# Patient Record
Sex: FEMALE | Race: WHITE | NOT HISPANIC OR LATINO | Employment: OTHER | URBAN - METROPOLITAN AREA
[De-identification: names, ages, dates, MRNs, and addresses within clinical notes are randomized per-mention and may not be internally consistent; named-entity substitution may affect disease eponyms.]

---

## 2017-08-02 ENCOUNTER — OFFICE VISIT (OUTPATIENT)
Dept: PULMONOLOGY | Facility: CLINIC | Age: 77
End: 2017-08-02
Payer: MEDICARE

## 2017-08-02 VITALS
WEIGHT: 181 LBS | BODY MASS INDEX: 32.07 KG/M2 | OXYGEN SATURATION: 95 % | HEIGHT: 63 IN | DIASTOLIC BLOOD PRESSURE: 80 MMHG | SYSTOLIC BLOOD PRESSURE: 120 MMHG | HEART RATE: 69 BPM

## 2017-08-02 DIAGNOSIS — R09.82 POSTNASAL DRIP: ICD-10-CM

## 2017-08-02 DIAGNOSIS — Z87.891 PERSONAL HISTORY OF TOBACCO USE, PRESENTING HAZARDS TO HEALTH: ICD-10-CM

## 2017-08-02 DIAGNOSIS — R05.9 COUGH: Primary | ICD-10-CM

## 2017-08-02 DIAGNOSIS — R91.8 ABNORMAL CT SCAN OF LUNG: ICD-10-CM

## 2017-08-02 DIAGNOSIS — G47.33 OBSTRUCTIVE SLEEP APNEA SYNDROME: ICD-10-CM

## 2017-08-02 DIAGNOSIS — J40 BRONCHITIS: ICD-10-CM

## 2017-08-02 DIAGNOSIS — E66.9 NON MORBID OBESITY, UNSPECIFIED OBESITY TYPE: ICD-10-CM

## 2017-08-02 PROCEDURE — 99204 OFFICE O/P NEW MOD 45 MIN: CPT | Mod: ,,, | Performed by: INTERNAL MEDICINE

## 2017-08-02 PROCEDURE — 1159F MED LIST DOCD IN RCRD: CPT | Mod: ,,, | Performed by: INTERNAL MEDICINE

## 2017-08-02 RX ORDER — METOPROLOL SUCCINATE 50 MG/1
100 TABLET, EXTENDED RELEASE ORAL DAILY
COMMUNITY
End: 2020-02-06 | Stop reason: CLARIF

## 2017-08-02 RX ORDER — ASPIRIN 325 MG
100 TABLET, DELAYED RELEASE (ENTERIC COATED) ORAL DAILY
COMMUNITY

## 2017-08-02 RX ORDER — ANASTROZOLE 1 MG/1
1 TABLET ORAL DAILY
COMMUNITY
End: 2018-04-26 | Stop reason: SDUPTHER

## 2017-08-02 RX ORDER — OMEPRAZOLE 20 MG/1
20 CAPSULE, DELAYED RELEASE ORAL DAILY
COMMUNITY
End: 2018-03-22 | Stop reason: ALTCHOICE

## 2017-08-02 RX ORDER — ALBUTEROL SULFATE 90 UG/1
2 AEROSOL, METERED RESPIRATORY (INHALATION) EVERY 6 HOURS PRN
Qty: 18 G | Refills: 5 | Status: SHIPPED | OUTPATIENT
Start: 2017-08-02 | End: 2018-03-22 | Stop reason: SDUPTHER

## 2017-08-02 RX ORDER — AMLODIPINE BESYLATE 5 MG/1
5 TABLET ORAL 2 TIMES DAILY
COMMUNITY
End: 2020-09-02

## 2017-08-02 RX ORDER — LOSARTAN POTASSIUM 25 MG/1
25 TABLET ORAL DAILY
COMMUNITY
End: 2018-03-22

## 2017-08-02 RX ORDER — LORATADINE 10 MG/1
10 TABLET ORAL DAILY
COMMUNITY
End: 2020-02-06 | Stop reason: CLARIF

## 2017-08-02 RX ORDER — TRAMADOL HYDROCHLORIDE 50 MG/1
50 TABLET ORAL EVERY 6 HOURS PRN
COMMUNITY
End: 2018-10-04 | Stop reason: SDUPTHER

## 2017-08-02 RX ORDER — MELOXICAM 7.5 MG/1
7.5 TABLET ORAL DAILY
COMMUNITY
End: 2017-08-02

## 2017-08-02 RX ORDER — BENAZEPRIL HYDROCHLORIDE 20 MG/1
20 TABLET ORAL DAILY
COMMUNITY
End: 2017-08-02

## 2017-08-02 RX ORDER — CHOLECALCIFEROL (VITAMIN D3) 25 MCG
1000 TABLET ORAL DAILY
COMMUNITY

## 2017-08-02 RX ORDER — METFORMIN HYDROCHLORIDE 500 MG/1
500 TABLET ORAL 2 TIMES DAILY WITH MEALS
COMMUNITY

## 2017-08-02 RX ORDER — ASPIRIN 81 MG/1
81 TABLET ORAL DAILY
COMMUNITY

## 2017-08-02 RX ORDER — ATORVASTATIN CALCIUM 40 MG/1
40 TABLET, FILM COATED ORAL DAILY
COMMUNITY
End: 2020-02-06 | Stop reason: CLARIF

## 2017-08-02 NOTE — PROGRESS NOTES
New Office Visit/Consultation Note    Patient Name: Mirta Guerin  MRN: [id number]  : 1940      Reason for visit: Cough, bronchitis, chest pain (since surgery)    HPI:     78 yo with h/o cigarette use had CABG ( Dr Cardenas) has had issues with chronic chest pain since that time (though it is improved), had CT chest - no significant findings but had some mediastinal adenopathy (? Reactive), had PFT which showed no obstruction, no restriction, mild decrease in DLCO.  Had a recent bronchitis (2017) and was treated with doxycyline and is improved (still with some cough now with white to yellow sputum).  She denies problems with SOB, STEPHENS.  Still with chest pain though it is better (states that she had point tenderness when seen by Dr Juarez).    Past Medical History  Past Medical History:   Diagnosis Date    Anxiety     AV block     Breast cancer     Cardiac pacemaker in situ     Coronary artery disease     Diabetes mellitus     GERD (gastroesophageal reflux disease)     HLD (hyperlipidemia)     Hypertension     Lung disease     Mitral valve disorder     NEGRITA (obstructive sleep apnea)     Psoriasis     Spinal stenosis     Vitamin D deficiency        Past Surgical History  Past Surgical History:   Procedure Laterality Date    BREAST LUMPECTOMY      CARDIAC PACEMAKER PLACEMENT  2012    CORONARY ARTERY BYPASS GRAFT  2017    MASTECTOMY, PARTIAL      UPPER GASTROINTESTINAL ENDOSCOPY         Medications  No current outpatient prescriptions on file prior to visit.     No current facility-administered medications on file prior to visit.        Allergies  Review of patient's allergies indicates:   Allergen Reactions    Benazepril Other (See Comments)     sleepy    Codeine Nausea And Vomiting    Polysporin [bacitracin-polymyxin b] Rash       Review of Systems  Review of Systems   Constitutional: Positive for malaise/fatigue. Negative for chills, diaphoresis, fever and weight loss.  "  HENT: Negative for congestion.    Eyes: Negative for pain.   Respiratory: Positive for cough and sputum production. Negative for hemoptysis, shortness of breath, wheezing and stridor.         H/o sleep apnea - intolerant   Cardiovascular: Positive for chest pain. Negative for palpitations, orthopnea, claudication, leg swelling and PND.        Musculoskeletal   Gastrointestinal: Positive for heartburn. Negative for abdominal pain, constipation, diarrhea, nausea and vomiting.   Genitourinary: Negative for dysuria, frequency and urgency.   Musculoskeletal: Negative for falls and myalgias.   Neurological: Negative for sensory change, focal weakness and weakness.   Psychiatric/Behavioral: Negative for depression. The patient is not nervous/anxious.        Physical Exam    Vitals:    08/02/17 1051   BP: 120/80   Pulse: 69   SpO2: 95%   Weight: 82.1 kg (181 lb)   Height: 5' 3" (1.6 m)       Physical Exam   Constitutional: She is oriented to person, place, and time. She appears well-developed and well-nourished. No distress.   HENT:   Head: Normocephalic and atraumatic.   Right Ear: External ear normal.   Left Ear: External ear normal.   Nose: Nose normal.   Mouth/Throat: Oropharynx is clear and moist.   Eyes: Conjunctivae and EOM are normal. Pupils are equal, round, and reactive to light.   Neck: Normal range of motion. Neck supple. No JVD present. No tracheal deviation present. No thyromegaly present.   Cardiovascular: Normal rate, regular rhythm, normal heart sounds and intact distal pulses.  Exam reveals no gallop and no friction rub.    No murmur heard.  Pulmonary/Chest: Effort normal and breath sounds normal. No stridor. No respiratory distress. She has no wheezes. She has no rales. She exhibits no tenderness.   Abdominal: Soft. Bowel sounds are normal. She exhibits no distension. There is no tenderness.   Musculoskeletal: Normal range of motion. She exhibits no edema or tenderness.   Lymphadenopathy:     She has no " cervical adenopathy.   Neurological: She is alert and oriented to person, place, and time. No cranial nerve deficit.   Skin: Skin is warm and dry. She is not diaphoretic.   Psychiatric: She has a normal mood and affect. Her behavior is normal.   Nursing note and vitals reviewed.      Labs    Xrays    Imaging Results    None         Impression/Plan    Problem List Items Addressed This Visit        Neuro    Obstructive sleep apnea syndrome  - d/w pt, will consider further evaluation       Pulmonary    Bronchitis  - better    Cough - Primary  - had essentially normal PFT  - trial of prn proair and will follow    Relevant Medications    albuterol 90 mcg/actuation inhaler       Fluids/Electrolytes/Nutrition/GI    Non morbid obesity  - needs to work on weight loss       Other    Postnasal drip  - continue with prn treatment    Personal history of tobacco use, presenting hazards to health  - has stopped    Abnormal CT scan of lung  - will repeat CT to make sure no changes and that nodes resolve  - RTC 1 month    Relevant Orders    CT Chest Without Contrast      Other Visit Diagnoses    None.         Time spent with patient: 45 minutes

## 2017-08-14 ENCOUNTER — OFFICE VISIT (OUTPATIENT)
Dept: HEMATOLOGY/ONCOLOGY | Facility: CLINIC | Age: 77
End: 2017-08-14
Payer: MEDICARE

## 2017-08-14 ENCOUNTER — TELEPHONE (OUTPATIENT)
Dept: HEMATOLOGY/ONCOLOGY | Facility: CLINIC | Age: 77
End: 2017-08-14

## 2017-08-14 VITALS
HEIGHT: 62 IN | HEART RATE: 87 BPM | DIASTOLIC BLOOD PRESSURE: 81 MMHG | RESPIRATION RATE: 18 BRPM | WEIGHT: 181 LBS | SYSTOLIC BLOOD PRESSURE: 147 MMHG | BODY MASS INDEX: 33.31 KG/M2 | TEMPERATURE: 98 F

## 2017-08-14 DIAGNOSIS — Z17.0 MALIGNANT NEOPLASM OF UPPER-OUTER QUADRANT OF RIGHT BREAST IN FEMALE, ESTROGEN RECEPTOR POSITIVE: Primary | ICD-10-CM

## 2017-08-14 DIAGNOSIS — C50.411 MALIGNANT NEOPLASM OF UPPER-OUTER QUADRANT OF RIGHT BREAST IN FEMALE, ESTROGEN RECEPTOR POSITIVE: Primary | ICD-10-CM

## 2017-08-14 DIAGNOSIS — I25.810 CORONARY ARTERY DISEASE INVOLVING AUTOLOGOUS VEIN CORONARY BYPASS GRAFT WITHOUT ANGINA PECTORIS: ICD-10-CM

## 2017-08-14 PROCEDURE — 3008F BODY MASS INDEX DOCD: CPT | Mod: ,,, | Performed by: INTERNAL MEDICINE

## 2017-08-14 PROCEDURE — 99214 OFFICE O/P EST MOD 30 MIN: CPT | Mod: ,,, | Performed by: INTERNAL MEDICINE

## 2017-08-14 PROCEDURE — 1159F MED LIST DOCD IN RCRD: CPT | Mod: ,,, | Performed by: INTERNAL MEDICINE

## 2017-08-14 PROCEDURE — 1125F AMNT PAIN NOTED PAIN PRSNT: CPT | Mod: ,,, | Performed by: INTERNAL MEDICINE

## 2017-08-14 RX ORDER — PANTOPRAZOLE SODIUM 40 MG/1
TABLET, DELAYED RELEASE ORAL
COMMUNITY
Start: 2017-08-05 | End: 2021-05-24

## 2017-08-14 RX ORDER — DULOXETIN HYDROCHLORIDE 20 MG/1
20 CAPSULE, DELAYED RELEASE ORAL DAILY
COMMUNITY
End: 2018-04-26 | Stop reason: SDUPTHER

## 2017-08-14 NOTE — LETTER
August 14, 2017      James Jung MD  [address] [place] [address]           [facility]  [address]  [address]  [facility] [address]  Phone: [phone]  Fax: [phone]          Patient: Mirta Guerin   MR Number: [id number]   YOB: 1940   Date of Visit: 8/14/2017       Dear Dr. James Jung:    Thank you for referring Mirta Guerin to me for evaluation. Attached you will find relevant portions of my assessment and plan of care.    If you have questions, please do not hesitate to call me. I look forward to following Mirta Guerin along with you.    Sincerely,    ZOE Fontenot MD    Enclosure  CC:  No Recipients    If you would like to receive this communication electronically, please contact [email or web address] or [phone] to request more information on [employer] Link access.    For providers and/or their staff who would like to refer a patient to [facility], please contact us through our one-stop-shop provider referral line, [facility], at [phone].    If you feel you have received this communication in error or would no longer like to receive these types of communications, please e-mail [email or web address]

## 2017-08-14 NOTE — PROGRESS NOTES
[facility] History & Physical    Subjective:      Patient ID:   Mirta Guerin  77 y.o. female  1940  James Jung M.D.. Barbie Castillo M.D., Tony Goldberg.      Chief Complaint:breast cancer follow-up    HPI:  77 y.o. female with diagnosis of stage I right breast cancer.Diagnosed in 2015. Treated with partial mastectomy and sentinel node biopsy, followed by adjuvant radiation therapy, she started adjuvant Arimidex in 2016.    ERP was positive, PRP was positive, HER-2/asaf was negative.hot flash symptoms are controlled on Cymbalta. Joint pain symptoms have not been a problem for her. She has history of hypertension, diabetes, GERD,hypercholesterolemia, DJD, and sleep apnea syndrome.    She has history of left partial mastectomy and sentinel node biopsy, pacemaker placement, facial surgery, right and left knee replacement, complete hysterectomy. She had CABG surgery in 2016.    Since her CABG surgery, she complains of continued substernal chest pain. CAT scan in 2016 at [facility] did show some inflammation in the soft tissue in the sternal area. She recently had another CAT scan completed at [facility] imaging center and report is pending.    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    Her dad had CVA, her mother  of ovarian cancer at age 86, her brother has had 2 or 3 CVA, her sister has had bladder cancer, she has a daughter with hypoglycemia.    She is allergic to codeine benazepril, Polysporin, and triple antibiotics ointment.    Dr. James Jung is her primary care physician. Dr. Wero Moreno is her pulmonologist.  Dr. Juarez is her cardiologist.    She is 5 foot 2 inches tall and weighs 181 pounds    ROS:   GEN: normal without any fever, night sweats or weight loss  HEENT: normal with no HA's, sore throat, stiff neck, changes in vision  CV: see history of present illness.   no CP, SOB, PND, STEPHENS  or orthopnea  PULM: see history of present illness.   no SOB, cough, hemoptysis, sputum or pleuritic pain  GI: normal with no abdominal pain, nausea, vomiting, constipation, diarrhea, melanotic stools, BRBPR, or hematemesis  : normal with no hematuria, dysuria  BREAST: see history of present illness  SKIN: normal with no rash, erythema, bruising, or swelling     Past Medical History:   Diagnosis Date    Anxiety     AV block     Breast cancer     Cardiac pacemaker in situ     Coronary artery disease     Diabetes mellitus     GERD (gastroesophageal reflux disease)     HLD (hyperlipidemia)     Hypertension     Lung disease     Mitral valve disorder     NEGRITA (obstructive sleep apnea)     Psoriasis     Spinal stenosis     Vitamin D deficiency      Past Surgical History:   Procedure Laterality Date    BREAST LUMPECTOMY      CARDIAC PACEMAKER PLACEMENT  04/05/2012    CORONARY ARTERY BYPASS GRAFT  07/2017    MASTECTOMY, PARTIAL      UPPER GASTROINTESTINAL ENDOSCOPY         Review of patient's allergies indicates:   Allergen Reactions    Benazepril Other (See Comments)     sleepy    Codeine Nausea And Vomiting    Polysporin [bacitracin-polymyxin b] Rash     Social History     Social History    Marital status:      Spouse name: N/A    Number of children: N/A    Years of education: N/A     Occupational History    Not on file.     Social History Main Topics    Smoking status: Current Every Day Smoker     Packs/day: 0.50     Years: 50.00     Types: Cigarettes     Start date: 1954    Smokeless tobacco: Never Used    Alcohol use Yes      Comment: occasional    Drug use: Unknown    Sexual activity: Not on file     Other Topics Concern    Not on file     Social History Narrative    No narrative on file         Current Outpatient Prescriptions:     albuterol 90 mcg/actuation inhaler, Inhale 2 puffs into the lungs every 6 (six) hours as needed for Wheezing. Rescue, Disp: 18 g, Rfl: 5     "amlodipine (NORVASC) 5 MG tablet, Take 5 mg by mouth once daily., Disp: , Rfl:     anastrozole (ARIMIDEX) 1 mg Tab, Take 1 mg by mouth once daily., Disp: , Rfl:     aspirin (ECOTRIN) 81 MG EC tablet, Take 81 mg by mouth once daily., Disp: , Rfl:     atorvastatin (LIPITOR) 40 MG tablet, Take 40 mg by mouth once daily., Disp: , Rfl:     co-enzyme Q-10 50 mg capsule, Take 100 mg by mouth once daily., Disp: , Rfl:     duloxetine (CYMBALTA) 20 MG capsule, Take 20 mg by mouth once daily., Disp: , Rfl:     loratadine (CLARITIN) 10 mg tablet, Take 10 mg by mouth once daily., Disp: , Rfl:     losartan (COZAAR) 25 MG tablet, Take 25 mg by mouth once daily., Disp: , Rfl:     metformin (GLUCOPHAGE) 500 MG tablet, Take 500 mg by mouth 2 (two) times daily with meals., Disp: , Rfl:     metoprolol succinate (TOPROL-XL) 100 MG 24 hr tablet, Take 100 mg by mouth once daily., Disp: , Rfl:     MULTIVIT-MIN/FA/LUTEIN/ZEAXANT (MACULAR VITAMIN ORAL), Take by mouth., Disp: , Rfl:     multivitamin capsule, Take 1 capsule by mouth once daily., Disp: , Rfl:     omeprazole (PRILOSEC) 20 MG capsule, Take 20 mg by mouth once daily., Disp: , Rfl:     pantoprazole (PROTONIX) 40 MG tablet, , Disp: , Rfl:     tramadol (ULTRAM) 50 mg tablet, Take 50 mg by mouth every 6 (six) hours as needed for Pain., Disp: , Rfl:     vitamin D 1000 units Tab, Take 1,000 Units by mouth once daily., Disp: , Rfl:           Objective:   Vitals:  Blood pressure (!) 147/81, pulse 87, temperature 97.6 °F (36.4 °C), resp. rate 18, height 5' 2" (1.575 m), weight 82.1 kg (181 lb).    Physical Examination:   GEN: no apparent distress, comfortable; AAOx3  HEAD: atraumatic and normocephalic  EYES: no pallor, no icterus, PERRLA  ENT: OMM, no pharyngeal erythema, external ears WNL; no nasal discharge; no thrush  NECK: no masses, thyroid normal, trachea midline, no LAD/LN's, supple  CV: RRR with no murmur the skin  at the sternal area is healed and closed, nontender " without warmth or redness or fluctuance  CHEST: Normal respiratory effort; CTAB; distantbreath sounds; no wheeze or crackles  ABDOM: nontender and nondistended; soft; normal bowel sounds; no rebound/guarding  MUSC/Skeletal: ROM normal; no crepitus; joints normal  EXTREM: no clubbing, cyanosis, inflammation or swelling  SKIN: no rashes, lesions, ulcers, petechia  : no wolf  NEURO: grossly intact; motor/sensory WNL; AAOx3; no tremors  PSYCH: normal mood, affect and behavior  LYMPH: normal cervical, supraclavicular, axillary and groin LN's  BREAST:the left breast shows postoperative change, nontender, without palpable mass. The right breast is nontender without palpable mass.      Labs:     Radiology/Diagnostic Studies:    Mammogram 01/13/2017 was negative for malignancy at [facility]. Follow-up mammogram is scheduled for January 2018 at [facility]      All lab results and imaging results have been reviewed and discussed with the patient    Assessment:   (1) 77 y.o. female with diagnosis of stage I right breast cancer treated with right partial mastectomy, adjuvant radiation therapy, and continues on adjuvant Arimidex now. Originally diagnosed in May 2015. No evidence of disease on physical exam.Due for mammogram in January 2018.    (2) hot flash symptoms secondary to Arimidex are managed with Cymbalta 20 mg by mouth daily.    (3)sleep apnea syndrome under management of pulmonary    (4)substernal chest pain evaluation under investigation per . CAT scan of chest is pending. Return to clinic here in 6 months with mammogram at that time.          1. Malignant neoplasm of upper-outer quadrant of right breast in female, estrogen receptor positive    2. Coronary artery disease involving autologous vein coronary bypass graft without angina pectoris        Plan:       Malignant neoplasm of upper-outer quadrant of right breast in female, estrogen receptor positive  -     Mammo Diagnostic Bilateral; Future; Expected date:  02/14/2018    Coronary artery disease involving autologous vein coronary bypass graft without angina pectoris      Return in about 6 months (around 2/14/2018) for follow up on cancer status.    I have explained and the patient understands all of  the current recommendation(s). I have answered all of their questions to the best of my ability and to their complete satisfaction.

## 2017-09-21 ENCOUNTER — OFFICE VISIT (OUTPATIENT)
Dept: PULMONOLOGY | Facility: CLINIC | Age: 77
End: 2017-09-21
Payer: MEDICARE

## 2017-09-21 VITALS
OXYGEN SATURATION: 98 % | WEIGHT: 180 LBS | HEART RATE: 76 BPM | SYSTOLIC BLOOD PRESSURE: 110 MMHG | DIASTOLIC BLOOD PRESSURE: 68 MMHG | HEIGHT: 62 IN | BODY MASS INDEX: 33.13 KG/M2

## 2017-09-21 DIAGNOSIS — R91.8 ABNORMAL CT SCAN OF LUNG: Primary | ICD-10-CM

## 2017-09-21 DIAGNOSIS — Z87.891 PERSONAL HISTORY OF TOBACCO USE, PRESENTING HAZARDS TO HEALTH: ICD-10-CM

## 2017-09-21 DIAGNOSIS — G47.33 OBSTRUCTIVE SLEEP APNEA SYNDROME: ICD-10-CM

## 2017-09-21 PROCEDURE — 99214 OFFICE O/P EST MOD 30 MIN: CPT | Mod: ,,, | Performed by: INTERNAL MEDICINE

## 2017-09-21 PROCEDURE — 1159F MED LIST DOCD IN RCRD: CPT | Mod: ,,, | Performed by: INTERNAL MEDICINE

## 2017-09-21 NOTE — PROGRESS NOTES
Office Visit    HPI:    Here for follow up, feels better overall.  CT shows no adenopathy or lung disease, + ASCVD.  She is smoking a few cigarettes (d/w pt).  No other new issues.    Medications      Current Outpatient Prescriptions:     amlodipine (NORVASC) 5 MG tablet, Take 5 mg by mouth once daily., Disp: , Rfl:     anastrozole (ARIMIDEX) 1 mg Tab, Take 1 mg by mouth once daily., Disp: , Rfl:     aspirin (ECOTRIN) 81 MG EC tablet, Take 81 mg by mouth once daily., Disp: , Rfl:     atorvastatin (LIPITOR) 40 MG tablet, Take 40 mg by mouth once daily., Disp: , Rfl:     co-enzyme Q-10 50 mg capsule, Take 100 mg by mouth once daily., Disp: , Rfl:     duloxetine (CYMBALTA) 20 MG capsule, Take 20 mg by mouth once daily., Disp: , Rfl:     loratadine (CLARITIN) 10 mg tablet, Take 10 mg by mouth once daily., Disp: , Rfl:     losartan (COZAAR) 25 MG tablet, Take 25 mg by mouth once daily., Disp: , Rfl:     metformin (GLUCOPHAGE) 500 MG tablet, Take 500 mg by mouth 2 (two) times daily with meals., Disp: , Rfl:     metoprolol succinate (TOPROL-XL) 100 MG 24 hr tablet, Take 100 mg by mouth once daily., Disp: , Rfl:     MULTIVIT-MIN/FA/LUTEIN/ZEAXANT (MACULAR VITAMIN ORAL), Take by mouth., Disp: , Rfl:     multivitamin capsule, Take 1 capsule by mouth once daily., Disp: , Rfl:     omeprazole (PRILOSEC) 20 MG capsule, Take 20 mg by mouth once daily., Disp: , Rfl:     pantoprazole (PROTONIX) 40 MG tablet, , Disp: , Rfl:     tramadol (ULTRAM) 50 mg tablet, Take 50 mg by mouth every 6 (six) hours as needed for Pain., Disp: , Rfl:     vitamin D 1000 units Tab, Take 1,000 Units by mouth once daily., Disp: , Rfl:     albuterol 90 mcg/actuation inhaler, Inhale 2 puffs into the lungs every 6 (six) hours as needed for Wheezing. Rescue, Disp: 18 g, Rfl: 5    Allergies    Review of patient's allergies indicates:   Allergen Reactions    Benazepril Other (See Comments)     sleepy    Codeine Nausea And Vomiting     "Polysporin [bacitracin-polymyxin b] Rash       Social History    History   Smoking Status    Current Every Day Smoker    Packs/day: 0.50    Years: 50.00    Types: Cigarettes    Start date: 1954   Smokeless Tobacco    Never Used       ETOH - 0 drinks per week.    Drug Use - 0    Occupation - retired    Family History    Family History   Problem Relation Age of Onset    Cancer Mother      ovarian    Coronary artery disease Father     Stroke Father     Stroke Brother     Stroke Maternal Grandfather     Cancer Paternal Grandmother      breast    Stroke Paternal Grandfather        ROS  ROS    Physical Exam    Vitals:    09/21/17 0950   BP: 110/68   Pulse: 76   SpO2: 98%   Weight: 81.6 kg (180 lb)   Height: 5' 2" (1.575 m)       Physical Exam    Lab    @RESUFAST (WBC,HGB,HCT,PLT)@    No results found for: NA, K, CL, CO2, GLU, BUN, CREATININE, CALCIUM, PROT, ALBUMIN, BILITOT, ALKPHOS, AST, ALT, ANIONGAP      Xray        Impression/Plan    Problem List Items Addressed This Visit        Pulmonary    Obstructive sleep apnea syndrome  - stable    Abnormal CT scan of lung - Primary  - repeat CT better, no further workup needed       Other    Personal history of tobacco use, presenting hazards to health  - PFT were OK  - d/w pt about need to stop  - RTC 6 ,months      Other Visit Diagnoses    None.         "

## 2018-02-03 ENCOUNTER — TELEPHONE (OUTPATIENT)
Dept: HEMATOLOGY/ONCOLOGY | Facility: CLINIC | Age: 78
End: 2018-02-03

## 2018-02-26 ENCOUNTER — OFFICE VISIT (OUTPATIENT)
Dept: HEMATOLOGY/ONCOLOGY | Facility: CLINIC | Age: 78
End: 2018-02-26
Payer: MEDICARE

## 2018-02-26 VITALS
DIASTOLIC BLOOD PRESSURE: 73 MMHG | TEMPERATURE: 98 F | SYSTOLIC BLOOD PRESSURE: 114 MMHG | HEART RATE: 79 BPM | RESPIRATION RATE: 18 BRPM | BODY MASS INDEX: 31.57 KG/M2 | WEIGHT: 178.19 LBS | HEIGHT: 63 IN

## 2018-02-26 DIAGNOSIS — G47.33 OBSTRUCTIVE SLEEP APNEA SYNDROME: ICD-10-CM

## 2018-02-26 DIAGNOSIS — Z17.0 MALIGNANT NEOPLASM OF UPPER-OUTER QUADRANT OF RIGHT BREAST IN FEMALE, ESTROGEN RECEPTOR POSITIVE: Primary | ICD-10-CM

## 2018-02-26 DIAGNOSIS — C50.411 MALIGNANT NEOPLASM OF UPPER-OUTER QUADRANT OF RIGHT BREAST IN FEMALE, ESTROGEN RECEPTOR POSITIVE: Primary | ICD-10-CM

## 2018-02-26 DIAGNOSIS — Z87.891 PERSONAL HISTORY OF TOBACCO USE, PRESENTING HAZARDS TO HEALTH: ICD-10-CM

## 2018-02-26 DIAGNOSIS — R91.8 ABNORMAL CT SCAN OF LUNG: ICD-10-CM

## 2018-02-26 DIAGNOSIS — I25.810 CORONARY ARTERY DISEASE INVOLVING AUTOLOGOUS VEIN CORONARY BYPASS GRAFT WITHOUT ANGINA PECTORIS: ICD-10-CM

## 2018-02-26 PROCEDURE — 1159F MED LIST DOCD IN RCRD: CPT | Mod: ,,, | Performed by: INTERNAL MEDICINE

## 2018-02-26 PROCEDURE — 99214 OFFICE O/P EST MOD 30 MIN: CPT | Mod: ,,, | Performed by: INTERNAL MEDICINE

## 2018-02-26 RX ORDER — METOPROLOL TARTRATE 50 MG/1
50 TABLET ORAL 2 TIMES DAILY
COMMUNITY
Start: 2018-02-11 | End: 2018-12-27

## 2018-02-26 RX ORDER — METOPROLOL TARTRATE 25 MG/1
25 TABLET, FILM COATED ORAL 2 TIMES DAILY
COMMUNITY
Start: 2018-01-02 | End: 2018-12-27

## 2018-02-26 RX ORDER — LOSARTAN POTASSIUM 100 MG/1
TABLET ORAL
COMMUNITY
Start: 2018-01-02 | End: 2021-05-24

## 2018-02-26 NOTE — LETTER
February 26, 2018      James Jung MD  [address] [place] [address]           [facility]  [address]  [address]  [facility] [address]  Phone: [phone]  Fax: [phone]          Patient: Mirta Guerin   MR Number: [id number]   YOB: 1940   Date of Visit: 2/26/2018       Dear Dr. James Jung:    Thank you for referring Mirta Guerin to me for evaluation. Attached you will find relevant portions of my assessment and plan of care.    If you have questions, please do not hesitate to call me. I look forward to following Mirta Guerin along with you.    Sincerely,    ZOE Fontenot MD    Enclosure  CC:  No Recipients    If you would like to receive this communication electronically, please contact [email or web address] or [phone] to request more information on [email or web address] Link access.    For providers and/or their staff who would like to refer a patient to [facility], please contact us through our one-stop-shop provider referral line, [facility], at [phone].    If you feel you have received this communication in error or would no longer like to receive these types of communications, please e-mail [email or web address]

## 2018-02-27 NOTE — PROGRESS NOTES
[facility] History & Physical    Subjective:      Patient ID:   Mirta Guerin  77 y.o. female  1940  James Jung M.D.. Barbie Castillo M.D., Tony Goldberg.      Chief Complaint:breast cancer follow-up    HPI:  77 y.o. female with diagnosis of stage I right breast cancer.Diagnosed in 2015. Treated with partial mastectomy and sentinel node biopsy, followed by adjuvant radiation therapy, she started adjuvant Arimidex in 2016.    ERP was positive, PRP was positive, HER-2/asaf was negative.hot flash symptoms are controlled on Cymbalta. Joint pain symptoms have not been a problem for her. She has history of hypertension, diabetes, GERD,hypercholesterolemia, DJD, and sleep apnea syndrome.    She has history of left partial mastectomy and sentinel node biopsy, pacemaker placement, facial surgery, right and left knee replacement, complete hysterectomy. She had CABG surgery in 2016.    Since her CABG surgery, she complains of continued substernal chest pain. CAT scan in 2016 at [facility] did show some inflammation in the soft tissue in the sternal area. She recently had another CAT scan completed at [facility] imaging center and report is pending.    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    Her dad had CVA, her mother  of ovarian cancer at age 86, her brother has had 2 or 3 CVA, her sister has had bladder cancer, she has a daughter with hypoglycemia.    She is allergic to codeine benazepril, Polysporin, and triple antibiotics ointment.    Dr. James Jung is her primary care physician. Dr. Wero Moreno is her pulmonologist.  Dr. Juarez is her cardiologist.  Dr. Talbot is her GYN.    She is 5 foot 2 inches tall and weighs 181 pounds    ROS:   GEN: normal without any fever, night sweats or weight loss  HEENT: normal with no HA's, sore throat, stiff neck, changes in vision  CV: see history of present  illness.   no CP, SOB, PND, STEPHENS or orthopnea  PULM: see history of present illness.   no SOB, cough, hemoptysis, sputum or pleuritic pain  GI: normal with no abdominal pain, nausea, vomiting, constipation, diarrhea, melanotic stools, BRBPR, or hematemesis  : normal with no hematuria, dysuria  BREAST: see history of present illness  SKIN: normal with no rash, erythema, bruising, or swelling     Past Medical History:   Diagnosis Date    Anxiety     AV block     Breast cancer     Cardiac pacemaker in situ     Coronary artery disease     Diabetes mellitus     GERD (gastroesophageal reflux disease)     HLD (hyperlipidemia)     Hypertension     Lung disease     Mitral valve disorder     NEGRITA (obstructive sleep apnea)     Psoriasis     Spinal stenosis     Vitamin D deficiency      Past Surgical History:   Procedure Laterality Date    BREAST LUMPECTOMY      CARDIAC PACEMAKER PLACEMENT  04/05/2012    CORONARY ARTERY BYPASS GRAFT  07/2017    MASTECTOMY, PARTIAL      UPPER GASTROINTESTINAL ENDOSCOPY         Review of patient's allergies indicates:   Allergen Reactions    Benazepril Other (See Comments)     sleepy    Codeine Nausea And Vomiting    Polysporin [bacitracin-polymyxin b] Rash     Social History     Social History    Marital status:      Spouse name: N/A    Number of children: N/A    Years of education: N/A     Occupational History    Not on file.     Social History Main Topics    Smoking status: Current Every Day Smoker     Packs/day: 0.50     Years: 50.00     Types: Cigarettes     Start date: 1954    Smokeless tobacco: Never Used    Alcohol use Yes      Comment: occasional    Drug use: Unknown    Sexual activity: Not on file     Other Topics Concern    Not on file     Social History Narrative    No narrative on file         Current Outpatient Prescriptions:     albuterol 90 mcg/actuation inhaler, Inhale 2 puffs into the lungs every 6 (six) hours as needed for Wheezing.  "Rescue, Disp: 18 g, Rfl: 5    amlodipine (NORVASC) 5 MG tablet, Take 5 mg by mouth once daily., Disp: , Rfl:     anastrozole (ARIMIDEX) 1 mg Tab, Take 1 mg by mouth once daily., Disp: , Rfl:     aspirin (ECOTRIN) 81 MG EC tablet, Take 81 mg by mouth once daily., Disp: , Rfl:     atorvastatin (LIPITOR) 40 MG tablet, Take 40 mg by mouth once daily., Disp: , Rfl:     co-enzyme Q-10 50 mg capsule, Take 100 mg by mouth once daily., Disp: , Rfl:     duloxetine (CYMBALTA) 20 MG capsule, Take 20 mg by mouth once daily., Disp: , Rfl:     loratadine (CLARITIN) 10 mg tablet, Take 10 mg by mouth once daily., Disp: , Rfl:     losartan (COZAAR) 100 MG tablet, , Disp: , Rfl:     losartan (COZAAR) 25 MG tablet, Take 25 mg by mouth once daily., Disp: , Rfl:     metformin (GLUCOPHAGE) 500 MG tablet, Take 500 mg by mouth 2 (two) times daily with meals., Disp: , Rfl:     metoprolol succinate (TOPROL-XL) 100 MG 24 hr tablet, Take 100 mg by mouth once daily., Disp: , Rfl:     metoprolol tartrate (LOPRESSOR) 25 MG tablet, , Disp: , Rfl:     metoprolol tartrate (LOPRESSOR) 50 MG tablet, , Disp: , Rfl:     MULTIVIT-MIN/FA/LUTEIN/ZEAXANT (MACULAR VITAMIN ORAL), Take by mouth., Disp: , Rfl:     multivitamin capsule, Take 1 capsule by mouth once daily., Disp: , Rfl:     omeprazole (PRILOSEC) 20 MG capsule, Take 20 mg by mouth once daily., Disp: , Rfl:     pantoprazole (PROTONIX) 40 MG tablet, , Disp: , Rfl:     tramadol (ULTRAM) 50 mg tablet, Take 50 mg by mouth every 6 (six) hours as needed for Pain., Disp: , Rfl:     vitamin D 1000 units Tab, Take 1,000 Units by mouth once daily., Disp: , Rfl:           Objective:   Vitals:  Blood pressure 114/73, pulse 79, temperature 97.9 °F (36.6 °C), resp. rate 18, height 5' 3" (1.6 m), weight 80.8 kg (178 lb 3.2 oz).    Physical Examination:   GEN: no apparent distress, comfortable  HEAD: atraumatic and normocephalic  EYES: no pallor, no icterus  ENT:  no pharyngeal erythema, external " ears WNL; no nasal discharge  NECK: no masses, thyroid normal, trachea midline, no LAD/LN's, supple  CV: RRR with no murmur the skin  at the sternal area is healed and closed, nontender without warmth or redness or fluctuance  CHEST: Normal respiratory effort; CTAB; distantbreath sounds; no wheeze or crackles  ABDOM: nontender and nondistended; soft; normal bowel sounds; no rebound/guarding, L/S NP  MUSC/Skeletal: ROM normal; no crepitus; joints normal  EXTREM: no clubbing, cyanosis, inflammation or swelling  SKIN: no rashes, lesions, ulcers, petechia  : no wolf  NEURO: grossly intact; motor/sensory WNL;  no tremors  PSYCH: normal mood, affect and behavior  LYMPH: normal cervical, supraclavicular, axillary and groin LN's  BREAST:the left breast shows postoperative change, nontender, without palpable mass. The right breast is nontender without palpable mass.      Labs:     Radiology/Diagnostic Studies:    Mammogram 1/2018 was negative for malignancy at [facility]. Follow-up mammogram is scheduled for January 2019 at [facility]      Assessment:   (1) 77 y.o. female with diagnosis of stage I right breast cancer treated with right partial mastectomy, adjuvant radiation therapy, and continues on adjuvant Arimidex now. Originally diagnosed in May 2015. No evidence of disease on physical exam.Due for mammogram in January 2019.    (2) hot flash symptoms secondary to Arimidex are managed with Cymbalta 20 mg by mouth daily.    (3)sleep apnea syndrome under management of pulmonary    (4)substernal chest pain hx

## 2018-03-22 ENCOUNTER — OFFICE VISIT (OUTPATIENT)
Dept: PULMONOLOGY | Facility: CLINIC | Age: 78
End: 2018-03-22
Payer: MEDICARE

## 2018-03-22 VITALS
OXYGEN SATURATION: 99 % | HEIGHT: 63 IN | WEIGHT: 181 LBS | HEART RATE: 90 BPM | BODY MASS INDEX: 32.07 KG/M2 | DIASTOLIC BLOOD PRESSURE: 70 MMHG | SYSTOLIC BLOOD PRESSURE: 110 MMHG

## 2018-03-22 DIAGNOSIS — G47.33 OBSTRUCTIVE SLEEP APNEA SYNDROME: Primary | ICD-10-CM

## 2018-03-22 DIAGNOSIS — R05.9 COUGH: ICD-10-CM

## 2018-03-22 PROCEDURE — 99214 OFFICE O/P EST MOD 30 MIN: CPT | Mod: ,,, | Performed by: INTERNAL MEDICINE

## 2018-03-22 RX ORDER — ALBUTEROL SULFATE 90 UG/1
2 AEROSOL, METERED RESPIRATORY (INHALATION) EVERY 6 HOURS PRN
Qty: 3 INHALER | Refills: 3 | Status: SHIPPED | OUTPATIENT
Start: 2018-03-22 | End: 2019-04-25

## 2018-03-22 NOTE — PROGRESS NOTES
"Office Visit    HPI:    9/21/2017 - Here for follow up, feels better overall.  CT shows no adenopathy or lung disease, + ASCVD.  She is smoking a few cigarettes (d/w pt).  No other new issues.    3/22/2018 - Here for follow up, feels ok and has had no new issues reported.  Still smokes a few cigarettes a day. No increase in SOB, STEPHENS.  Pt is currently stable on present medications with no recent increases in their symptoms or use of rescue medications.  I have reviewed the medical regimen and re-educated the pt on the role of rescue and controlling medications.  All questions answered.  Inhaler technique seems adequate.    SLEEP STUDY ORDER    Pt has evidence for sleep apnea including snoring, observed apneas, poor quality sleep, excessive somnolence ([place] Sleepiness Score - 13).  Medically she needs a sleep study for evaluation and diagnosis.  This is a face to face visit regarding the need for a sleep study and possible further evaluation and/or treatment.  She is interested in a home study.    Cigarette Counseling    Currently smoking < 1/4 packs per day  30 pack years    I have counseled pt for 3-5 minutes regarding cigarette cessation.  This has included the need to stop smoking as well as strategies, including but not limited to "cold turkey", CHANTIX (including risks and benefits of sailaja drug), nicotine replacement and WELLBUTRIN.    Medications      Current Outpatient Prescriptions:     albuterol 90 mcg/actuation inhaler, Inhale 2 puffs into the lungs every 6 (six) hours as needed for Wheezing. Rescue, Disp: 18 g, Rfl: 5    amlodipine (NORVASC) 5 MG tablet, Take 5 mg by mouth once daily., Disp: , Rfl:     anastrozole (ARIMIDEX) 1 mg Tab, Take 1 mg by mouth once daily., Disp: , Rfl:     aspirin (ECOTRIN) 81 MG EC tablet, Take 81 mg by mouth once daily., Disp: , Rfl:     atorvastatin (LIPITOR) 40 MG tablet, Take 40 mg by mouth once daily., Disp: , Rfl:     co-enzyme Q-10 50 mg capsule, Take 100 mg by " mouth once daily., Disp: , Rfl:     duloxetine (CYMBALTA) 20 MG capsule, Take 20 mg by mouth once daily., Disp: , Rfl:     loratadine (CLARITIN) 10 mg tablet, Take 10 mg by mouth once daily., Disp: , Rfl:     losartan (COZAAR) 100 MG tablet, , Disp: , Rfl:     metformin (GLUCOPHAGE) 500 MG tablet, Take 500 mg by mouth 2 (two) times daily with meals., Disp: , Rfl:     metoprolol succinate (TOPROL-XL) 50 MG 24 hr tablet, Take 100 mg by mouth once daily., Disp: , Rfl:     metoprolol tartrate (LOPRESSOR) 25 MG tablet, 25 mg 2 (two) times daily. , Disp: , Rfl:     metoprolol tartrate (LOPRESSOR) 50 MG tablet, 50 mg 2 (two) times daily. , Disp: , Rfl:     MULTIVIT-MIN/FA/LUTEIN/ZEAXANT (MACULAR VITAMIN ORAL), Take by mouth., Disp: , Rfl:     multivitamin capsule, Take 1 capsule by mouth once daily., Disp: , Rfl:     pantoprazole (PROTONIX) 40 MG tablet, , Disp: , Rfl:     tramadol (ULTRAM) 50 mg tablet, Take 50 mg by mouth every 6 (six) hours as needed for Pain., Disp: , Rfl:     vitamin D 1000 units Tab, Take 1,000 Units by mouth once daily., Disp: , Rfl:     Allergies    Review of patient's allergies indicates:   Allergen Reactions    Benazepril Other (See Comments)     sleepy    Codeine Nausea And Vomiting    Polysporin [bacitracin-polymyxin b] Rash       Social History    History   Smoking Status    Current Every Day Smoker    Packs/day: 0.50    Years: 50.00    Types: Cigarettes    Start date: 1954   Smokeless Tobacco    Never Used       ETOH - 0 drinks per week.    Drug Use - 0    Occupation - retired    Family History    Family History   Problem Relation Age of Onset    Cancer Mother      ovarian    Coronary artery disease Father     Stroke Father     Stroke Brother     Stroke Maternal Grandfather     Cancer Paternal Grandmother      breast    Stroke Paternal Grandfather        ROS  Review of Systems   Constitutional: Positive for malaise/fatigue. Negative for chills, diaphoresis, fever  "and weight loss.   HENT: Negative for congestion.    Eyes: Negative for pain.   Respiratory: Positive for cough. Negative for hemoptysis, sputum production, shortness of breath, wheezing and stridor.    Cardiovascular: Negative for chest pain, palpitations, orthopnea, claudication, leg swelling and PND.   Gastrointestinal: Negative for abdominal pain, constipation, diarrhea, heartburn, nausea and vomiting.   Genitourinary: Negative for dysuria, frequency and urgency.   Musculoskeletal: Negative for falls and myalgias.   Neurological: Negative for sensory change, focal weakness and weakness.   Psychiatric/Behavioral: Negative for depression. The patient is not nervous/anxious.        Physical Exam    Vitals:    03/22/18 0951   BP: 110/70   Pulse: 90   SpO2: 99%   Weight: 82.1 kg (181 lb)   Height: 5' 3" (1.6 m)       Physical Exam   Constitutional: She is oriented to person, place, and time. She appears well-developed and well-nourished. No distress.   HENT:   Head: Normocephalic and atraumatic.   Nose: Nose normal.   Mouth/Throat: Oropharynx is clear and moist.   Eyes: Conjunctivae and EOM are normal. Pupils are equal, round, and reactive to light.   Neck: Normal range of motion. Neck supple. No JVD present. No tracheal deviation present. No thyromegaly present.   Cardiovascular: Normal rate, regular rhythm, normal heart sounds and intact distal pulses.  Exam reveals no gallop and no friction rub.    No murmur heard.  Pulmonary/Chest: Effort normal and breath sounds normal. No stridor. No respiratory distress. She has no wheezes. She has no rales. She exhibits no tenderness.   Abdominal: Soft. Bowel sounds are normal. She exhibits no distension. There is no tenderness.   Musculoskeletal: Normal range of motion. She exhibits no edema or tenderness.   Lymphadenopathy:     She has no cervical adenopathy.   Neurological: She is alert and oriented to person, place, and time. No cranial nerve deficit.   Skin: Skin is warm " "and dry. She is not diaphoretic.   Psychiatric: She has a normal mood and affect. Her behavior is normal.   Nursing note and vitals reviewed.      Lab    @RESUFAST (WBC,HGB,HCT,PLT)@    No results found for: NA, K, CL, CO2, GLU, BUN, CREATININE, CALCIUM, PROT, ALBUMIN, BILITOT, ALKPHOS, AST, ALT, ANIONGAP      Xray    Comparison: Prior outside report dated 11/10/2016. The images are not available  for comparison  CT THORAX:  There are median sternotomy wires from prior CABG. There is no subcutaneous or  retrosternal soft tissue density within the region of the sternoclavicular  joint which was described on the prior study. There is no osseous abnormality.  There is a left subclavian vein implantable cardiac device with lead tips in  the right atrium and right ventricle.    There are subcentimeter mediastinal nodes which are not pathologically enlarged.  There is no mediastinal, hilar or axillary adenopathy. There is diffuse  vascular calcification of the aorta and coronary arteries.    There are mild emphysematous changes. There are no pulmonary nodules,  infiltrates or pleural effusions.    The trachea and bronchi are normal.    There is a 5.8 cm cyst within the left lobe of the liver the adrenal glands are  normal. There are no acute osseous abnormalities.    IMPRESSION:  Prior CABG with no acute pulmonary process    Mild emphysematous changes    Diffuse vascular calcification of aorta    Read and electronically signed by: Angely Orellana MD on 8/15/2017 1:13 PM CDT      ANGELY ORELLANA MD    Impression/Plan    Problem List Items Addressed This Visit        Pulmonary    Obstructive sleep apnea syndrome  - has not used CPAP in "years"  - will set up for home study to evaluate    Abnormal CT scan of lung - Primary  - repeat CT better, no further workup needed       Other    Personal history of tobacco use, presenting hazards to health  - PFT were OK (FEV! - 99%, DLCO - 58% 6/5/2017)  - d/w pt about need to " stop  - RTC 6 months      Other Visit Diagnoses    None.

## 2018-04-18 ENCOUNTER — TELEPHONE (OUTPATIENT)
Dept: ORTHOPEDICS | Facility: CLINIC | Age: 78
End: 2018-04-18

## 2018-04-18 NOTE — TELEPHONE ENCOUNTER
Spoke with the patient. She wants rx called in to [facility] [phone].  ----- Message from Aisha Juarez sent at 4/18/2018  9:59 AM CDT -----  Contact: Patient  Call patient, [phone], she wants to know if the reason we could not approve her tramadol was because a different pharmacy is requesting?

## 2018-04-26 DIAGNOSIS — Z17.0 MALIGNANT NEOPLASM OF UPPER-OUTER QUADRANT OF RIGHT BREAST IN FEMALE, ESTROGEN RECEPTOR POSITIVE: Primary | ICD-10-CM

## 2018-04-26 DIAGNOSIS — C50.411 MALIGNANT NEOPLASM OF UPPER-OUTER QUADRANT OF RIGHT BREAST IN FEMALE, ESTROGEN RECEPTOR POSITIVE: Primary | ICD-10-CM

## 2018-04-26 NOTE — TELEPHONE ENCOUNTER
----- Message from Regina Mosher sent at 4/26/2018  9:48 AM CDT -----  Pt called in asked that we call in her Duloxetine 20mg to [facility] phone number [phone] pt asked that we call rather than wait for them to send us an rx request since pt only has 7 pills left.

## 2018-04-29 RX ORDER — DULOXETIN HYDROCHLORIDE 20 MG/1
20 CAPSULE, DELAYED RELEASE ORAL DAILY
Qty: 30 CAPSULE | Refills: 4 | Status: SHIPPED | OUTPATIENT
Start: 2018-04-29 | End: 2018-09-27 | Stop reason: SDUPTHER

## 2018-04-29 RX ORDER — ANASTROZOLE 1 MG/1
1 TABLET ORAL DAILY
Qty: 90 TABLET | Refills: 3 | Status: SHIPPED | OUTPATIENT
Start: 2018-04-29 | End: 2018-12-01 | Stop reason: SDUPTHER

## 2018-05-01 ENCOUNTER — TELEPHONE (OUTPATIENT)
Dept: PULMONOLOGY | Facility: CLINIC | Age: 78
End: 2018-05-01

## 2018-05-01 DIAGNOSIS — G47.33 OBSTRUCTIVE SLEEP APNEA SYNDROME: Primary | ICD-10-CM

## 2018-06-14 ENCOUNTER — OFFICE VISIT (OUTPATIENT)
Dept: PODIATRY | Facility: CLINIC | Age: 78
End: 2018-06-14
Payer: MEDICARE

## 2018-06-14 VITALS
BODY MASS INDEX: 31.89 KG/M2 | HEIGHT: 63 IN | HEART RATE: 81 BPM | TEMPERATURE: 98 F | DIASTOLIC BLOOD PRESSURE: 62 MMHG | SYSTOLIC BLOOD PRESSURE: 127 MMHG | WEIGHT: 180 LBS

## 2018-06-14 DIAGNOSIS — L97.509 ULCER OF FOOT, UNSPECIFIED LATERALITY, UNSPECIFIED ULCER STAGE: ICD-10-CM

## 2018-06-14 DIAGNOSIS — L60.0 INGROWN NAIL: ICD-10-CM

## 2018-06-14 DIAGNOSIS — E11.49 TYPE II DIABETES MELLITUS WITH NEUROLOGICAL MANIFESTATIONS: Primary | ICD-10-CM

## 2018-06-14 DIAGNOSIS — L40.9 PSORIASIS: ICD-10-CM

## 2018-06-14 PROCEDURE — 99213 OFFICE O/P EST LOW 20 MIN: CPT | Mod: S$PBB,,, | Performed by: PODIATRIST

## 2018-06-14 PROCEDURE — 99213 OFFICE O/P EST LOW 20 MIN: CPT | Mod: PBBFAC,PN | Performed by: PODIATRIST

## 2018-06-14 PROCEDURE — 99999 PR PBB SHADOW E&M-EST. PATIENT-LVL III: CPT | Mod: PBBFAC,,, | Performed by: PODIATRIST

## 2018-06-17 NOTE — PROGRESS NOTES
Subjective:       Patient ID: Mirta Guerin is a 77 y.o. female.    Chief Complaint: Diabetes Mellitus; Nail Problem; and Ingrown Toenail  Patient presents today for followup she is a history of chronically ingrown toenails with signs of infection especially on her left hallux.     HPI Patient reports No polyuria, No polydipsia, No galactorrhea, No hair loss, No heat intolerance, No cold intolerance, No change in libido, and No hirsutism; DM TYPE 2. She reports No fever, No chills, No night sweats, No weight loss, No weight gain, No fatigue, No malaise, No loss of appetite, and No myalgias. She reports No chest pain, No murmurs, No palpitations, No dyspnea on exertion, No orthopnea, No syncope, No paroxysmal nocturnal dyspnea, No edema, No cyanosis, No claudication, No rest pain, and No orthostatic symmtoms. She reports No shortness of breath, No wheezing, No cough, No hoarseness, No hemoptysis, and No sleep apnea. She reports No heartburn, No nausea, No vomiting, No diarrhea, No constipation, No abdominal pain, No jaundice, No melena, No hematochezia, No biliuria, No acholia, No anorexia, No early satiety, No dysphagia, No odynophagia, No hematemesis, No excessive belching, No bloating, No distention, No obstipation, No blood in stools, No hemorrhoids, No steatorrhea, and No excessive flatus. She reports No rash, No itching, No new skin lesions, No changes to the existing skin lesions or moles, No pigmentation changes, No skin dryness, No hair dryness, No hair changes, No nail changes, and No hirsutism. She reports No blurred vision, No changes in vision, No impaired vision, No double vision, No peripheral vision changes, and No eye discomfort. She reports No sore throat, No nasal congestion, No nasal discharge, No postnasal drip, No headaches, No vertigo, No lightheadedness, No nosebleeds, No gingival bleeding, No dental problems, No neck stiffness, No neck pain, No tinnitus, and No oral lesions. She reports  no bone pain, No muscle pain, No muscular weakness, No muscle cramps, No joint pain, No joint swelling, and No motion limitations. She reports No breast lump, No tenderness, No swelling, and No nipple discharge. She reports No tingling, No numbness, No weakness, No incoordination, No difficulty concentrating, No speech problems, No seizures, No tremors, and No loss of balance. She reports No anxiety, No depression, and No hallucinations. She reports No easy bruising, No petechiae, no purpura, No lymphadenopathy, and No pica. She reports No allergic dermatitis and No frequent illness. She reports No urgency, No frequency, No dysuria, No nocturia, No hematuria, No hesitancy, No pyuria, No oligouria, No change in urine color, No incontinence, No decreased stream, No dribbling, No pneumaturia, and No decreased libido. She reports No dyspareunia, No genital sores, No irregular menses, No menorrhagia, No vaginal discharge, and No possible pregnancy.     Review of Systems    Objective:      Physical Exam   Constitutional: She appears well-developed and well-nourished.   Cardiovascular:   Pulses:       Dorsalis pedis pulses are 1+ on the right side, and 1+ on the left side.        Posterior tibial pulses are 1+ on the right side, and 1+ on the left side.   Musculoskeletal: Normal range of motion. She exhibits edema and deformity.   Feet:   Right Foot:   Protective Sensation: 4 sites tested. 1 site sensed.  Skin Integrity: Positive for dry skin.   Left Foot:   Protective Sensation: 4 sites tested. 1 site sensed.  Skin Integrity: Positive for dry skin.   Neurological: She displays abnormal reflex.   Skin: Skin is warm. Capillary refill takes more than 3 seconds.   Psychiatric: She has a normal mood and affect. Her behavior is normal. Judgment and thought content normal.     Patient is noted to have positive erythema positive edema at the medial lateral aspect of the patient's left hallux upon debridement of the lateral border  left hallux there is purulent drainage noted as well as obvious signs of infection and pain to palpation. Patient is noted to have a well-defined area of hyperkeratotic tissue on the dorsal lateral aspect of the fifth digit left there is positive pain noted upon palpation of this area no sign of infection is noted. patient's previously noted psoriasis is more well-controlled on today's visit that ever seen it since it started seeing the patient there are no erythematous plaques or areas of skin breakdown especially on the heel.    Assessment:       1. Type II diabetes mellitus with neurological manifestations    2. Ulcer of foot, unspecified laterality, unspecified ulcer stage    3. Ingrown nail    4. Psoriasis        Plan:        Following examination I aggressively debrided both the medial and lateral border of the patient's left hallux I did advise the patient is obvious signs of infection with purulent drainage noted at the lateral border left hallux. Sterile saline was used to flush and irrigated the lateral border left hallux bacitracin ointment and a well-padded dry dressing applied patient advised to keep antibiotic ointment on the area for the next 3 days if this does not completely resolve if it continues to be red and painful we need to see her back for followup sooner than the regularly scheduled 6-8 week appointment. I debrided the hyperkeratotic tissue from the fifth digit left patient stated that this felt much better I advised her this is likely due to a shoe rubbing and irritation of this area recommended followup 6-8 weeks.Patient states her psoriasis is doing as good as it's been in many years and is very well controlled especially on her feet.   Patient made a statement today that the gabapentin I had prescribed her has absolutely changed her life she is having a lot less discomfort and pain in her lower extremities.

## 2018-08-06 ENCOUNTER — TELEPHONE (OUTPATIENT)
Dept: PODIATRY | Facility: CLINIC | Age: 78
End: 2018-08-06

## 2018-08-06 NOTE — TELEPHONE ENCOUNTER
Pt requesting rx of gabapentin 300 mg - 1 cap at bedtime every night.  Pt states she has taken the gabapentin prn and is out.  Last seen in June.  Last rx is in [place].  Please advise.  Pt uses [facility] mail order.

## 2018-08-06 NOTE — TELEPHONE ENCOUNTER
----- Message from Yolande Reyes sent at 8/6/2018  9:43 AM CDT -----  Contact: self  Patient need to speak with nurse regarding a refill on medication Gabapengin 300mg    Patent states medication has not been refilled since 2015        [facility] - [address]  [address]  [facility] [address]  Phone: [phone] Fax: [phone]      Please call to advice [phone]

## 2018-08-07 DIAGNOSIS — E11.42 DIABETIC POLYNEUROPATHY ASSOCIATED WITH TYPE 2 DIABETES MELLITUS: Primary | ICD-10-CM

## 2018-08-07 RX ORDER — GABAPENTIN 300 MG/1
300 CAPSULE ORAL NIGHTLY
Qty: 90 CAPSULE | Refills: 3 | Status: SHIPPED | OUTPATIENT
Start: 2018-08-07 | End: 2019-09-16 | Stop reason: SDUPTHER

## 2018-08-07 NOTE — TELEPHONE ENCOUNTER
----- Message from Larry Crawford sent at 8/7/2018  2:55 PM CDT -----  Type:  Patient Returning Call    Who Called:  Self   Who Left Message for Patient:  ANTHONY  Does the patient know what this is regarding?:  ANTHONY Best Call Back Number:  [phone]  Additional Information:

## 2018-08-13 ENCOUNTER — OFFICE VISIT (OUTPATIENT)
Dept: ORTHOPEDICS | Facility: CLINIC | Age: 78
End: 2018-08-13
Payer: MEDICARE

## 2018-08-13 VITALS
DIASTOLIC BLOOD PRESSURE: 80 MMHG | BODY MASS INDEX: 33.66 KG/M2 | HEIGHT: 63 IN | WEIGHT: 190 LBS | SYSTOLIC BLOOD PRESSURE: 130 MMHG

## 2018-08-13 DIAGNOSIS — M51.36 DISC DEGENERATION, LUMBAR: Primary | ICD-10-CM

## 2018-08-13 DIAGNOSIS — M43.16 SPONDYLOLISTHESIS OF LUMBAR REGION: ICD-10-CM

## 2018-08-13 PROCEDURE — 99213 OFFICE O/P EST LOW 20 MIN: CPT | Mod: ,,, | Performed by: ORTHOPAEDIC SURGERY

## 2018-08-13 RX ORDER — DICLOFENAC SODIUM 10 MG/G
2 GEL TOPICAL 4 TIMES DAILY
Qty: 500 G | Refills: 4 | Status: SHIPPED | OUTPATIENT
Start: 2018-08-13 | End: 2018-11-12

## 2018-08-13 NOTE — PROGRESS NOTES
Subjective:       Patient ID: Mirta Guerin is a 78 y.o. female.    Chief Complaint: Pain of the Lumbar Spine (Lumbar pain is worse. Lumbar pain radiates down both legs to knees with burning and tingling. No other treatment)  5    History of Present Illness  She returns for follow-up. The last injection ofDr. Jung helped but this was about onths ago. She is interested in having a new injection of the lumbar spine    Current Medications  Current Outpatient Medications   Medication Sig Dispense Refill    amlodipine (NORVASC) 5 MG tablet Take 5 mg by mouth once daily.      anastrozole (ARIMIDEX) 1 mg Tab Take 1 tablet (1 mg total) by mouth once daily. 90 tablet 3    aspirin (ECOTRIN) 81 MG EC tablet Take 81 mg by mouth once daily.      atorvastatin (LIPITOR) 40 MG tablet Take 40 mg by mouth once daily.      co-enzyme Q-10 50 mg capsule Take 100 mg by mouth once daily.      DULoxetine (CYMBALTA) 20 MG capsule Take 1 capsule (20 mg total) by mouth once daily. 30 capsule 4    gabapentin (NEURONTIN) 300 MG capsule Take 1 capsule (300 mg total) by mouth every evening. 90 capsule 3    loratadine (CLARITIN) 10 mg tablet Take 10 mg by mouth once daily.      losartan (COZAAR) 100 MG tablet       metformin (GLUCOPHAGE) 500 MG tablet Take 500 mg by mouth 2 (two) times daily with meals.      metoprolol succinate (TOPROL-XL) 50 MG 24 hr tablet Take 100 mg by mouth once daily.      metoprolol tartrate (LOPRESSOR) 25 MG tablet 25 mg 2 (two) times daily.       metoprolol tartrate (LOPRESSOR) 50 MG tablet 50 mg 2 (two) times daily.       MULTIVIT-MIN/FA/LUTEIN/ZEAXANT (MACULAR VITAMIN ORAL) Take by mouth.      multivitamin capsule Take 1 capsule by mouth once daily.      pantoprazole (PROTONIX) 40 MG tablet       tramadol (ULTRAM) 50 mg tablet Take 50 mg by mouth every 6 (six) hours as needed for Pain.      vitamin D 1000 units Tab Take 1,000 Units by mouth once daily.      albuterol 90 mcg/actuation inhaler  Inhale 2 puffs into the lungs every 6 (six) hours as needed for Wheezing. Rescue 3 Inhaler 3     No current facility-administered medications for this visit.        Allergies  Review of patient's allergies indicates:   Allergen Reactions    Benazepril Other (See Comments)     sleepy    Codeine Nausea And Vomiting    Polysporin [bacitracin-polymyxin b] Rash       Past Medical History  Past Medical History:   Diagnosis Date    Anxiety     AV block     Breast cancer     Cardiac pacemaker in situ     Coronary artery disease     Diabetes mellitus     GERD (gastroesophageal reflux disease)     HLD (hyperlipidemia)     Hypertension     Lung disease     Mitral valve disorder     NEGRITA (obstructive sleep apnea)     Prediabetes     Psoriasis     Spinal stenosis     Vitamin D deficiency        Surgical History  Past Surgical History:   Procedure Laterality Date    BREAST LUMPECTOMY      CARDIAC PACEMAKER PLACEMENT  04/05/2012    CORONARY ARTERY BYPASS GRAFT  07/2017    MASTECTOMY, PARTIAL      UPPER GASTROINTESTINAL ENDOSCOPY         Family History:   Family History   Problem Relation Age of Onset    Cancer Mother         ovarian    Coronary artery disease Father     Stroke Father     Stroke Brother     Stroke Maternal Grandfather     Cancer Paternal Grandmother         breast    Stroke Paternal Grandfather        Social History:   Social History     Socioeconomic History    Marital status:      Spouse name: Not on file    Number of children: Not on file    Years of education: Not on file    Highest education level: Not on file   Social Needs    Financial resource strain: Not on file    Food insecurity - worry: Not on file    Food insecurity - inability: Not on file    Transportation needs - medical: Not on file    Transportation needs - non-medical: Not on file   Occupational History    Not on file   Tobacco Use    Smoking status: Current Every Day Smoker     Packs/day: 0.50      Years: 50.00     Pack years: 25.00     Types: Cigarettes     Start date: 1954    Smokeless tobacco: Never Used   Substance and Sexual Activity    Alcohol use: Yes     Comment: occasional    Drug use: Not on file    Sexual activity: Not on file   Other Topics Concern    Not on file   Social History Narrative    Not on file       Hospitalization/Major Diagnostic Procedure:     Review of Systems     General/Constitutional:  Chills denies. Fatigue denies. Fever denies. Weight gain denies. Weight loss denies.    Respiratory:  Shortness of breath denies.    Cardiovascular:  Chest pain denies.    Gastrointestinal:  Constipation denies. Diarrhea denies. Nausea denies. Vomiting denies.     Hematology:  Easy bruising denies. Prolonged bleeding denies.     Genitourinary:  Frequent urination denies. Pain in lower back denies. Painful urination denies.     Musculoskeletal:  See HPI for details    Skin:  Rash denies.    Neurologic:  Dizziness denies. Gait abnormalities denies. Seizures denies. Tingling/Numbess denies.    Psychiatric:  Anxiety denies. Depressed mood denies.     Objective:   Vital Signs:   Vitals:    08/13/18 1039   BP: 130/80        Physical Exam      General Examination:     Constitutional: The patient is alert and oriented to lace person and time. Mood is pleasant.     Head/Face: Normal facial features normal eyebrows    Eyes: Normal extraocular motion bilaterally    Lungs: Respirations are equal and unlabored    Gait is coordinated.    Cardiovascular: There are no swelling or varicosities present.    Lymphatic: Negative for adenopathy    Skin: Normal    Neurological: Level of consciousness normal. Oriented to place person and time and situation    Psychiatric: Oriented to time place person and situation    Lumbar spine exam shows patient has difficulty erectand moderate tendpinous muscles bilaterally without spasm. Range of motion is moderately restricted. Straight leg raising is negative.  XRAY Report/  Interpretation:      Assessment:       1. Disc degeneration, lumbar    2. Spondylolisthesis of lumbar region        Plan:       Mirta was seen today for pain.    Diagnoses and all orders for this visit:    Disc degeneration, lumbar    Spondylolisthesis of lumbar region         No Follow-up on file.    atient referred back for lumbar medial branch blocks ateral L4-5 and L5-S1 lev    This note was created using Dragon voice recognition software that occasionally misinterpreted phrases or words.

## 2018-08-27 ENCOUNTER — OFFICE VISIT (OUTPATIENT)
Dept: HEMATOLOGY/ONCOLOGY | Facility: CLINIC | Age: 78
End: 2018-08-27
Payer: MEDICARE

## 2018-08-27 ENCOUNTER — TELEPHONE (OUTPATIENT)
Dept: ORTHOPEDICS | Facility: CLINIC | Age: 78
End: 2018-08-27

## 2018-08-27 ENCOUNTER — TELEPHONE (OUTPATIENT)
Dept: HEMATOLOGY/ONCOLOGY | Facility: CLINIC | Age: 78
End: 2018-08-27

## 2018-08-27 VITALS
WEIGHT: 187.19 LBS | HEART RATE: 91 BPM | SYSTOLIC BLOOD PRESSURE: 117 MMHG | DIASTOLIC BLOOD PRESSURE: 71 MMHG | HEIGHT: 63 IN | BODY MASS INDEX: 33.17 KG/M2 | TEMPERATURE: 98 F

## 2018-08-27 DIAGNOSIS — G47.30 SLEEP APNEA, UNSPECIFIED TYPE: ICD-10-CM

## 2018-08-27 DIAGNOSIS — C50.411 MALIGNANT NEOPLASM OF UPPER-OUTER QUADRANT OF RIGHT BREAST IN FEMALE, ESTROGEN RECEPTOR POSITIVE: Primary | ICD-10-CM

## 2018-08-27 DIAGNOSIS — Z17.0 MALIGNANT NEOPLASM OF UPPER-OUTER QUADRANT OF RIGHT BREAST IN FEMALE, ESTROGEN RECEPTOR POSITIVE: Primary | ICD-10-CM

## 2018-08-27 PROCEDURE — 99214 OFFICE O/P EST MOD 30 MIN: CPT | Mod: ,,, | Performed by: INTERNAL MEDICINE

## 2018-08-27 NOTE — TELEPHONE ENCOUNTER
----- Message from Destini Mensah sent at 8/27/2018  3:01 PM CDT -----  Contact: HALINA @  [facility] [phone]  IF ANY IMAGING WAS DONE CAN IT BE SENT  [phone]  Notes, orders, imaging Faxed as requested

## 2018-08-27 NOTE — PROGRESS NOTES
[facility] History & Physical    Subjective:      Patient ID:   Mirta Guerin  78 y.o. female  1940  James Jung M.D.. Barbie Castillo M.D., Tony Goldberg.      Chief Complaint:breast cancer follow-up    HPI:  78 y.o. female with diagnosis of stage I right breast cancer.Diagnosed in 2015. Treated with partial mastectomy and sentinel node biopsy, followed by adjuvant radiation therapy, she started adjuvant Arimidex in 2016.    ERP was positive, PRP was positive, HER-2/asaf was negative.hot flash symptoms are controlled on Cymbalta. Joint pain symptoms have not been a problem for her. She has history of hypertension, diabetes, GERD,hypercholesterolemia, DJD, and sleep apnea syndrome.    She has history of left partial mastectomy and sentinel node biopsy, pacemaker placement, facial surgery, right and left knee replacement, complete hysterectomy. She had CABG surgery in 2016.    She had a skin cancer removed from her right scalp ,she believes it was a melanoma, per Dr. Lee in 2018, she also saw Dr. Cabrera rfor wide excision of the area.  Will call for pathology reports.    Since her CABG surgery, she complains of continued substernal chest pain. CAT scan in 2016 at [facility] did show some inflammation in the soft tissue in the sternal area. She recently had another CAT scan completed at [facility] imaging center and report is pending.    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    Her dad had CVA, her mother  of ovarian cancer at age 86, her brother has had 2 or 3 CVA, her sister has had bladder cancer, she has a daughter with hypoglycemia.    She is allergic to codeine benazepril, Polysporin, and triple antibiotics ointment.    Dr. James Jung is her primary care physician. Dr. Wero Moreno is her pulmonologist.  Dr. Juarez is her cardiologist.  Dr. Talbot is her GYN.    She has been  diagnosed with sleep apnea syndrome. She has asked for a referral to Dr. Mancera of pulmonary.    She is tolerating the Arimadex fine,  With the addition of Cymbalta daily.  She has history of restless leg syndrome and peripheral neuropathy symptoms in the fingers and feet.    She is 5 foot 2 inches tall and weighs 181 pounds    ROS:   GEN: normal without any fever, night sweats or weight loss  HEENT: normal with no HA's, sore throat, stiff neck, changes in vision  CV: see history of present illness.   no CP, SOB, PND, STEPHENS or orthopnea  PULM: see history of present illness.   no SOB, cough, hemoptysis, sputum or pleuritic pain  GI: normal with no abdominal pain, nausea, vomiting, constipation, diarrhea, melanotic stools, BRBPR, or hematemesis  : normal with no hematuria, dysuria  BREAST: see history of present illness  SKIN: normal with no rash, erythema, bruising, or swelling     Past Medical History:   Diagnosis Date    Anxiety     AV block     Breast cancer     Cardiac pacemaker in situ     Coronary artery disease     Diabetes mellitus     GERD (gastroesophageal reflux disease)     HLD (hyperlipidemia)     Hypertension     Lung disease     Mitral valve disorder     NEGRITA (obstructive sleep apnea)     Prediabetes     Psoriasis     Spinal stenosis     Vitamin D deficiency      Past Surgical History:   Procedure Laterality Date    BREAST LUMPECTOMY      CARDIAC PACEMAKER PLACEMENT  04/05/2012    CORONARY ARTERY BYPASS GRAFT  07/2017    MASTECTOMY, PARTIAL      UPPER GASTROINTESTINAL ENDOSCOPY         Review of patient's allergies indicates:   Allergen Reactions    Benazepril Other (See Comments)     sleepy    Codeine Nausea And Vomiting    Polysporin [bacitracin-polymyxin b] Rash     Social History     Socioeconomic History    Marital status:      Spouse name: Not on file    Number of children: Not on file    Years of education: Not on file    Highest education level: Not on file    Social Needs    Financial resource strain: Not on file    Food insecurity - worry: Not on file    Food insecurity - inability: Not on file    Transportation needs - medical: Not on file    Transportation needs - non-medical: Not on file   Occupational History    Not on file   Tobacco Use    Smoking status: Current Every Day Smoker     Packs/day: 0.50     Years: 50.00     Pack years: 25.00     Types: Cigarettes     Start date: 1954    Smokeless tobacco: Never Used   Substance and Sexual Activity    Alcohol use: Yes     Comment: occasional    Drug use: Not on file    Sexual activity: Not on file   Other Topics Concern    Not on file   Social History Narrative    Not on file         Current Outpatient Medications:     amlodipine (NORVASC) 5 MG tablet, Take 5 mg by mouth once daily., Disp: , Rfl:     anastrozole (ARIMIDEX) 1 mg Tab, Take 1 tablet (1 mg total) by mouth once daily., Disp: 90 tablet, Rfl: 3    aspirin (ECOTRIN) 81 MG EC tablet, Take 81 mg by mouth once daily., Disp: , Rfl:     atorvastatin (LIPITOR) 40 MG tablet, Take 40 mg by mouth once daily., Disp: , Rfl:     co-enzyme Q-10 50 mg capsule, Take 100 mg by mouth once daily., Disp: , Rfl:     diclofenac sodium (VOLTAREN) 1 % Gel, Apply 2 g topically 4 (four) times daily. AAA, Disp: 500 g, Rfl: 4    DULoxetine (CYMBALTA) 20 MG capsule, Take 1 capsule (20 mg total) by mouth once daily., Disp: 30 capsule, Rfl: 4    gabapentin (NEURONTIN) 300 MG capsule, Take 1 capsule (300 mg total) by mouth every evening., Disp: 90 capsule, Rfl: 3    loratadine (CLARITIN) 10 mg tablet, Take 10 mg by mouth once daily., Disp: , Rfl:     losartan (COZAAR) 100 MG tablet, , Disp: , Rfl:     metformin (GLUCOPHAGE) 500 MG tablet, Take 500 mg by mouth 2 (two) times daily with meals., Disp: , Rfl:     metoprolol succinate (TOPROL-XL) 50 MG 24 hr tablet, Take 100 mg by mouth once daily., Disp: , Rfl:     metoprolol tartrate (LOPRESSOR) 25 MG tablet, 25 mg 2  "(two) times daily. , Disp: , Rfl:     metoprolol tartrate (LOPRESSOR) 50 MG tablet, 50 mg 2 (two) times daily. , Disp: , Rfl:     MULTIVIT-MIN/FA/LUTEIN/ZEAXANT (MACULAR VITAMIN ORAL), Take by mouth., Disp: , Rfl:     multivitamin capsule, Take 1 capsule by mouth once daily., Disp: , Rfl:     pantoprazole (PROTONIX) 40 MG tablet, , Disp: , Rfl:     tramadol (ULTRAM) 50 mg tablet, Take 50 mg by mouth every 6 (six) hours as needed for Pain., Disp: , Rfl:     vitamin D 1000 units Tab, Take 1,000 Units by mouth once daily., Disp: , Rfl:     albuterol 90 mcg/actuation inhaler, Inhale 2 puffs into the lungs every 6 (six) hours as needed for Wheezing. Rescue, Disp: 3 Inhaler, Rfl: 3          Objective:   Vitals:  Blood pressure 117/71, pulse 91, temperature 97.7 °F (36.5 °C), height 5' 3" (1.6 m), weight 84.9 kg (187 lb 3.2 oz).    Physical Examination:   GEN: no apparent distress, comfortable  HEAD: atraumatic and normocephalic  EYES: no pallor, no icterus.  She has a healing scabbed over surgical site at the right scalp, without satellite lesions or palpable lymphadenopathy at the neck  ENT:  no pharyngeal erythema, external ears WNL; no nasal discharge  NECK: no masses, thyroid normal, trachea midline, no LAD/LN's, supple  CV: RRR with no murmur,  the skin  at the sternal area is healed and closed, nontender without warmth or redness or fluctuance  CHEST: Normal respiratory effort; CTAB; distantbreath sounds; no wheeze or crackles  ABDOM: nontender and nondistended; soft; normal bowel sounds; no rebound/guarding, L/S NP  MUSC/Skeletal: ROM normal; no crepitus; joints normal  EXTREM: no clubbing, cyanosis, inflammation or swelling  SKIN: no rashes, lesions, ulcers, petechia  : no wolf  NEURO: grossly intact; motor/sensory WNL;  no tremors  PSYCH: normal mood, affect and behavior  LYMPH: normal cervical, supraclavicular, axillary and groin LN's  BREAST:the left breast shows postoperative change, nontender, " without palpable mass. The right breast is nontender without palpable mass.      Labs:     Radiology/Diagnostic Studies:    Mammogram 1/2018 was negative for malignancy at [facility]. Follow-up mammogram is scheduled for January 2019 at [facility]      Assessment:   (1) 78 y.o. female with diagnosis of stage I right breast cancer treated with right partial mastectomy, adjuvant radiation therapy, and continues on adjuvant Arimidex now. Originally diagnosed in May 2015. No evidence of disease on physical exam.Due for mammogram in January 2019.    (2) hot flash symptoms secondary to Arimidex are managed with Cymbalta 20 mg by mouth daily.    (3)sleep apnea syndrome under management of pulmonary, make referral to Dr. Mancera at patient's request.    (4)substernal chest pain     (5)recent removal of melanotic lesion from right scalp, call for pathology reports.    Return to clinic in February with repeat mammogram at that time

## 2018-08-27 NOTE — TELEPHONE ENCOUNTER
She had a melanoma lesion removed from her forehead and June 2018. Please call Dr. Lee for the pathology report.    She also saw a Dr. Hi in [place] for wide excision of the area, please call him for the pathology report from that procedure.

## 2018-08-27 NOTE — LETTER
August 27, 2018      James Jung MD  [address] [place] [address]           [facility]  [address]  [address]  [facility] [address]  Phone: [phone]  Fax: [phone]          Patient: Mirta Guerin   MR Number: [id number]   YOB: 1940   Date of Visit: 8/27/2018       Dear Dr. James Jung:    Thank you for referring Mirta Guerin to me for evaluation. Attached you will find relevant portions of my assessment and plan of care.    If you have questions, please do not hesitate to call me. I look forward to following Mirta Guerin along with you.    Sincerely,    ZOE Fontenot MD    Enclosure  CC:  No Recipients    If you would like to receive this communication electronically, please contact [email or web address] or [phone] to request more information on [employer] Link access.    For providers and/or their staff who would like to refer a patient to [facility], please contact us through our one-stop-shop provider referral line, [facility], at [phone].    If you feel you have received this communication in error or would no longer like to receive these types of communications, please e-mail [email or web address]

## 2018-09-13 ENCOUNTER — OFFICE VISIT (OUTPATIENT)
Dept: PODIATRY | Facility: CLINIC | Age: 78
End: 2018-09-13
Payer: MEDICARE

## 2018-09-13 VITALS
HEIGHT: 63 IN | BODY MASS INDEX: 31.89 KG/M2 | SYSTOLIC BLOOD PRESSURE: 147 MMHG | HEART RATE: 77 BPM | TEMPERATURE: 97 F | WEIGHT: 180 LBS | DIASTOLIC BLOOD PRESSURE: 67 MMHG

## 2018-09-13 DIAGNOSIS — L60.0 INGROWN NAIL: ICD-10-CM

## 2018-09-13 DIAGNOSIS — E11.49 TYPE II DIABETES MELLITUS WITH NEUROLOGICAL MANIFESTATIONS: Primary | ICD-10-CM

## 2018-09-13 DIAGNOSIS — L40.9 PSORIASIS: ICD-10-CM

## 2018-09-13 PROCEDURE — 99213 OFFICE O/P EST LOW 20 MIN: CPT | Mod: PBBFAC,PN | Performed by: PODIATRIST

## 2018-09-13 PROCEDURE — 99999 PR PBB SHADOW E&M-EST. PATIENT-LVL III: CPT | Mod: PBBFAC,,, | Performed by: PODIATRIST

## 2018-09-13 PROCEDURE — 99213 OFFICE O/P EST LOW 20 MIN: CPT | Mod: S$PBB,,, | Performed by: PODIATRIST

## 2018-09-16 PROBLEM — L60.0 INGROWN NAIL: Status: ACTIVE | Noted: 2018-09-16

## 2018-09-16 PROBLEM — L40.9 PSORIASIS: Status: ACTIVE | Noted: 2018-09-16

## 2018-09-16 PROBLEM — E11.49 TYPE II DIABETES MELLITUS WITH NEUROLOGICAL MANIFESTATIONS: Status: ACTIVE | Noted: 2018-09-16

## 2018-09-16 NOTE — PROGRESS NOTES
Subjective:       Patient ID: Mirta Guerin is a 78 y.o. female.    Chief Complaint: Follow-up; Foot Problem; Ingrown Toenail; Nail Problem; and Diabetes Mellitus  Patient presents today for followup she is a history of chronically ingrown toenails with signs of infection especially on her left hallux.     HPI Patient reports No polyuria, No polydipsia, No galactorrhea, No hair loss, No heat intolerance, No cold intolerance, No change in libido, and No hirsutism; DM TYPE 2. She reports No fever, No chills, No night sweats, No weight loss, No weight gain, No fatigue, No malaise, No loss of appetite, and No myalgias. She reports No chest pain, No murmurs, No palpitations, No dyspnea on exertion, No orthopnea, No syncope, No paroxysmal nocturnal dyspnea, No edema, No cyanosis, No claudication, No rest pain, and No orthostatic symmtoms. She reports No shortness of breath, No wheezing, No cough, No hoarseness, No hemoptysis, and No sleep apnea. She reports No heartburn, No nausea, No vomiting, No diarrhea, No constipation, No abdominal pain, No jaundice, No melena, No hematochezia, No biliuria, No acholia, No anorexia, No early satiety, No dysphagia, No odynophagia, No hematemesis, No excessive belching, No bloating, No distention, No obstipation, No blood in stools, No hemorrhoids, No steatorrhea, and No excessive flatus. She reports No rash, No itching, No new skin lesions, No changes to the existing skin lesions or moles, No pigmentation changes, No skin dryness, No hair dryness, No hair changes, No nail changes, and No hirsutism. She reports No blurred vision, No changes in vision, No impaired vision, No double vision, No peripheral vision changes, and No eye discomfort. She reports No sore throat, No nasal congestion, No nasal discharge, No postnasal drip, No headaches, No vertigo, No lightheadedness, No nosebleeds, No gingival bleeding, No dental problems, No neck stiffness, No neck pain, No tinnitus, and No  oral lesions. She reports no bone pain, No muscle pain, No muscular weakness, No muscle cramps, No joint pain, No joint swelling, and No motion limitations. She reports No breast lump, No tenderness, No swelling, and No nipple discharge. She reports No tingling, No numbness, No weakness, No incoordination, No difficulty concentrating, No speech problems, No seizures, No tremors, and No loss of balance. She reports No anxiety, No depression, and No hallucinations. She reports No easy bruising, No petechiae, no purpura, No lymphadenopathy, and No pica. She reports No allergic dermatitis and No frequent illness. She reports No urgency, No frequency, No dysuria, No nocturia, No hematuria, No hesitancy, No pyuria, No oligouria, No change in urine color, No incontinence, No decreased stream, No dribbling, No pneumaturia, and No decreased libido. She reports No dyspareunia, No genital sores, No irregular menses, No menorrhagia, No vaginal discharge, and No possible pregnancy.     Review of Systems    Objective:      Physical Exam   Constitutional: She appears well-developed and well-nourished.   Cardiovascular:   Pulses:       Dorsalis pedis pulses are 1+ on the right side, and 1+ on the left side.        Posterior tibial pulses are 1+ on the right side, and 1+ on the left side.   Musculoskeletal: Normal range of motion. She exhibits edema and deformity.   Feet:   Right Foot:   Protective Sensation: 4 sites tested. 1 site sensed.  Skin Integrity: Positive for dry skin.   Left Foot:   Protective Sensation: 4 sites tested. 1 site sensed.  Skin Integrity: Positive for dry skin.   Neurological: She displays abnormal reflex.   Skin: Skin is warm. Capillary refill takes more than 3 seconds.   Psychiatric: She has a normal mood and affect. Her behavior is normal. Judgment and thought content normal.     Patient is noted to have positive erythema positive edema at the medial lateral aspect of the patient's left hallux upon  debridement of the lateral border left hallux there is purulent drainage noted as well as obvious signs of infection and pain to palpation. Patient is noted to have a well-defined area of hyperkeratotic tissue on the dorsal lateral aspect of the fifth digit left there is positive pain noted upon palpation of this area no sign of infection is noted. patient's previously noted psoriasis is more well-controlled on today's visit that ever seen it since it started seeing the patient there are no erythematous plaques or areas of skin breakdown especially on the heel.    Assessment:       1. Type II diabetes mellitus with neurological manifestations    2. Ingrown nail    3. Psoriasis        Plan:        Following examination I aggressively debrided both the medial and lateral border of the patient's left hallux I did advise the patient is obvious signs of infection with purulent drainage noted at the lateral border left hallux. Sterile saline was used to flush and irrigated the lateral border left hallux bacitracin ointment and a well-padded dry dressing applied patient advised to keep antibiotic ointment on the area for the next 3 days if this does not completely resolve if it continues to be red and painful we need to see her back for followup sooner than the regularly scheduled 6-8 week appointment. I debrided the hyperkeratotic tissue from the fifth digit left patient stated that this felt much better I advised her this is likely due to a shoe rubbing and irritation of this area recommended followup 6-8 weeks.Patient states her psoriasis is doing as good as it's been in many years and is very well controlled especially on her feet.   Patient made a statement today that the gabapentin I had prescribed her has absolutely changed her life she is having a lot less discomfort and pain in her lower extremities.  Patient recently had surgery to remove a melanoma off of her head.

## 2018-09-27 DIAGNOSIS — Z17.0 MALIGNANT NEOPLASM OF UPPER-OUTER QUADRANT OF RIGHT BREAST IN FEMALE, ESTROGEN RECEPTOR POSITIVE: ICD-10-CM

## 2018-09-27 DIAGNOSIS — C50.411 MALIGNANT NEOPLASM OF UPPER-OUTER QUADRANT OF RIGHT BREAST IN FEMALE, ESTROGEN RECEPTOR POSITIVE: ICD-10-CM

## 2018-09-29 RX ORDER — DULOXETIN HYDROCHLORIDE 20 MG/1
CAPSULE, DELAYED RELEASE ORAL
Qty: 30 CAPSULE | Refills: 4 | Status: SHIPPED | OUTPATIENT
Start: 2018-09-29 | End: 2022-09-16 | Stop reason: SDUPTHER

## 2018-10-04 NOTE — TELEPHONE ENCOUNTER
Received mail order for Tramadol unfortunately we are unable to send Tramadol through mail order. The Patient will have to take hard script and mail on her own, or fill locally. Will send to jeremy for approval. Her insurance allows 240 pills per 30 days

## 2018-10-05 RX ORDER — TRAMADOL HYDROCHLORIDE 50 MG/1
50 TABLET ORAL EVERY 6 HOURS PRN
Qty: 60 TABLET | Refills: 3 | Status: SHIPPED | OUTPATIENT
Start: 2018-10-05 | End: 2018-11-04

## 2018-10-05 NOTE — TELEPHONE ENCOUNTER
----- Message from Gricel Bedoya sent at 10/4/2018  9:00 AM CDT -----  Patient needs approval for her Tramadol.  [employer] has told her they have sent 2 notices.  Please call patient at [phone]

## 2018-11-12 ENCOUNTER — OFFICE VISIT (OUTPATIENT)
Dept: ORTHOPEDICS | Facility: CLINIC | Age: 78
End: 2018-11-12
Payer: MEDICARE

## 2018-11-12 VITALS
DIASTOLIC BLOOD PRESSURE: 80 MMHG | SYSTOLIC BLOOD PRESSURE: 130 MMHG | HEIGHT: 63 IN | WEIGHT: 180 LBS | BODY MASS INDEX: 31.89 KG/M2

## 2018-11-12 DIAGNOSIS — M43.16 SPONDYLOLISTHESIS OF LUMBAR REGION: ICD-10-CM

## 2018-11-12 DIAGNOSIS — M51.36 DISC DEGENERATION, LUMBAR: Primary | ICD-10-CM

## 2018-11-12 PROCEDURE — 99213 OFFICE O/P EST LOW 20 MIN: CPT | Mod: ,,, | Performed by: ORTHOPAEDIC SURGERY

## 2018-11-12 RX ORDER — TOBRAMYCIN 3 MG/ML
SOLUTION/ DROPS OPHTHALMIC
COMMUNITY
Start: 2018-10-15 | End: 2020-02-06 | Stop reason: CLARIF

## 2018-11-12 RX ORDER — TRAMADOL HYDROCHLORIDE 50 MG/1
TABLET ORAL
COMMUNITY
Start: 2018-11-11 | End: 2019-02-18 | Stop reason: SDUPTHER

## 2018-11-12 NOTE — PROGRESS NOTES
Subjective:       Patient ID: Mirta Guerin is a 78 y.o. female.    Chief Complaint: Pain of the Lumbar Spine (She did have MBB last Wednesday with Dr Moeller. That has helped some. She has lumbar soreness. Light pain down the legs to knees. )      History of Present Illness  Patient is here to follow-up for low back pain. Last week she underwent a facet rhizotomy and is already feeling better than she did prior to the procedure. She remains with some postprocedural soreness.    Current Medications  Current Outpatient Medications   Medication Sig Dispense Refill    amlodipine (NORVASC) 5 MG tablet Take 5 mg by mouth once daily.      anastrozole (ARIMIDEX) 1 mg Tab Take 1 tablet (1 mg total) by mouth once daily. 90 tablet 3    aspirin (ECOTRIN) 81 MG EC tablet Take 81 mg by mouth once daily.      atorvastatin (LIPITOR) 40 MG tablet Take 40 mg by mouth once daily.      co-enzyme Q-10 50 mg capsule Take 100 mg by mouth once daily.      DULoxetine (CYMBALTA) 20 MG capsule TAKE 1 CAPSULE BY MOUTH  ONCE DAILY 30 capsule 4    loratadine (CLARITIN) 10 mg tablet Take 10 mg by mouth once daily.      losartan (COZAAR) 100 MG tablet       metformin (GLUCOPHAGE) 500 MG tablet Take 500 mg by mouth 2 (two) times daily with meals.      metoprolol succinate (TOPROL-XL) 50 MG 24 hr tablet Take 100 mg by mouth once daily.      metoprolol tartrate (LOPRESSOR) 25 MG tablet 25 mg 2 (two) times daily.       metoprolol tartrate (LOPRESSOR) 50 MG tablet 50 mg 2 (two) times daily.       MULTIVIT-MIN/FA/LUTEIN/ZEAXANT (MACULAR VITAMIN ORAL) Take by mouth.      multivitamin capsule Take 1 capsule by mouth once daily.      pantoprazole (PROTONIX) 40 MG tablet       tobramycin sulfate 0.3% (TOBREX) 0.3 % ophthalmic solution       traMADol (ULTRAM) 50 mg tablet       vitamin D 1000 units Tab Take 1,000 Units by mouth once daily.      albuterol 90 mcg/actuation inhaler Inhale 2 puffs into the lungs every 6 (six) hours as  needed for Wheezing. Rescue 3 Inhaler 3    gabapentin (NEURONTIN) 300 MG capsule Take 1 capsule (300 mg total) by mouth every evening. 90 capsule 3     No current facility-administered medications for this visit.        Allergies  Review of patient's allergies indicates:   Allergen Reactions    Benazepril Other (See Comments)     sleepy    Codeine Nausea And Vomiting    Polysporin [bacitracin-polymyxin b] Rash       Past Medical History  Past Medical History:   Diagnosis Date    Anxiety     AV block     Breast cancer     Cardiac pacemaker in situ     Coronary artery disease     Diabetes mellitus     GERD (gastroesophageal reflux disease)     HLD (hyperlipidemia)     Hypertension     Lung disease     Mitral valve disorder     NEGRITA (obstructive sleep apnea)     Prediabetes     Psoriasis     Spinal stenosis     Vitamin D deficiency        Surgical History  Past Surgical History:   Procedure Laterality Date    BREAST LUMPECTOMY      CARDIAC PACEMAKER PLACEMENT  04/05/2012    CORONARY ARTERY BYPASS GRAFT  07/2017    MASTECTOMY, PARTIAL      UPPER GASTROINTESTINAL ENDOSCOPY         Family History:   Family History   Problem Relation Age of Onset    Cancer Mother         ovarian    Coronary artery disease Father     Stroke Father     Stroke Brother     Stroke Maternal Grandfather     Cancer Paternal Grandmother         breast    Stroke Paternal Grandfather        Social History:   Social History     Socioeconomic History    Marital status:      Spouse name: Not on file    Number of children: Not on file    Years of education: Not on file    Highest education level: Not on file   Social Needs    Financial resource strain: Not on file    Food insecurity - worry: Not on file    Food insecurity - inability: Not on file    Transportation needs - medical: Not on file    Transportation needs - non-medical: Not on file   Occupational History    Not on file   Tobacco Use    Smoking  status: Current Every Day Smoker     Packs/day: 0.50     Years: 50.00     Pack years: 25.00     Types: Cigarettes     Start date: 1954    Smokeless tobacco: Never Used   Substance and Sexual Activity    Alcohol use: Yes     Comment: occasional    Drug use: Not on file    Sexual activity: Not on file   Other Topics Concern    Not on file   Social History Narrative    Not on file       Hospitalization/Major Diagnostic Procedure:     Review of Systems     General/Constitutional:  Chills denies. Fatigue denies. Fever denies. Weight gain denies. Weight loss denies.    Respiratory:  Shortness of breath denies.    Cardiovascular:  Chest pain denies.    Gastrointestinal:  Constipation denies. Diarrhea denies. Nausea denies. Vomiting denies.     Hematology:  Easy bruising denies. Prolonged bleeding denies.     Genitourinary:  Frequent urination denies. Pain in lower back denies. Painful urination denies.     Musculoskeletal:  See HPI for details    Skin:  Rash denies.    Neurologic:  Dizziness denies. Gait abnormalities denies. Seizures denies. Tingling/Numbess denies.    Psychiatric:  Anxiety denies. Depressed mood denies.     Objective:   Vital Signs:   Vitals:    11/12/18 0953   BP: 130/80        Physical Exam      General Examination:     Constitutional: The patient is alert and oriented to lace person and time. Mood is pleasant.     Head/Face: Normal facial features normal eyebrows    Eyes: Normal extraocular motion bilaterally    Lungs: Respirations are equal and unlabored    Gait is coordinated.    Cardiovascular: There are no swelling or varicosities present.    Lymphatic: Negative for adenopathy    Skin: Normal    Neurological: Level of consciousness normal. Oriented to place person and time and situation    Psychiatric: Oriented to time place person and situation   lumbar exam: Antalgic gait and antalgic sit to stand. Lumbar range of motion approximate 70% of normal both flexion and extension. Bilateral lower  "extremity is or distal neurovascular intact.    XRAY Report/ Interpretation: New studies today      Assessment:       1. Disc degeneration, lumbar    2. Spondylolisthesis of lumbar region        Plan:       Mirta was seen today for pain.    Diagnoses and all orders for this visit:    Disc degeneration, lumbar    Spondylolisthesis of lumbar region         Follow-up in about 3 months (around 2/12/2019), or if symptoms worsen or fail to improve.  Reuben Dunbar, physician's assistant served in the capacity as a "scribe" for this patient encounter  A "face to face" encounter occurred with Dr. Ltitle on this date  The treatment plan and medical decision making is outlined below:  Anterior activity as tolerated. Follow-up in 3 months or sooner if needed    This note was created using Dragon voice recognition software that occasionally misinterpreted phrases or words.  "

## 2018-11-18 ENCOUNTER — TELEPHONE (OUTPATIENT)
Dept: HEMATOLOGY/ONCOLOGY | Facility: CLINIC | Age: 78
End: 2018-11-18

## 2018-11-19 NOTE — TELEPHONE ENCOUNTER
She had a melanoma lesion removed from her forehead and June 2018. Please call Dr. Lee for the pathology report.    She also saw a Dr. Hi in [place] for wide excision of the area, please call him for the pathology report from that procedure.

## 2018-11-22 ENCOUNTER — TELEPHONE (OUTPATIENT)
Dept: HEMATOLOGY/ONCOLOGY | Facility: CLINIC | Age: 78
End: 2018-11-22

## 2018-11-23 NOTE — TELEPHONE ENCOUNTER
She had a melanoma lesion removed from her forehead and June 2018. Please call Dr. Lee for the pathology report.    She also saw a Dr. Hi in [place] for wide excision of the area, please call him for the pathology report from that procedure.

## 2018-12-01 DIAGNOSIS — C50.411 MALIGNANT NEOPLASM OF UPPER-OUTER QUADRANT OF RIGHT BREAST IN FEMALE, ESTROGEN RECEPTOR POSITIVE: ICD-10-CM

## 2018-12-01 DIAGNOSIS — Z17.0 MALIGNANT NEOPLASM OF UPPER-OUTER QUADRANT OF RIGHT BREAST IN FEMALE, ESTROGEN RECEPTOR POSITIVE: ICD-10-CM

## 2018-12-01 RX ORDER — ANASTROZOLE 1 MG/1
TABLET ORAL
Qty: 90 TABLET | Refills: 3 | Status: SHIPPED | OUTPATIENT
Start: 2018-12-01 | End: 2020-01-10

## 2018-12-27 ENCOUNTER — OFFICE VISIT (OUTPATIENT)
Dept: PODIATRY | Facility: CLINIC | Age: 78
End: 2018-12-27
Payer: MEDICARE

## 2018-12-27 VITALS
RESPIRATION RATE: 20 BRPM | SYSTOLIC BLOOD PRESSURE: 131 MMHG | TEMPERATURE: 98 F | HEIGHT: 63 IN | WEIGHT: 180 LBS | HEART RATE: 75 BPM | BODY MASS INDEX: 31.89 KG/M2 | DIASTOLIC BLOOD PRESSURE: 75 MMHG

## 2018-12-27 DIAGNOSIS — E11.49 TYPE II DIABETES MELLITUS WITH NEUROLOGICAL MANIFESTATIONS: Primary | ICD-10-CM

## 2018-12-27 DIAGNOSIS — L60.0 INGROWN NAIL: ICD-10-CM

## 2018-12-27 DIAGNOSIS — L40.9 PSORIASIS: ICD-10-CM

## 2018-12-27 PROCEDURE — 99213 OFFICE O/P EST LOW 20 MIN: CPT | Mod: PBBFAC,PN | Performed by: PODIATRIST

## 2018-12-27 PROCEDURE — 99213 OFFICE O/P EST LOW 20 MIN: CPT | Mod: S$PBB,,, | Performed by: PODIATRIST

## 2018-12-27 PROCEDURE — 99999 PR PBB SHADOW E&M-EST. PATIENT-LVL III: CPT | Mod: PBBFAC,,, | Performed by: PODIATRIST

## 2018-12-27 RX ORDER — DOCUSATE SODIUM 100 MG/1
100 CAPSULE, LIQUID FILLED ORAL 2 TIMES DAILY
COMMUNITY

## 2018-12-27 NOTE — PROGRESS NOTES
Subjective:       Patient ID: Mirta Guerin is a 78 y.o. female.    Chief Complaint: Diabetic Foot Exam  Patient presents today for followup she is a history of chronically ingrown toenails with signs of infection especially on her left hallux.     Ingrown Toenail     Nail Problem      Patient reports No polyuria, No polydipsia, No galactorrhea, No hair loss, No heat intolerance, No cold intolerance, No change in libido, and No hirsutism; DM TYPE 2. She reports No fever, No chills, No night sweats, No weight loss, No weight gain, No fatigue, No malaise, No loss of appetite, and No myalgias. She reports No chest pain, No murmurs, No palpitations, No dyspnea on exertion, No orthopnea, No syncope, No paroxysmal nocturnal dyspnea, No edema, No cyanosis, No claudication, No rest pain, and No orthostatic symmtoms. She reports No shortness of breath, No wheezing, No cough, No hoarseness, No hemoptysis, and No sleep apnea. She reports No heartburn, No nausea, No vomiting, No diarrhea, No constipation, No abdominal pain, No jaundice, No melena, No hematochezia, No biliuria, No acholia, No anorexia, No early satiety, No dysphagia, No odynophagia, No hematemesis, No excessive belching, No bloating, No distention, No obstipation, No blood in stools, No hemorrhoids, No steatorrhea, and No excessive flatus. She reports No rash, No itching, No new skin lesions, No changes to the existing skin lesions or moles, No pigmentation changes, No skin dryness, No hair dryness, No hair changes, No nail changes, and No hirsutism. She reports No blurred vision, No changes in vision, No impaired vision, No double vision, No peripheral vision changes, and No eye discomfort. She reports No sore throat, No nasal congestion, No nasal discharge, No postnasal drip, No headaches, No vertigo, No lightheadedness, No nosebleeds, No gingival bleeding, No dental problems, No neck stiffness, No neck pain, No tinnitus, and No oral lesions. She reports  no bone pain, No muscle pain, No muscular weakness, No muscle cramps, No joint pain, No joint swelling, and No motion limitations. She reports No breast lump, No tenderness, No swelling, and No nipple discharge. She reports No tingling, No numbness, No weakness, No incoordination, No difficulty concentrating, No speech problems, No seizures, No tremors, and No loss of balance. She reports No anxiety, No depression, and No hallucinations. She reports No easy bruising, No petechiae, no purpura, No lymphadenopathy, and No pica. She reports No allergic dermatitis and No frequent illness. She reports No urgency, No frequency, No dysuria, No nocturia, No hematuria, No hesitancy, No pyuria, No oligouria, No change in urine color, No incontinence, No decreased stream, No dribbling, No pneumaturia, and No decreased libido. She reports No dyspareunia, No genital sores, No irregular menses, No menorrhagia, No vaginal discharge, and No possible pregnancy.     Review of Systems    Objective:      Physical Exam   Constitutional: She appears well-developed and well-nourished.   Cardiovascular:   Pulses:       Dorsalis pedis pulses are 1+ on the right side, and 1+ on the left side.        Posterior tibial pulses are 1+ on the right side, and 1+ on the left side.   Musculoskeletal: Normal range of motion. She exhibits edema and deformity.   Feet:   Right Foot:   Protective Sensation: 4 sites tested. 1 site sensed.  Skin Integrity: Positive for dry skin.   Left Foot:   Protective Sensation: 4 sites tested. 1 site sensed.  Skin Integrity: Positive for dry skin.   Neurological: She displays abnormal reflex.   Skin: Skin is warm. Capillary refill takes more than 3 seconds.   Psychiatric: She has a normal mood and affect. Her behavior is normal. Judgment and thought content normal.     Patient is noted to have positive erythema positive edema at the medial lateral aspect of the patient's left hallux upon debridement of the lateral border  left hallux there is purulent drainage noted as well as obvious signs of infection and pain to palpation. Patient is noted to have a well-defined area of hyperkeratotic tissue on the dorsal lateral aspect of the fifth digit left there is positive pain noted upon palpation of this area no sign of infection is noted. patient's previously noted psoriasis is more well-controlled on today's visit that ever seen it since it started seeing the patient there are mild erythematous plaques or areas of skin breakdown especially on the heel.    Assessment:       1. Type II diabetes mellitus with neurological manifestations    2. Ingrown nail    3. Psoriasis        Plan:        Following examination I aggressively debrided both the medial and lateral border of the patient's left hallux I did advise the patient is obvious signs of infection with purulent drainage noted at the lateral border left hallux. Sterile saline was used to flush and irrigated the lateral border left hallux bacitracin ointment and a well-padded dry dressing applied patient advised to keep antibiotic ointment on the area for the next 3 days if this does not completely resolve if it continues to be red and painful we need to see her back for followup sooner than the regularly scheduled 6-8 week appointment. I debrided the hyperkeratotic tissue from the fifth digit left patient stated that this felt much better I advised her this is likely due to a shoe rubbing and irritation of this area recommended followup 6-8 weeks.Patient states her psoriasis is doing as good as it's been in many years and is very well controlled especially on her feet.   Patient made a statement today that the gabapentin I had prescribed her has absolutely changed her life she is having a lot less discomfort and pain in her lower extremities.   Patient relates she has been under lot of stress with family issues of this definitely aggravates her psoriasis.

## 2019-01-08 ENCOUNTER — OFFICE VISIT (OUTPATIENT)
Dept: PULMONOLOGY | Facility: CLINIC | Age: 79
End: 2019-01-08
Payer: MEDICARE

## 2019-01-08 VITALS
HEART RATE: 82 BPM | OXYGEN SATURATION: 96 % | BODY MASS INDEX: 33.31 KG/M2 | WEIGHT: 188 LBS | HEIGHT: 63 IN | DIASTOLIC BLOOD PRESSURE: 85 MMHG | SYSTOLIC BLOOD PRESSURE: 120 MMHG

## 2019-01-08 DIAGNOSIS — G47.61 PLMD (PERIODIC LIMB MOVEMENT DISORDER): ICD-10-CM

## 2019-01-08 DIAGNOSIS — G25.81 RLS (RESTLESS LEGS SYNDROME): ICD-10-CM

## 2019-01-08 DIAGNOSIS — I25.10 CORONARY ARTERY DISEASE, ANGINA PRESENCE UNSPECIFIED, UNSPECIFIED VESSEL OR LESION TYPE, UNSPECIFIED WHETHER NATIVE OR TRANSPLANTED HEART: Primary | ICD-10-CM

## 2019-01-08 DIAGNOSIS — G47.33 OSA (OBSTRUCTIVE SLEEP APNEA): ICD-10-CM

## 2019-01-08 PROCEDURE — 99214 OFFICE O/P EST MOD 30 MIN: CPT | Mod: ,,, | Performed by: INTERNAL MEDICINE

## 2019-01-08 PROCEDURE — 99214 PR OFFICE/OUTPT VISIT, EST, LEVL IV, 30-39 MIN: ICD-10-PCS | Mod: ,,, | Performed by: INTERNAL MEDICINE

## 2019-01-08 NOTE — PROGRESS NOTES
SUBJECTIVE:    Patient ID: Mirta Guerin is a 78 y.o. female.    Chief Complaint: Chest Pain (has sharp pains in chest from surgery in 2016) and Sleep Apnea (pt does not treat NEGRITA)    HPI   Patient has NEGRITA she does not use her machine consistently because of other things going on and nasal allergies. Her machine is over 10 years old.  Dr. Hewitt ordered a sleep study in June, she had a study but has not received new machine yet.  She has had many different medical issues during that time and she has not heard from in.  She sleeps in a recliner since her CABG secondary to her breasts pulling when she sleeps.  Past Medical History:   Diagnosis Date    Anxiety     AV block     Breast cancer     Cardiac pacemaker in situ     Coronary artery disease     Diabetes mellitus     GERD (gastroesophageal reflux disease)     HLD (hyperlipidemia)     Hypertension     Lung disease     Mitral valve disorder     NEGRITA (obstructive sleep apnea)     Prediabetes     Psoriasis     Spinal stenosis     Vitamin D deficiency      Past Surgical History:   Procedure Laterality Date    BREAST LUMPECTOMY      CARDIAC PACEMAKER PLACEMENT  04/05/2012    CORONARY ARTERY BYPASS GRAFT  07/2017    MASTECTOMY, PARTIAL      UPPER GASTROINTESTINAL ENDOSCOPY       Family History   Problem Relation Age of Onset    Cancer Mother         ovarian    Coronary artery disease Father     Stroke Father     Stroke Brother     Stroke Maternal Grandfather     Cancer Paternal Grandmother         breast    Stroke Paternal Grandfather         Social History:   Marital Status:   Occupation: Data Unavailable  Alcohol History:  reports that she drinks alcohol.  Tobacco History:  reports that she has been smoking cigarettes.  She started smoking about 65 years ago. She has a 25.00 pack-year smoking history. she has never used smokeless tobacco.  Drug History:  reports that she does not use drugs.    Review of patient's allergies  indicates:   Allergen Reactions    Benazepril Other (See Comments)     sleepy    Codeine Nausea And Vomiting    Polysporin [bacitracin-polymyxin b] Rash       Current Outpatient Medications   Medication Sig Dispense Refill    amlodipine (NORVASC) 5 MG tablet Take 5 mg by mouth once daily.      aspirin (ECOTRIN) 81 MG EC tablet Take 81 mg by mouth once daily.      atorvastatin (LIPITOR) 40 MG tablet Take 40 mg by mouth once daily.      co-enzyme Q-10 50 mg capsule Take 100 mg by mouth once daily.      docusate sodium (COLACE) 100 MG capsule Take 100 mg by mouth 2 (two) times daily.      DULoxetine (CYMBALTA) 20 MG capsule TAKE 1 CAPSULE BY MOUTH  ONCE DAILY 30 capsule 4    loratadine (CLARITIN) 10 mg tablet Take 10 mg by mouth once daily.      losartan (COZAAR) 100 MG tablet       metformin (GLUCOPHAGE) 500 MG tablet Take 500 mg by mouth 2 (two) times daily with meals.      metoprolol succinate (TOPROL-XL) 50 MG 24 hr tablet Take 100 mg by mouth once daily.      MULTIVIT-MIN/FA/LUTEIN/ZEAXANT (MACULAR VITAMIN ORAL) Take by mouth.      pantoprazole (PROTONIX) 40 MG tablet       traMADol (ULTRAM) 50 mg tablet       vitamin D 1000 units Tab Take 1,000 Units by mouth once daily.      albuterol 90 mcg/actuation inhaler Inhale 2 puffs into the lungs every 6 (six) hours as needed for Wheezing. Rescue 3 Inhaler 3    anastrozole (ARIMIDEX) 1 mg Tab TAKE 1 TABLET BY MOUTH ONCE DAILY 90 tablet 3    gabapentin (NEURONTIN) 300 MG capsule Take 1 capsule (300 mg total) by mouth every evening. 90 capsule 3    tobramycin sulfate 0.3% (TOBREX) 0.3 % ophthalmic solution        No current facility-administered medications for this visit.            Review of Systems  General: Feeling Well.  Eyes: Vision is good.  ENT:  No sinusitis or pharyngitis.   Heart:: No chest pain or palpitations.  Lungs: No cough, sputum, or wheezing.  GI: no N/V or reflux .  : wakes up twice a night to urinate  Musculoskeletal: No joint  "pain or myalgias.  Skin: No lesions or rashes.  Neuro: No headaches or neuropathy.  Lymph: swelling if she eats real salty  Psych: No anxiety or depression.  Endo:weight gain     OBJECTIVE:      /85 (BP Location: Left arm, Patient Position: Sitting)   Pulse 82   Ht 5' 3" (1.6 m)   Wt 85.3 kg (188 lb)   SpO2 96%   BMI 33.30 kg/m²     Physical Exam  GENERAL: Older patient in no distress.  HEENT: Pupils equal and reactive. Extraocular movements intact. Nose intact.      Pharynx moist. malampatti 4  NECK: Supple. 16 inches   HEART: Regular rate and rhythm. No murmur or gallop auscultated.  LUNGS: Clear to auscultation and percussion. Lung excursion symmetrical. No change in fremitus. No adventitial noises.  ABDOMEN: Bowel sounds present. Non-tender, no masses palpated.  EXTREMITIES: Normal muscle tone and joint movement, no cyanosis or clubbing.   LYMPHATICS: No adenopathy palpated, no edema.  SKIN: Dry, intact, no lesions.   NEURO: Cranial nerves II-XII intact. Motor strength 5/5 bilaterally, upper and lower extremities.  PSYCH: Appropriate affect.    Assessment:       1. Coronary artery disease, angina presence unspecified, unspecified vessel or lesion type, unspecified whether native or transplanted heart    2. NEGRITA (obstructive sleep apnea)    3. RLS (restless legs syndrome)    4. PLMD (periodic limb movement disorder)          Plan:       Coronary artery disease, angina presence unspecified, unspecified vessel or lesion type, unspecified whether native or transplanted heart    NEGRITA (obstructive sleep apnea)  -     Cancel: CPAP FOR HOME USE  -     Cancel: CPAP/BIPAP SUPPLIES  -     CPAP FOR HOME USE  -     CPAP/BIPAP SUPPLIES    RLS (restless legs syndrome)    PLMD (periodic limb movement disorder)    CPAP 14cm of pressure, medium nasal pillows (patient wants the nasal pillows)  Follow up in 2 months with your chip    wear a good bra to sleep in   Continue Gabapentin for RLS  Will not treat PLMD unless it " seems to be interferring with her sleep  Follow-up in about 2 months (around 3/8/2019).

## 2019-01-08 NOTE — PATIENT INSTRUCTIONS
Sleep Apnea (Child)  Sleep apnea (also called obstructive apnea) is a condition where there are long pauses between breaths during sleep. It usually first appears in pre-school age children (2 to 5 years). This is the most common type of apnea in children over 2 years old. Pauses in breathing may be brief or may last over 10 seconds. During this time the child continues to make efforts to breath. This can occur in otherwise healthy children from 2 years old through the teen years.  There are a number of potential causes of sleep apnea in children including:  · Obesity  · Enlarged tonsils or adenoids  · Medication side effects  · Anatomic abnormalities  · Metabolic or genetic disorders  There are a number of symptoms of sleep apnea, and you may see it directly. Other common symptoms include:  · Snoring  · Morning headaches  · Excessive sleepiness  · Restless sleep  · Night sweats  · Nasal obstruction  · Irritability  · Behavioral problems  Snoring is the most common symptom. But not all children who snore will have sleep apnea. Other symptoms vary by age:  Children 2 to 9 years old:  At night there may be labored noisy breathing, restless movements, sweating, bed wetting and unusual sleep positions. During the day, there may be hyperactivity, poor attention and behavioral problems.  Children 9 years old to teens:  At night there may be noisy breathing, gasping, sweating, mouth breathing. During the day, there may be difficulty getting out of bed, frequent napping, irritability, poor concentration and attention. School performance may suffer.  The diagnosis can be hard to make from the parents description alone. Special sleep studies are sometimes necessary to know for sure that this is the problem.  If nasal allergies are present, anti-inflammatory or decongestant medicines may help. If enlarged tonsils and adenoids are the cause and symptoms are severe, surgery is the best treatment.  Home care  · Use a pillow  that supports the head in a neutral position. Not bent forward or tilted back.  · Do not expose your child to cigarette smoke or other indoor pollutants. These will irritate the throat lining and worsen this condition.  · Treat nasal allergies as advised by your doctor.  · If your child is overweight, discuss a weight-loss program with your doctor.  Follow-up care  Follow up with your doctor, or as advised for your childs next scheduled exam. If X-rays or CT scan were done, you will be notified if there is a change in the reading, especially if it affects treatment.  Call 911  Call 911 if any of these occur:  · Trouble breathing  · Any episodes where your child appears to be limp, pale, or blue (usually around the mouth)  · Confusion  · Very drowsy or trouble awakening  · Fainting or loss of consciousness  · Rapid heart rate  · Seizure  · Stiff neck  When to seek medical advice  Call your child's healthcare provider right away if any of these occur:  · Observed pause in breathing that lasts more than 20 seconds  · Not responding normally (behavioral changes) in school or at home  · Bluish color during periods of normal breathing  · Fast breathing (birth to 6 wks: over 60 breaths/min; 6 wk - 2 yr: over 45 breaths/min, 3-6 yr: over 35 breaths/min, 7-10 yrs: over 30 breaths/min, more than 10 yrs old: over 25 breaths/min)  Date Last Reviewed: 11/19/2015  © 2308-4960 [employer]. [address] [place] [address]. All rights reserved. This information is not intended as a substitute for professional medical care. Always follow your healthcare professional's instructions.      CPAP 14cm of pressure, medium nasal pillows (patient wants the nasal pillows)  Follow up in 2 months with your chip    wear a good bra to sleep in

## 2019-01-09 ENCOUNTER — DOCUMENTATION ONLY (OUTPATIENT)
Dept: PULMONOLOGY | Facility: CLINIC | Age: 79
End: 2019-01-09

## 2019-01-31 DIAGNOSIS — M85.89 OSTEOPENIA OF MULTIPLE SITES: ICD-10-CM

## 2019-01-31 DIAGNOSIS — Z17.0 MALIGNANT NEOPLASM OF UPPER-OUTER QUADRANT OF RIGHT BREAST IN FEMALE, ESTROGEN RECEPTOR POSITIVE: Primary | ICD-10-CM

## 2019-01-31 DIAGNOSIS — C50.411 MALIGNANT NEOPLASM OF UPPER-OUTER QUADRANT OF RIGHT BREAST IN FEMALE, ESTROGEN RECEPTOR POSITIVE: Primary | ICD-10-CM

## 2019-02-01 ENCOUNTER — TELEPHONE (OUTPATIENT)
Dept: HEMATOLOGY/ONCOLOGY | Facility: CLINIC | Age: 79
End: 2019-02-01

## 2019-02-01 NOTE — TELEPHONE ENCOUNTER
----- Message from Enedelia Cross sent at 1/28/2019  4:12 PM CST -----  The patient called and said she needs an order for a mammogram and a Dexa Bone Scan sent to [facility]. She said it is already scheduled for 2/18/19 at 1:445? Please let the patient know when orders are sent at [phone]

## 2019-02-05 ENCOUNTER — TELEPHONE (OUTPATIENT)
Dept: ORTHOPEDICS | Facility: CLINIC | Age: 79
End: 2019-02-05

## 2019-02-05 NOTE — TELEPHONE ENCOUNTER
puja from [facility] called she needs a verbal approval to dispense the rx # [id number] ref# [id number]

## 2019-02-18 ENCOUNTER — OFFICE VISIT (OUTPATIENT)
Dept: ORTHOPEDICS | Facility: CLINIC | Age: 79
End: 2019-02-18
Payer: MEDICARE

## 2019-02-18 VITALS
WEIGHT: 180 LBS | HEIGHT: 63 IN | DIASTOLIC BLOOD PRESSURE: 82 MMHG | BODY MASS INDEX: 31.89 KG/M2 | SYSTOLIC BLOOD PRESSURE: 120 MMHG | HEART RATE: 86 BPM

## 2019-02-18 DIAGNOSIS — M51.36 DISC DEGENERATION, LUMBAR: Primary | ICD-10-CM

## 2019-02-18 DIAGNOSIS — M47.816 LUMBAR FACET ARTHROPATHY: ICD-10-CM

## 2019-02-18 DIAGNOSIS — M43.16 SPONDYLOLISTHESIS OF LUMBAR REGION: ICD-10-CM

## 2019-02-18 DIAGNOSIS — M19.042 OSTEOARTHRITIS OF FINGERS OF HANDS, BILATERAL: ICD-10-CM

## 2019-02-18 DIAGNOSIS — M19.041 OSTEOARTHRITIS OF FINGERS OF HANDS, BILATERAL: ICD-10-CM

## 2019-02-18 PROBLEM — M51.369 DISC DEGENERATION, LUMBAR: Status: ACTIVE | Noted: 2019-02-18

## 2019-02-18 PROCEDURE — 99213 OFFICE O/P EST LOW 20 MIN: CPT | Mod: ,,, | Performed by: ORTHOPAEDIC SURGERY

## 2019-02-18 PROCEDURE — 99213 PR OFFICE/OUTPT VISIT, EST, LEVL III, 20-29 MIN: ICD-10-PCS | Mod: ,,, | Performed by: ORTHOPAEDIC SURGERY

## 2019-02-18 RX ORDER — DICLOFENAC SODIUM 10 MG/G
2 GEL TOPICAL 4 TIMES DAILY
Qty: 500 G | Refills: 5 | Status: SHIPPED | OUTPATIENT
Start: 2019-02-18 | End: 2020-02-06 | Stop reason: CLARIF

## 2019-02-18 RX ORDER — TRAMADOL HYDROCHLORIDE 50 MG/1
50 TABLET ORAL EVERY 6 HOURS PRN
Qty: 28 TABLET | Refills: 3 | Status: SHIPPED | OUTPATIENT
Start: 2019-02-18 | End: 2019-05-27 | Stop reason: SDUPTHER

## 2019-02-18 NOTE — PROGRESS NOTES
Subjective:       Patient ID: Mirta Guerin is a 78 y.o. female.    Chief Complaint: Pain of the Lower Back (Lumbar follow up, states her back is doing well. She had the RFA done and it has helped her. She still cannot stand for an extended time, but back is doing much better)      History of Present Illness  Patient is here to follow-up for low back pain as well as bilateral hand pain due to arthritis. Since having the lumbar facet rhizotomy done her pain is improved significantly which has allowed her to increase her functional mobility. NSAID topical gel works well for her hand pain but has been very expensive.    Current Medications  Current Outpatient Medications   Medication Sig Dispense Refill    amlodipine (NORVASC) 5 MG tablet Take 5 mg by mouth once daily.      anastrozole (ARIMIDEX) 1 mg Tab TAKE 1 TABLET BY MOUTH ONCE DAILY 90 tablet 3    aspirin (ECOTRIN) 81 MG EC tablet Take 81 mg by mouth once daily.      atorvastatin (LIPITOR) 40 MG tablet Take 40 mg by mouth once daily.      co-enzyme Q-10 50 mg capsule Take 100 mg by mouth once daily.      docusate sodium (COLACE) 100 MG capsule Take 100 mg by mouth 2 (two) times daily.      DULoxetine (CYMBALTA) 20 MG capsule TAKE 1 CAPSULE BY MOUTH  ONCE DAILY 30 capsule 4    gabapentin (NEURONTIN) 300 MG capsule Take 1 capsule (300 mg total) by mouth every evening. 90 capsule 3    loratadine (CLARITIN) 10 mg tablet Take 10 mg by mouth once daily.      losartan (COZAAR) 100 MG tablet       metformin (GLUCOPHAGE) 500 MG tablet Take 500 mg by mouth 2 (two) times daily with meals.      metoprolol succinate (TOPROL-XL) 50 MG 24 hr tablet Take 100 mg by mouth once daily.      MULTIVIT-MIN/FA/LUTEIN/ZEAXANT (MACULAR VITAMIN ORAL) Take by mouth.      pantoprazole (PROTONIX) 40 MG tablet       tobramycin sulfate 0.3% (TOBREX) 0.3 % ophthalmic solution       traMADol (ULTRAM) 50 mg tablet       vitamin D 1000 units Tab Take 1,000 Units by mouth once  daily.      albuterol 90 mcg/actuation inhaler Inhale 2 puffs into the lungs every 6 (six) hours as needed for Wheezing. Rescue 3 Inhaler 3     No current facility-administered medications for this visit.        Allergies  Review of patient's allergies indicates:   Allergen Reactions    Benazepril Other (See Comments)     sleepy    Codeine Nausea And Vomiting    Polysporin [bacitracin-polymyxin b] Rash       Past Medical History  Past Medical History:   Diagnosis Date    Anxiety     AV block     Breast cancer     Cardiac pacemaker in situ     Coronary artery disease     Diabetes mellitus     GERD (gastroesophageal reflux disease)     HLD (hyperlipidemia)     Hypertension     Lung disease     Mitral valve disorder     NEGRITA (obstructive sleep apnea)     Prediabetes     Psoriasis     Spinal stenosis     Vitamin D deficiency        Surgical History  Past Surgical History:   Procedure Laterality Date    BREAST LUMPECTOMY      CARDIAC PACEMAKER PLACEMENT  04/05/2012    CORONARY ARTERY BYPASS GRAFT  07/2017    MASTECTOMY, PARTIAL      SKIN CANCER EXCISION      UPPER GASTROINTESTINAL ENDOSCOPY         Family History:   Family History   Problem Relation Age of Onset    Cancer Mother         ovarian    Coronary artery disease Father     Stroke Father     Stroke Brother     Stroke Maternal Grandfather     Cancer Paternal Grandmother         breast    Stroke Paternal Grandfather        Social History:   Social History     Socioeconomic History    Marital status:      Spouse name: Not on file    Number of children: Not on file    Years of education: Not on file    Highest education level: Not on file   Social Needs    Financial resource strain: Not on file    Food insecurity - worry: Not on file    Food insecurity - inability: Not on file    Transportation needs - medical: Not on file    Transportation needs - non-medical: Not on file   Occupational History    Not on file    Tobacco Use    Smoking status: Current Every Day Smoker     Packs/day: 0.50     Years: 50.00     Pack years: 25.00     Types: Cigarettes     Start date: 1954    Smokeless tobacco: Never Used   Substance and Sexual Activity    Alcohol use: Yes     Comment: occasional    Drug use: No    Sexual activity: No   Other Topics Concern    Not on file   Social History Narrative    Not on file       Hospitalization/Major Diagnostic Procedure:     Review of Systems     General/Constitutional:  Chills denies. Fatigue denies. Fever denies. Weight gain denies. Weight loss denies.    Respiratory:  Shortness of breath denies.    Cardiovascular:  Chest pain denies.    Gastrointestinal:  Constipation denies. Diarrhea denies. Nausea denies. Vomiting denies.     Hematology:  Easy bruising denies. Prolonged bleeding denies.     Genitourinary:  Frequent urination denies. Pain in lower back denies. Painful urination denies.     Musculoskeletal:  See HPI for details    Skin:  Rash denies.    Neurologic:  Dizziness denies. Gait abnormalities denies. Seizures denies. Tingling/Numbess denies.    Psychiatric:  Anxiety denies. Depressed mood denies.     Objective:   Vital Signs:   Vitals:    02/18/19 1022   BP: 120/82   Pulse: 86        Physical Exam      General Examination:     Constitutional: The patient is alert and oriented to lace person and time. Mood is pleasant.     Head/Face: Normal facial features normal eyebrows    Eyes: Normal extraocular motion bilaterally    Lungs: Respirations are equal and unlabored    Gait is coordinated.    Cardiovascular: There are no swelling or varicosities present.    Lymphatic: Negative for adenopathy    Skin: Normal    Neurological: Level of consciousness normal. Oriented to place person and time and situation    Psychiatric: Oriented to time place person and situation    lumbar exam: Antalgic gait and antalgic sit to stand. Lumbar range of motion approximate 70% of normal both flexion and  "extension. Bilateral lower extremity is or distal neurovascular intact.    XRAY Report/ Interpretation: Bilateral hand with multiple abnormalities and hypertrophies due to osteoarthritis      Assessment:       1. Disc degeneration, lumbar    2. Spondylolisthesis of lumbar region    3. Lumbar facet arthropathy    4. Osteoarthritis of fingers of hands, bilateral        Plan:       Mirta was seen today for pain.    Diagnoses and all orders for this visit:    Disc degeneration, lumbar    Spondylolisthesis of lumbar region    Lumbar facet arthropathy    Osteoarthritis of fingers of hands, bilateral         No Follow-up on file.  Reuben Dunbar, physician's assistant served in the capacity as a "scribe" for this patient encounter  A "face to face" encounter occurred with Dr. Little on this date  The treatment plan and medical decision making is outlined below:    Continue to increase activity as tolerated and follow-up with pain management. I agreed to refill her tramadol today. I will give her also a prescription of Voltaren gel which may come in a generic form    This note was created using Dragon voice recognition software that occasionally misinterpreted phrases or words.  "

## 2019-02-25 ENCOUNTER — OFFICE VISIT (OUTPATIENT)
Dept: HEMATOLOGY/ONCOLOGY | Facility: CLINIC | Age: 79
End: 2019-02-25
Payer: MEDICARE

## 2019-02-25 ENCOUNTER — TELEPHONE (OUTPATIENT)
Dept: HEMATOLOGY/ONCOLOGY | Facility: CLINIC | Age: 79
End: 2019-02-25

## 2019-02-25 VITALS
TEMPERATURE: 97 F | WEIGHT: 185.88 LBS | RESPIRATION RATE: 20 BRPM | BODY MASS INDEX: 32.93 KG/M2 | HEART RATE: 84 BPM | SYSTOLIC BLOOD PRESSURE: 125 MMHG | DIASTOLIC BLOOD PRESSURE: 82 MMHG

## 2019-02-25 DIAGNOSIS — Z17.0 MALIGNANT NEOPLASM OF UPPER-OUTER QUADRANT OF RIGHT BREAST IN FEMALE, ESTROGEN RECEPTOR POSITIVE: Primary | ICD-10-CM

## 2019-02-25 DIAGNOSIS — R92.8 ABNORMAL MAMMOGRAM OF LEFT BREAST: ICD-10-CM

## 2019-02-25 DIAGNOSIS — C50.411 MALIGNANT NEOPLASM OF UPPER-OUTER QUADRANT OF RIGHT BREAST IN FEMALE, ESTROGEN RECEPTOR POSITIVE: Primary | ICD-10-CM

## 2019-02-25 PROCEDURE — 99214 PR OFFICE/OUTPT VISIT, EST, LEVL IV, 30-39 MIN: ICD-10-PCS | Mod: ,,, | Performed by: INTERNAL MEDICINE

## 2019-02-25 PROCEDURE — 99214 OFFICE O/P EST MOD 30 MIN: CPT | Mod: ,,, | Performed by: INTERNAL MEDICINE

## 2019-02-25 NOTE — PATIENT INSTRUCTIONS
What Is Breast Cancer?  Having breast cancer means that some cells in your breast have changed and are growing out of control. Learning about the different types and stages of breast cancer can help you take an active role in your treatment.  Changes in your breast  Your entire body is made of living tissue. This tissue is made up of tiny cells. You can't see these cells with the naked eye. Normal cells reproduce (divide) in a controlled way. They grow when your body needs them, and die when your body does not need them any longer. When you have cancer, some cells become abnormal. These cells may divide quickly, don't die when they should, and spread into other parts of the body.      Normal breast tissue is made of healthy cells. They reproduce new cells that look and work the same. Noninvasive breast cancer(carcinoma in situ) happens when cancer cells are only in the ducts.       Invasive breast cancer happens when cancer cells move out of the ducts or lobules into the surrounding breast tissue. Metastasis happens when cancer cells move into the lymph nodes or bloodstream and travel to another part of the body.      Stages of breast cancer  Several tests are used to measure the size of a tumor and learn how far it has spread. This is called staging. The stage of your cancer will help determine your treatment. Based on [employer] guidelines, the stages of breast cancer are:  · Stage 0. The cancer is noninvasive. Cancer cells are found only in the ducts (ductal carcinoma in situ).  · Stage I. The tumor is 2 cm (about 3/4 of an inch) or less in diameter. It has invaded the surrounding breast tissue, and tiny amounts of cancer cells may be found in the underarm lymph nodes.  · Stage II. The tumor is larger than 2 cm and has not spread to lymph nodes, or the cancer is less than 5 cm across and has spread to the lymph nodes under the arm.  · Stage III. The tumor is less than 5 cm (2 inches) across, and  there's a lot of cancer in your underarm lymph nodes, or it has spread to other lymph nodes. Or the tumor is larger than 5 cm and has spread to lymph nodes. Or the tumor is any size and has spread to the skin, chest wall, and maybe to nearby lymph nodes.  · Stage IV. The tumor has spread beyond the breast to the bones, lungs, liver, brain, or lymph nodes far away from the breast.  Recurrent breast cancer. When the cancer returns after treatment.   Date Last Reviewed: 9/21/2015 © 2000-2017 [facility]. [address] [place] [id number]. All rights reserved. This information is not intended as a substitute for professional medical care. Always follow your healthcare professional's instructions.

## 2019-02-25 NOTE — LETTER
February 25, 2019      James Jung MD  [address] [place] [address]           [facility] - Hematology Oncology  [address]  [address]  [facility] [address]  Phone: [phone]  Fax: [phone]          Patient: Mirta Guerin   MR Number: [id number]   YOB: 1940   Date of Visit: 2/25/2019       Dear Dr. James Jung:    Thank you for referring Mirta Guerin to me for evaluation. Attached you will find relevant portions of my assessment and plan of care.    If you have questions, please do not hesitate to call me. I look forward to following Mirta Guerin along with you.    Sincerely,    ZOE Fontenot MD    Enclosure  CC:  No Recipients    If you would like to receive this communication electronically, please contact [email or web address] or [phone] to request more information on [facility] [employer] access.    For providers and/or their staff who would like to refer a patient to [facility], please contact us through our one-stop-shop provider referral line, [facility], at [phone].    If you feel you have received this communication in error or would no longer like to receive these types of communications, please e-mail [email or web address]

## 2019-02-26 NOTE — PROGRESS NOTES
[facility] History & Physical    Subjective:      Patient ID:   Mirta Guerin  78 y.o. female  1940  James Jung M.D.. Barbie Castillo M.D., Tony Goldberg.      Chief Complaint:breast cancer follow-up    HPI:  78 y.o. female with diagnosis of stage I right breast cancer.Diagnosed in 2015. Treated with partial mastectomy and sentinel node biopsy, followed by adjuvant radiation therapy, she started adjuvant Arimidex in 2016.    ERP was positive, PRP was positive, HER-2/asaf was negative.hot flash symptoms are controlled on Cymbalta. Joint pain symptoms have not been a problem for her. She has history of hypertension, diabetes, GERD,hypercholesterolemia, DJD, and sleep apnea syndrome.    She has history of left partial mastectomy and sentinel node biopsy, pacemaker placement, facial surgery, right and left knee replacement, complete hysterectomy. She had CABG surgery in 2016.    She had a skin cancer removed from her right scalp ,she believes it was a melanoma, per Dr. Lee in 2018, she also saw Dr. Hi for wide excision of the area.  Will call for pathology reports.    Since her CABG surgery, she complains of continued substernal chest pain. CAT scan in 2016 at [facility] did show some inflammation in the soft tissue in the sternal area. I suspect she has costochrondral joint sx.    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    Her dad had CVA, her mother  of ovarian cancer at age 86, her brother has had 2 or 3 CVA, her sister has had bladder cancer, she has a daughter with hypoglycemia.    She is allergic to codeine benazepril, Polysporin, and triple antibiotics ointment.    Dr. James Jung is her primary care physician.   Dr. Juarez is her cardiologist.  Dr. Talbot is her GYN.    She has been diagnosed with sleep apnea syndrome.    She is tolerating the Arimadex fine,  With the  addition of Cymbalta daily.  She has history of restless leg syndrome and peripheral neuropathy symptoms in the fingers and feet.  HF sx better while on Cymbalta.  She has hx of osteopenia, took Reclast in the past.  Hx LBP, S/P injection x's 9, sx decreased.    She is 5 foot 2 inches tall and weighs 181 pounds    ROS:   GEN: normal without any fever, night sweats or weight loss  HEENT: normal with no HA's, sore throat, stiff neck, changes in vision  CV: see history of present illness.   no CP, SOB, PND, STEPHENS or orthopnea  PULM: see history of present illness.   no SOB, cough, hemoptysis, sputum or pleuritic pain  GI: normal with no abdominal pain, nausea, vomiting, constipation, diarrhea, melanotic stools, BRBPR, or hematemesis  : normal with no hematuria, dysuria  BREAST: see history of present illness  SKIN: normal with no rash, erythema, bruising, or swelling     Past Medical History:   Diagnosis Date    Anxiety     AV block     Breast cancer     Cardiac pacemaker in situ     Coronary artery disease     Diabetes mellitus     GERD (gastroesophageal reflux disease)     HLD (hyperlipidemia)     Hypertension     Lung disease     Mitral valve disorder     NEGRITA (obstructive sleep apnea)     Prediabetes     Psoriasis     Spinal stenosis     Vitamin D deficiency      Past Surgical History:   Procedure Laterality Date    BREAST LUMPECTOMY      CARDIAC PACEMAKER PLACEMENT  04/05/2012    CORONARY ARTERY BYPASS GRAFT  07/2017    MASTECTOMY, PARTIAL      SKIN CANCER EXCISION      UPPER GASTROINTESTINAL ENDOSCOPY         Review of patient's allergies indicates:   Allergen Reactions    Benazepril Other (See Comments)     sleepy    Codeine Nausea And Vomiting    Polysporin [bacitracin-polymyxin b] Rash     Social History     Socioeconomic History    Marital status:      Spouse name: Not on file    Number of children: Not on file    Years of education: Not on file    Highest education level:  Not on file   Social Needs    Financial resource strain: Not on file    Food insecurity - worry: Not on file    Food insecurity - inability: Not on file    Transportation needs - medical: Not on file    Transportation needs - non-medical: Not on file   Occupational History    Not on file   Tobacco Use    Smoking status: Current Every Day Smoker     Packs/day: 0.50     Years: 50.00     Pack years: 25.00     Types: Cigarettes     Start date: 1954    Smokeless tobacco: Never Used   Substance and Sexual Activity    Alcohol use: Yes     Comment: occasional    Drug use: No    Sexual activity: No   Other Topics Concern    Not on file   Social History Narrative    Not on file         Current Outpatient Medications:     amlodipine (NORVASC) 5 MG tablet, Take 5 mg by mouth once daily., Disp: , Rfl:     anastrozole (ARIMIDEX) 1 mg Tab, TAKE 1 TABLET BY MOUTH ONCE DAILY, Disp: 90 tablet, Rfl: 3    aspirin (ECOTRIN) 81 MG EC tablet, Take 81 mg by mouth once daily., Disp: , Rfl:     atorvastatin (LIPITOR) 40 MG tablet, Take 40 mg by mouth once daily., Disp: , Rfl:     co-enzyme Q-10 50 mg capsule, Take 100 mg by mouth once daily., Disp: , Rfl:     diclofenac sodium (VOLTAREN) 1 % Gel, Apply 2 g topically 4 (four) times daily. Apply to affected area, Disp: 500 g, Rfl: 5    docusate sodium (COLACE) 100 MG capsule, Take 100 mg by mouth 2 (two) times daily., Disp: , Rfl:     DULoxetine (CYMBALTA) 20 MG capsule, TAKE 1 CAPSULE BY MOUTH  ONCE DAILY, Disp: 30 capsule, Rfl: 4    loratadine (CLARITIN) 10 mg tablet, Take 10 mg by mouth once daily., Disp: , Rfl:     losartan (COZAAR) 100 MG tablet, , Disp: , Rfl:     metformin (GLUCOPHAGE) 500 MG tablet, Take 500 mg by mouth 2 (two) times daily with meals., Disp: , Rfl:     metoprolol succinate (TOPROL-XL) 50 MG 24 hr tablet, Take 100 mg by mouth once daily., Disp: , Rfl:     MULTIVIT-MIN/FA/LUTEIN/ZEAXANT (MACULAR VITAMIN ORAL), Take by mouth., Disp: , Rfl:      pantoprazole (PROTONIX) 40 MG tablet, , Disp: , Rfl:     tobramycin sulfate 0.3% (TOBREX) 0.3 % ophthalmic solution, , Disp: , Rfl:     traMADol (ULTRAM) 50 mg tablet, Take 1 tablet (50 mg total) by mouth every 6 (six) hours as needed for Pain., Disp: 28 tablet, Rfl: 3    vitamin D 1000 units Tab, Take 1,000 Units by mouth once daily., Disp: , Rfl:     albuterol 90 mcg/actuation inhaler, Inhale 2 puffs into the lungs every 6 (six) hours as needed for Wheezing. Rescue, Disp: 3 Inhaler, Rfl: 3    gabapentin (NEURONTIN) 300 MG capsule, Take 1 capsule (300 mg total) by mouth every evening., Disp: 90 capsule, Rfl: 3          Objective:   Vitals:  Blood pressure 125/82, pulse 84, temperature 97.3 °F (36.3 °C), temperature source Oral, resp. rate 20, weight 84.3 kg (185 lb 14.4 oz).    Physical Examination:   GEN: no apparent distress, comfortable  HEAD: atraumatic and normocephalic  EYES: no pallor, no icterus.  She has a healing scabbed over surgical site at the right scalp, without satellite lesions or palpable lymphadenopathy at the neck  ENT:  no pharyngeal erythema, external ears WNL; no nasal discharge  NECK: no masses, thyroid normal, trachea midline, no LAD/LN's, supple  CV: RRR with no murmur,  the skin  at the sternal area is healed and closed, nontender without warmth or redness or fluctuance  CHEST: Normal respiratory effort; CTAB; distantbreath sounds; no wheeze or crackles  ABDOM: nontender and nondistended; soft;  no rebound/guarding, L/S NP  MUSC/Skeletal: ROM normal; no crepitus; joints normal  EXTREM: no clubbing, cyanosis, inflammation or swelling  SKIN: no rashes, lesions, ulcers, petechia  : no cvat  NEURO: grossly intact; motor/sensory WNL;  no tremors  PSYCH: normal mood, affect and behavior  LYMPH: normal cervical, supraclavicular, axillary and groin LN's  BREAST:the left breast shows postoperative change, nontender, without palpable mass. The right breast is nontender without palpable  mass.      Labs:     Radiology/Diagnostic Studies:    Mammogram 2/2019 showed 1.5 oval well circumscribed mass at 8 o'clock at L breast.  Recheck site with mamm and U/S 8/2019.      Assessment:   (1) 78 y.o. female with diagnosis of stage I right breast cancer treated with right partial mastectomy, adjuvant radiation therapy, and continues on adjuvant Arimidex now. Originally diagnosed in May 2015. No evidence of disease on physical exam.Due for mammogram in August 2019.    (2) hot flash symptoms secondary to Arimidex are managed with Cymbalta 20 mg by mouth daily.    (3)sleep apnea syndrome under management of pulmonary, make referral to Dr. Mancera at patient's request.    (4)atypical L chest pain     (5)recent removal of melanotic lesion from right scalp, call for pathology reports.    (6)Abn mamm/ U/S, recheck in 6 months, RTC 6 months.

## 2019-02-26 NOTE — TELEPHONE ENCOUNTER
Rosalia, she had a scalp lesion removed in 6/2018.  Call Dr. Lee  And Dr. Martínez of [place] for both path reports.  Initial bx, and wide excision or Moh's Rx.

## 2019-03-11 ENCOUNTER — OFFICE VISIT (OUTPATIENT)
Dept: PULMONOLOGY | Facility: CLINIC | Age: 79
End: 2019-03-11
Payer: MEDICARE

## 2019-03-11 ENCOUNTER — DOCUMENTATION ONLY (OUTPATIENT)
Dept: PULMONOLOGY | Facility: CLINIC | Age: 79
End: 2019-03-11

## 2019-03-11 VITALS
HEART RATE: 65 BPM | BODY MASS INDEX: 32.6 KG/M2 | OXYGEN SATURATION: 95 % | HEIGHT: 63 IN | DIASTOLIC BLOOD PRESSURE: 65 MMHG | SYSTOLIC BLOOD PRESSURE: 120 MMHG | WEIGHT: 184 LBS

## 2019-03-11 DIAGNOSIS — G47.33 OSA (OBSTRUCTIVE SLEEP APNEA): Primary | ICD-10-CM

## 2019-03-11 PROCEDURE — 99212 OFFICE O/P EST SF 10 MIN: CPT | Mod: ,,, | Performed by: NURSE PRACTITIONER

## 2019-03-11 PROCEDURE — 99212 PR OFFICE/OUTPT VISIT, EST, LEVL II, 10-19 MIN: ICD-10-PCS | Mod: ,,, | Performed by: NURSE PRACTITIONER

## 2019-03-11 NOTE — PROGRESS NOTES
Pt was seen in office today and stated she has not heard anything from [facility] about her CPAP. I spoke to Nay and she said they faxed a request for pts PSG and initial OV on 1/11/19 but I do not see where we had received it. So I sent the info that [facility] requested today.     4/12/2019 Spoke to Nay at [facility] and patient was set up with CPAP on 3/20/2019

## 2019-03-11 NOTE — PROGRESS NOTES
Patient came in today for what was supposed to be a compliance check for her new CPAP machine. Order was sent to [facility] in January.  Patient states they called her once and she did not hear anything else.  [facility] was called they are sending paperwork to be signed. Will reschedule for 2 months

## 2019-04-25 ENCOUNTER — OFFICE VISIT (OUTPATIENT)
Dept: PODIATRY | Facility: CLINIC | Age: 79
End: 2019-04-25
Payer: MEDICARE

## 2019-04-25 VITALS
BODY MASS INDEX: 32.6 KG/M2 | DIASTOLIC BLOOD PRESSURE: 59 MMHG | HEART RATE: 86 BPM | HEIGHT: 63 IN | TEMPERATURE: 98 F | WEIGHT: 184 LBS | SYSTOLIC BLOOD PRESSURE: 140 MMHG

## 2019-04-25 DIAGNOSIS — L40.9 PSORIASIS: ICD-10-CM

## 2019-04-25 DIAGNOSIS — E11.49 TYPE II DIABETES MELLITUS WITH NEUROLOGICAL MANIFESTATIONS: ICD-10-CM

## 2019-04-25 DIAGNOSIS — L60.0 INGROWN NAIL: Primary | ICD-10-CM

## 2019-04-25 PROCEDURE — 99999 PR PBB SHADOW E&M-EST. PATIENT-LVL III: ICD-10-PCS | Mod: PBBFAC,,, | Performed by: PODIATRIST

## 2019-04-25 PROCEDURE — 99999 PR PBB SHADOW E&M-EST. PATIENT-LVL III: CPT | Mod: PBBFAC,,, | Performed by: PODIATRIST

## 2019-04-25 PROCEDURE — 99213 PR OFFICE/OUTPT VISIT, EST, LEVL III, 20-29 MIN: ICD-10-PCS | Mod: S$PBB,,, | Performed by: PODIATRIST

## 2019-04-25 PROCEDURE — 99213 OFFICE O/P EST LOW 20 MIN: CPT | Mod: PBBFAC,PN | Performed by: PODIATRIST

## 2019-04-25 PROCEDURE — 99213 OFFICE O/P EST LOW 20 MIN: CPT | Mod: S$PBB,,, | Performed by: PODIATRIST

## 2019-04-25 RX ORDER — TRIAMCINOLONE ACETONIDE 1 MG/G
CREAM TOPICAL
COMMUNITY
Start: 2019-04-16 | End: 2020-02-06 | Stop reason: CLARIF

## 2019-04-25 RX ORDER — FLUTICASONE PROPIONATE AND SALMETEROL 250; 50 UG/1; UG/1
POWDER RESPIRATORY (INHALATION)
COMMUNITY
End: 2019-06-17

## 2019-04-25 RX ORDER — METOPROLOL TARTRATE 50 MG/1
TABLET ORAL
COMMUNITY
Start: 2019-02-25 | End: 2019-05-27

## 2019-04-28 NOTE — PROGRESS NOTES
Subjective:       Patient ID: Mirta Guerin is a 78 y.o. female.    Chief Complaint: Nail Problem; Foot Problem; Follow-up; and Ingrown Toenail  Patient presents today for followup she is a history of chronically ingrown toenails with signs of infection especially on her left hallux.     Ingrown Toenail     Nail Problem      Patient reports No polyuria, No polydipsia, No galactorrhea, No hair loss, No heat intolerance, No cold intolerance, No change in libido, and No hirsutism; DM TYPE 2. She reports No fever, No chills, No night sweats, No weight loss, No weight gain, No fatigue, No malaise, No loss of appetite, and No myalgias. She reports No chest pain, No murmurs, No palpitations, No dyspnea on exertion, No orthopnea, No syncope, No paroxysmal nocturnal dyspnea, No edema, No cyanosis, No claudication, No rest pain, and No orthostatic symmtoms. She reports No shortness of breath, No wheezing, No cough, No hoarseness, No hemoptysis, and No sleep apnea. She reports No heartburn, No nausea, No vomiting, No diarrhea, No constipation, No abdominal pain, No jaundice, No melena, No hematochezia, No biliuria, No acholia, No anorexia, No early satiety, No dysphagia, No odynophagia, No hematemesis, No excessive belching, No bloating, No distention, No obstipation, No blood in stools, No hemorrhoids, No steatorrhea, and No excessive flatus. She reports No rash, No itching, No new skin lesions, No changes to the existing skin lesions or moles, No pigmentation changes, No skin dryness, No hair dryness, No hair changes, No nail changes, and No hirsutism. She reports No blurred vision, No changes in vision, No impaired vision, No double vision, No peripheral vision changes, and No eye discomfort. She reports No sore throat, No nasal congestion, No nasal discharge, No postnasal drip, No headaches, No vertigo, No lightheadedness, No nosebleeds, No gingival bleeding, No dental problems, No neck stiffness, No neck pain, No  tinnitus, and No oral lesions. She reports no bone pain, No muscle pain, No muscular weakness, No muscle cramps, No joint pain, No joint swelling, and No motion limitations. She reports No breast lump, No tenderness, No swelling, and No nipple discharge. She reports No tingling, No numbness, No weakness, No incoordination, No difficulty concentrating, No speech problems, No seizures, No tremors, and No loss of balance. She reports No anxiety, No depression, and No hallucinations. She reports No easy bruising, No petechiae, no purpura, No lymphadenopathy, and No pica. She reports No allergic dermatitis and No frequent illness. She reports No urgency, No frequency, No dysuria, No nocturia, No hematuria, No hesitancy, No pyuria, No oligouria, No change in urine color, No incontinence, No decreased stream, No dribbling, No pneumaturia, and No decreased libido. She reports No dyspareunia, No genital sores, No irregular menses, No menorrhagia, No vaginal discharge, and No possible pregnancy.     Review of Systems    Objective:      Physical Exam   Constitutional: She appears well-developed and well-nourished.   Cardiovascular:   Pulses:       Dorsalis pedis pulses are 1+ on the right side, and 1+ on the left side.        Posterior tibial pulses are 1+ on the right side, and 1+ on the left side.   Musculoskeletal: Normal range of motion. She exhibits edema and deformity.   Feet:   Right Foot:   Protective Sensation: 4 sites tested. 1 site sensed.  Skin Integrity: Positive for dry skin.   Left Foot:   Protective Sensation: 4 sites tested. 1 site sensed.  Skin Integrity: Positive for dry skin.   Neurological: She displays abnormal reflex.   Skin: Skin is warm. Capillary refill takes more than 3 seconds.   Psychiatric: She has a normal mood and affect. Her behavior is normal. Judgment and thought content normal.     Patient is noted to have positive erythema positive edema at the medial lateral aspect of the patient's left  hallux upon debridement of the lateral border left hallux there is purulent drainage noted as well as obvious signs of infection and pain to palpation. Patient is noted to have a well-defined area of hyperkeratotic tissue on the dorsal lateral aspect of the fifth digit left there is positive pain noted upon palpation of this area no sign of infection is noted. patient's previously noted psoriasis is more well-controlled on today's visit that ever seen it since it started seeing the patient there are mild erythematous plaques or areas of skin breakdown especially on the heel.    Assessment:       1. Ingrown nail    2. Psoriasis    3. Type II diabetes mellitus with neurological manifestations        Plan:        Following examination I aggressively debrided both the medial and lateral border of the patient's left hallux I did advise the patient is obvious signs of infection with purulent drainage noted at the lateral border left hallux. Sterile saline was used to flush and irrigated the lateral border left hallux bacitracin ointment and a well-padded dry dressing applied patient advised to keep antibiotic ointment on the area for the next 3 days if this does not completely resolve if it continues to be red and painful we need to see her back for followup sooner than the regularly scheduled 6-8 week appointment. I debrided the hyperkeratotic tissue from the fifth digit left patient stated that this felt much better I advised her this is likely due to a shoe rubbing and irritation of this area recommended followup 6-8.   weeks.Patient states her psoriasis is doing as good as it's been in many years and is very well controlled especially on her feet.   Patient made a statement today that the gabapentin I had prescribed her has absolutely changed her life she is having a lot less discomfort and pain in her lower extremities.   Patient relates she has been under lot of stress with family issues of this definitely  aggravates her psoriasis.Complete diabetic evaluation was performed today.

## 2019-05-06 ENCOUNTER — OFFICE VISIT (OUTPATIENT)
Dept: PULMONOLOGY | Facility: CLINIC | Age: 79
End: 2019-05-06
Payer: MEDICARE

## 2019-05-06 VITALS
HEART RATE: 87 BPM | WEIGHT: 182 LBS | OXYGEN SATURATION: 97 % | HEIGHT: 63 IN | SYSTOLIC BLOOD PRESSURE: 120 MMHG | DIASTOLIC BLOOD PRESSURE: 80 MMHG | BODY MASS INDEX: 32.25 KG/M2

## 2019-05-06 DIAGNOSIS — G47.33 OSA (OBSTRUCTIVE SLEEP APNEA): Primary | ICD-10-CM

## 2019-05-06 DIAGNOSIS — J30.9 ALLERGIC RHINITIS, UNSPECIFIED SEASONALITY, UNSPECIFIED TRIGGER: ICD-10-CM

## 2019-05-06 PROCEDURE — 99214 OFFICE O/P EST MOD 30 MIN: CPT | Mod: ,,, | Performed by: NURSE PRACTITIONER

## 2019-05-06 PROCEDURE — 99214 PR OFFICE/OUTPT VISIT, EST, LEVL IV, 30-39 MIN: ICD-10-PCS | Mod: ,,, | Performed by: NURSE PRACTITIONER

## 2019-05-06 NOTE — PROGRESS NOTES
SUBJECTIVE:    Patient ID: Mirta Guerin is a 78 y.o. female.    Chief Complaint: Apnea (Pt has been sick on and off past couple months due to being sick) and Cough    HPI   Patient here today for compliance check however last night was the first night she wore her CPAP.  She got her machine on 3/30/19 she states she did not wear her machine because she has been suffering from her allergies.  She did not call anyone regarding this.  She states she slept amazing last night and had no issues with the machine.    Past Medical History:   Diagnosis Date    Anxiety     AV block     Breast cancer     Cardiac pacemaker in situ     Coronary artery disease     Diabetes mellitus     GERD (gastroesophageal reflux disease)     HLD (hyperlipidemia)     Hypertension     Lung disease     Mitral valve disorder     NEGRITA (obstructive sleep apnea)     Prediabetes     Psoriasis     Spinal stenosis     Vitamin D deficiency      Past Surgical History:   Procedure Laterality Date    BREAST LUMPECTOMY      CARDIAC PACEMAKER PLACEMENT  04/05/2012    CORONARY ARTERY BYPASS GRAFT  07/2017    MASTECTOMY, PARTIAL      SKIN CANCER EXCISION      UPPER GASTROINTESTINAL ENDOSCOPY       Family History   Problem Relation Age of Onset    Cancer Mother         ovarian    Coronary artery disease Father     Stroke Father     Stroke Brother     Stroke Maternal Grandfather     Cancer Paternal Grandmother         breast    Stroke Paternal Grandfather         Social History:   Marital Status:   Occupation: Data Unavailable  Alcohol History:  reports that she drinks alcohol.  Tobacco History:  reports that she has been smoking cigarettes.  She started smoking about 65 years ago. She has a 25.00 pack-year smoking history. She has never used smokeless tobacco.  Drug History:  reports that she does not use drugs.    Review of patient's allergies indicates:   Allergen Reactions    Benazepril Other (See Comments)      sleepy    Codeine Nausea And Vomiting    Polysporin [bacitracin-polymyxin b] Rash       Current Outpatient Medications   Medication Sig Dispense Refill    amlodipine (NORVASC) 5 MG tablet Take 5 mg by mouth once daily.      anastrozole (ARIMIDEX) 1 mg Tab TAKE 1 TABLET BY MOUTH ONCE DAILY 90 tablet 3    aspirin (ECOTRIN) 81 MG EC tablet Take 81 mg by mouth once daily.      atorvastatin (LIPITOR) 40 MG tablet Take 40 mg by mouth once daily.      co-enzyme Q-10 50 mg capsule Take 100 mg by mouth once daily.      docusate sodium (COLACE) 100 MG capsule Take 100 mg by mouth 2 (two) times daily.      DULoxetine (CYMBALTA) 20 MG capsule TAKE 1 CAPSULE BY MOUTH  ONCE DAILY 30 capsule 4    loratadine (CLARITIN) 10 mg tablet Take 10 mg by mouth once daily.      losartan (COZAAR) 100 MG tablet       metformin (GLUCOPHAGE) 500 MG tablet Take 500 mg by mouth 2 (two) times daily with meals.      metoprolol succinate (TOPROL-XL) 50 MG 24 hr tablet Take 100 mg by mouth once daily.      MULTIVIT-MIN/FA/LUTEIN/ZEAXANT (MACULAR VITAMIN ORAL) Take by mouth.      pantoprazole (PROTONIX) 40 MG tablet       traMADol (ULTRAM) 50 mg tablet Take 1 tablet (50 mg total) by mouth every 6 (six) hours as needed for Pain. 28 tablet 3    vitamin D 1000 units Tab Take 1,000 Units by mouth once daily.      diclofenac sodium (VOLTAREN) 1 % Gel Apply 2 g topically 4 (four) times daily. Apply to affected area 500 g 5    fluticasone-salmeterol diskus inhaler 250-50 mcg Advair Diskus 250 mcg-50 mcg/dose powder for inhalation      gabapentin (NEURONTIN) 300 MG capsule Take 1 capsule (300 mg total) by mouth every evening. 90 capsule 3    metoprolol tartrate (LOPRESSOR) 50 MG tablet       tobramycin sulfate 0.3% (TOBREX) 0.3 % ophthalmic solution       triamcinolone acetonide 0.1% (KENALOG) 0.1 % cream        No current facility-administered medications for this visit.            General: Feeling Well.  Eyes: Vision is good.  ENT:  No  "sinusitis or pharyngitis.   Heart:: No chest pain or palpitations.  Lungs: No cough, sputum, or wheezing.  GI: no N/V or reflux .  : wakes up twice a night to urinate  Musculoskeletal: No joint pain or myalgias.  Skin: No lesions or rashes.  Neuro: No headaches or neuropathy.  Lymph: swelling if she eats real salty  Psych: No anxiety or depression.  Endo:weight gain     OBJECTIVE:      /80 (BP Location: Left arm, Patient Position: Sitting, BP Method: Medium (Manual))   Pulse 87   Ht 5' 3" (1.6 m)   Wt 82.6 kg (182 lb)   SpO2 97%   BMI 32.24 kg/m²     Physical Exam  GENERAL: Older patient in no distress.  HEENT: Pupils equal and reactive. Extraocular movements intact. Nose intact.      Pharynx moist.   NECK: Supple.   HEART: Regular rate and rhythm. No murmur or gallop auscultated.  LUNGS: Clear to auscultation and percussion. Lung excursion symmetrical. No change in fremitus. No adventitial noises.  ABDOMEN: Bowel sounds present. Non-tender, no masses palpated.  EXTREMITIES: Normal muscle tone and joint movement, no cyanosis or clubbing.   LYMPHATICS: No adenopathy palpated, no edema.  SKIN: Dry, intact, no lesions.   NEURO: Cranial nerves II-XII intact. Motor strength 5/5 bilaterally, upper and lower extremities.  PSYCH: Appropriate affect.    Assessment:       1. NEGRITA (obstructive sleep apnea)    2. Allergic rhinitis, unspecified seasonality, unspecified trigger          Plan:       NEGRITA (obstructive sleep apnea)    Allergic rhinitis, unspecified seasonality, unspecified trigger    Stop the Claritin  Start Allegra 180mg daily  Use flonase to each nostril daily  Sleep at least 4 hours a night on your CPAP   Bring your chip     Follow up in about 6 weeks (around 6/17/2019).        "

## 2019-05-06 NOTE — PATIENT INSTRUCTIONS
Obstructive Sleep Apnea  Obstructive sleep apnea is a condition that causes your air passages to become narrowed or blocked during sleep. As a result, breathing stops for short periods. Your body wakes up enough for breathing to begin again, though you don't remember it. The cycle of stopped breathing and brief awakenings can repeat dozens of times a night. This prevents the body from getting to the deeper stages of sleep that are needed for good rest and may cause your body's oxygen level to fall.  Signs of sleep apnea include loud snoring, noisy breathing, and gasping sounds during sleep. Daytime symptoms include waking up tired after a full night's sleep, waking up with headaches, feeling very sleepy or falling asleep during the day, and having problems with memory or concentration.  Risk factors for sleep apnea include:  · Being overweight  · Being a man, or a woman in menopause  · Smoking  · Using alcohol or sedating medicines  · Having enlarged structures in the nose or throat  Home care  Lifestyle changes that can help treat snoring and sleep apnea include the following:  · If you are overweight, lose weight. Talk to your healthcare provider about a weight-loss plan for you.  · Avoid alcohol for 3 to 4 hours before bedtime. Avoid sedating medications. Ask your healthcare provider about the medicines you take.  · If you smoke, talk to your healthcare provider about ways to quit.  · Sleep on your side. This can help prevent gravity from pulling relaxed throat tissues into your breathing passages.  · If you have allergies or sinus problems that block your nose, ask your healthcare provider for help.  Follow-up care  Follow up with your healthcare provider, or as advised. A diagnosis of sleep apnea is made with a sleep study. Your healthcare provider can tell you more about this test.  When to seek medical advice  Sleep apnea can make you more likely to have certain health problems. These include high blood  pressure, heart attack, stroke, and sexual dysfunction. If you have sleep apnea, talk to your healthcare provider about the best treatments for you.  Date Last Reviewed: 4/1/2017  © 3911-1894 [email or web address]. [address] [place] [address], [place] [address]. All rights reserved. This information is not intended as a substitute for professional medical care. Always follow your healthcare professional's instructions.    Stop the Claritin  Start Allegra 180mg daily  Use flonase to each nostril daily  Sleep at least 4 hours a night on your CPAP   Bring your chip

## 2019-05-27 ENCOUNTER — OFFICE VISIT (OUTPATIENT)
Dept: ORTHOPEDICS | Facility: CLINIC | Age: 79
End: 2019-05-27
Payer: MEDICARE

## 2019-05-27 VITALS
BODY MASS INDEX: 32.6 KG/M2 | DIASTOLIC BLOOD PRESSURE: 78 MMHG | HEART RATE: 80 BPM | HEIGHT: 63 IN | WEIGHT: 184 LBS | SYSTOLIC BLOOD PRESSURE: 138 MMHG

## 2019-05-27 DIAGNOSIS — M47.816 LUMBAR FACET ARTHROPATHY: ICD-10-CM

## 2019-05-27 DIAGNOSIS — M19.041 OSTEOARTHRITIS OF FINGERS OF HANDS, BILATERAL: ICD-10-CM

## 2019-05-27 DIAGNOSIS — M43.16 SPONDYLOLISTHESIS OF LUMBAR REGION: ICD-10-CM

## 2019-05-27 DIAGNOSIS — M51.36 DISC DEGENERATION, LUMBAR: Primary | ICD-10-CM

## 2019-05-27 DIAGNOSIS — M19.042 OSTEOARTHRITIS OF FINGERS OF HANDS, BILATERAL: ICD-10-CM

## 2019-05-27 PROCEDURE — 99213 OFFICE O/P EST LOW 20 MIN: CPT | Mod: ,,, | Performed by: PHYSICIAN ASSISTANT

## 2019-05-27 PROCEDURE — 99213 PR OFFICE/OUTPT VISIT, EST, LEVL III, 20-29 MIN: ICD-10-PCS | Mod: ,,, | Performed by: PHYSICIAN ASSISTANT

## 2019-05-27 RX ORDER — TRAMADOL HYDROCHLORIDE 50 MG/1
50 TABLET ORAL EVERY 4 HOURS PRN
Qty: 42 TABLET | Refills: 3 | Status: SHIPPED | OUTPATIENT
Start: 2019-05-27 | End: 2019-06-03

## 2019-05-27 NOTE — PROGRESS NOTES
Subjective:       Patient ID: Mirta Guerin is a 78 y.o. female.    Chief Complaint: Pain of the Lumbar Spine (Lumbar is getting worse. Pain does not radiate. No other treatment)      History of Present Illness  Patient is here to follow-up for chronic low back pain with a recent mild exacerbation. Overall she does well with tramadol twice a day. She's had a lumbar facet rhizotomy in the past, over 6 months ago, and it did help reduce her symptoms.    Current Medications  Current Outpatient Medications   Medication Sig Dispense Refill    amlodipine (NORVASC) 5 MG tablet Take 5 mg by mouth once daily.      anastrozole (ARIMIDEX) 1 mg Tab TAKE 1 TABLET BY MOUTH ONCE DAILY 90 tablet 3    aspirin (ECOTRIN) 81 MG EC tablet Take 81 mg by mouth once daily.      atorvastatin (LIPITOR) 40 MG tablet Take 40 mg by mouth once daily.      co-enzyme Q-10 50 mg capsule Take 100 mg by mouth once daily.      diclofenac sodium (VOLTAREN) 1 % Gel Apply 2 g topically 4 (four) times daily. Apply to affected area 500 g 5    docusate sodium (COLACE) 100 MG capsule Take 100 mg by mouth 2 (two) times daily.      DULoxetine (CYMBALTA) 20 MG capsule TAKE 1 CAPSULE BY MOUTH  ONCE DAILY 30 capsule 4    fluticasone-salmeterol diskus inhaler 250-50 mcg Advair Diskus 250 mcg-50 mcg/dose powder for inhalation      loratadine (CLARITIN) 10 mg tablet Take 10 mg by mouth once daily.      losartan (COZAAR) 100 MG tablet       metformin (GLUCOPHAGE) 500 MG tablet Take 500 mg by mouth 2 (two) times daily with meals.      metoprolol succinate (TOPROL-XL) 50 MG 24 hr tablet Take 100 mg by mouth once daily.      MULTIVIT-MIN/FA/LUTEIN/ZEAXANT (MACULAR VITAMIN ORAL) Take by mouth.      pantoprazole (PROTONIX) 40 MG tablet       tobramycin sulfate 0.3% (TOBREX) 0.3 % ophthalmic solution       traMADol (ULTRAM) 50 mg tablet Take 1 tablet (50 mg total) by mouth every 6 (six) hours as needed for Pain. 28 tablet 3    triamcinolone  acetonide 0.1% (KENALOG) 0.1 % cream       vitamin D 1000 units Tab Take 1,000 Units by mouth once daily.      gabapentin (NEURONTIN) 300 MG capsule Take 1 capsule (300 mg total) by mouth every evening. 90 capsule 3     No current facility-administered medications for this visit.        Allergies  Review of patient's allergies indicates:   Allergen Reactions    Benazepril Other (See Comments)     sleepy    Codeine Nausea And Vomiting    Polysporin [bacitracin-polymyxin b] Rash       Past Medical History  Past Medical History:   Diagnosis Date    Anxiety     AV block     Breast cancer     Cardiac pacemaker in situ     Coronary artery disease     Diabetes mellitus     GERD (gastroesophageal reflux disease)     HLD (hyperlipidemia)     Hypertension     Lung disease     Mitral valve disorder     NEGRITA (obstructive sleep apnea)     Prediabetes     Psoriasis     Spinal stenosis     Vitamin D deficiency        Surgical History  Past Surgical History:   Procedure Laterality Date    BREAST LUMPECTOMY      CARDIAC PACEMAKER PLACEMENT  04/05/2012    CORONARY ARTERY BYPASS GRAFT  07/2017    MASTECTOMY, PARTIAL      SKIN CANCER EXCISION      UPPER GASTROINTESTINAL ENDOSCOPY         Family History:   Family History   Problem Relation Age of Onset    Cancer Mother         ovarian    Coronary artery disease Father     Stroke Father     Stroke Brother     Stroke Maternal Grandfather     Cancer Paternal Grandmother         breast    Stroke Paternal Grandfather        Social History:   Social History     Socioeconomic History    Marital status:      Spouse name: Not on file    Number of children: Not on file    Years of education: Not on file    Highest education level: Not on file   Occupational History    Not on file   Social Needs    Financial resource strain: Not on file    Food insecurity:     Worry: Not on file     Inability: Not on file    Transportation needs:     Medical: Not on  file     Non-medical: Not on file   Tobacco Use    Smoking status: Current Every Day Smoker     Packs/day: 0.50     Years: 50.00     Pack years: 25.00     Types: Cigarettes     Start date: 1956    Smokeless tobacco: Never Used   Substance and Sexual Activity    Alcohol use: Yes     Comment: occasional    Drug use: No    Sexual activity: Never   Lifestyle    Physical activity:     Days per week: Not on file     Minutes per session: Not on file    Stress: Not on file   Relationships    Social connections:     Talks on phone: Not on file     Gets together: Not on file     Attends Che[employer] service: Not on file     Active member of club or organization: Not on file     Attends meetings of clubs or organizations: Not on file     Relationship status: Not on file   Other Topics Concern    Not on file   Social History Narrative    Not on file       Hospitalization/Major Diagnostic Procedure:     Review of Systems     General/Constitutional:  Chills denies. Fatigue denies. Fever denies. Weight gain denies. Weight loss denies.    Respiratory:  Shortness of breath denies.    Cardiovascular:  Chest pain denies.    Gastrointestinal:  Constipation denies. Diarrhea denies. Nausea denies. Vomiting denies.     Hematology:  Easy bruising denies. Prolonged bleeding denies.     Genitourinary:  Frequent urination denies. Pain in lower back denies. Painful urination denies.     Musculoskeletal:  See HPI for details    Skin:  Rash denies.    Neurologic:  Dizziness denies. Gait abnormalities denies. Seizures denies. Tingling/Numbess denies.    Psychiatric:  Anxiety denies. Depressed mood denies.     Objective:   Vital Signs:   Vitals:    05/27/19 0944   BP: 138/78   Pulse: 80        Physical Exam      General Examination:     Constitutional: The patient is alert and oriented to lace person and time. Mood is pleasant.     Head/Face: Normal facial features normal eyebrows    Eyes: Normal extraocular motion bilaterally    Lungs:  Respirations are equal and unlabored    Gait is coordinated.    Cardiovascular: There are no swelling or varicosities present.    Lymphatic: Negative for adenopathy    Skin: Normal    Neurological: Level of consciousness normal. Oriented to place person and time and situation    Psychiatric: Oriented to time place person and situation    lumbar exam: Antalgic gait and antalgic sit to stand. Lumbar range of motion approximate 70% of normal both flexion and extension. Bilateral lower extremity is or distal neurovascular intact.  XRAY Report/ Interpretation: No new studies      Assessment:       1. Disc degeneration, lumbar    2. Spondylolisthesis of lumbar region    3. Lumbar facet arthropathy        Plan:       Mirta was seen today for pain.    Diagnoses and all orders for this visit:    Disc degeneration, lumbar    Spondylolisthesis of lumbar region    Lumbar facet arthropathy         No follow-ups on file.    At this time I refilled her tramadol. She will consider repeating the lumbar facet rhizotomy in the future. Follow-up in 3 months or sooner if needed.    This note was created using Dragon voice recognition software that occasionally misinterpreted phrases or words.

## 2019-06-17 ENCOUNTER — OFFICE VISIT (OUTPATIENT)
Dept: PULMONOLOGY | Facility: CLINIC | Age: 79
End: 2019-06-17
Payer: MEDICARE

## 2019-06-17 VITALS
DIASTOLIC BLOOD PRESSURE: 80 MMHG | HEIGHT: 63 IN | WEIGHT: 187 LBS | BODY MASS INDEX: 33.13 KG/M2 | OXYGEN SATURATION: 95 % | SYSTOLIC BLOOD PRESSURE: 130 MMHG | HEART RATE: 80 BPM

## 2019-06-17 DIAGNOSIS — G47.33 OBSTRUCTIVE SLEEP APNEA SYNDROME: Primary | ICD-10-CM

## 2019-06-17 PROCEDURE — 99214 OFFICE O/P EST MOD 30 MIN: CPT | Mod: ,,, | Performed by: NURSE PRACTITIONER

## 2019-06-17 PROCEDURE — 99214 PR OFFICE/OUTPT VISIT, EST, LEVL IV, 30-39 MIN: ICD-10-PCS | Mod: ,,, | Performed by: NURSE PRACTITIONER

## 2019-06-17 RX ORDER — TRAMADOL HYDROCHLORIDE 50 MG/1
TABLET ORAL
COMMUNITY
Start: 2019-06-10 | End: 2019-08-23 | Stop reason: SDUPTHER

## 2019-06-17 NOTE — PROGRESS NOTES
SUBJECTIVE:    Patient ID: Mirta Guerin is a 78 y.o. female.    Chief Complaint: Apnea     Patient here today feeling well. She is sleeping on her CPAP every night for an average of 6 hours and 14 minutes. She is 100% compliant.  Her AHI is 3.1.  She is still suffering with her sinuses.  She is still taking Claritin,states nothing else works.  She feels great benefit from her CPAP.     Past Medical History:   Diagnosis Date    Anxiety     AV block     Breast cancer     Cardiac pacemaker in situ     Coronary artery disease     Diabetes mellitus     GERD (gastroesophageal reflux disease)     HLD (hyperlipidemia)     Hypertension     Lung disease     Mitral valve disorder     NEGRITA (obstructive sleep apnea)     Prediabetes     Psoriasis     Spinal stenosis     Vitamin D deficiency      Past Surgical History:   Procedure Laterality Date    BREAST LUMPECTOMY      CARDIAC PACEMAKER PLACEMENT  04/05/2012    CORONARY ARTERY BYPASS GRAFT  07/2017    MASTECTOMY, PARTIAL      SKIN CANCER EXCISION      UPPER GASTROINTESTINAL ENDOSCOPY       Family History   Problem Relation Age of Onset    Cancer Mother         ovarian    Coronary artery disease Father     Stroke Father     Stroke Brother     Stroke Maternal Grandfather     Cancer Paternal Grandmother         breast    Stroke Paternal Grandfather         Social History:   Marital Status:   Occupation: Data Unavailable  Alcohol History:  reports that she drinks alcohol.  Tobacco History:  reports that she has been smoking cigarettes.  She started smoking about 65 years ago. She has a 12.50 pack-year smoking history. She has never used smokeless tobacco.  Drug History:  reports that she does not use drugs.    Review of patient's allergies indicates:   Allergen Reactions    Benazepril Other (See Comments)     sleepy    Codeine Nausea And Vomiting    Polysporin [bacitracin-polymyxin b] Rash       Current Outpatient Medications    Medication Sig Dispense Refill    amlodipine (NORVASC) 5 MG tablet Take 5 mg by mouth once daily.      anastrozole (ARIMIDEX) 1 mg Tab TAKE 1 TABLET BY MOUTH ONCE DAILY 90 tablet 3    aspirin (ECOTRIN) 81 MG EC tablet Take 81 mg by mouth once daily.      atorvastatin (LIPITOR) 40 MG tablet Take 40 mg by mouth once daily.      co-enzyme Q-10 50 mg capsule Take 100 mg by mouth once daily.      diclofenac sodium (VOLTAREN) 1 % Gel Apply 2 g topically 4 (four) times daily. Apply to affected area 500 g 5    docusate sodium (COLACE) 100 MG capsule Take 100 mg by mouth 2 (two) times daily.      DULoxetine (CYMBALTA) 20 MG capsule TAKE 1 CAPSULE BY MOUTH  ONCE DAILY 30 capsule 4    loratadine (CLARITIN) 10 mg tablet Take 10 mg by mouth once daily.      losartan (COZAAR) 100 MG tablet       metformin (GLUCOPHAGE) 500 MG tablet Take 500 mg by mouth 2 (two) times daily with meals.      metoprolol succinate (TOPROL-XL) 50 MG 24 hr tablet Take 100 mg by mouth once daily.      MULTIVIT-MIN/FA/LUTEIN/ZEAXANT (MACULAR VITAMIN ORAL) Take by mouth.      pantoprazole (PROTONIX) 40 MG tablet       tobramycin sulfate 0.3% (TOBREX) 0.3 % ophthalmic solution       traMADol (ULTRAM) 50 mg tablet       triamcinolone acetonide 0.1% (KENALOG) 0.1 % cream       vitamin D 1000 units Tab Take 1,000 Units by mouth once daily.      gabapentin (NEURONTIN) 300 MG capsule Take 1 capsule (300 mg total) by mouth every evening. 90 capsule 3     No current facility-administered medications for this visit.            General: Feeling Well.  Eyes: Vision is good.  ENT:  Suffers from chronic sinuses   Heart:: No chest pain or palpitations.  Lungs: No cough, sputum, or wheezing.  GI: no N/V or reflux .  : no issues   Musculoskeletal: No joint pain or myalgias.  Skin: No lesions or rashes.  Neuro: No headaches or neuropathy.  Lymph: swelling if she eats real salty  Psych: No anxiety or depression.  Endo:weight gain     OBJECTIVE:     "  /80 (BP Location: Left arm, Patient Position: Sitting, BP Method: Medium (Manual))   Pulse 80   Ht 5' 3" (1.6 m)   Wt 84.8 kg (187 lb)   SpO2 95%   BMI 33.13 kg/m²     Physical Exam  GENERAL: Older patient in no distress.  HEENT: Pupils equal and reactive. Extraocular movements intact. Nose intact.      Pharynx moist.   NECK: Supple.   HEART: Regular rate and rhythm. No murmur or gallop auscultated.  LUNGS: Clear to auscultation and percussion. Lung excursion symmetrical. No change in fremitus. No adventitial noises.  ABDOMEN: Bowel sounds present. Non-tender, no masses palpated.  EXTREMITIES: Normal muscle tone and joint movement, no cyanosis or clubbing.   LYMPHATICS: No adenopathy palpated, no edema.  SKIN: Dry, intact, no lesions.   NEURO: Cranial nerves II-XII intact. Motor strength 5/5 bilaterally, upper and lower extremities.  PSYCH: Appropriate affect.    Assessment:       1. Obstructive sleep apnea syndrome          Plan:       Obstructive sleep apnea syndrome    Keep sleeping on your CPAP every night  Follow up in about 6 months (around 12/17/2019).        "

## 2019-06-17 NOTE — PATIENT INSTRUCTIONS
Obstructive Sleep Apnea  Obstructive sleep apnea is a condition that causes your air passages to become narrowed or blocked during sleep. As a result, breathing stops for short periods. Your body wakes up enough for breathing to begin again, though you don't remember it. The cycle of stopped breathing and brief awakenings can repeat dozens of times a night. This prevents the body from getting to the deeper stages of sleep that are needed for good rest and may cause your body's oxygen level to fall.  Signs of sleep apnea include loud snoring, noisy breathing, and gasping sounds during sleep. Daytime symptoms include waking up tired after a full night's sleep, waking up with headaches, feeling very sleepy or falling asleep during the day, and having problems with memory or concentration.  Risk factors for sleep apnea include:  · Being overweight  · Being a man, or a woman in menopause  · Smoking  · Using alcohol or sedating medicines  · Having enlarged structures in the nose or throat  Home care  Lifestyle changes that can help treat snoring and sleep apnea include the following:  · If you are overweight, lose weight. Talk to your healthcare provider about a weight-loss plan for you.  · Avoid alcohol for 3 to 4 hours before bedtime. Avoid sedating medications. Ask your healthcare provider about the medicines you take.  · If you smoke, talk to your healthcare provider about ways to quit.  · Sleep on your side. This can help prevent gravity from pulling relaxed throat tissues into your breathing passages.  · If you have allergies or sinus problems that block your nose, ask your healthcare provider for help.  Follow-up care  Follow up with your healthcare provider, or as advised. A diagnosis of sleep apnea is made with a sleep study. Your healthcare provider can tell you more about this test.  When to seek medical advice  Sleep apnea can make you more likely to have certain health problems. These include high blood  pressure, heart attack, stroke, and sexual dysfunction. If you have sleep apnea, talk to your healthcare provider about the best treatments for you.  Date Last Reviewed: 4/1/2017  © 9900-4630 [employer]. [address] [place] [address], [place] [address]. All rights reserved. This information is not intended as a substitute for professional medical care. Always follow your healthcare professional's instructions.      Keep sleeping on your CPAP every night

## 2019-07-25 ENCOUNTER — OFFICE VISIT (OUTPATIENT)
Dept: PODIATRY | Facility: CLINIC | Age: 79
End: 2019-07-25
Payer: MEDICARE

## 2019-07-25 VITALS
TEMPERATURE: 97 F | DIASTOLIC BLOOD PRESSURE: 77 MMHG | SYSTOLIC BLOOD PRESSURE: 164 MMHG | HEIGHT: 63 IN | HEART RATE: 84 BPM | WEIGHT: 180 LBS | BODY MASS INDEX: 31.89 KG/M2

## 2019-07-25 DIAGNOSIS — S91.302A OPEN WOUND OF LEFT HEEL, INITIAL ENCOUNTER: ICD-10-CM

## 2019-07-25 DIAGNOSIS — L60.0 INGROWN NAIL: Primary | ICD-10-CM

## 2019-07-25 DIAGNOSIS — E11.49 TYPE II DIABETES MELLITUS WITH NEUROLOGICAL MANIFESTATIONS: ICD-10-CM

## 2019-07-25 PROCEDURE — 99213 OFFICE O/P EST LOW 20 MIN: CPT | Mod: S$PBB,,, | Performed by: PODIATRIST

## 2019-07-25 PROCEDURE — 99213 PR OFFICE/OUTPT VISIT, EST, LEVL III, 20-29 MIN: ICD-10-PCS | Mod: S$PBB,,, | Performed by: PODIATRIST

## 2019-07-25 PROCEDURE — 99999 PR PBB SHADOW E&M-EST. PATIENT-LVL III: CPT | Mod: PBBFAC,,, | Performed by: PODIATRIST

## 2019-07-25 PROCEDURE — 99999 PR PBB SHADOW E&M-EST. PATIENT-LVL III: ICD-10-PCS | Mod: PBBFAC,,, | Performed by: PODIATRIST

## 2019-07-25 PROCEDURE — 99213 OFFICE O/P EST LOW 20 MIN: CPT | Mod: PBBFAC,PN | Performed by: PODIATRIST

## 2019-07-25 NOTE — LETTER
July 25, 2019      James Jung MD  [address] [place] [address]           [facility] - Podiatry/Wound Care  [address]  [place] [address]  Phone: [phone]  Fax: [phone]          Patient: Mirta Guerin   MR Number: [id number]   YOB: 1940   Date of Visit: 7/25/2019       Dear Dr. James Jung:    Thank you for referring Mirta Guerin to me for evaluation. Attached you will find relevant portions of my assessment and plan of care.    If you have questions, please do not hesitate to call me. I look forward to following Mirta Guerin along with you.    Sincerely,    Angelo Mendes, ROSALBA    Enclosure  CC:  No Recipients    If you would like to receive this communication electronically, please contact [email or web address] or [phone] to request more information on [employer] Link access.    For providers and/or their staff who would like to refer a patient to [facility], please contact us through our one-stop-shop provider referral line, [facility], at [phone].    If you feel you have received this communication in error or would no longer like to receive these types of communications, please e-mail [email or web address]

## 2019-07-25 NOTE — PROGRESS NOTES
Subjective:       Patient ID: Mirta Guerin is a 79 y.o. female.    Chief Complaint: Follow-up; Diabetes Mellitus; Nail Problem; and Ingrown Toenail  Patient presents today for followup she is a history of chronically ingrown toenails with signs of infection especially on her left hallux.     Nail Problem   Associated symptoms include arthralgias, joint swelling and a rash.   Ingrown Toenail   Associated symptoms include arthralgias, joint swelling and a rash.        Review of Systems   Musculoskeletal: Positive for arthralgias and joint swelling.   Skin: Positive for color change, rash and wound.   All other systems reviewed and are negative.      Objective:      Physical Exam   Constitutional: She appears well-developed and well-nourished.   Cardiovascular:   Pulses:       Dorsalis pedis pulses are 1+ on the right side, and 1+ on the left side.        Posterior tibial pulses are 1+ on the right side, and 1+ on the left side.   Pulmonary/Chest: Effort normal.   Musculoskeletal: Normal range of motion. She exhibits edema and deformity.   Feet:   Right Foot:   Protective Sensation: 4 sites tested. 1 site sensed.  Skin Integrity: Positive for dry skin.   Left Foot:   Protective Sensation: 4 sites tested. 1 site sensed.  Skin Integrity: Positive for dry skin.   Neurological: She displays abnormal reflex.   Skin: Skin is warm. Capillary refill takes more than 3 seconds.   Psychiatric: She has a normal mood and affect. Her behavior is normal. Judgment and thought content normal.     Patient is noted to have positive erythema positive edema at the medial lateral aspect of the patient's left hallux upon debridement of the lateral border left hallux there is purulent drainage noted as well as obvious signs of infection and pain to palpation. Patient is noted to have a well-defined area of hyperkeratotic tissue on the dorsal lateral aspect of the fifth digit left there is positive pain noted upon palpation of this area  no sign of infection is noted. patient's previously noted psoriasis is more well-controlled on today's visit that ever seen it since it started seeing the patient there are mild erythematous plaques or areas of skin breakdown especially on the heel.    Assessment:       1. Ingrown nail    2. Type II diabetes mellitus with neurological manifestations        Plan:        Following examination I aggressively debrided both the medial and lateral border of the patient's left hallux I did advise the patient is obvious signs of infection with purulent drainage noted at the lateral border left hallux. Sterile saline was used to flush and irrigated the lateral border left hallux bacitracin ointment and a well-padded dry dressing applied patient advised to keep antibiotic ointment on the area for the next 3 days if this does not completely resolve if it continues to be red and painful we need to see her back for followup sooner than the regularly scheduled 12 week appointment. I debrided the hyperkeratotic tissue from the fifth digit left patient stated that this felt much better I advised her this is likely due to a shoe rubbing and irritation of this area recommended followup 10 weeks.   weeks.Patient states her psoriasis is doing as good as it's been in many years and is very well controlled especially on her feet.  Patient relates she has been under lot of stress with family issues of this definitely aggravates her psoriasis.Complete diabetic evaluation was performed today. Pt developed an area of psoriatic breakdown on her left heel this needs to be monitored closely. Calmoseptine ointment disp. Sample to use on this area.

## 2019-08-15 ENCOUNTER — TELEPHONE (OUTPATIENT)
Dept: HEMATOLOGY/ONCOLOGY | Facility: CLINIC | Age: 79
End: 2019-08-15

## 2019-08-15 DIAGNOSIS — C50.411 MALIGNANT NEOPLASM OF UPPER-OUTER QUADRANT OF RIGHT BREAST IN FEMALE, ESTROGEN RECEPTOR POSITIVE: Primary | ICD-10-CM

## 2019-08-15 DIAGNOSIS — Z17.0 MALIGNANT NEOPLASM OF UPPER-OUTER QUADRANT OF RIGHT BREAST IN FEMALE, ESTROGEN RECEPTOR POSITIVE: Primary | ICD-10-CM

## 2019-08-15 NOTE — TELEPHONE ENCOUNTER
----- Message from Enedelia Cross sent at 8/15/2019 12:04 PM CDT -----  [facility] called and said the patient is scheduled for tomorrow and they need an order for a follow up ultrasound of the left breast. She said that is what was recommended on the last mammogram. Please call Juany back at [phone].

## 2019-08-23 ENCOUNTER — OFFICE VISIT (OUTPATIENT)
Dept: ORTHOPEDICS | Facility: CLINIC | Age: 79
End: 2019-08-23
Payer: MEDICARE

## 2019-08-23 VITALS
HEIGHT: 63 IN | WEIGHT: 180 LBS | SYSTOLIC BLOOD PRESSURE: 132 MMHG | BODY MASS INDEX: 31.89 KG/M2 | DIASTOLIC BLOOD PRESSURE: 72 MMHG | HEART RATE: 84 BPM

## 2019-08-23 DIAGNOSIS — M51.36 DISC DEGENERATION, LUMBAR: Primary | ICD-10-CM

## 2019-08-23 DIAGNOSIS — M47.816 LUMBAR FACET ARTHROPATHY: ICD-10-CM

## 2019-08-23 DIAGNOSIS — M43.16 SPONDYLOLISTHESIS OF LUMBAR REGION: ICD-10-CM

## 2019-08-23 PROCEDURE — 99213 PR OFFICE/OUTPT VISIT, EST, LEVL III, 20-29 MIN: ICD-10-PCS | Mod: S$GLB,,, | Performed by: PHYSICIAN ASSISTANT

## 2019-08-23 PROCEDURE — 99213 OFFICE O/P EST LOW 20 MIN: CPT | Mod: S$GLB,,, | Performed by: PHYSICIAN ASSISTANT

## 2019-08-23 RX ORDER — TRAMADOL HYDROCHLORIDE 50 MG/1
50 TABLET ORAL EVERY 4 HOURS PRN
Qty: 42 TABLET | Refills: 3 | Status: SHIPPED | OUTPATIENT
Start: 2019-08-23 | End: 2019-08-30

## 2019-08-23 NOTE — PROGRESS NOTES
Subjective:       Patient ID: Mirta Guerin is a 79 y.o. female.    Chief Complaint: Pain of the Lumbar Spine (Lumbar is the same. Pain radiates down both legs off and on to knees. No other treatment)      History of Present Illness  Patient is here to follow-up for chronic low back pain with a recent mild exacerbation. Overall she does well with tramadol twice a day. Pain is worse with increased activity.    Current Medications  Current Outpatient Medications   Medication Sig Dispense Refill    amlodipine (NORVASC) 5 MG tablet Take 5 mg by mouth once daily.      anastrozole (ARIMIDEX) 1 mg Tab TAKE 1 TABLET BY MOUTH ONCE DAILY 90 tablet 3    aspirin (ECOTRIN) 81 MG EC tablet Take 81 mg by mouth once daily.      atorvastatin (LIPITOR) 40 MG tablet Take 40 mg by mouth once daily.      co-enzyme Q-10 50 mg capsule Take 100 mg by mouth once daily.      diclofenac sodium (VOLTAREN) 1 % Gel Apply 2 g topically 4 (four) times daily. Apply to affected area 500 g 5    docusate sodium (COLACE) 100 MG capsule Take 100 mg by mouth 2 (two) times daily.      DULoxetine (CYMBALTA) 20 MG capsule TAKE 1 CAPSULE BY MOUTH  ONCE DAILY 30 capsule 4    loratadine (CLARITIN) 10 mg tablet Take 10 mg by mouth once daily.      losartan (COZAAR) 100 MG tablet       metformin (GLUCOPHAGE) 500 MG tablet Take 500 mg by mouth 2 (two) times daily with meals.      metoprolol succinate (TOPROL-XL) 50 MG 24 hr tablet Take 100 mg by mouth once daily.      MULTIVIT-MIN/FA/LUTEIN/ZEAXANT (MACULAR VITAMIN ORAL) Take by mouth.      pantoprazole (PROTONIX) 40 MG tablet       tobramycin sulfate 0.3% (TOBREX) 0.3 % ophthalmic solution       traMADol (ULTRAM) 50 mg tablet       triamcinolone acetonide 0.1% (KENALOG) 0.1 % cream       vitamin D 1000 units Tab Take 1,000 Units by mouth once daily.      gabapentin (NEURONTIN) 300 MG capsule Take 1 capsule (300 mg total) by mouth every evening. 90 capsule 3     No current  facility-administered medications for this visit.        Allergies  Review of patient's allergies indicates:   Allergen Reactions    Benazepril Other (See Comments)     sleepy    Codeine Nausea And Vomiting    Polysporin [bacitracin-polymyxin b] Rash       Past Medical History  Past Medical History:   Diagnosis Date    Anxiety     AV block     Breast cancer     Cardiac pacemaker in situ     Coronary artery disease     Diabetes mellitus     GERD (gastroesophageal reflux disease)     HLD (hyperlipidemia)     Hypertension     Lung disease     Mitral valve disorder     NEGRITA (obstructive sleep apnea)     Prediabetes     Psoriasis     Spinal stenosis     Vitamin D deficiency        Surgical History  Past Surgical History:   Procedure Laterality Date    BREAST LUMPECTOMY      CARDIAC PACEMAKER PLACEMENT  04/05/2012    CORONARY ARTERY BYPASS GRAFT  07/2017    MASTECTOMY, PARTIAL      SKIN CANCER EXCISION      UPPER GASTROINTESTINAL ENDOSCOPY         Family History:   Family History   Problem Relation Age of Onset    Cancer Mother         ovarian    Coronary artery disease Father     Stroke Father     Stroke Brother     Stroke Maternal Grandfather     Cancer Paternal Grandmother         breast    Stroke Paternal Grandfather        Social History:   Social History     Socioeconomic History    Marital status:      Spouse name: Not on file    Number of children: Not on file    Years of education: Not on file    Highest education level: Not on file   Occupational History    Not on file   Social Needs    Financial resource strain: Not on file    Food insecurity:     Worry: Not on file     Inability: Not on file    Transportation needs:     Medical: Not on file     Non-medical: Not on file   Tobacco Use    Smoking status: Current Every Day Smoker     Packs/day: 0.25     Years: 50.00     Pack years: 12.50     Types: Cigarettes     Start date: 1954    Smokeless tobacco: Never Used    Substance and Sexual Activity    Alcohol use: Yes     Comment: occasional    Drug use: No    Sexual activity: Never   Lifestyle    Physical activity:     Days per week: Not on file     Minutes per session: Not on file    Stress: Not on file   Relationships    Social connections:     Talks on phone: Not on file     Gets together: Not on file     Attends [employer] service: Not on file     Active member of club or organization: Not on file     Attends meetings of clubs or organizations: Not on file     Relationship status: Not on file   Other Topics Concern    Not on file   Social History Narrative    Not on file       Hospitalization/Major Diagnostic Procedure:     Review of Systems     General/Constitutional:  Chills denies. Fatigue denies. Fever denies. Weight gain denies. Weight loss denies.    Respiratory:  Shortness of breath denies.    Cardiovascular:  Chest pain denies.    Gastrointestinal:  Constipation denies. Diarrhea denies. Nausea denies. Vomiting denies.     Hematology:  Easy bruising denies. Prolonged bleeding denies.     Genitourinary:  Frequent urination denies. Pain in lower back denies. Painful urination denies.     Musculoskeletal:  See HPI for details    Skin:  Rash denies.    Neurologic:  Dizziness denies. Gait abnormalities denies. Seizures denies. Tingling/Numbess denies.    Psychiatric:  Anxiety denies. Depressed mood denies.     Objective:   Vital Signs:   Vitals:    08/23/19 1027   BP: 132/72   Pulse: 84        Physical Exam      General Examination:     Constitutional: The patient is alert and oriented to lace person and time. Mood is pleasant.     Head/Face: Normal facial features normal eyebrows    Eyes: Normal extraocular motion bilaterally    Lungs: Respirations are equal and unlabored    Gait is coordinated.    Cardiovascular: There are no swelling or varicosities present.    Lymphatic: Negative for adenopathy    Skin: Normal    Neurological: Level of consciousness normal.  Oriented to place person and time and situation    Psychiatric: Oriented to time place person and situation     lumbar exam: Antalgic gait and antalgic sit to stand. Lumbar range of motion approximate 70% of normal both flexion and extension. Bilateral lower extremity is or distal neurovascular intact.    XRAY Report/ Interpretation: No new studies today      Assessment:       1. Disc degeneration, lumbar    2. Spondylolisthesis of lumbar region    3. Lumbar facet arthropathy        Plan:       Mirta was seen today for pain.    Diagnoses and all orders for this visit:    Disc degeneration, lumbar    Spondylolisthesis of lumbar region    Lumbar facet arthropathy         No follow-ups on file.    Continue with activity as tolerated. I refilled her tramadol. Follow-up 6 months or sooner if needed.    This note was created using Dragon voice recognition software that occasionally misinterpreted phrases or words.

## 2019-09-09 ENCOUNTER — TELEPHONE (OUTPATIENT)
Dept: HEMATOLOGY/ONCOLOGY | Facility: CLINIC | Age: 79
End: 2019-09-09

## 2019-09-09 ENCOUNTER — OFFICE VISIT (OUTPATIENT)
Dept: HEMATOLOGY/ONCOLOGY | Facility: CLINIC | Age: 79
End: 2019-09-09
Payer: MEDICARE

## 2019-09-09 VITALS
RESPIRATION RATE: 20 BRPM | WEIGHT: 183.63 LBS | SYSTOLIC BLOOD PRESSURE: 142 MMHG | DIASTOLIC BLOOD PRESSURE: 74 MMHG | HEART RATE: 83 BPM | TEMPERATURE: 98 F | BODY MASS INDEX: 32.52 KG/M2

## 2019-09-09 DIAGNOSIS — C50.411 MALIGNANT NEOPLASM OF UPPER-OUTER QUADRANT OF RIGHT BREAST IN FEMALE, ESTROGEN RECEPTOR POSITIVE: Primary | ICD-10-CM

## 2019-09-09 DIAGNOSIS — Z17.0 MALIGNANT NEOPLASM OF UPPER-OUTER QUADRANT OF RIGHT BREAST IN FEMALE, ESTROGEN RECEPTOR POSITIVE: Primary | ICD-10-CM

## 2019-09-09 DIAGNOSIS — R92.8 ABNORMALITY OF LEFT BREAST ON SCREENING MAMMOGRAM: ICD-10-CM

## 2019-09-09 PROCEDURE — 99214 PR OFFICE/OUTPT VISIT, EST, LEVL IV, 30-39 MIN: ICD-10-PCS | Mod: S$GLB,,, | Performed by: INTERNAL MEDICINE

## 2019-09-09 PROCEDURE — 99214 OFFICE O/P EST MOD 30 MIN: CPT | Mod: S$GLB,,, | Performed by: INTERNAL MEDICINE

## 2019-09-10 NOTE — PROGRESS NOTES
[facility] History & Physical    Subjective:      Patient ID:   Mirta Guerin  79 y.o. female  1940  James Jung M.D.. Barbie Castillo M.D., Tony Goldberg.      Chief Complaint:breast cancer follow-up    HPI:  79 y.o. female with diagnosis of stage I right breast cancer.Diagnosed in 2015. Treated with partial mastectomy and sentinel node biopsy, followed by adjuvant radiation therapy, she started adjuvant Arimidex in 2016.    ERP was positive, PRP was positive, HER-2/asaf was negative.hot flash symptoms are controlled on Cymbalta. Joint pain symptoms have not been a problem for her. She has history of hypertension, diabetes, GERD,hypercholesterolemia, DJD, and sleep apnea syndrome.    She has history of left partial mastectomy and sentinel node biopsy, pacemaker placement, facial surgery, right and left knee replacement, complete hysterectomy. She had CABG surgery in 2016.    She had a skin cancer removed from her right scalp ,she believes it was a melanoma, per Dr. Lee in 2018, she also saw Dr. Hi for wide excision of the area.    She had stage 0 melanoma in situ, margins were clear on Moh's surgery/    Since her CABG surgery, she complains of continued substernal chest pain. CAT scan in 2016 at [facility] did show some inflammation in the soft tissue in the sternal area. I suspect she has costochrondral joint sx.    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    Her dad had CVA, her mother  of ovarian cancer at age 86, her brother has had 2 or 3 CVA, her sister has had bladder cancer, she has a daughter with hypoglycemia.    She is allergic to codeine benazepril, Polysporin, and triple antibiotics ointment.    Dr. James Jung is her primary care physician.   Dr. Juarez is her cardiologist.  Dr. Talbot is her GYN.    She has been diagnosed with sleep apnea syndrome.    She is  tolerating the Arimadex fine,  With the addition of Cymbalta daily.  HF sx controlled.  She has history of restless leg syndrome and peripheral neuropathy symptoms in the fingers and feet.  She has hx of osteopenia, took Reclast in the past.  Hx LBP, S/P injection x's 9, sx decreased.    She is 5 foot 2 inches tall and weighs 181 pounds    ROS:   GEN: normal without any fever, night sweats or weight loss  HEENT: normal with no HA's, sore throat, stiff neck, changes in vision  CV: see history of present illness.   no CP, SOB, PND, STEPHENS or orthopnea  PULM: see history of present illness.   no SOB, cough, hemoptysis, sputum or pleuritic pain  GI: normal with no abdominal pain, nausea, vomiting, constipation, diarrhea, melanotic stools, BRBPR, or hematemesis  : normal with no hematuria, dysuria  BREAST: see history of present illness  SKIN: normal with no rash, erythema, bruising, or swelling     Past Medical History:   Diagnosis Date    Anxiety     AV block     Breast cancer     Cardiac pacemaker in situ     Coronary artery disease     Diabetes mellitus     GERD (gastroesophageal reflux disease)     HLD (hyperlipidemia)     Hypertension     Lung disease     Mitral valve disorder     NEGRITA (obstructive sleep apnea)     Prediabetes     Psoriasis     Spinal stenosis     Vitamin D deficiency      Past Surgical History:   Procedure Laterality Date    BREAST LUMPECTOMY      CARDIAC PACEMAKER PLACEMENT  04/05/2012    CORONARY ARTERY BYPASS GRAFT  07/2017    MASTECTOMY, PARTIAL      SKIN CANCER EXCISION      UPPER GASTROINTESTINAL ENDOSCOPY         Review of patient's allergies indicates:   Allergen Reactions    Benazepril Other (See Comments)     sleepy    Codeine Nausea And Vomiting    Polysporin [bacitracin-polymyxin b] Rash           Current Outpatient Medications:     amlodipine (NORVASC) 5 MG tablet, Take 5 mg by mouth once daily., Disp: , Rfl:     anastrozole (ARIMIDEX) 1 mg Tab, TAKE 1 TABLET  BY MOUTH ONCE DAILY, Disp: 90 tablet, Rfl: 3    aspirin (ECOTRIN) 81 MG EC tablet, Take 81 mg by mouth once daily., Disp: , Rfl:     atorvastatin (LIPITOR) 40 MG tablet, Take 40 mg by mouth once daily., Disp: , Rfl:     co-enzyme Q-10 50 mg capsule, Take 100 mg by mouth once daily., Disp: , Rfl:     diclofenac sodium (VOLTAREN) 1 % Gel, Apply 2 g topically 4 (four) times daily. Apply to affected area, Disp: 500 g, Rfl: 5    docusate sodium (COLACE) 100 MG capsule, Take 100 mg by mouth 2 (two) times daily., Disp: , Rfl:     DULoxetine (CYMBALTA) 20 MG capsule, TAKE 1 CAPSULE BY MOUTH  ONCE DAILY, Disp: 30 capsule, Rfl: 4    loratadine (CLARITIN) 10 mg tablet, Take 10 mg by mouth once daily., Disp: , Rfl:     losartan (COZAAR) 100 MG tablet, , Disp: , Rfl:     metformin (GLUCOPHAGE) 500 MG tablet, Take 500 mg by mouth 2 (two) times daily with meals., Disp: , Rfl:     metoprolol succinate (TOPROL-XL) 50 MG 24 hr tablet, Take 100 mg by mouth once daily., Disp: , Rfl:     MULTIVIT-MIN/FA/LUTEIN/ZEAXANT (MACULAR VITAMIN ORAL), Take by mouth., Disp: , Rfl:     pantoprazole (PROTONIX) 40 MG tablet, , Disp: , Rfl:     tobramycin sulfate 0.3% (TOBREX) 0.3 % ophthalmic solution, , Disp: , Rfl:     triamcinolone acetonide 0.1% (KENALOG) 0.1 % cream, , Disp: , Rfl:     vitamin D 1000 units Tab, Take 1,000 Units by mouth once daily., Disp: , Rfl:     gabapentin (NEURONTIN) 300 MG capsule, Take 1 capsule (300 mg total) by mouth every evening., Disp: 90 capsule, Rfl: 3          Objective:   Vitals:  Blood pressure (!) 142/74, pulse 83, temperature 98.2 °F (36.8 °C), temperature source Oral, resp. rate 20, weight 83.3 kg (183 lb 9.6 oz).    Physical Examination:   GEN: no apparent distress, comfortable  HEAD: atraumatic and normocephalic  EYES: no pallor, no icterus.  She has a healing scabbed over surgical site at the right scalp, without satellite lesions or palpable lymphadenopathy at the neck  ENT:  no pharyngeal  erythema, external ears WNL; no nasal discharge  NECK: no masses, thyroid normal, trachea midline, no LAD/LN's, supple  CV: RRR with no murmur,  the skin  at the sternal area is healed and closed, nontender without warmth or redness or fluctuance  CHEST: Normal respiratory effort; CTAB; distantbreath sounds; no wheeze or crackles  ABDOM: nontender and nondistended; soft;  no rebound/guarding, L/S NP  MUSC/Skeletal: ROM normal; no crepitus; joints normal  EXTREM: no clubbing, cyanosis, inflammation or swelling  SKIN: no rashes, lesions, ulcers, petechia  : no cvat  NEURO: grossly intact; motor/sensory WNL;  no tremors  PSYCH: normal mood, affect and behavior  LYMPH: normal cervical, supraclavicular, axillary and groin LN's  BREAST:the left breast shows postoperative change, nontender, without palpable mass. The right breast is nontender without palpable mass.      Labs:     Radiology/Diagnostic Studies:    Mammogram 2/2019 showed 1.5 oval well circumscribed mass at 8 o'clock at L breast.  Recheck site with U/S now is stable at 8 o'clock of L breast.  Likely a lipoma, per radiologist.    Recheck Mamm and U/S 6 months.      Assessment:   (1) 79 y.o. female with diagnosis of stage I right breast cancer treated with right partial mastectomy, adjuvant radiation therapy, and continues on adjuvant Arimidex now. Originally diagnosed in May 2015. No evidence of disease on physical exam.Due for mammogram in August 2019.    (2) hot flash symptoms secondary to Arimidex are managed with Cymbalta 20 mg by mouth daily.    (3)sleep apnea syndrome under management of pulmonary, make referral to Dr. Mancera at patient's request.    (4)atypical L chest pain     (5)recent removal of melanoma in situ from right scalp,   Margins clear on Moh's.  Observe for now.  Operative site at R scalp shows post op changes, with out mass or melanotic nodules being seen.    (6)Abn mamm/ U/S, recheck in 6 months, RTC 6 months.

## 2019-09-16 RX ORDER — GABAPENTIN 300 MG/1
CAPSULE ORAL
Qty: 90 CAPSULE | Refills: 3 | Status: SHIPPED | OUTPATIENT
Start: 2019-09-16 | End: 2020-07-24

## 2019-10-17 ENCOUNTER — OFFICE VISIT (OUTPATIENT)
Dept: PODIATRY | Facility: CLINIC | Age: 79
End: 2019-10-17
Payer: MEDICARE

## 2019-10-17 VITALS
SYSTOLIC BLOOD PRESSURE: 148 MMHG | TEMPERATURE: 97 F | WEIGHT: 183 LBS | DIASTOLIC BLOOD PRESSURE: 77 MMHG | HEART RATE: 73 BPM | HEIGHT: 63 IN | BODY MASS INDEX: 32.43 KG/M2

## 2019-10-17 DIAGNOSIS — L60.0 INGROWN NAIL: Primary | ICD-10-CM

## 2019-10-17 DIAGNOSIS — E11.49 TYPE II DIABETES MELLITUS WITH NEUROLOGICAL MANIFESTATIONS: ICD-10-CM

## 2019-10-17 DIAGNOSIS — L40.9 PSORIASIS: ICD-10-CM

## 2019-10-17 PROCEDURE — 99999 PR PBB SHADOW E&M-EST. PATIENT-LVL III: ICD-10-PCS | Mod: PBBFAC,,, | Performed by: PODIATRIST

## 2019-10-17 PROCEDURE — 99213 PR OFFICE/OUTPT VISIT, EST, LEVL III, 20-29 MIN: ICD-10-PCS | Mod: S$PBB,,, | Performed by: PODIATRIST

## 2019-10-17 PROCEDURE — 99213 OFFICE O/P EST LOW 20 MIN: CPT | Mod: PBBFAC,PN | Performed by: PODIATRIST

## 2019-10-17 PROCEDURE — 99213 OFFICE O/P EST LOW 20 MIN: CPT | Mod: S$PBB,,, | Performed by: PODIATRIST

## 2019-10-17 PROCEDURE — 99999 PR PBB SHADOW E&M-EST. PATIENT-LVL III: CPT | Mod: PBBFAC,,, | Performed by: PODIATRIST

## 2019-10-17 RX ORDER — ROSUVASTATIN CALCIUM 10 MG/1
TABLET, COATED ORAL
COMMUNITY
Start: 2019-10-11 | End: 2021-06-21

## 2019-10-17 RX ORDER — TRAMADOL HYDROCHLORIDE 50 MG/1
TABLET ORAL
COMMUNITY
End: 2020-01-17

## 2019-10-17 RX ORDER — METOPROLOL TARTRATE 50 MG/1
TABLET ORAL 2 TIMES DAILY
COMMUNITY
End: 2021-05-24

## 2019-10-17 RX ORDER — BENAZEPRIL HYDROCHLORIDE 20 MG/1
TABLET ORAL
COMMUNITY
End: 2020-02-06 | Stop reason: CLARIF

## 2019-10-17 NOTE — LETTER
October 20, 2019      James Jung MD  [address] [place] [address]           [facility] - Podiatry/Wound Care  [address]  [place] [address]  Phone: [phone]  Fax: [phone]          Patient: Mirta Guerin   MR Number: [id number]   YOB: 1940   Date of Visit: 10/17/2019       Dear Dr. James Jung:    Thank you for referring Mirta Guerin to me for evaluation. Attached you will find relevant portions of my assessment and plan of care.    If you have questions, please do not hesitate to call me. I look forward to following Mirta Guerin along with you.    Sincerely,    Angelo Mendes, ROSALBA    Enclosure  CC:  No Recipients    If you would like to receive this communication electronically, please contact [email or web address] or [phone] to request more information on [employer] Link access.    For providers and/or their staff who would like to refer a patient to [facility], please contact us through our one-stop-shop provider referral line, [facility] [place], at [phone].    If you feel you have received this communication in error or would no longer like to receive these types of communications, please e-mail [email or web address]

## 2019-10-20 NOTE — PROGRESS NOTES
Subjective:       Patient ID: Mirta Guerin is a 79 y.o. female.    Chief Complaint: Follow-up; Diabetes Mellitus; Nail Problem; and Ingrown Toenail  Patient presents today for followup she is a history of chronically ingrown toenails with signs of infection especially on her left hallux.     Nail Problem   Associated symptoms include arthralgias, joint swelling and a rash.   Ingrown Toenail   Associated symptoms include arthralgias, joint swelling and a rash.   Follow-up   Associated symptoms include arthralgias, joint swelling and a rash.        Review of Systems   Musculoskeletal: Positive for arthralgias and joint swelling.   Skin: Positive for color change, rash and wound.   All other systems reviewed and are negative.      Objective:      Physical Exam   Constitutional: She appears well-developed and well-nourished.   Cardiovascular:   Pulses:       Dorsalis pedis pulses are 1+ on the right side, and 1+ on the left side.        Posterior tibial pulses are 1+ on the right side, and 1+ on the left side.   Pulmonary/Chest: Effort normal.   Musculoskeletal: Normal range of motion. She exhibits edema and deformity.   Feet:   Right Foot:   Protective Sensation: 4 sites tested. 1 site sensed.  Skin Integrity: Positive for dry skin.   Left Foot:   Protective Sensation: 4 sites tested. 1 site sensed.  Skin Integrity: Positive for dry skin.   Neurological: She displays abnormal reflex.   Skin: Skin is warm. Capillary refill takes more than 3 seconds.   Psychiatric: She has a normal mood and affect. Her behavior is normal. Judgment and thought content normal.     Patient is noted to have positive erythema positive edema at the medial lateral aspect of the patient's left hallux upon debridement of the lateral border left hallux there is purulent drainage noted as well as obvious signs of infection and pain to palpation. Patient is noted to have a well-defined area of hyperkeratotic tissue on the dorsal lateral  aspect of the fifth digit left there is positive pain noted upon palpation of this area no sign of infection is noted. patient's previously noted psoriasis is more well-controlled on today's visit that ever seen it since it started seeing the patient there are mild erythematous plaques or areas of skin breakdown especially on the heel.    Assessment:       1. Ingrown nail    2. Psoriasis    3. Type II diabetes mellitus with neurological manifestations        Plan:        Following examination I aggressively debrided both the medial and lateral border of the patient's left hallux I did advise the patient is obvious signs of infection with purulent drainage noted at the lateral border left hallux. Sterile saline was used to flush and irrigated the lateral border left hallux bacitracin ointment and a well-padded dry dressing applied patient advised to keep antibiotic ointment on the area for the next 3 days if this does not completely resolve if it continues to be red and painful we need to see her back for followup sooner than the regularly scheduled 12 week appointment. I debrided the hyperkeratotic tissue from the fifth digit left patient stated that this felt much better I advised her this is likely due to a shoe rubbing and irritation of this area recommended followup 10 weeks.   weeks.Patient states her psoriasis is doing as good as it's been in many years and is very well controlled especially on her feet.  Patient relates she has been under lot of stress with family issues of this definitely aggravates her psoriasis.Complete diabetic evaluation was performed today. Pt developed an area of psoriatic breakdown on her left heel this needs to be monitored closely. Calmoseptine ointment disp. Sample to use on this area.  Patient states this ointment has been wonderful and see only thing that has really helped the psoriasis on her heels.This note was created using M*Modal voice recognition software that occasionally  misinterpreted phrases or words.

## 2020-01-06 ENCOUNTER — OFFICE VISIT (OUTPATIENT)
Dept: PULMONOLOGY | Facility: CLINIC | Age: 80
End: 2020-01-06
Payer: MEDICARE

## 2020-01-06 ENCOUNTER — HOSPITAL ENCOUNTER (OUTPATIENT)
Dept: RADIOLOGY | Facility: HOSPITAL | Age: 80
Discharge: HOME | End: 2020-01-06
Attending: NURSE PRACTITIONER
Payer: MEDICARE

## 2020-01-06 ENCOUNTER — TELEPHONE (OUTPATIENT)
Dept: PULMONOLOGY | Facility: CLINIC | Age: 80
End: 2020-01-06

## 2020-01-06 VITALS
DIASTOLIC BLOOD PRESSURE: 80 MMHG | HEART RATE: 85 BPM | WEIGHT: 182 LBS | SYSTOLIC BLOOD PRESSURE: 130 MMHG | BODY MASS INDEX: 32.24 KG/M2 | OXYGEN SATURATION: 93 %

## 2020-01-06 DIAGNOSIS — J32.9 CHRONIC SINUSITIS, UNSPECIFIED LOCATION: ICD-10-CM

## 2020-01-06 DIAGNOSIS — J32.9 CHRONIC SINUSITIS, UNSPECIFIED LOCATION: Primary | ICD-10-CM

## 2020-01-06 DIAGNOSIS — G47.33 OSA (OBSTRUCTIVE SLEEP APNEA): ICD-10-CM

## 2020-01-06 PROCEDURE — 70220 X-RAY EXAM OF SINUSES: CPT | Mod: TC

## 2020-01-06 PROCEDURE — 1159F MED LIST DOCD IN RCRD: CPT | Mod: S$GLB,,, | Performed by: NURSE PRACTITIONER

## 2020-01-06 PROCEDURE — 99214 OFFICE O/P EST MOD 30 MIN: CPT | Mod: S$GLB,,, | Performed by: NURSE PRACTITIONER

## 2020-01-06 PROCEDURE — 1126F PR PAIN SEVERITY QUANTIFIED, NO PAIN PRESENT: ICD-10-PCS | Mod: S$GLB,,, | Performed by: NURSE PRACTITIONER

## 2020-01-06 PROCEDURE — 1159F PR MEDICATION LIST DOCUMENTED IN MEDICAL RECORD: ICD-10-PCS | Mod: S$GLB,,, | Performed by: NURSE PRACTITIONER

## 2020-01-06 PROCEDURE — 99214 PR OFFICE/OUTPT VISIT, EST, LEVL IV, 30-39 MIN: ICD-10-PCS | Mod: S$GLB,,, | Performed by: NURSE PRACTITIONER

## 2020-01-06 PROCEDURE — 1126F AMNT PAIN NOTED NONE PRSNT: CPT | Mod: S$GLB,,, | Performed by: NURSE PRACTITIONER

## 2020-01-06 RX ORDER — IBUPROFEN 600 MG/1
TABLET ORAL
COMMUNITY
Start: 2019-12-12 | End: 2020-02-06 | Stop reason: CLARIF

## 2020-01-06 RX ORDER — FLUTICASONE PROPIONATE 50 MCG
SPRAY, SUSPENSION (ML) NASAL
COMMUNITY
Start: 2019-12-12

## 2020-01-06 RX ORDER — AMOXICILLIN AND CLAVULANATE POTASSIUM 875; 125 MG/1; MG/1
TABLET, FILM COATED ORAL
COMMUNITY
Start: 2019-12-12 | End: 2020-02-06 | Stop reason: CLARIF

## 2020-01-06 NOTE — PATIENT INSTRUCTIONS
Obstructive Sleep Apnea  Obstructive sleep apnea is a condition that causes your air passages to become narrowed or blocked during sleep. As a result, breathing stops for short periods. Your body wakes up enough for breathing to begin again, though you don't remember it. The cycle of stopped breathing and brief awakenings can repeat dozens of times a night. This prevents the body from getting to the deeper stages of sleep that are needed for good rest and may cause your body's oxygen level to fall.  Signs of sleep apnea include loud snoring, noisy breathing, and gasping sounds during sleep. Daytime symptoms include waking up tired after a full night's sleep, waking up with headaches, feeling very sleepy or falling asleep during the day, and having problems with memory or concentration.  Risk factors for sleep apnea include:  · Being overweight  · Being a man, or a woman in menopause  · Smoking  · Using alcohol or sedating medicines  · Having enlarged structures in the nose or throat  Home care  Lifestyle changes that can help treat snoring and sleep apnea include the following:  · If you are overweight, lose weight. Talk to your healthcare provider about a weight-loss plan for you.  · Avoid alcohol for 3 to 4 hours before bedtime. Avoid sedating medications. Ask your healthcare provider about the medicines you take.  · If you smoke, talk to your healthcare provider about ways to quit.  · Sleep on your side. This can help prevent gravity from pulling relaxed throat tissues into your breathing passages.  · If you have allergies or sinus problems that block your nose, ask your healthcare provider for help.  Follow-up care  Follow up with your healthcare provider, or as advised. A diagnosis of sleep apnea is made with a sleep study. Your healthcare provider can tell you more about this test.  When to seek medical advice  Sleep apnea can make you more likely to have certain health problems. These include high blood  pressure, heart attack, stroke, and sexual dysfunction. If you have sleep apnea, talk to your healthcare provider about the best treatments for you.  Date Last Reviewed: 4/1/2017  © 3189-3410 [employer]. [address] [place] [address], [place] [address]. All rights reserved. This information is not intended as a substitute for professional medical care. Always follow your healthcare professional's instructions.      Keep sleeping on your CPAP every night  Need to see an ENT for chronic sinusitis   Sinus xray   Try saline rinses, keep using flonase 2 puffs to each nostril daily

## 2020-01-06 NOTE — PROGRESS NOTES
SUBJECTIVE:    Patient ID: Mirta Guerin is a 79 y.o. female.    Chief Complaint: Apnea (6 month follow up ) and Cough (whole month of december went to urgent care sinus infection )    Patient here today states she was sick the entire month of December with a sinus infection. She suffers frequently with sinus infections, has not seen an ENT.  She was not able to sleep on her CPAP during the entire month because she could not breathe through her nose.  She states it is better this past week and has started sleeping on it more.    Past Medical History:   Diagnosis Date    Anxiety     AV block     Breast cancer     Cardiac pacemaker in situ     Coronary artery disease     Diabetes mellitus     GERD (gastroesophageal reflux disease)     HLD (hyperlipidemia)     Hypertension     Lung disease     Mitral valve disorder     NEGRITA (obstructive sleep apnea)     Prediabetes     Psoriasis     Spinal stenosis     Vitamin D deficiency      Past Surgical History:   Procedure Laterality Date    BREAST LUMPECTOMY      CARDIAC PACEMAKER PLACEMENT  04/05/2012    CORONARY ARTERY BYPASS GRAFT  07/2017    MASTECTOMY, PARTIAL      SKIN CANCER EXCISION      UPPER GASTROINTESTINAL ENDOSCOPY       Family History   Problem Relation Age of Onset    Cancer Mother         ovarian    Coronary artery disease Father     Stroke Father     Stroke Brother     Stroke Maternal Grandfather     Cancer Paternal Grandmother         breast    Stroke Paternal Grandfather         Social History:   Marital Status:   Occupation: Data Unavailable  Alcohol History:  reports that she drinks alcohol.  Tobacco History:  reports that she has been smoking cigarettes. She started smoking about 66 years ago. She has a 12.50 pack-year smoking history. She has never used smokeless tobacco.  Drug History:  reports that she does not use drugs.    Review of patient's allergies indicates:   Allergen Reactions    Benazepril Other (See  Comments)     sleepy    Codeine Nausea And Vomiting    Polysporin [bacitracin-polymyxin b] Rash       Current Outpatient Medications   Medication Sig Dispense Refill    amlodipine (NORVASC) 5 MG tablet Take 5 mg by mouth once daily.      anastrozole (ARIMIDEX) 1 mg Tab TAKE 1 TABLET BY MOUTH ONCE DAILY 90 tablet 3    aspirin (ECOTRIN) 81 MG EC tablet Take 81 mg by mouth once daily.      co-enzyme Q-10 50 mg capsule Take 100 mg by mouth once daily.      diclofenac sodium (VOLTAREN) 1 % Gel Apply 2 g topically 4 (four) times daily. Apply to affected area 500 g 5    docusate sodium (COLACE) 100 MG capsule Take 100 mg by mouth 2 (two) times daily.      DULoxetine (CYMBALTA) 20 MG capsule TAKE 1 CAPSULE BY MOUTH  ONCE DAILY 30 capsule 4    fluticasone propionate (FLONASE) 50 mcg/actuation nasal spray       gabapentin (NEURONTIN) 300 MG capsule TAKE 1 CAPSULE BY MOUTH  EVERY EVENING 90 capsule 3    ibuprofen (ADVIL,MOTRIN) 600 MG tablet       loratadine (CLARITIN) 10 mg tablet Take 10 mg by mouth once daily.      losartan (COZAAR) 100 MG tablet       metformin (GLUCOPHAGE) 500 MG tablet Take 500 mg by mouth 2 (two) times daily with meals.      metoprolol succinate (TOPROL-XL) 50 MG 24 hr tablet Take 100 mg by mouth once daily.      metoprolol tartrate (LOPRESSOR) 50 MG tablet metoprolol tartrate 50 mg tablet      MULTIVIT-MIN/FA/LUTEIN/ZEAXANT (MACULAR VITAMIN ORAL) Take by mouth.      pantoprazole (PROTONIX) 40 MG tablet       rosuvastatin (CRESTOR) 10 MG tablet       traMADol (ULTRAM) 50 mg tablet tramadol 50 mg tablet      triamcinolone acetonide 0.1% (KENALOG) 0.1 % cream       vitamin D 1000 units Tab Take 1,000 Units by mouth once daily.      amoxicillin-clavulanate 875-125mg (AUGMENTIN) 875-125 mg per tablet       atorvastatin (LIPITOR) 40 MG tablet Take 40 mg by mouth once daily.      benazepril (LOTENSIN) 20 MG tablet benazepril 20 mg tablet      tobramycin sulfate 0.3% (TOBREX) 0.3 %  ophthalmic solution        No current facility-administered medications for this visit.            General: Feeling better, sleeping better now that she can sleep on CPAP again   Eyes: Vision is good.  ENT:  Suffers from chronic sinuses, was sick the entire month of December with sinus infection  Heart:: No chest pain or palpitations.  Lungs: No cough, sputum, or wheezing.  GI: no N/V or reflux .  : no issues   Musculoskeletal: No joint pain or myalgias.  Skin: No lesions or rashes.  Neuro: No headaches or neuropathy.  Lymph: swelling if she eats real salty  Psych: No anxiety or depression.  Endo:weight stable     OBJECTIVE:      /80 (BP Location: Left arm, Patient Position: Sitting)   Pulse 85   Wt 82.6 kg (182 lb)   SpO2 (!) 93%   BMI 32.24 kg/m²     Physical Exam  GENERAL: Older patient in no distress.  HEENT: Pupils equal and reactive. Extraocular movements intact. Nose intact.      Pharynx moist.   NECK: Supple.   HEART: Regular rate and rhythm. No murmur or gallop auscultated.  LUNGS: Clear to auscultation and percussion. Lung excursion symmetrical. No change in fremitus. No adventitial noises.  ABDOMEN: Bowel sounds present. Non-tender, no masses palpated.  EXTREMITIES: Normal muscle tone and joint movement, no cyanosis or clubbing.   LYMPHATICS: No adenopathy palpated, no edema.  SKIN: Dry, intact, no lesions.   NEURO: Cranial nerves II-XII intact. Motor strength 5/5 bilaterally, upper and lower extremities.  PSYCH: Appropriate affect.    Assessment:       1. Chronic sinusitis, unspecified location    2. NEGRITA (obstructive sleep apnea)          Plan:       Chronic sinusitis, unspecified location  -     Ambulatory referral to ENT  -     X-Ray Sinuses 3 or more views; Future; Expected date: 01/06/2020    NEGRITA (obstructive sleep apnea)         Keep sleeping on your CPAP every night  Need to see an ENT for chronic sinusitis issues  Sinus xray   Try saline rinses, keep using flonase 2 puffs to each  nostril daily  Follow up in about 6 months (around 7/6/2020).

## 2020-01-09 DIAGNOSIS — C50.411 MALIGNANT NEOPLASM OF UPPER-OUTER QUADRANT OF RIGHT BREAST IN FEMALE, ESTROGEN RECEPTOR POSITIVE: ICD-10-CM

## 2020-01-09 DIAGNOSIS — Z17.0 MALIGNANT NEOPLASM OF UPPER-OUTER QUADRANT OF RIGHT BREAST IN FEMALE, ESTROGEN RECEPTOR POSITIVE: ICD-10-CM

## 2020-01-10 RX ORDER — ANASTROZOLE 1 MG/1
TABLET ORAL
Qty: 90 TABLET | Refills: 4 | Status: SHIPPED | OUTPATIENT
Start: 2020-01-10 | End: 2020-02-06

## 2020-01-16 ENCOUNTER — OFFICE VISIT (OUTPATIENT)
Dept: PODIATRY | Facility: CLINIC | Age: 80
End: 2020-01-16
Payer: MEDICARE

## 2020-01-16 VITALS
BODY MASS INDEX: 32.25 KG/M2 | DIASTOLIC BLOOD PRESSURE: 69 MMHG | HEIGHT: 63 IN | SYSTOLIC BLOOD PRESSURE: 136 MMHG | WEIGHT: 182 LBS | TEMPERATURE: 98 F | HEART RATE: 82 BPM

## 2020-01-16 DIAGNOSIS — L40.9 PSORIASIS: ICD-10-CM

## 2020-01-16 DIAGNOSIS — E11.49 TYPE II DIABETES MELLITUS WITH NEUROLOGICAL MANIFESTATIONS: Primary | ICD-10-CM

## 2020-01-16 DIAGNOSIS — L60.0 INGROWN NAIL: ICD-10-CM

## 2020-01-16 PROCEDURE — 99213 PR OFFICE/OUTPT VISIT, EST, LEVL III, 20-29 MIN: ICD-10-PCS | Mod: S$PBB,,, | Performed by: PODIATRIST

## 2020-01-16 PROCEDURE — 1159F MED LIST DOCD IN RCRD: CPT | Mod: ,,, | Performed by: PODIATRIST

## 2020-01-16 PROCEDURE — 99213 OFFICE O/P EST LOW 20 MIN: CPT | Mod: PBBFAC,PN | Performed by: PODIATRIST

## 2020-01-16 PROCEDURE — 99999 PR PBB SHADOW E&M-EST. PATIENT-LVL III: ICD-10-PCS | Mod: PBBFAC,,, | Performed by: PODIATRIST

## 2020-01-16 PROCEDURE — 99999 PR PBB SHADOW E&M-EST. PATIENT-LVL III: CPT | Mod: PBBFAC,,, | Performed by: PODIATRIST

## 2020-01-16 PROCEDURE — 1126F AMNT PAIN NOTED NONE PRSNT: CPT | Mod: ,,, | Performed by: PODIATRIST

## 2020-01-16 PROCEDURE — 99213 OFFICE O/P EST LOW 20 MIN: CPT | Mod: S$PBB,,, | Performed by: PODIATRIST

## 2020-01-16 PROCEDURE — 1159F PR MEDICATION LIST DOCUMENTED IN MEDICAL RECORD: ICD-10-PCS | Mod: ,,, | Performed by: PODIATRIST

## 2020-01-16 PROCEDURE — 1126F PR PAIN SEVERITY QUANTIFIED, NO PAIN PRESENT: ICD-10-PCS | Mod: ,,, | Performed by: PODIATRIST

## 2020-01-16 NOTE — LETTER
January 19, 2020      James Jung MD  [address] [place] [address]           [facility] - Podiatry/Wound Care  [address]  [place] [address]  Phone: [phone]  Fax: [phone]          Patient: Mirta Guerin   MR Number: [id number]   YOB: 1940   Date of Visit: 1/16/2020       Dear Dr. James Jung:    Thank you for referring Mirta Guerin to me for evaluation. Attached you will find relevant portions of my assessment and plan of care.    If you have questions, please do not hesitate to call me. I look forward to following Mirta Guerin along with you.    Sincerely,    Angelo Mendes, ROSALBA    Enclosure  CC:  No Recipients    If you would like to receive this communication electronically, please contact [email or web address] or [phone] to request more information on [email or web address] Link access.    For providers and/or their staff who would like to refer a patient to [facility], please contact us through our one-stop-shop provider referral line, [facility], at [phone].    If you feel you have received this communication in error or would no longer like to receive these types of communications, please e-mail [email or web address]

## 2020-01-17 RX ORDER — TRAMADOL HYDROCHLORIDE 50 MG/1
TABLET ORAL
Qty: 42 TABLET | Refills: 3 | Status: SHIPPED | OUTPATIENT
Start: 2020-01-17 | End: 2020-09-30

## 2020-01-19 NOTE — PROGRESS NOTES
Subjective:       Patient ID: Mirta Guerin is a 79 y.o. female.    Chief Complaint: Follow-up and Nail Problem  Patient presents today for followup she is a history of chronically ingrown toenails with signs of infection especially on her left hallux.     Follow-up   Associated symptoms include arthralgias, joint swelling and a rash.   Nail Problem   Associated symptoms include arthralgias, joint swelling and a rash.   Ingrown Toenail   Associated symptoms include arthralgias, joint swelling and a rash.        Review of Systems   Musculoskeletal: Positive for arthralgias and joint swelling.   Skin: Positive for color change, rash and wound.   All other systems reviewed and are negative.      Objective:      Physical Exam   Constitutional: She appears well-developed and well-nourished.   Cardiovascular:   Pulses:       Dorsalis pedis pulses are 1+ on the right side, and 1+ on the left side.        Posterior tibial pulses are 1+ on the right side, and 1+ on the left side.   Pulmonary/Chest: Effort normal.   Musculoskeletal: Normal range of motion. She exhibits edema and deformity.   Feet:   Right Foot:   Protective Sensation: 4 sites tested. 1 site sensed.  Skin Integrity: Positive for dry skin.   Left Foot:   Protective Sensation: 4 sites tested. 1 site sensed.  Skin Integrity: Positive for dry skin.   Neurological: She displays abnormal reflex.   Skin: Skin is warm. Capillary refill takes more than 3 seconds.   Psychiatric: She has a normal mood and affect. Her behavior is normal. Judgment and thought content normal.     Patient is noted to have positive erythema positive edema at the medial lateral aspect of the patient's left hallux upon debridement of the lateral border left hallux there is purulent drainage noted as well as obvious signs of infection and pain to palpation. Patient is noted to have a well-defined area of hyperkeratotic tissue on the dorsal lateral aspect of the fifth digit left there is  positive pain noted upon palpation of this area no sign of infection is noted. patient's previously noted psoriasis is more well-controlled on today's visit that ever seen it since it started seeing the patient there are mild erythematous plaques or areas of skin breakdown especially on the heel.    Assessment:       1. Type II diabetes mellitus with neurological manifestations    2. Ingrown nail    3. Psoriasis        Plan:        Following examination I aggressively debrided both the medial and lateral border of the patient's left hallux I did advise the patient is obvious signs of infection with purulent drainage noted at the lateral border left hallux. Sterile saline was used to flush and irrigated the lateral border left hallux bacitracin ointment and a well-padded dry dressing applied patient advised to keep antibiotic ointment on the area for the next 3 days if this does not completely resolve if it continues to be red and painful we need to see her back for followup sooner than the regularly scheduled 12 week appointment. I debrided the hyperkeratotic tissue from the fifth digit left patient stated that this felt much better I advised her this is likely due to a shoe rubbing and irritation of this area recommended followup 10 weeks.   weeks.Patient states her psoriasis is doing as good as it's been in many years and is very well controlled especially on her feet.  Patient relates she has been under lot of stress with family issues of this definitely aggravates her psoriasis.Complete diabetic evaluation was performed today. Pt developed an area of psoriatic breakdown on her left heel this needs to be monitored closely. Calmoseptine ointment disp. Sample to use on this area.  Patient states this ointment has been wonderful and see only thing that has really helped the psoriasis on her heels.This note was created using [employer] voice recognition software that occasionally misinterpreted phrases or words.

## 2020-01-24 ENCOUNTER — TELEPHONE (OUTPATIENT)
Dept: ORTHOPEDICS | Facility: CLINIC | Age: 80
End: 2020-01-24

## 2020-01-24 NOTE — TELEPHONE ENCOUNTER
----- Message from Fernanda Caban sent at 1/24/2020 12:33 PM CST -----  Contact: Patient [phone]  Patient requesting refill on her Tramadol 50 mg. Dr Little. [phone] [place]

## 2020-02-06 ENCOUNTER — HOSPITAL ENCOUNTER (EMERGENCY)
Facility: HOSPITAL | Age: 80
Discharge: HOME | End: 2020-02-06
Attending: EMERGENCY MEDICINE
Payer: MEDICARE

## 2020-02-06 VITALS
TEMPERATURE: 98 F | OXYGEN SATURATION: 97 % | SYSTOLIC BLOOD PRESSURE: 153 MMHG | HEIGHT: 63 IN | WEIGHT: 180 LBS | BODY MASS INDEX: 31.89 KG/M2 | HEART RATE: 70 BPM | RESPIRATION RATE: 18 BRPM | DIASTOLIC BLOOD PRESSURE: 74 MMHG

## 2020-02-06 DIAGNOSIS — R03.0 ELEVATED BLOOD PRESSURE READING: Primary | ICD-10-CM

## 2020-02-06 PROCEDURE — 99281 EMR DPT VST MAYX REQ PHY/QHP: CPT

## 2020-02-06 NOTE — ED PROVIDER NOTES
Encounter Date: 2/6/2020       History     Chief Complaint   Patient presents with    Hypertension     onset Monday     The patient is an 79-year-old female who has a history of anxiety, AV block, breast cancer, cardiac pacemaker, coronary artery disease, diabetes mellitus, GERD, hyperlipidemia, hypertension, mitral valve disorder, obstructive sleep apnea, psoriasis, spinal stenosis, and vitamin-D deficiency who presents for evaluation of her blood pressure because she has had multiple elevated measurements over the course of the past couple of days.  She had a mild headache and felt queasy a couple of days ago.  She felt that her blood pressure might be elevated and so she checked it and found that it was.  Earlier tonight, she had a mild diffuse headache.  When she checked her blood pressure it was 200/80.  She uses an an automated blood pressure cuff and takes the measurements at her left upper arm.  She takes metoprolol, amlodipine, and losartan.  Her last dose of blood pressure medication was at 22:30.  Her headache has resolved at this time and she feels fine.  She denies chest pain and palpitations.  She denies cough shortness of breath. She denies abdominal pain, nausea, and vomiting.  She denies focal extremity weakness, numbness, and tingling.    The history is provided by the patient. No  was used.     Review of patient's allergies indicates:   Allergen Reactions    Benazepril Other (See Comments)     sleepy    Codeine Nausea And Vomiting    Polysporin [bacitracin-polymyxin b] Rash     Past Medical History:   Diagnosis Date    Anxiety     AV block     Breast cancer     Cardiac pacemaker in situ     Coronary artery disease     Diabetes mellitus     GERD (gastroesophageal reflux disease)     HLD (hyperlipidemia)     Hypertension     Lung disease     Mitral valve disorder     NEGRITA (obstructive sleep apnea)     Prediabetes     Psoriasis     Spinal stenosis     Vitamin D  deficiency      Past Surgical History:   Procedure Laterality Date    BREAST LUMPECTOMY      CARDIAC PACEMAKER PLACEMENT  04/05/2012    CORONARY ARTERY BYPASS GRAFT  07/2017    MASTECTOMY, PARTIAL      SKIN CANCER EXCISION      UPPER GASTROINTESTINAL ENDOSCOPY       Family History   Problem Relation Age of Onset    Cancer Mother         ovarian    Coronary artery disease Father     Stroke Father     Stroke Brother     Stroke Maternal Grandfather     Cancer Paternal Grandmother         breast    Stroke Paternal Grandfather      Social History     Tobacco Use    Smoking status: Current Every Day Smoker     Packs/day: 0.25     Years: 50.00     Pack years: 12.50     Types: Cigarettes     Start date: 1954    Smokeless tobacco: Never Used   Substance Use Topics    Alcohol use: Yes     Comment: occasional    Drug use: No     Review of Systems   Constitutional: Negative for chills and fever.   Eyes: Negative for visual disturbance.   Respiratory: Negative for cough and shortness of breath.    Cardiovascular: Negative for chest pain and palpitations.   Gastrointestinal: Negative for abdominal pain, nausea and vomiting.   Genitourinary: Negative for dysuria.   Musculoskeletal: Negative for arthralgias, myalgias, neck pain and neck stiffness.   Skin: Negative for rash.   Neurological: Positive for headaches (Resolved). Negative for dizziness, syncope, weakness, light-headedness and numbness.       Physical Exam     Initial Vitals [02/06/20 0100]   BP Pulse Resp Temp SpO2   (!) 174/86 70 18 98 °F (36.7 °C) 97 %      MAP       --         Physical Exam    Nursing note and vitals reviewed.  Constitutional: She appears well-developed and well-nourished. She is not diaphoretic. No distress.   HENT:   Head: Normocephalic and atraumatic.   Mouth/Throat: Oropharynx is clear and moist.   Eyes: Conjunctivae and EOM are normal. Pupils are equal, round, and reactive to light. No scleral icterus.   Neck: No JVD present.    Cardiovascular: Normal rate, regular rhythm and normal heart sounds. Exam reveals no gallop and no friction rub.    No murmur heard.  Pulmonary/Chest: Breath sounds normal. No respiratory distress. She has no wheezes. She has no rhonchi. She has no rales.   Abdominal: Soft. She exhibits no distension. There is no tenderness.   Neurological: She is alert and oriented to person, place, and time. She has normal strength. No cranial nerve deficit. Coordination and gait normal. GCS score is 15. GCS eye subscore is 4. GCS verbal subscore is 5. GCS motor subscore is 6.   Skin: Skin is warm and dry. No pallor.   Psychiatric: She has a normal mood and affect. Her speech is normal and behavior is normal. Judgment and thought content normal. She is not actively hallucinating. Cognition and memory are normal. She is attentive.         ED Course   Procedures  Labs Reviewed - No data to display       Imaging Results    None          Medical Decision Making:   History:   Old Medical Records: I decided to obtain old medical records.  Old Records Summarized: other records.       <> Summary of Records: Reviewed past medical history, see HPI. Blood pressure was normal at clinic visit three weeks ago.    Medical decision making:  This patient has undergone evaluation after having multiple elevated blood pressure measurements at home over the past few days.  Tonight, she had a mild headache and checked her blood pressure and found to be elevated.  She took her regular nighttime dosages of her medications.  He her blood pressure was elevated on ED arrival.  At the time of my evaluation, her headache had resolved.  Blood pressure was recheck and remained elevated but was improved from the time of arrival.  Emergent lab and imaging studies not felt to be indicated at this time.  The patient will follow up with her PCP.                                 Clinical Impression:       ICD-10-CM ICD-9-CM   1. Elevated blood pressure reading R03.0  796.2         Disposition:   Disposition: Discharged  Condition: Stable                     Medardo Jerez III, MD  02/06/20 0217

## 2020-02-06 NOTE — ED NOTES
Pt here with complaints of hypertension onset Monday. Pt reports blood pressure higher than normal x few days. Blood pressure 200/86 at home. Took blood pressure medication at 2230. Pt complains of slight headache. Pt awake and alert. Respirations non labored.

## 2020-02-17 ENCOUNTER — OFFICE VISIT (OUTPATIENT)
Dept: ORTHOPEDICS | Facility: CLINIC | Age: 80
End: 2020-02-17
Payer: MEDICARE

## 2020-02-17 VITALS — DIASTOLIC BLOOD PRESSURE: 70 MMHG | WEIGHT: 180 LBS | BODY MASS INDEX: 31.89 KG/M2 | SYSTOLIC BLOOD PRESSURE: 138 MMHG

## 2020-02-17 DIAGNOSIS — M51.36 DISC DEGENERATION, LUMBAR: Primary | ICD-10-CM

## 2020-02-17 DIAGNOSIS — M43.16 SPONDYLOLISTHESIS OF LUMBAR REGION: ICD-10-CM

## 2020-02-17 DIAGNOSIS — M47.816 LUMBAR FACET ARTHROPATHY: ICD-10-CM

## 2020-02-17 PROCEDURE — 99213 PR OFFICE/OUTPT VISIT, EST, LEVL III, 20-29 MIN: ICD-10-PCS | Mod: 25,S$GLB,, | Performed by: ORTHOPAEDIC SURGERY

## 2020-02-17 PROCEDURE — 99213 OFFICE O/P EST LOW 20 MIN: CPT | Mod: 25,S$GLB,, | Performed by: ORTHOPAEDIC SURGERY

## 2020-02-17 RX ORDER — TRAMADOL HYDROCHLORIDE 50 MG/1
50 TABLET ORAL EVERY 12 HOURS PRN
Qty: 120 TABLET | Refills: 4 | Status: SHIPPED | OUTPATIENT
Start: 2020-02-17 | End: 2020-04-17

## 2020-02-17 RX ORDER — METOPROLOL SUCCINATE 25 MG/1
TABLET, EXTENDED RELEASE ORAL
COMMUNITY
Start: 2020-02-13 | End: 2020-02-17

## 2020-02-17 RX ORDER — CLONIDINE HYDROCHLORIDE 0.1 MG/1
TABLET ORAL
COMMUNITY
Start: 2020-02-07

## 2020-02-17 RX ORDER — OLMESARTAN MEDOXOMIL 20 MG/1
TABLET ORAL
COMMUNITY
Start: 2020-02-13 | End: 2020-09-24

## 2020-02-17 NOTE — PROGRESS NOTES
Subjective:       Patient ID: Mirta Guerin is a 79 y.o. female.    Chief Complaint: Pain of the Lumbar Spine (Lumbar is the same. Pain radiates down both legs to feet off and on with numbness. No other treatment)      History of Present Illness     Patient is here to follow-up for chronic low back pain with a recent mild exacerbation. Overall she does well with tramadol twice a day. Pain is worse with increased activity.  Tramadol results of complaints she had a radiofrequency ablation that gave her relief 2 years ago  Current Medications  Current Outpatient Medications   Medication Sig Dispense Refill    amlodipine (NORVASC) 5 MG tablet Take 5 mg by mouth 2 (two) times daily.       aspirin (ECOTRIN) 81 MG EC tablet Take 81 mg by mouth once daily.      cloNIDine (CATAPRES) 0.1 MG tablet TAKE 1 TABLET BY MOUTH EVERY DAY AS NEEDED FOR SYSTOLIC PRESSURE GREATER THAN 170      co-enzyme Q-10 50 mg capsule Take 100 mg by mouth once daily.      docusate sodium (COLACE) 100 MG capsule Take 100 mg by mouth 2 (two) times daily.      DULoxetine (CYMBALTA) 20 MG capsule TAKE 1 CAPSULE BY MOUTH  ONCE DAILY 30 capsule 4    fluticasone propionate (FLONASE) 50 mcg/actuation nasal spray       gabapentin (NEURONTIN) 300 MG capsule TAKE 1 CAPSULE BY MOUTH  EVERY EVENING 90 capsule 3    losartan (COZAAR) 100 MG tablet       metformin (GLUCOPHAGE) 500 MG tablet Take 500 mg by mouth 2 (two) times daily with meals.      metoprolol tartrate (LOPRESSOR) 50 MG tablet 2 (two) times daily.       MULTIVIT-MIN/FA/LUTEIN/ZEAXANT (MACULAR VITAMIN ORAL) Take by mouth.      olmesartan (BENICAR) 20 MG tablet       pantoprazole (PROTONIX) 40 MG tablet       rosuvastatin (CRESTOR) 10 MG tablet       traMADol (ULTRAM) 50 mg tablet TAKE 1 TABLET BY MOUTH EVERY 4 HOURS AS NEEDED FOR PAIN 42 tablet 3    vitamin D 1000 units Tab Take 1,000 Units by mouth once daily.      traMADol (ULTRAM) 50 mg tablet Take 1 tablet (50 mg total) by  mouth every 12 (twelve) hours as needed for Pain. 120 tablet 4     No current facility-administered medications for this visit.        Allergies  Review of patient's allergies indicates:   Allergen Reactions    Benazepril Other (See Comments)     sleepy    Codeine Nausea And Vomiting    Polysporin [bacitracin-polymyxin b] Rash       Past Medical History  Past Medical History:   Diagnosis Date    Anxiety     AV block     Breast cancer     Cardiac pacemaker in situ     Coronary artery disease     Diabetes mellitus     GERD (gastroesophageal reflux disease)     HLD (hyperlipidemia)     Hypertension     Lung disease     Mitral valve disorder     NEGRITA (obstructive sleep apnea)     Prediabetes     Psoriasis     Spinal stenosis     Vitamin D deficiency        Surgical History  Past Surgical History:   Procedure Laterality Date    BREAST LUMPECTOMY      CARDIAC PACEMAKER PLACEMENT  04/05/2012    CORONARY ARTERY BYPASS GRAFT  07/2017    MASTECTOMY, PARTIAL      SKIN CANCER EXCISION      UPPER GASTROINTESTINAL ENDOSCOPY         Family History:   Family History   Problem Relation Age of Onset    Cancer Mother         ovarian    Coronary artery disease Father     Stroke Father     Stroke Brother     Stroke Maternal Grandfather     Cancer Paternal Grandmother         breast    Stroke Paternal Grandfather        Social History:   Social History     Socioeconomic History    Marital status:      Spouse name: Not on file    Number of children: Not on file    Years of education: Not on file    Highest education level: Not on file   Occupational History    Not on file   Social Needs    Financial resource strain: Not on file    Food insecurity:     Worry: Not on file     Inability: Not on file    Transportation needs:     Medical: Not on file     Non-medical: Not on file   Tobacco Use    Smoking status: Current Every Day Smoker     Packs/day: 0.25     Years: 50.00     Pack years: 12.50      Types: Cigarettes     Start date: 1954    Smokeless tobacco: Never Used   Substance and Sexual Activity    Alcohol use: Yes     Comment: occasional    Drug use: No    Sexual activity: Never   Lifestyle    Physical activity:     Days per week: Not on file     Minutes per session: Not on file    Stress: Not on file   Relationships    Social connections:     Talks on phone: Not on file     Gets together: Not on file     Attends Restoration service: Not on file     Active member of club or organization: Not on file     Attends meetings of clubs or organizations: Not on file     Relationship status: Not on file   Other Topics Concern    Not on file   Social History Narrative    Not on file       Hospitalization/Major Diagnostic Procedure:     Review of Systems     General/Constitutional:  Chills denies. Fatigue denies. Fever denies. Weight gain denies. Weight loss denies.    Respiratory:  Shortness of breath denies.    Cardiovascular:  Chest pain denies.    Gastrointestinal:  Constipation denies. Diarrhea denies. Nausea denies. Vomiting denies.     Hematology:  Easy bruising denies. Prolonged bleeding denies.     Genitourinary:  Frequent urination denies. Pain in lower back denies. Painful urination denies.     Musculoskeletal:  See HPI for details    Skin:  Rash denies.    Neurologic:  Dizziness denies. Gait abnormalities denies. Seizures denies. Tingling/Numbess denies.    Psychiatric:  Anxiety denies. Depressed mood denies.     Objective:   Vital Signs:   Vitals:    02/17/20 1029   BP: 138/70        Physical Exam      General Examination:     Constitutional: The patient is alert and oriented to lace person and time. Mood is pleasant.     Head/Face: Normal facial features normal eyebrows    Eyes: Normal extraocular motion bilaterally    Lungs: Respirations are equal and unlabored    Gait is coordinated.    Cardiovascular: There are no swelling or varicosities present.    Lymphatic: Negative for  adenopathy    Skin: Normal    Neurological: Level of consciousness normal. Oriented to place person and time and situation    Psychiatric: Oriented to time place person and situation    Patient shows moderate tenderness lumbosacral junction paraspinous muscles pain with extension and bending no spasm  XRAY Report/ Interpretation:  None today      Assessment:       1. Disc degeneration, lumbar    2. Spondylolisthesis of lumbar region    3. Lumbar facet arthropathy        Plan:       Mirta was seen today for pain.    Diagnoses and all orders for this visit:    Disc degeneration, lumbar  -     X-Ray Lumbar Spine Ap And Lateral  -     traMADol (ULTRAM) 50 mg tablet; Take 1 tablet (50 mg total) by mouth every 12 (twelve) hours as needed for Pain.    Spondylolisthesis of lumbar region  -     traMADol (ULTRAM) 50 mg tablet; Take 1 tablet (50 mg total) by mouth every 12 (twelve) hours as needed for Pain.    Lumbar facet arthropathy  -     traMADol (ULTRAM) 50 mg tablet; Take 1 tablet (50 mg total) by mouth every 12 (twelve) hours as needed for Pain.         Follow up in about 6 months (around 8/17/2020) for lumbar f/u.    Treatment options were discussed new prescription for tramadol given today repeat refills for tramadol is medically necessary to manage her chronic back pain that is not expected to resolve with time    This note was created using Dragon voice recognition software that occasionally misinterpreted phrases or words.

## 2020-03-06 ENCOUNTER — TELEPHONE (OUTPATIENT)
Dept: HEMATOLOGY/ONCOLOGY | Facility: CLINIC | Age: 80
End: 2020-03-06

## 2020-03-06 NOTE — TELEPHONE ENCOUNTER
Patient was called to remind her of her appointment for 03/09/2020 and was asked if her Mammogram was completed and it was not. She stated that she would have that done as soon as possible and was informed that [facility] would call her to reschedule the follow up appointment.

## 2020-03-09 ENCOUNTER — TELEPHONE (OUTPATIENT)
Dept: HEMATOLOGY/ONCOLOGY | Facility: CLINIC | Age: 80
End: 2020-03-09

## 2020-03-09 DIAGNOSIS — C50.411 MALIGNANT NEOPLASM OF UPPER-OUTER QUADRANT OF RIGHT BREAST IN FEMALE, ESTROGEN RECEPTOR POSITIVE: Primary | ICD-10-CM

## 2020-03-09 DIAGNOSIS — Z17.0 MALIGNANT NEOPLASM OF UPPER-OUTER QUADRANT OF RIGHT BREAST IN FEMALE, ESTROGEN RECEPTOR POSITIVE: Primary | ICD-10-CM

## 2020-03-09 DIAGNOSIS — R92.8 ABNORMAL MAMMOGRAM: ICD-10-CM

## 2020-03-09 NOTE — TELEPHONE ENCOUNTER
Orders will be placed for [facility].  Pt to call us once she get a date scheduled so that she can get a RTC date.

## 2020-03-09 NOTE — TELEPHONE ENCOUNTER
----- Message from Angela Virgen, Patient Care Assistant sent at 3/9/2020 11:29 AM CDT -----  Patient called in stating she need to know if she schedules her own follow up appt. For her cb at [facility] or does someone from Dr. Fontenot office  have to schedule it . She can be reached at [phone]

## 2020-05-05 ENCOUNTER — TELEPHONE (OUTPATIENT)
Dept: ORTHOPEDICS | Facility: CLINIC | Age: 80
End: 2020-05-05

## 2020-06-25 ENCOUNTER — OFFICE VISIT (OUTPATIENT)
Dept: PODIATRY | Facility: CLINIC | Age: 80
End: 2020-06-25
Payer: MEDICARE

## 2020-06-25 VITALS
HEIGHT: 63 IN | TEMPERATURE: 98 F | WEIGHT: 180 LBS | SYSTOLIC BLOOD PRESSURE: 152 MMHG | DIASTOLIC BLOOD PRESSURE: 80 MMHG | RESPIRATION RATE: 16 BRPM | HEART RATE: 94 BPM | BODY MASS INDEX: 31.89 KG/M2

## 2020-06-25 DIAGNOSIS — L40.9 PSORIASIS: ICD-10-CM

## 2020-06-25 DIAGNOSIS — L60.0 INGROWN NAIL: ICD-10-CM

## 2020-06-25 DIAGNOSIS — E11.49 TYPE II DIABETES MELLITUS WITH NEUROLOGICAL MANIFESTATIONS: Primary | ICD-10-CM

## 2020-06-25 PROCEDURE — 99213 OFFICE O/P EST LOW 20 MIN: CPT | Mod: S$PBB,,, | Performed by: PODIATRIST

## 2020-06-25 PROCEDURE — 99999 PR PBB SHADOW E&M-EST. PATIENT-LVL IV: ICD-10-PCS | Mod: PBBFAC,,, | Performed by: PODIATRIST

## 2020-06-25 PROCEDURE — 99214 OFFICE O/P EST MOD 30 MIN: CPT | Mod: PBBFAC,PN | Performed by: PODIATRIST

## 2020-06-25 PROCEDURE — 99213 PR OFFICE/OUTPT VISIT, EST, LEVL III, 20-29 MIN: ICD-10-PCS | Mod: S$PBB,,, | Performed by: PODIATRIST

## 2020-06-25 PROCEDURE — 99999 PR PBB SHADOW E&M-EST. PATIENT-LVL IV: CPT | Mod: PBBFAC,,, | Performed by: PODIATRIST

## 2020-06-25 RX ORDER — HYDROCORTISONE 25 MG/G
CREAM TOPICAL 2 TIMES DAILY
Qty: 453.6 G | Refills: 2 | Status: SHIPPED | OUTPATIENT
Start: 2020-06-25 | End: 2022-11-17

## 2020-06-25 NOTE — LETTER
June 26, 2020      James Jung MD  [address] [place] [address]           [facility] - Podiatry/[facility]ound [facility]  [address]  [place] [address]  Phone: [phone]  Fax: [phone]          Patient: Mirta Guerin   MR Number: [id number]   YOB: 1940   Date of Visit: 6/25/2020       Dear Dr. James Jung:    Thank you for referring Mirta Guerin to me for evaluation. Attached you will find relevant portions of my assessment and plan of care.    If you have questions, please do not hesitate to call me. I look forward to following Mirta Guerin along with you.    Sincerely,    Angelo Mendes, ROSALBA    Enclosure  CC:  No Recipients    If you would like to receive this communication electronically, please contact [email or web address] or [phone] to request more information on [employer] Link access.    For providers and/or their staff who would like to refer a patient to [facility], please contact us through our one-stop-shop provider referral line, [facility], at [phone].    If you feel you have received this communication in error or would no longer like to receive these types of communications, please e-mail [email or web address]

## 2020-06-26 NOTE — PROGRESS NOTES
Subjective:       Patient ID: Mirta Guerin is a 79 y.o. female.    Chief Complaint: Follow-up and Diabetic Foot Exam  Patient presents today for followup she is a history of chronically ingrown toenails with signs of infection especially on her left hallux.     Follow-up  Associated symptoms include arthralgias, joint swelling and a rash.   Nail Problem  Associated symptoms include arthralgias, joint swelling and a rash.   Ingrown Toenail  Associated symptoms include arthralgias, joint swelling and a rash.        Review of Systems   Musculoskeletal: Positive for arthralgias and joint swelling.   Skin: Positive for color change, rash and wound.   All other systems reviewed and are negative.      Objective:      Physical Exam  Constitutional:       Appearance: She is well-developed.   Cardiovascular:      Pulses:           Dorsalis pedis pulses are 1+ on the right side and 1+ on the left side.        Posterior tibial pulses are 1+ on the right side and 1+ on the left side.   Pulmonary:      Effort: Pulmonary effort is normal.   Musculoskeletal: Normal range of motion.         General: Deformity present.   Feet:      Right foot:      Protective Sensation: 4 sites tested. 1 site sensed.     Skin integrity: Dry skin present.      Left foot:      Protective Sensation: 4 sites tested. 1 site sensed.     Skin integrity: Dry skin present.   Skin:     General: Skin is warm.      Capillary Refill: Capillary refill takes more than 3 seconds.   Neurological:      Deep Tendon Reflexes: Reflexes abnormal.   Psychiatric:         Behavior: Behavior normal.         Thought Content: Thought content normal.         Judgment: Judgment normal.       Patient is noted to have positive erythema positive edema at the medial lateral aspect of the patient's left hallux upon debridement of the lateral border left hallux there is purulent drainage noted as well as obvious signs of infection and pain to palpation. Patient is noted to have  a well-defined area of hyperkeratotic tissue on the dorsal lateral aspect of the fifth digit left there is positive pain noted upon palpation of this area no sign of infection is noted. patient's previously noted psoriasis is more well-controlled on today's visit that ever seen it since it started seeing the patient there are mild erythematous plaques or areas of skin breakdown especially on the heel.        Assessment:       1. Type II diabetes mellitus with neurological manifestations    2. Ingrown nail    3. Psoriasis        Plan:        Following examination I aggressively debrided both the medial and lateral border of the patient's left hallux I did advise the patient is obvious signs of infection with purulent drainage noted at the lateral border left hallux. Sterile saline was used to flush and irrigated the lateral border left hallux bacitracin ointment and a well-padded dry dressing applied patient advised to keep antibiotic ointment on the area for the next 3 days if this does not completely resolve if it continues to be red and painful we need to see her back for followup sooner than the regularly scheduled 12 week appointment. I debrided the hyperkeratotic tissue from the fifth digit left patient stated that this felt much better I advised her this is likely due to a shoe rubbing and irritation of this area recommended followup 12 weeks. .Patient states her psoriasis is doing as good as it's been in many years and is very well controlled especially on her feet.  Patient relates she has been under lot of stress with family issues of this definitely aggravates her psoriasis.Complete diabetic evaluation was performed today. Pt developed an area of psoriatic breakdown on her left heel this needs to be monitored closely. Calmoseptine ointment disp.   Patient states this ointment has been wonderful and see only thing that has really helped the psoriasis on her heels.This note was created using [employer] voice  recognition software that occasionally misinterpreted phrases or words.

## 2020-08-10 ENCOUNTER — OFFICE VISIT (OUTPATIENT)
Dept: PULMONOLOGY | Facility: CLINIC | Age: 80
End: 2020-08-10
Payer: MEDICARE

## 2020-08-10 VITALS
SYSTOLIC BLOOD PRESSURE: 110 MMHG | TEMPERATURE: 96 F | HEART RATE: 83 BPM | WEIGHT: 184 LBS | OXYGEN SATURATION: 95 % | DIASTOLIC BLOOD PRESSURE: 70 MMHG | BODY MASS INDEX: 32.59 KG/M2

## 2020-08-10 DIAGNOSIS — G47.33 OSA (OBSTRUCTIVE SLEEP APNEA): Primary | ICD-10-CM

## 2020-08-10 PROCEDURE — 99213 PR OFFICE/OUTPT VISIT, EST, LEVL III, 20-29 MIN: ICD-10-PCS | Mod: S$GLB,,, | Performed by: NURSE PRACTITIONER

## 2020-08-10 PROCEDURE — 99213 OFFICE O/P EST LOW 20 MIN: CPT | Mod: S$GLB,,, | Performed by: NURSE PRACTITIONER

## 2020-08-10 RX ORDER — METOPROLOL SUCCINATE 50 MG/1
TABLET, EXTENDED RELEASE ORAL
COMMUNITY
Start: 2020-05-29 | End: 2020-12-28

## 2020-08-10 NOTE — PATIENT INSTRUCTIONS
Obstructive Sleep Apnea  Obstructive sleep apnea is a condition that causes your air passages to become narrowed or blocked during sleep. As a result, breathing stops for short periods. Your body wakes up enough for breathing to begin again, though you don't remember it. The cycle of stopped breathing and brief awakenings can repeat dozens of times a night. This prevents the body from getting to the deeper stages of sleep that are needed for good rest and may cause your body's oxygen level to fall.  Signs of sleep apnea include loud snoring, noisy breathing, and gasping sounds during sleep. Daytime symptoms include waking up tired after a full night's sleep, waking up with headaches, feeling very sleepy or falling asleep during the day, and having problems with memory or concentration.  Risk factors for sleep apnea include:  · Being overweight  · Being a man, or a woman in menopause  · Smoking  · Using alcohol or sedating medicines  · Having enlarged structures in the nose or throat  Home care  Lifestyle changes that can help treat snoring and sleep apnea include the following:  · If you are overweight, lose weight. Talk to your healthcare provider about a weight-loss plan for you.  · Avoid alcohol for 3 to 4 hours before bedtime. Avoid sedating medications. Ask your healthcare provider about the medicines you take.  · If you smoke, talk to your healthcare provider about ways to quit.  · Sleep on your side. This can help prevent gravity from pulling relaxed throat tissues into your breathing passages.  · If you have allergies or sinus problems that block your nose, ask your healthcare provider for help.  Follow-up care  Follow up with your healthcare provider, or as advised. A diagnosis of sleep apnea is made with a sleep study. Your healthcare provider can tell you more about this test.  When to seek medical advice  Sleep apnea can make you more likely to have certain health problems. These include high blood  pressure, heart attack, stroke, and sexual dysfunction. If you have sleep apnea, talk to your healthcare provider about the best treatments for you.  Date Last Reviewed: 4/1/2017  © 9394-5046 [employer], [employer]. [address] [place] [address], [place] [address]. All rights reserved. This information is not intended as a substitute for professional medical care. Always follow your healthcare professional's instructions.    Keep sleeping on your CPAP every night  Need to see an ENT for the sinus congestion   Need a download   [facility] number is [phone]

## 2020-08-10 NOTE — PROGRESS NOTES
SUBJECTIVE:    Patient ID: Mirta Guerin is a 80 y.o. female.    Chief Complaint: Apnea (follow up )    Patient here today feeling well.  She is sleeping at least 4.5 hours a night on her CPAP.  She is trying to make up doctors appointments she had to cancel during initial covid outbreak. She has not seen ENT yet.  She does feel much more well rested since sleeping on her CPAP.    Past Medical History:   Diagnosis Date    Anxiety     AV block     Breast cancer     Cardiac pacemaker in situ     Coronary artery disease     Diabetes mellitus     GERD (gastroesophageal reflux disease)     HLD (hyperlipidemia)     Hypertension     Lung disease     Mitral valve disorder     NEGRITA (obstructive sleep apnea)     Prediabetes     Psoriasis     Spinal stenosis     Vitamin D deficiency      Past Surgical History:   Procedure Laterality Date    BREAST LUMPECTOMY      CARDIAC PACEMAKER PLACEMENT  04/05/2012    CORONARY ARTERY BYPASS GRAFT  07/2017    MASTECTOMY, PARTIAL      SKIN CANCER EXCISION      UPPER GASTROINTESTINAL ENDOSCOPY       Family History   Problem Relation Age of Onset    Cancer Mother         ovarian    Coronary artery disease Father     Stroke Father     Stroke Brother     Stroke Maternal Grandfather     Cancer Paternal Grandmother         breast    Stroke Paternal Grandfather         Social History:   Marital Status:   Occupation: Data Unavailable  Alcohol History:  reports current alcohol use.  Tobacco History:  reports that she has been smoking cigarettes. She started smoking about 66 years ago. She has a 12.50 pack-year smoking history. She has never used smokeless tobacco.  Drug History:  reports no history of drug use.    Review of patient's allergies indicates:   Allergen Reactions    Benazepril Other (See Comments)     sleepy    Codeine Nausea And Vomiting    Polysporin [bacitracin-polymyxin b] Rash       Current Outpatient Medications   Medication Sig  Dispense Refill    amlodipine (NORVASC) 5 MG tablet Take 5 mg by mouth 2 (two) times daily.       aspirin (ECOTRIN) 81 MG EC tablet Take 81 mg by mouth once daily.      cloNIDine (CATAPRES) 0.1 MG tablet TAKE 1 TABLET BY MOUTH EVERY DAY AS NEEDED FOR SYSTOLIC PRESSURE GREATER THAN 170      co-enzyme Q-10 50 mg capsule Take 100 mg by mouth once daily.      docusate sodium (COLACE) 100 MG capsule Take 100 mg by mouth 2 (two) times daily.      DULoxetine (CYMBALTA) 20 MG capsule TAKE 1 CAPSULE BY MOUTH  ONCE DAILY 30 capsule 4    fluticasone propionate (FLONASE) 50 mcg/actuation nasal spray       gabapentin (NEURONTIN) 300 MG capsule TAKE 1 CAPSULE BY MOUTH  EVERY EVENING 90 capsule 3    hydrocortisone 2.5 % cream Apply topically 2 (two) times daily. 453.6 g 2    metformin (GLUCOPHAGE) 500 MG tablet Take 500 mg by mouth 2 (two) times daily with meals.      metoprolol succinate (TOPROL-XL) 50 MG 24 hr tablet       MULTIVIT-MIN/FA/LUTEIN/ZEAXANT (MACULAR VITAMIN ORAL) Take by mouth.      olmesartan (BENICAR) 20 MG tablet       pantoprazole (PROTONIX) 40 MG tablet       rosuvastatin (CRESTOR) 10 MG tablet       traMADol (ULTRAM) 50 mg tablet TAKE 1 TABLET BY MOUTH EVERY 4 HOURS AS NEEDED FOR PAIN 42 tablet 3    vitamin D 1000 units Tab Take 1,000 Units by mouth once daily.      losartan (COZAAR) 100 MG tablet       metoprolol tartrate (LOPRESSOR) 50 MG tablet 2 (two) times daily.        No current facility-administered medications for this visit.            General: feeling well   Eyes: Vision is good.  ENT:  Suffers from chronic sinuses, dry mouth at times   Heart:: No chest pain or palpitations.  Lungs: No cough, sputum, or wheezing.  GI: no N/V or reflux .  : no issues   Musculoskeletal: No joint pain or myalgias.  Skin: No lesions or rashes.  Neuro: forgetful at times   Lymph: swelling if she eats real salty  Psych: No anxiety or depression.  Endo:weight stable     OBJECTIVE:      /70 (BP  Location: Left arm, Patient Position: Sitting)   Pulse 83   Temp 96.4 °F (35.8 °C)   Wt 83.5 kg (184 lb)   SpO2 95%   BMI 32.59 kg/m²     Physical Exam  GENERAL: Older patient in no distress.  HEENT: Pupils equal and reactive. Extraocular movements intact. Nose intact.      Pharynx moist.   NECK: Supple.   HEART: Regular rate and rhythm. No murmur or gallop auscultated.  LUNGS: Clear to auscultation and percussion. Lung excursion symmetrical. No change in fremitus. No adventitial noises.  ABDOMEN: Bowel sounds present. Non-tender, no masses palpated.  EXTREMITIES: Normal muscle tone and joint movement, no cyanosis or clubbing.   LYMPHATICS: No adenopathy palpated, no edema.  SKIN: Dry, intact, no lesions.   NEURO: Cranial nerves II-XII intact. Motor strength 5/5 bilaterally, upper and lower extremities.  PSYCH: Appropriate affect.    Assessment:       1. NEGRITA (obstructive sleep apnea)          Plan:       NEGRITA (obstructive sleep apnea)         Keep sleeping on your CPAP every night  Need to see an ENT for the chronic sinus congestion   Need a download   [facility] number is [phone]   Follow up in about 1 year (around 8/10/2021).

## 2020-08-17 ENCOUNTER — OFFICE VISIT (OUTPATIENT)
Dept: ORTHOPEDICS | Facility: CLINIC | Age: 80
End: 2020-08-17
Payer: MEDICARE

## 2020-08-17 VITALS
SYSTOLIC BLOOD PRESSURE: 134 MMHG | DIASTOLIC BLOOD PRESSURE: 80 MMHG | WEIGHT: 180 LBS | HEART RATE: 83 BPM | BODY MASS INDEX: 31.89 KG/M2

## 2020-08-17 DIAGNOSIS — M51.36 DISC DEGENERATION, LUMBAR: Primary | ICD-10-CM

## 2020-08-17 DIAGNOSIS — M47.816 LUMBAR FACET ARTHROPATHY: ICD-10-CM

## 2020-08-17 DIAGNOSIS — M43.16 SPONDYLOLISTHESIS OF LUMBAR REGION: ICD-10-CM

## 2020-08-17 PROCEDURE — 99213 OFFICE O/P EST LOW 20 MIN: CPT | Mod: S$GLB,,, | Performed by: ORTHOPAEDIC SURGERY

## 2020-08-17 PROCEDURE — 99213 PR OFFICE/OUTPT VISIT, EST, LEVL III, 20-29 MIN: ICD-10-PCS | Mod: S$GLB,,, | Performed by: ORTHOPAEDIC SURGERY

## 2020-08-17 NOTE — PROGRESS NOTES
Subjective:       Patient ID: Mirta Guerin is a 80 y.o. female.    Chief Complaint: Pain of the Lumbar Spine (Lumbar is the same. Pain does not radiate. No other treatment)      History of Present Illness  Patient is here to follow-up for chronic low back pain with a recent mild exacerbation. Overall she does well with tramadol twice a day. Pain is worse with increased activity.  overall she is doing well but has exacerbations with any type of house chores.  Would like to consider having the rhizotomy repeated in the possible near future.    Current Medications  Current Outpatient Medications   Medication Sig Dispense Refill    amlodipine (NORVASC) 5 MG tablet Take 5 mg by mouth 2 (two) times daily.       aspirin (ECOTRIN) 81 MG EC tablet Take 81 mg by mouth once daily.      cloNIDine (CATAPRES) 0.1 MG tablet TAKE 1 TABLET BY MOUTH EVERY DAY AS NEEDED FOR SYSTOLIC PRESSURE GREATER THAN 170      co-enzyme Q-10 50 mg capsule Take 100 mg by mouth once daily.      docusate sodium (COLACE) 100 MG capsule Take 100 mg by mouth 2 (two) times daily.      DULoxetine (CYMBALTA) 20 MG capsule TAKE 1 CAPSULE BY MOUTH  ONCE DAILY 30 capsule 4    fluticasone propionate (FLONASE) 50 mcg/actuation nasal spray       gabapentin (NEURONTIN) 300 MG capsule TAKE 1 CAPSULE BY MOUTH  EVERY EVENING 90 capsule 3    hydrocortisone 2.5 % cream Apply topically 2 (two) times daily. 453.6 g 2    losartan (COZAAR) 100 MG tablet       metformin (GLUCOPHAGE) 500 MG tablet Take 500 mg by mouth 2 (two) times daily with meals.      metoprolol succinate (TOPROL-XL) 50 MG 24 hr tablet       metoprolol tartrate (LOPRESSOR) 50 MG tablet 2 (two) times daily.       MULTIVIT-MIN/FA/LUTEIN/ZEAXANT (MACULAR VITAMIN ORAL) Take by mouth.      olmesartan (BENICAR) 20 MG tablet       pantoprazole (PROTONIX) 40 MG tablet       rosuvastatin (CRESTOR) 10 MG tablet       traMADol (ULTRAM) 50 mg tablet TAKE 1 TABLET BY MOUTH EVERY 4 HOURS AS  NEEDED FOR PAIN 42 tablet 3    vitamin D 1000 units Tab Take 1,000 Units by mouth once daily.       No current facility-administered medications for this visit.        Allergies  Review of patient's allergies indicates:   Allergen Reactions    Benazepril Other (See Comments)     sleepy    Codeine Nausea And Vomiting    Polysporin [bacitracin-polymyxin b] Rash       Past Medical History  Past Medical History:   Diagnosis Date    Anxiety     AV block     Breast cancer     Cardiac pacemaker in situ     Coronary artery disease     Diabetes mellitus     GERD (gastroesophageal reflux disease)     HLD (hyperlipidemia)     Hypertension     Lung disease     Mitral valve disorder     NEGRITA (obstructive sleep apnea)     Prediabetes     Psoriasis     Spinal stenosis     Vitamin D deficiency        Surgical History  Past Surgical History:   Procedure Laterality Date    BREAST LUMPECTOMY      CARDIAC PACEMAKER PLACEMENT  04/05/2012    CORONARY ARTERY BYPASS GRAFT  07/2017    MASTECTOMY, PARTIAL      SKIN CANCER EXCISION      UPPER GASTROINTESTINAL ENDOSCOPY         Family History:   Family History   Problem Relation Age of Onset    Cancer Mother         ovarian    Coronary artery disease Father     Stroke Father     Stroke Brother     Stroke Maternal Grandfather     Cancer Paternal Grandmother         breast    Stroke Paternal Grandfather        Social History:   Social History     Socioeconomic History    Marital status:      Spouse name: Not on file    Number of children: Not on file    Years of education: Not on file    Highest education level: Not on file   Occupational History    Not on file   Social Needs    Financial resource strain: Not on file    Food insecurity     Worry: Not on file     Inability: Not on file    Transportation needs     Medical: Not on file     Non-medical: Not on file   Tobacco Use    Smoking status: Current Every Day Smoker     Packs/day: 0.25      Years: 50.00     Pack years: 12.50     Types: Cigarettes     Start date: 1954    Smokeless tobacco: Never Used   Substance and Sexual Activity    Alcohol use: Yes     Comment: occasional    Drug use: No    Sexual activity: Never   Lifestyle    Physical activity     Days per week: Not on file     Minutes per session: Not on file    Stress: Not on file   Relationships    Social connections     Talks on phone: Not on file     Gets together: Not on file     Attends Voodoo service: Not on file     Active member of club or organization: Not on file     Attends meetings of clubs or organizations: Not on file     Relationship status: Not on file   Other Topics Concern    Not on file   Social History Narrative    Not on file       Hospitalization/Major Diagnostic Procedure:     Review of Systems     General/Constitutional:  Chills denies. Fatigue denies. Fever denies. Weight gain denies. Weight loss denies.    Respiratory:  Shortness of breath denies.    Cardiovascular:  Chest pain denies.    Gastrointestinal:  Constipation denies. Diarrhea denies. Nausea denies. Vomiting denies.     Hematology:  Easy bruising denies. Prolonged bleeding denies.     Genitourinary:  Frequent urination denies. Pain in lower back denies. Painful urination denies.     Musculoskeletal:  See HPI for details    Skin:  Rash denies.    Neurologic:  Dizziness denies. Gait abnormalities denies. Seizures denies. Tingling/Numbess denies.    Psychiatric:  Anxiety denies. Depressed mood denies.     Objective:   Vital Signs:   Vitals:    08/17/20 1011   BP: 134/80   Pulse: 83        Physical Exam      General Examination:     Constitutional: The patient is alert and oriented to lace person and time. Mood is pleasant.     Head/Face: Normal facial features normal eyebrows    Eyes: Normal extraocular motion bilaterally    Lungs: Respirations are equal and unlabored    Gait is coordinated.    Cardiovascular: There are no swelling or varicosities  "present.    Lymphatic: Negative for adenopathy    Skin: Normal    Neurological: Level of consciousness normal. Oriented to place person and time and situation    Psychiatric: Oriented to time place person and situation     lumbar exam: Antalgic gait and antalgic sit to stand. Lumbar range of motion approximate 70% of normal both flexion and extension. Bilateral lower extremity is or distal neurovascular intact.    XRAY Report/ Interpretation:  Lumbar AP and lateral x-rays taken in the office today reviewed the patient demonstrates moderate multilevel degenerative disc disease and moderate to significant facet sclerosis throughout the entire lumbar spine.  L4-5 grade 1 spondylolisthesis is also noted      Assessment:       1. Disc degeneration, lumbar    2. Spondylolisthesis of lumbar region    3. Lumbar facet arthropathy        Plan:       Mirta was seen today for pain.    Diagnoses and all orders for this visit:    Disc degeneration, lumbar  -     X-Ray Lumbar Spine Ap And Lateral    Spondylolisthesis of lumbar region  -     X-Ray Lumbar Spine Ap And Lateral    Lumbar facet arthropathy         No follow-ups on file.  Reuben Dunbar, physician's assistant served in the capacity as a "scribe" for this patient encounter  A "face to face" encounter occurred with Dr. Little on this date  The treatment plan and medical decision making is outlined below:Patient will call when she is ready to have the rhizotomy procedure repeated.  This is most recently done with Dr. Moeller.  Continue with tramadol up to 2 times a day as needed for pain.  Follow up with us in 6 months or sooner if needed    This note was created using Dragon voice recognition software that occasionally misinterpreted phrases or words.    "

## 2020-08-27 ENCOUNTER — OFFICE VISIT (OUTPATIENT)
Dept: HEMATOLOGY/ONCOLOGY | Facility: CLINIC | Age: 80
End: 2020-08-27
Payer: MEDICARE

## 2020-08-27 VITALS
RESPIRATION RATE: 18 BRPM | DIASTOLIC BLOOD PRESSURE: 79 MMHG | BODY MASS INDEX: 33.11 KG/M2 | HEART RATE: 87 BPM | WEIGHT: 186.88 LBS | TEMPERATURE: 98 F | SYSTOLIC BLOOD PRESSURE: 134 MMHG

## 2020-08-27 DIAGNOSIS — C50.411 MALIGNANT NEOPLASM OF UPPER-OUTER QUADRANT OF RIGHT BREAST IN FEMALE, ESTROGEN RECEPTOR POSITIVE: Primary | ICD-10-CM

## 2020-08-27 DIAGNOSIS — Z17.0 MALIGNANT NEOPLASM OF UPPER-OUTER QUADRANT OF RIGHT BREAST IN FEMALE, ESTROGEN RECEPTOR POSITIVE: Primary | ICD-10-CM

## 2020-08-27 PROCEDURE — 99214 PR OFFICE/OUTPT VISIT, EST, LEVL IV, 30-39 MIN: ICD-10-PCS | Mod: S$GLB,,, | Performed by: INTERNAL MEDICINE

## 2020-08-27 PROCEDURE — 99214 OFFICE O/P EST MOD 30 MIN: CPT | Mod: S$GLB,,, | Performed by: INTERNAL MEDICINE

## 2020-08-31 NOTE — PROGRESS NOTES
[facility] Hematology Oncology In Office Encounter Progress note    20  Subjective:      Patient ID:   Mirta Guerin  80 y.o. female  1940  James Jung M.D.. Barbie Castillo M.D., Tony Goldberg.      Chief Complaint:breast cancer follow-up    HPI:  80 y.o. female with diagnosis of stage I right breast cancer.Diagnosed in 2015. Treated with partial mastectomy and sentinel node biopsy, followed by adjuvant radiation therapy, she started adjuvant Arimidex in 2016.    ERP was positive, PRP was positive, HER-2/asaf was negative.hot flash symptoms are controlled on Cymbalta. Joint pain symptoms have not been a problem for her. She has history of hypertension, diabetes, GERD,hypercholesterolemia, DJD, and sleep apnea syndrome.    She has history of left partial mastectomy and sentinel node biopsy, pacemaker placement, facial surgery, right and left knee replacement, complete hysterectomy. She had CABG surgery in 2016.    She had a skin cancer removed from her right scalp ,she believes it was a melanoma, per Dr. Lee in 2018, she also saw Dr. Hi for wide excision of the area.    She had stage 0 melanoma in situ, margins were clear on Moh's surgery/    Since her CABG surgery, she complains of continued substernal chest pain. CAT scan in 2016 at [facility] did show some inflammation in the soft tissue in the sternal area. I suspect she has costochrondral joint sx.    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    Her dad had CVA, her mother  of ovarian cancer at age 86, her brother has had 2 or 3 CVA, her sister has had bladder cancer, she has a daughter with hypoglycemia.    She is allergic to codeine benazepril, Polysporin, and triple antibiotics ointment.    Dr. James Jung is her primary care physician.   Dr. Juarez is her cardiologist.  Dr. Talbot is her GYN.    She has  been diagnosed with sleep apnea syndrome.    She is tolerating the Arimadex fine,  With the addition of Cymbalta daily.  HF sx controlled.  She has history of restless leg syndrome and peripheral neuropathy symptoms in the fingers and feet.  She has hx of osteopenia, took Reclast in the past.  Hx LBP, S/P injection x's 9, sx decreased.    She is 5 foot 2 inches tall and weighs 181 pounds    ROS:   GEN: normal without any fever, night sweats or weight loss  HEENT: normal with no HA's, sore throat, stiff neck, changes in vision  CV: see history of present illness.   no CP, SOB, PND, STEPHENS or orthopnea  PULM: see history of present illness.   no SOB, cough, hemoptysis, sputum or pleuritic pain  GI: normal with no abdominal pain, nausea, vomiting, constipation, diarrhea, melanotic stools, BRBPR, or hematemesis  : normal with no hematuria, dysuria  BREAST: see history of present illness  SKIN: normal with no rash, erythema, bruising, or swelling     Past Medical History:   Diagnosis Date    Anxiety     AV block     Breast cancer     Cardiac pacemaker in situ     Coronary artery disease     Diabetes mellitus     GERD (gastroesophageal reflux disease)     HLD (hyperlipidemia)     Hypertension     Lung disease     Mitral valve disorder     NEGRITA (obstructive sleep apnea)     Prediabetes     Psoriasis     Spinal stenosis     Vitamin D deficiency      Past Surgical History:   Procedure Laterality Date    BREAST LUMPECTOMY      CARDIAC PACEMAKER PLACEMENT  04/05/2012    CORONARY ARTERY BYPASS GRAFT  07/2017    MASTECTOMY, PARTIAL      SKIN CANCER EXCISION      UPPER GASTROINTESTINAL ENDOSCOPY         Review of patient's allergies indicates:   Allergen Reactions    Benazepril Other (See Comments)     sleepy    Codeine Nausea And Vomiting    Polysporin [bacitracin-polymyxin b] Rash           Current Outpatient Medications:     amlodipine (NORVASC) 5 MG tablet, Take 5 mg by mouth 2 (two) times daily. ,  Disp: , Rfl:     aspirin (ECOTRIN) 81 MG EC tablet, Take 81 mg by mouth once daily., Disp: , Rfl:     cloNIDine (CATAPRES) 0.1 MG tablet, TAKE 1 TABLET BY MOUTH EVERY DAY AS NEEDED FOR SYSTOLIC PRESSURE GREATER THAN 170, Disp: , Rfl:     co-enzyme Q-10 50 mg capsule, Take 100 mg by mouth once daily., Disp: , Rfl:     docusate sodium (COLACE) 100 MG capsule, Take 100 mg by mouth 2 (two) times daily., Disp: , Rfl:     DULoxetine (CYMBALTA) 20 MG capsule, TAKE 1 CAPSULE BY MOUTH  ONCE DAILY, Disp: 30 capsule, Rfl: 4    fluticasone propionate (FLONASE) 50 mcg/actuation nasal spray, , Disp: , Rfl:     gabapentin (NEURONTIN) 300 MG capsule, TAKE 1 CAPSULE BY MOUTH  EVERY EVENING, Disp: 90 capsule, Rfl: 3    hydrocortisone 2.5 % cream, Apply topically 2 (two) times daily., Disp: 453.6 g, Rfl: 2    losartan (COZAAR) 100 MG tablet, , Disp: , Rfl:     metformin (GLUCOPHAGE) 500 MG tablet, Take 500 mg by mouth 2 (two) times daily with meals., Disp: , Rfl:     metoprolol succinate (TOPROL-XL) 50 MG 24 hr tablet, , Disp: , Rfl:     metoprolol tartrate (LOPRESSOR) 50 MG tablet, 2 (two) times daily. , Disp: , Rfl:     MULTIVIT-MIN/FA/LUTEIN/ZEAXANT (MACULAR VITAMIN ORAL), Take by mouth., Disp: , Rfl:     olmesartan (BENICAR) 20 MG tablet, , Disp: , Rfl:     pantoprazole (PROTONIX) 40 MG tablet, , Disp: , Rfl:     rosuvastatin (CRESTOR) 10 MG tablet, , Disp: , Rfl:     traMADol (ULTRAM) 50 mg tablet, TAKE 1 TABLET BY MOUTH EVERY 4 HOURS AS NEEDED FOR PAIN, Disp: 42 tablet, Rfl: 3    vitamin D 1000 units Tab, Take 1,000 Units by mouth once daily., Disp: , Rfl:           Objective:   Vitals:  Blood pressure 134/79, pulse 87, temperature 98.4 °F (36.9 °C), resp. rate 18, weight 84.8 kg (186 lb 14.4 oz).    Physical Examination:   GEN: no apparent distress, comfortable  HEAD: atraumatic and normocephalic  EYES: no pallor, no icterus.  She has a healing scabbed over surgical site at the right scalp, without satellite  lesions or palpable lymphadenopathy at the neck  ENT:  no pharyngeal erythema, external ears WNL; no nasal discharge  NECK: no masses, thyroid normal, trachea midline, no LAD/LN's, supple  CV: RRR with no murmur,  the skin  at the sternal area is healed and closed, nontender without warmth or redness or fluctuance  CHEST: Normal respiratory effort; CTAB; distantbreath sounds; no wheeze or crackles  ABDOM: nontender and nondistended; soft;  no rebound/guarding, L/S NP  MUSC/Skeletal: ROM normal; no crepitus; joints normal  EXTREM: no clubbing, cyanosis, inflammation or swelling  SKIN: no rashes, lesions, ulcers, petechia  : no cvat  NEURO: grossly intact; motor/sensory WNL;  no tremors  PSYCH: normal mood, affect and behavior  LYMPH: normal cervical, supraclavicular, axillary and groin LN's  BREAST:the left breast shows postoperative change, nontender, without palpable mass. The right breast is nontender without palpable mass.      Labs:     Radiology/Diagnostic Studies:    Mammogram 2/2019 showed 1.5 oval well circumscribed mass at 8 o'clock at L breast.  Recheck site with U/S now is stable at 8 o'clock of L breast.  Likely a lipoma, per radiologist.    Mamm 5/28/20 DIS Stable, recheck 1 year.  6/2021.        Assessment:   (1) 80 y.o. female with diagnosis of stage I right breast cancer treated with right partial mastectomy, adjuvant radiation therapy, and continues on adjuvant Arimidex now. Originally diagnosed in May 2015. No evidence of disease on physical exam.Due for mammogram in June 2021.    (2) hot flash symptoms secondary to Arimidex are managed with Cymbalta 20 mg by mouth daily.    (3)sleep apnea syndrome under management of pulmonary, make referral to Dr. Mancera at patient's request.    (4)atypical L chest pain     (5)recent removal of melanoma in situ from right scalp,   Margins clear on Moh's.  Observe for now.  Operative site at R scalp shows post op changes, with out mass or melanotic nodules being  seen.    (6) H/F psoriasis, Dr. Lee.    RTC 6/2020

## 2020-09-03 DIAGNOSIS — M51.36 DISC DEGENERATION, LUMBAR: Primary | ICD-10-CM

## 2020-09-03 NOTE — TELEPHONE ENCOUNTER
----- Message from Fernanda Caban sent at 9/3/2020  2:29 PM CDT -----  Phone #: [phone]  Patient is requesting a refill of Tramadol 50 mg Dr Little      Pharmacy:   [facility] - [place], [place] - [address]  [place] [address]  Phone: [phone] Fax: [phone]

## 2020-09-04 RX ORDER — TRAMADOL HYDROCHLORIDE 50 MG/1
50 TABLET ORAL EVERY 6 HOURS PRN
Qty: 28 TABLET | Refills: 2 | Status: SHIPPED | OUTPATIENT
Start: 2020-09-04 | End: 2020-09-11

## 2020-09-14 RX ORDER — AMLODIPINE BESYLATE 5 MG/1
5 TABLET ORAL 2 TIMES DAILY
Qty: 180 TABLET | Refills: 3 | Status: SHIPPED | OUTPATIENT
Start: 2020-09-14 | End: 2020-09-24

## 2020-09-24 ENCOUNTER — OFFICE VISIT (OUTPATIENT)
Dept: PODIATRY | Facility: CLINIC | Age: 80
End: 2020-09-24
Payer: MEDICARE

## 2020-09-24 VITALS
SYSTOLIC BLOOD PRESSURE: 151 MMHG | TEMPERATURE: 98 F | HEIGHT: 63 IN | BODY MASS INDEX: 32.96 KG/M2 | WEIGHT: 186 LBS | HEART RATE: 94 BPM | DIASTOLIC BLOOD PRESSURE: 78 MMHG

## 2020-09-24 DIAGNOSIS — E11.49 TYPE II DIABETES MELLITUS WITH NEUROLOGICAL MANIFESTATIONS: ICD-10-CM

## 2020-09-24 DIAGNOSIS — L40.9 PSORIASIS: ICD-10-CM

## 2020-09-24 DIAGNOSIS — L60.0 INGROWN NAIL: Primary | ICD-10-CM

## 2020-09-24 PROCEDURE — 99999 PR PBB SHADOW E&M-EST. PATIENT-LVL IV: CPT | Mod: PBBFAC,,, | Performed by: PODIATRIST

## 2020-09-24 PROCEDURE — 99214 OFFICE O/P EST MOD 30 MIN: CPT | Mod: PBBFAC,PN | Performed by: PODIATRIST

## 2020-09-24 PROCEDURE — 99999 PR PBB SHADOW E&M-EST. PATIENT-LVL IV: ICD-10-PCS | Mod: PBBFAC,,, | Performed by: PODIATRIST

## 2020-09-24 PROCEDURE — 99213 OFFICE O/P EST LOW 20 MIN: CPT | Mod: S$PBB,,, | Performed by: PODIATRIST

## 2020-09-24 PROCEDURE — 99213 PR OFFICE/OUTPT VISIT, EST, LEVL III, 20-29 MIN: ICD-10-PCS | Mod: S$PBB,,, | Performed by: PODIATRIST

## 2020-09-24 RX ORDER — OLMESARTAN MEDOXOMIL 40 MG/1
TABLET ORAL
COMMUNITY
Start: 2020-08-29 | End: 2021-04-05

## 2020-09-24 RX ORDER — ANASTROZOLE 1 MG/1
TABLET ORAL
COMMUNITY
Start: 2020-08-27 | End: 2024-02-21 | Stop reason: SDUPTHER

## 2020-09-24 NOTE — LETTER
September 27, 2020      James Jung MD  [address] [place] [address]           [facility] - Podiatry/Wound Care  [address]  [place] [address]  Phone: [phone]  Fax: [phone]          Patient: Mirta Guerin   MR Number: [id number]   YOB: 1940   Date of Visit: 9/24/2020       Dear Dr. James Jung:    Thank you for referring Mirta Guerin to me for evaluation. Attached you will find relevant portions of my assessment and plan of care.    If you have questions, please do not hesitate to call me. I look forward to following Mirta Guerin along with you.    Sincerely,    Angelo Mendes, ROSALBA    Enclosure  CC:  No Recipients    If you would like to receive this communication electronically, please contact [email or web address] or [phone] to request more information on [employer] access.    For providers and/or their staff who would like to refer a patient to [facility], please contact us through our one-stop-shop provider referral line, [facility], at [phone].    If you feel you have received this communication in error or would no longer like to receive these types of communications, please e-mail [email or web address]

## 2020-09-27 NOTE — PROGRESS NOTES
Subjective:       Patient ID: Mirta Guerin is a 80 y.o. female.    Chief Complaint: Follow-up, Diabetes Mellitus, Nail Problem, and Heel Pain  Patient presents today for followup she is a history of chronically ingrown toenails with signs of infection especially on her left hallux.     Follow-up  Associated symptoms include arthralgias, joint swelling and a rash.   Nail Problem  Associated symptoms include arthralgias, joint swelling and a rash.   Ingrown Toenail  Associated symptoms include arthralgias, joint swelling and a rash.        Review of Systems   Musculoskeletal: Positive for arthralgias and joint swelling.   Skin: Positive for color change, rash and wound.   All other systems reviewed and are negative.      Objective:      Physical Exam  Constitutional:       Appearance: She is well-developed.   Cardiovascular:      Pulses:           Dorsalis pedis pulses are 1+ on the right side and 1+ on the left side.                Posterior tibial pulses are 1+ on the right side and 1+ on the left side.   Pulmonary:      Effort: Pulmonary effort is normal.   Musculoskeletal: Normal range of motion.         General: Deformity present.   Feet:      Right foot:      Protective Sensation: 4 sites tested. 1 site sensed.     Skin integrity: Dry skin present.      Left foot:      Protective Sensation: 4 sites tested. 1 site sensed.     Skin integrity: Dry skin present.   Skin:     General: Skin is warm.      Capillary Refill: Capillary refill takes more than 3 seconds.   Neurological:      Deep Tendon Reflexes: Reflexes abnormal.   Psychiatric:         Behavior: Behavior normal.         Thought Content: Thought content normal.         Judgment: Judgment normal.       Patient is noted to have positive erythema positive edema at the medial lateral aspect of the patient's left hallux upon debridement of the lateral border left hallux there is purulent drainage noted as well as obvious signs of infection and pain to  palpation. Patient is noted to have a well-defined area of hyperkeratotic tissue on the dorsal lateral aspect of the fifth digit left there is positive pain noted upon palpation of this area no sign of infection is noted. patient's previously noted psoriasis is more well-controlled on today's visit that ever seen it since it started seeing the patient there are mild erythematous plaques or areas of skin breakdown especially on the heel.        Assessment:       1. Ingrown nail    2. Psoriasis    3. Type II diabetes mellitus with neurological manifestations        Plan:        Following examination I aggressively debrided both the medial and lateral border of the patient's left hallux I did advise the patient is obvious signs of infection with purulent drainage noted at the lateral border left hallux. Sterile saline was used to flush and irrigated the lateral border left hallux bacitracin ointment and a well-padded dry dressing applied patient advised to keep antibiotic ointment on the area for the next 3 days if this does not completely resolve if it continues to be red and painful we need to see her back for followup sooner than the regularly scheduled 12 week appointment. I debrided the hyperkeratotic tissue from the fifth digit left patient stated that this felt much better I advised her this is likely due to a shoe rubbing and irritation of this area recommended followup 12 weeks. .Patient states her psoriasis is doing as good as it's been in many years and is very well controlled especially on her feet.  Patient relates she has been under lot of stress with family issues of this definitely aggravates her psoriasis.Complete diabetic evaluation was performed today. Pt developed an area of psoriatic breakdown on her left heel this needs to be monitored closely. Calmoseptine ointment disp.   Patient states this ointment has been wonderful and see only thing that has really helped the psoriasis on her heels.   Patient indicated today that the gabapentin that I put her on has changed her life.  This note was created using [employer] voice recognition software that occasionally misinterpreted phrases or words.

## 2020-09-29 DIAGNOSIS — I25.10 CORONARY ARTERY DISEASE, ANGINA PRESENCE UNSPECIFIED, UNSPECIFIED VESSEL OR LESION TYPE, UNSPECIFIED WHETHER NATIVE OR TRANSPLANTED HEART: Primary | ICD-10-CM

## 2020-10-02 DIAGNOSIS — M51.36 DISC DEGENERATION, LUMBAR: Primary | ICD-10-CM

## 2020-10-02 NOTE — TELEPHONE ENCOUNTER
----- Message from Mitzi Batres sent at 10/2/2020  1:07 PM CDT -----  Phone #: [phone]  Patient is requesting a refill of Tramodol      Pharmacy:   [facility] [place] - [id number]  [id number] [place] [address]  Phone: [phone] Fax: [phone]    [facility] - [address]  [address] [place] [address]  Phone: [phone] Fax: [phone]

## 2020-10-05 RX ORDER — TRAMADOL HYDROCHLORIDE 50 MG/1
50 TABLET ORAL EVERY 6 HOURS PRN
Qty: 28 TABLET | Refills: 1 | Status: SHIPPED | OUTPATIENT
Start: 2020-10-05 | End: 2020-10-12

## 2020-10-22 DIAGNOSIS — I25.10 CORONARY ARTERY DISEASE, ANGINA PRESENCE UNSPECIFIED, UNSPECIFIED VESSEL OR LESION TYPE, UNSPECIFIED WHETHER NATIVE OR TRANSPLANTED HEART: Primary | ICD-10-CM

## 2020-11-16 ENCOUNTER — OFFICE VISIT (OUTPATIENT)
Dept: CARDIOLOGY | Facility: CLINIC | Age: 80
End: 2020-11-16
Payer: MEDICARE

## 2020-11-16 VITALS
WEIGHT: 185 LBS | BODY MASS INDEX: 32.78 KG/M2 | SYSTOLIC BLOOD PRESSURE: 128 MMHG | HEIGHT: 63 IN | DIASTOLIC BLOOD PRESSURE: 86 MMHG | OXYGEN SATURATION: 95 % | HEART RATE: 68 BPM

## 2020-11-16 DIAGNOSIS — E78.5 DYSLIPIDEMIA: Primary | ICD-10-CM

## 2020-11-16 DIAGNOSIS — I25.10 CORONARY ARTERY DISEASE WITHOUT ANGINA PECTORIS, UNSPECIFIED VESSEL OR LESION TYPE, UNSPECIFIED WHETHER NATIVE OR TRANSPLANTED HEART: ICD-10-CM

## 2020-11-16 DIAGNOSIS — Z95.0 CARDIAC PACEMAKER IN SITU: ICD-10-CM

## 2020-11-16 DIAGNOSIS — G47.33 OBSTRUCTIVE SLEEP APNEA SYNDROME: ICD-10-CM

## 2020-11-16 DIAGNOSIS — I10 ESSENTIAL HYPERTENSION: ICD-10-CM

## 2020-11-16 DIAGNOSIS — Z72.0 TOBACCO ABUSE: ICD-10-CM

## 2020-11-16 PROCEDURE — 99214 PR OFFICE/OUTPT VISIT, EST, LEVL IV, 30-39 MIN: ICD-10-PCS | Mod: S$GLB,,, | Performed by: INTERNAL MEDICINE

## 2020-11-16 PROCEDURE — 99214 OFFICE O/P EST MOD 30 MIN: CPT | Mod: S$GLB,,, | Performed by: INTERNAL MEDICINE

## 2020-11-16 RX ORDER — LORATADINE 10 MG/1
10 TABLET ORAL DAILY
COMMUNITY

## 2020-11-16 RX ORDER — VARENICLINE TARTRATE 0.5 (11)-1
KIT ORAL
Qty: 1 PACKAGE | Refills: 0 | Status: SHIPPED | OUTPATIENT
Start: 2020-11-16 | End: 2021-05-24

## 2020-11-16 NOTE — PROGRESS NOTES
Subjective:    Patient ID:  Mirta Guerin is a 80 y.o. female patient here for evaluation Abnormal ECG, COPD, Hypertension, Hyperlipidemia, Carotid Artery Disease, Sleep Apnea, Diabetes, and Cardiac Valve Problem      History of Present Illness:       Ms. Kirby is here today for her follow up visit. She denies chest pain. She is trying to stop smoking. She denies lightheaded or dizziness. No recent fever or chills. She is wanting to try Chantix.       Review of patient's allergies indicates:   Allergen Reactions    Benazepril Other (See Comments)     sleepy    Codeine Nausea And Vomiting    Polysporin [bacitracin-polymyxin b] Rash       Past Medical History:   Diagnosis Date    Anxiety     AV block     Breast cancer     Cardiac pacemaker in situ     Coronary artery disease     Diabetes mellitus     GERD (gastroesophageal reflux disease)     HLD (hyperlipidemia)     Hypertension     Lung disease     Mitral valve disorder     NEGRITA (obstructive sleep apnea)     Prediabetes     Psoriasis     Spinal stenosis     Vitamin D deficiency      Past Surgical History:   Procedure Laterality Date    BREAST LUMPECTOMY      CARDIAC PACEMAKER PLACEMENT  04/05/2012    CORONARY ARTERY BYPASS GRAFT  07/2017    MASTECTOMY, PARTIAL      SKIN CANCER EXCISION      UPPER GASTROINTESTINAL ENDOSCOPY       Social History     Tobacco Use    Smoking status: Current Every Day Smoker     Packs/day: 0.25     Years: 50.00     Pack years: 12.50     Types: Cigarettes     Start date: 1954    Smokeless tobacco: Never Used   Substance Use Topics    Alcohol use: Yes     Comment: occasional    Drug use: No        Review of Systems:    As noted in HPI in addition      REVIEW OF SYSTEMS  CARDIOVASCULAR: No recent chest pain, palpitations, arm, neck, or jaw pain  RESPIRATORY: No recent fever, cough chills, SOB or congestion  : No blood in the urine  GI: No Nausea, vomiting, constipation, diarrhea, blood, or  reflux.  MUSCULOSKELETAL: No myalgias  NEURO: No lightheadedness or dizziness  EYES: No Double vision, blurry, vision or headache              Objective:        Vitals:    11/16/20 1327   BP: 128/86   Pulse: 68       LIPIDS - LAST 2   No results found for: CHOL, HDL, LDLCALC, TRIG, CHOLHDL    CBC - LAST 2  No results found for: WBC, RBC, HGB, HCT, MCV, MCH, MCHC, RDW, PLT, MPV, GRAN, LYMPH, MONO, BASO, NRBC    CHEMISTRY & LIVER FUNCTION - LAST 2  No results found for: NA, K, CL, CO2, ANIONGAP, BUN, CREATININE, GLU, CALCIUM, PH, MG, ALBUMIN, PROT, ALKPHOS, ALT, AST, BILITOT     CARDIAC PROFILE - LAST 2  No results found for: BNP, CPK, CPKMB, LDH, TROPONINI     COAGULATION - LAST 2  No results found for: LABPT, INR, APTT    ENDOCRINE & PSA - LAST 2  No results found for: HGBA1C, MICROALBUR, TSH, PROCAL, PSA     ECHOCARDIOGRAM RESULTS  No results found for this or any previous visit.    CURRENT/PREVIOUS VISIT EKG    PHYSICAL EXAM  CONSTITUTIONAL: Well built, well nourished in no apparent distress  NECK: no carotid bruit, no JVD  LUNGS: CTA  CHEST WALL: no tenderness  HEART: regular rate and rhythm, S1, S2 normal, no murmur, click, rub or gallop   ABDOMEN: soft, non-tender; bowel sounds normal; no masses,  no organomegaly  EXTREMITIES: Extremities normal, no edema, no calf tenderness noted  NEURO: AAO X 3    I HAVE REVIEWED :    The vital signs, nurses notes, and all the pertinent radiology and labs.         Current Outpatient Medications   Medication Instructions    amLODIPine (NORVASC) 5 MG tablet TAKE 1 TABLET BY MOUTH TWO  TIMES DAILY    anastrozole (ARIMIDEX) 1 mg Tab No dose, route, or frequency recorded.    aspirin (ECOTRIN) 81 mg, Oral, Daily    cloNIDine (CATAPRES) 0.1 MG tablet TAKE 1 TABLET BY MOUTH EVERY DAY AS NEEDED FOR SYSTOLIC PRESSURE GREATER THAN 170    co-enzyme Q-10 100 mg, Oral, Daily    docusate sodium (COLACE) 100 mg, Oral, 2 times daily    DULoxetine (CYMBALTA) 20 MG capsule TAKE 1 CAPSULE  BY MOUTH  ONCE DAILY    fluticasone propionate (FLONASE) 50 mcg/actuation nasal spray No dose, route, or frequency recorded.    gabapentin (NEURONTIN) 300 MG capsule TAKE 1 CAPSULE BY MOUTH  EVERY EVENING    hydrocortisone 2.5 % cream Topical (Top), 2 times daily    loratadine (CLARITIN) 10 mg, Oral, Daily    losartan (COZAAR) 100 MG tablet No dose, route, or frequency recorded.    metFORMIN (GLUCOPHAGE) 500 mg, Oral, 2 times daily with meals    metoprolol succinate (TOPROL-XL) 50 MG 24 hr tablet No dose, route, or frequency recorded.    metoprolol tartrate (LOPRESSOR) 50 MG tablet 2 times daily    MULTIVIT-MIN/FA/LUTEIN/ZEAXANT (MACULAR VITAMIN ORAL) Oral    olmesartan (BENICAR) 40 MG tablet No dose, route, or frequency recorded.    pantoprazole (PROTONIX) 40 MG tablet No dose, route, or frequency recorded.    rosuvastatin (CRESTOR) 10 MG tablet No dose, route, or frequency recorded.    traMADoL (ULTRAM) 50 mg tablet TAKE ONE (1) TABLET BY MOUTH EVERY SIX (6) HOURS AS NEEDED FOR PAIN     vitamin D (VITAMIN D3) 1,000 Units, Oral, Daily          Assessment:     1.  Coronary artery disease status post bypass surgery  2.  Pacemaker in situ  3.  Essential hypertension  4.  COPD  5.  Obstructive sleep apnea  6.  Dyslipidemia  7. Tobacco abuse trying to stop          Plan:     RECOMMENDATIONS plan at this time is to continue on current regimen keep himself adequately hydrated maintain on the same medical regimen 0 this is to include amlodipine 5 mg, aspirin 81, Crestor 10, metoprolol succinate 51 g daily along with vitamin supplements and all Benicar take 40 mg once daily.  Continue on other medications same doses and I will see her back in follow-up in 4 months time unless new symptoms arise in the interim   Will give her chantix to see if this will help her quit smoking.         No follow-ups on file.

## 2021-01-07 ENCOUNTER — OFFICE VISIT (OUTPATIENT)
Dept: PODIATRY | Facility: CLINIC | Age: 81
End: 2021-01-07
Payer: MEDICARE

## 2021-01-07 VITALS
OXYGEN SATURATION: 96 % | BODY MASS INDEX: 32.78 KG/M2 | DIASTOLIC BLOOD PRESSURE: 72 MMHG | SYSTOLIC BLOOD PRESSURE: 127 MMHG | TEMPERATURE: 98 F | HEART RATE: 75 BPM | WEIGHT: 185 LBS | HEIGHT: 63 IN | RESPIRATION RATE: 14 BRPM

## 2021-01-07 DIAGNOSIS — L60.0 INGROWN NAIL: ICD-10-CM

## 2021-01-07 DIAGNOSIS — L40.9 PSORIASIS: ICD-10-CM

## 2021-01-07 DIAGNOSIS — E11.49 TYPE II DIABETES MELLITUS WITH NEUROLOGICAL MANIFESTATIONS: Primary | ICD-10-CM

## 2021-01-07 PROCEDURE — 99999 PR PBB SHADOW E&M-EST. PATIENT-LVL V: ICD-10-PCS | Mod: PBBFAC,,, | Performed by: PODIATRIST

## 2021-01-07 PROCEDURE — 99999 PR PBB SHADOW E&M-EST. PATIENT-LVL V: CPT | Mod: PBBFAC,,, | Performed by: PODIATRIST

## 2021-01-07 PROCEDURE — 99213 OFFICE O/P EST LOW 20 MIN: CPT | Mod: S$PBB,,, | Performed by: PODIATRIST

## 2021-01-07 PROCEDURE — 99215 OFFICE O/P EST HI 40 MIN: CPT | Mod: PBBFAC,PN | Performed by: PODIATRIST

## 2021-01-07 PROCEDURE — 99213 PR OFFICE/OUTPT VISIT, EST, LEVL III, 20-29 MIN: ICD-10-PCS | Mod: S$PBB,,, | Performed by: PODIATRIST

## 2021-01-13 ENCOUNTER — IMMUNIZATION (OUTPATIENT)
Dept: FAMILY MEDICINE | Facility: CLINIC | Age: 81
End: 2021-01-13
Payer: MEDICARE

## 2021-01-13 DIAGNOSIS — Z23 NEED FOR VACCINATION: ICD-10-CM

## 2021-01-13 PROCEDURE — 0011A COVID-19, MRNA, LNP-S, PF, 100 MCG/0.5 ML DOSE VACCINE: CPT | Mod: ,,, | Performed by: FAMILY MEDICINE

## 2021-01-13 PROCEDURE — 91301 COVID-19, MRNA, LNP-S, PF, 100 MCG/0.5 ML DOSE VACCINE: ICD-10-PCS | Mod: ,,, | Performed by: FAMILY MEDICINE

## 2021-01-13 PROCEDURE — 0011A COVID-19, MRNA, LNP-S, PF, 100 MCG/0.5 ML DOSE VACCINE: ICD-10-PCS | Mod: ,,, | Performed by: FAMILY MEDICINE

## 2021-01-13 PROCEDURE — 91301 COVID-19, MRNA, LNP-S, PF, 100 MCG/0.5 ML DOSE VACCINE: CPT | Mod: ,,, | Performed by: FAMILY MEDICINE

## 2021-02-11 ENCOUNTER — IMMUNIZATION (OUTPATIENT)
Dept: FAMILY MEDICINE | Facility: CLINIC | Age: 81
End: 2021-02-11
Payer: MEDICARE

## 2021-02-11 DIAGNOSIS — Z23 NEED FOR VACCINATION: Primary | ICD-10-CM

## 2021-02-11 PROCEDURE — 0012A COVID-19, MRNA, LNP-S, PF, 100 MCG/0.5 ML DOSE VACCINE: ICD-10-PCS | Mod: CV19,S$GLB,, | Performed by: FAMILY MEDICINE

## 2021-02-11 PROCEDURE — 91301 COVID-19, MRNA, LNP-S, PF, 100 MCG/0.5 ML DOSE VACCINE: ICD-10-PCS | Mod: S$GLB,,, | Performed by: FAMILY MEDICINE

## 2021-02-11 PROCEDURE — 91301 COVID-19, MRNA, LNP-S, PF, 100 MCG/0.5 ML DOSE VACCINE: CPT | Mod: S$GLB,,, | Performed by: FAMILY MEDICINE

## 2021-02-11 PROCEDURE — 0012A COVID-19, MRNA, LNP-S, PF, 100 MCG/0.5 ML DOSE VACCINE: CPT | Mod: CV19,S$GLB,, | Performed by: FAMILY MEDICINE

## 2021-03-19 ENCOUNTER — TELEPHONE (OUTPATIENT)
Dept: ORTHOPEDICS | Facility: CLINIC | Age: 81
End: 2021-03-19

## 2021-04-05 ENCOUNTER — TELEPHONE (OUTPATIENT)
Dept: ORTHOPEDICS | Facility: CLINIC | Age: 81
End: 2021-04-05

## 2021-04-05 RX ORDER — OLMESARTAN MEDOXOMIL 40 MG/1
TABLET ORAL
Qty: 90 TABLET | Refills: 3 | Status: SHIPPED | OUTPATIENT
Start: 2021-04-05 | End: 2022-02-15

## 2021-04-16 ENCOUNTER — OFFICE VISIT (OUTPATIENT)
Dept: ORTHOPEDICS | Facility: CLINIC | Age: 81
End: 2021-04-16
Payer: MEDICARE

## 2021-04-16 VITALS
DIASTOLIC BLOOD PRESSURE: 74 MMHG | WEIGHT: 175 LBS | HEIGHT: 63 IN | HEART RATE: 96 BPM | SYSTOLIC BLOOD PRESSURE: 136 MMHG | BODY MASS INDEX: 31.01 KG/M2

## 2021-04-16 DIAGNOSIS — M43.16 SPONDYLOLISTHESIS OF LUMBAR REGION: ICD-10-CM

## 2021-04-16 DIAGNOSIS — M51.36 DISC DEGENERATION, LUMBAR: Primary | ICD-10-CM

## 2021-04-16 DIAGNOSIS — M47.816 LUMBAR FACET ARTHROPATHY: ICD-10-CM

## 2021-04-16 PROCEDURE — 99213 PR OFFICE/OUTPT VISIT, EST, LEVL III, 20-29 MIN: ICD-10-PCS | Mod: S$GLB,,, | Performed by: PHYSICIAN ASSISTANT

## 2021-04-16 PROCEDURE — 99213 OFFICE O/P EST LOW 20 MIN: CPT | Mod: S$GLB,,, | Performed by: PHYSICIAN ASSISTANT

## 2021-04-16 RX ORDER — TRAMADOL HYDROCHLORIDE 50 MG/1
50 TABLET ORAL EVERY 6 HOURS PRN
Qty: 28 EACH | Refills: 3 | Status: SHIPPED | OUTPATIENT
Start: 2021-04-16 | End: 2021-07-06

## 2021-04-29 ENCOUNTER — OFFICE VISIT (OUTPATIENT)
Dept: PODIATRY | Facility: CLINIC | Age: 81
End: 2021-04-29
Payer: MEDICARE

## 2021-04-29 VITALS
HEART RATE: 82 BPM | TEMPERATURE: 98 F | WEIGHT: 180 LBS | HEIGHT: 63 IN | SYSTOLIC BLOOD PRESSURE: 144 MMHG | DIASTOLIC BLOOD PRESSURE: 77 MMHG | BODY MASS INDEX: 31.89 KG/M2

## 2021-04-29 DIAGNOSIS — L60.0 INGROWN NAIL: ICD-10-CM

## 2021-04-29 DIAGNOSIS — E11.49 TYPE II DIABETES MELLITUS WITH NEUROLOGICAL MANIFESTATIONS: Primary | ICD-10-CM

## 2021-04-29 DIAGNOSIS — L40.9 PSORIASIS: ICD-10-CM

## 2021-04-29 PROCEDURE — 99213 OFFICE O/P EST LOW 20 MIN: CPT | Mod: PBBFAC,PN | Performed by: PODIATRIST

## 2021-04-29 PROCEDURE — 99213 OFFICE O/P EST LOW 20 MIN: CPT | Mod: S$PBB,,, | Performed by: PODIATRIST

## 2021-04-29 PROCEDURE — 99213 PR OFFICE/OUTPT VISIT, EST, LEVL III, 20-29 MIN: ICD-10-PCS | Mod: S$PBB,,, | Performed by: PODIATRIST

## 2021-04-29 PROCEDURE — 99999 PR PBB SHADOW E&M-EST. PATIENT-LVL III: ICD-10-PCS | Mod: PBBFAC,,, | Performed by: PODIATRIST

## 2021-04-29 PROCEDURE — 99999 PR PBB SHADOW E&M-EST. PATIENT-LVL III: CPT | Mod: PBBFAC,,, | Performed by: PODIATRIST

## 2021-04-29 RX ORDER — AMOXICILLIN AND CLAVULANATE POTASSIUM 875; 125 MG/1; MG/1
1 TABLET, FILM COATED ORAL 2 TIMES DAILY
COMMUNITY
Start: 2021-04-26 | End: 2021-05-24

## 2021-04-29 RX ORDER — MUPIROCIN 20 MG/G
OINTMENT TOPICAL 3 TIMES DAILY
COMMUNITY
Start: 2021-04-21 | End: 2021-05-24

## 2021-05-18 ENCOUNTER — HOSPITAL ENCOUNTER (OUTPATIENT)
Dept: CARDIOLOGY | Facility: CLINIC | Age: 81
Discharge: HOME | End: 2021-05-18
Attending: INTERNAL MEDICINE
Payer: MEDICARE

## 2021-05-18 DIAGNOSIS — I49.5 SSS (SICK SINUS SYNDROME): Primary | ICD-10-CM

## 2021-05-18 DIAGNOSIS — I25.10 CORONARY ARTERY DISEASE, ANGINA PRESENCE UNSPECIFIED, UNSPECIFIED VESSEL OR LESION TYPE, UNSPECIFIED WHETHER NATIVE OR TRANSPLANTED HEART: ICD-10-CM

## 2021-05-18 PROCEDURE — 93280 PM DEVICE PROGR EVAL DUAL: CPT | Mod: S$GLB,,, | Performed by: INTERNAL MEDICINE

## 2021-05-18 PROCEDURE — 93280 CARDIAC DEVICE CHECK - IN CLINIC & HOSPITAL: ICD-10-PCS | Mod: S$GLB,,, | Performed by: INTERNAL MEDICINE

## 2021-05-24 ENCOUNTER — OFFICE VISIT (OUTPATIENT)
Dept: CARDIOLOGY | Facility: CLINIC | Age: 81
End: 2021-05-24
Payer: MEDICARE

## 2021-05-24 VITALS
HEART RATE: 80 BPM | WEIGHT: 180 LBS | OXYGEN SATURATION: 94 % | BODY MASS INDEX: 31.89 KG/M2 | RESPIRATION RATE: 16 BRPM | DIASTOLIC BLOOD PRESSURE: 74 MMHG | SYSTOLIC BLOOD PRESSURE: 122 MMHG | HEIGHT: 63 IN

## 2021-05-24 DIAGNOSIS — I10 ESSENTIAL HYPERTENSION: Primary | ICD-10-CM

## 2021-05-24 DIAGNOSIS — Z95.1 HX OF CABG: ICD-10-CM

## 2021-05-24 DIAGNOSIS — E11.49 TYPE II DIABETES MELLITUS WITH NEUROLOGICAL MANIFESTATIONS: ICD-10-CM

## 2021-05-24 DIAGNOSIS — I25.810 CORONARY ARTERY DISEASE INVOLVING AUTOLOGOUS VEIN CORONARY BYPASS GRAFT WITHOUT ANGINA PECTORIS: ICD-10-CM

## 2021-05-24 DIAGNOSIS — E78.5 DYSLIPIDEMIA: ICD-10-CM

## 2021-05-24 PROCEDURE — 99214 OFFICE O/P EST MOD 30 MIN: CPT | Mod: S$GLB,,, | Performed by: INTERNAL MEDICINE

## 2021-05-24 PROCEDURE — 99214 PR OFFICE/OUTPT VISIT, EST, LEVL IV, 30-39 MIN: ICD-10-PCS | Mod: S$GLB,,, | Performed by: INTERNAL MEDICINE

## 2021-06-02 LAB
BATTERY VOLTAGE (V): 2.75 V
IMPEDANCE RA LEAD (NATIVE): 428 OHMS
IMPEDANCE RA LEAD: 709 OHMS
P/R-WAVE RA LEAD (NATIVE): NORMAL MV
P/R-WAVE RA LEAD: NORMAL MV
THRESHOLD MS RA LEAD (NATIVE): 0.4 MS
THRESHOLD MS RA LEAD: 0.4 MS
THRESHOLD V RA LEAD (NATIVE): 0.62 V
THRESHOLD V RA LEAD: 1 V

## 2021-06-29 ENCOUNTER — TELEPHONE (OUTPATIENT)
Dept: ORTHOPEDICS | Facility: CLINIC | Age: 81
End: 2021-06-29

## 2021-06-29 DIAGNOSIS — M47.816 LUMBAR FACET ARTHROPATHY: ICD-10-CM

## 2021-06-29 DIAGNOSIS — M43.16 SPONDYLOLISTHESIS OF LUMBAR REGION: ICD-10-CM

## 2021-06-29 DIAGNOSIS — M51.36 DISC DEGENERATION, LUMBAR: Primary | ICD-10-CM

## 2021-07-29 ENCOUNTER — OFFICE VISIT (OUTPATIENT)
Dept: PODIATRY | Facility: CLINIC | Age: 81
End: 2021-07-29
Payer: MEDICARE

## 2021-07-29 VITALS
WEIGHT: 180 LBS | SYSTOLIC BLOOD PRESSURE: 149 MMHG | HEART RATE: 93 BPM | BODY MASS INDEX: 31.89 KG/M2 | DIASTOLIC BLOOD PRESSURE: 81 MMHG | TEMPERATURE: 98 F | HEIGHT: 63 IN

## 2021-07-29 DIAGNOSIS — L40.9 PSORIASIS: ICD-10-CM

## 2021-07-29 DIAGNOSIS — L60.0 INGROWN NAIL: ICD-10-CM

## 2021-07-29 DIAGNOSIS — E11.9 COMPREHENSIVE DIABETIC FOOT EXAMINATION, TYPE 2 DM, ENCOUNTER FOR: ICD-10-CM

## 2021-07-29 DIAGNOSIS — E11.49 TYPE II DIABETES MELLITUS WITH NEUROLOGICAL MANIFESTATIONS: Primary | ICD-10-CM

## 2021-07-29 PROCEDURE — 99214 OFFICE O/P EST MOD 30 MIN: CPT | Mod: PBBFAC,PN | Performed by: PODIATRIST

## 2021-07-29 PROCEDURE — 99999 PR PBB SHADOW E&M-EST. PATIENT-LVL IV: ICD-10-PCS | Mod: PBBFAC,,, | Performed by: PODIATRIST

## 2021-07-29 PROCEDURE — 99213 OFFICE O/P EST LOW 20 MIN: CPT | Mod: S$PBB,,, | Performed by: PODIATRIST

## 2021-07-29 PROCEDURE — 99999 PR PBB SHADOW E&M-EST. PATIENT-LVL IV: CPT | Mod: PBBFAC,,, | Performed by: PODIATRIST

## 2021-07-29 PROCEDURE — 99213 PR OFFICE/OUTPT VISIT, EST, LEVL III, 20-29 MIN: ICD-10-PCS | Mod: S$PBB,,, | Performed by: PODIATRIST

## 2021-07-29 RX ORDER — DOXYCYCLINE 100 MG/1
1 TABLET ORAL 2 TIMES DAILY
COMMUNITY
Start: 2021-07-22 | End: 2021-12-01

## 2021-07-30 PROBLEM — E11.9 COMPREHENSIVE DIABETIC FOOT EXAMINATION, TYPE 2 DM, ENCOUNTER FOR: Status: ACTIVE | Noted: 2021-07-30

## 2021-08-12 ENCOUNTER — TELEPHONE (OUTPATIENT)
Dept: HEMATOLOGY/ONCOLOGY | Facility: CLINIC | Age: 81
End: 2021-08-12

## 2021-08-16 ENCOUNTER — OFFICE VISIT (OUTPATIENT)
Dept: PULMONOLOGY | Facility: CLINIC | Age: 81
End: 2021-08-16
Payer: MEDICARE

## 2021-08-16 VITALS
WEIGHT: 176 LBS | OXYGEN SATURATION: 95 % | DIASTOLIC BLOOD PRESSURE: 83 MMHG | SYSTOLIC BLOOD PRESSURE: 175 MMHG | HEART RATE: 87 BPM | HEIGHT: 63 IN | BODY MASS INDEX: 31.18 KG/M2

## 2021-08-16 DIAGNOSIS — G47.33 OSA (OBSTRUCTIVE SLEEP APNEA): Primary | ICD-10-CM

## 2021-08-16 PROCEDURE — 99213 OFFICE O/P EST LOW 20 MIN: CPT | Mod: S$GLB,,, | Performed by: NURSE PRACTITIONER

## 2021-08-16 PROCEDURE — 99213 PR OFFICE/OUTPT VISIT, EST, LEVL III, 20-29 MIN: ICD-10-PCS | Mod: S$GLB,,, | Performed by: NURSE PRACTITIONER

## 2021-09-21 ENCOUNTER — TELEPHONE (OUTPATIENT)
Dept: CARDIOLOGY | Facility: CLINIC | Age: 81
End: 2021-09-21

## 2021-10-04 ENCOUNTER — TELEPHONE (OUTPATIENT)
Dept: CARDIOLOGY | Facility: CLINIC | Age: 81
End: 2021-10-04

## 2021-11-03 ENCOUNTER — TELEPHONE (OUTPATIENT)
Dept: HEMATOLOGY/ONCOLOGY | Facility: CLINIC | Age: 81
End: 2021-11-03

## 2021-11-03 ENCOUNTER — OFFICE VISIT (OUTPATIENT)
Dept: HEMATOLOGY/ONCOLOGY | Facility: CLINIC | Age: 81
End: 2021-11-03
Payer: MEDICARE

## 2021-11-03 VITALS
SYSTOLIC BLOOD PRESSURE: 136 MMHG | HEART RATE: 80 BPM | BODY MASS INDEX: 29.76 KG/M2 | DIASTOLIC BLOOD PRESSURE: 79 MMHG | WEIGHT: 168 LBS

## 2021-11-03 DIAGNOSIS — C50.411 MALIGNANT NEOPLASM OF UPPER-OUTER QUADRANT OF RIGHT BREAST IN FEMALE, ESTROGEN RECEPTOR POSITIVE: Primary | ICD-10-CM

## 2021-11-03 DIAGNOSIS — R63.0 ANOREXIA: ICD-10-CM

## 2021-11-03 DIAGNOSIS — Z17.0 MALIGNANT NEOPLASM OF UPPER-OUTER QUADRANT OF RIGHT BREAST IN FEMALE, ESTROGEN RECEPTOR POSITIVE: Primary | ICD-10-CM

## 2021-11-03 PROCEDURE — 99214 OFFICE O/P EST MOD 30 MIN: CPT | Mod: S$GLB,,, | Performed by: INTERNAL MEDICINE

## 2021-11-03 PROCEDURE — 99214 PR OFFICE/OUTPT VISIT, EST, LEVL IV, 30-39 MIN: ICD-10-PCS | Mod: S$GLB,,, | Performed by: INTERNAL MEDICINE

## 2021-11-04 ENCOUNTER — OFFICE VISIT (OUTPATIENT)
Dept: PODIATRY | Facility: CLINIC | Age: 81
End: 2021-11-04
Payer: MEDICARE

## 2021-11-04 VITALS
BODY MASS INDEX: 29.77 KG/M2 | HEART RATE: 88 BPM | DIASTOLIC BLOOD PRESSURE: 75 MMHG | HEIGHT: 63 IN | WEIGHT: 168 LBS | SYSTOLIC BLOOD PRESSURE: 139 MMHG | TEMPERATURE: 98 F

## 2021-11-04 DIAGNOSIS — E11.49 TYPE II DIABETES MELLITUS WITH NEUROLOGICAL MANIFESTATIONS: ICD-10-CM

## 2021-11-04 DIAGNOSIS — D36.10 NEUROMA: ICD-10-CM

## 2021-11-04 DIAGNOSIS — L60.0 INGROWN NAIL: ICD-10-CM

## 2021-11-04 DIAGNOSIS — L40.9 PSORIASIS: ICD-10-CM

## 2021-11-04 DIAGNOSIS — E11.9 COMPREHENSIVE DIABETIC FOOT EXAMINATION, TYPE 2 DM, ENCOUNTER FOR: Primary | ICD-10-CM

## 2021-11-04 PROCEDURE — 99214 OFFICE O/P EST MOD 30 MIN: CPT | Mod: PBBFAC,PN | Performed by: PODIATRIST

## 2021-11-04 PROCEDURE — 99999 PR PBB SHADOW E&M-EST. PATIENT-LVL IV: CPT | Mod: PBBFAC,,, | Performed by: PODIATRIST

## 2021-11-04 PROCEDURE — 99999 PR PBB SHADOW E&M-EST. PATIENT-LVL IV: ICD-10-PCS | Mod: PBBFAC,,, | Performed by: PODIATRIST

## 2021-11-04 PROCEDURE — 99213 PR OFFICE/OUTPT VISIT, EST, LEVL III, 20-29 MIN: ICD-10-PCS | Mod: S$PBB,,, | Performed by: PODIATRIST

## 2021-11-04 PROCEDURE — 99213 OFFICE O/P EST LOW 20 MIN: CPT | Mod: S$PBB,,, | Performed by: PODIATRIST

## 2021-11-17 ENCOUNTER — HOSPITAL ENCOUNTER (OUTPATIENT)
Dept: RADIOLOGY | Facility: CLINIC | Age: 81
Discharge: HOME | End: 2021-11-17
Attending: INTERNAL MEDICINE
Payer: MEDICARE

## 2021-11-17 ENCOUNTER — HOSPITAL ENCOUNTER (OUTPATIENT)
Dept: RADIOLOGY | Facility: CLINIC | Age: 81
Discharge: HOME | End: 2021-11-17
Attending: INTERNAL MEDICINE

## 2021-11-17 ENCOUNTER — HOSPITAL ENCOUNTER (OUTPATIENT)
Dept: CARDIOLOGY | Facility: CLINIC | Age: 81
Discharge: HOME | End: 2021-11-17
Attending: INTERNAL MEDICINE
Payer: MEDICARE

## 2021-11-17 DIAGNOSIS — I25.810 CORONARY ARTERY DISEASE INVOLVING AUTOLOGOUS VEIN CORONARY BYPASS GRAFT WITHOUT ANGINA PECTORIS: ICD-10-CM

## 2021-11-17 DIAGNOSIS — E78.5 DYSLIPIDEMIA: ICD-10-CM

## 2021-11-17 DIAGNOSIS — I10 ESSENTIAL HYPERTENSION: ICD-10-CM

## 2021-11-17 DIAGNOSIS — E11.49 TYPE II DIABETES MELLITUS WITH NEUROLOGICAL MANIFESTATIONS: ICD-10-CM

## 2021-11-17 PROCEDURE — A9502 TC99M TETROFOSMIN: HCPCS | Mod: S$GLB,,, | Performed by: INTERNAL MEDICINE

## 2021-11-17 PROCEDURE — 78452 STRESS TEST WITH MYOCARDIAL PERFUSION (CUPID ONLY): ICD-10-PCS | Mod: S$GLB,,, | Performed by: INTERNAL MEDICINE

## 2021-11-17 PROCEDURE — 93015 STRESS TEST WITH MYOCARDIAL PERFUSION (CUPID ONLY): ICD-10-PCS | Mod: S$GLB,,, | Performed by: INTERNAL MEDICINE

## 2021-11-17 PROCEDURE — 78452 HT MUSCLE IMAGE SPECT MULT: CPT | Mod: S$GLB,,, | Performed by: INTERNAL MEDICINE

## 2021-11-17 PROCEDURE — 93015 CV STRESS TEST SUPVJ I&R: CPT | Mod: S$GLB,,, | Performed by: INTERNAL MEDICINE

## 2021-11-17 PROCEDURE — A9502 STRESS TEST WITH MYOCARDIAL PERFUSION (CUPID ONLY): ICD-10-PCS | Mod: S$GLB,,, | Performed by: INTERNAL MEDICINE

## 2021-11-17 RX ORDER — REGADENOSON 0.08 MG/ML
0.4 INJECTION, SOLUTION INTRAVENOUS ONCE
Status: COMPLETED | OUTPATIENT
Start: 2021-11-17 | End: 2021-11-17

## 2021-11-17 RX ADMIN — REGADENOSON 0.4 MG: 0.08 INJECTION, SOLUTION INTRAVENOUS at 08:11

## 2021-11-18 LAB
CV PHARM DOSE: 0.4 MG
CV STRESS BASE HR: 66 BPM
DIASTOLIC BLOOD PRESSURE: 86 MMHG
EJECTION FRACTION- HIGH: 65 %
END DIASTOLIC INDEX-HIGH: 158 ML/M2
END DIASTOLIC INDEX-LOW: 94 ML/M2
END SYSTOLIC INDEX-HIGH: 71 ML/M2
END SYSTOLIC INDEX-LOW: 33 ML/M2
NUC STRESS DIASTOLIC VOLUME INDEX: 54
NUC STRESS EJECTION FRACTION: 67 %
NUC STRESS SYSTOLIC VOLUME INDEX: 18
OHS CV CPX 1 MINUTE RECOVERY HEART RATE: 82 BPM
OHS CV CPX 85 PERCENT MAX PREDICTED HEART RATE MALE: 113
OHS CV CPX MAX PREDICTED HEART RATE: 133
OHS CV CPX PATIENT IS FEMALE: 1
OHS CV CPX PATIENT IS MALE: 0
OHS CV CPX PEAK DIASTOLIC BLOOD PRESSURE: 80 MMHG
OHS CV CPX PEAK HEAR RATE: 87 BPM
OHS CV CPX PEAK RATE PRESSURE PRODUCT: NORMAL
OHS CV CPX PEAK SYSTOLIC BLOOD PRESSURE: 164 MMHG
OHS CV CPX PERCENT MAX PREDICTED HEART RATE ACHIEVED: 65
OHS CV CPX RATE PRESSURE PRODUCT PRESENTING: NORMAL
RETIRED EF AND QEF - SEE NOTES: 53 %
SYSTOLIC BLOOD PRESSURE: 166 MMHG

## 2021-11-26 ENCOUNTER — HOSPITAL ENCOUNTER (EMERGENCY)
Facility: HOSPITAL | Age: 81
Discharge: HOME | End: 2021-11-27
Attending: INTERNAL MEDICINE
Payer: MEDICARE

## 2021-11-26 VITALS
DIASTOLIC BLOOD PRESSURE: 80 MMHG | OXYGEN SATURATION: 92 % | HEIGHT: 63 IN | BODY MASS INDEX: 29.77 KG/M2 | SYSTOLIC BLOOD PRESSURE: 158 MMHG | TEMPERATURE: 98 F | HEART RATE: 76 BPM | RESPIRATION RATE: 16 BRPM | WEIGHT: 168 LBS

## 2021-11-26 DIAGNOSIS — I16.0 HYPERTENSIVE URGENCY: ICD-10-CM

## 2021-11-26 DIAGNOSIS — K80.20 CALCULUS OF GALLBLADDER WITHOUT CHOLECYSTITIS WITHOUT OBSTRUCTION: ICD-10-CM

## 2021-11-26 DIAGNOSIS — N20.0 KIDNEY STONE ON RIGHT SIDE: ICD-10-CM

## 2021-11-26 DIAGNOSIS — R11.2 NAUSEA & VOMITING: ICD-10-CM

## 2021-11-26 DIAGNOSIS — N23 RENAL COLIC ON RIGHT SIDE: Primary | ICD-10-CM

## 2021-11-26 LAB
ALBUMIN SERPL BCP-MCNC: 3.7 G/DL (ref 3.5–5.2)
ALP SERPL-CCNC: 89 U/L (ref 55–135)
ALT SERPL W/O P-5'-P-CCNC: 14 U/L (ref 10–44)
ANION GAP SERPL CALC-SCNC: 14 MMOL/L (ref 8–16)
AST SERPL-CCNC: 22 U/L (ref 10–40)
BACTERIA #/AREA URNS HPF: ABNORMAL /HPF
BASOPHILS # BLD AUTO: 0.07 K/UL (ref 0–0.2)
BASOPHILS NFR BLD: 0.6 % (ref 0–1.9)
BILIRUB SERPL-MCNC: 0.4 MG/DL (ref 0.1–1)
BILIRUB UR QL STRIP: ABNORMAL
BNP SERPL-MCNC: 113 PG/ML (ref 0–99)
BUN SERPL-MCNC: 28 MG/DL (ref 8–23)
CALCIUM SERPL-MCNC: 10.1 MG/DL (ref 8.7–10.5)
CHLORIDE SERPL-SCNC: 102 MMOL/L (ref 95–110)
CLARITY UR: ABNORMAL
CO2 SERPL-SCNC: 22 MMOL/L (ref 23–29)
COLOR UR: ABNORMAL
CREAT SERPL-MCNC: 1.3 MG/DL (ref 0.5–1.4)
DIFFERENTIAL METHOD: ABNORMAL
EOSINOPHIL # BLD AUTO: 0.2 K/UL (ref 0–0.5)
EOSINOPHIL NFR BLD: 1.5 % (ref 0–8)
ERYTHROCYTE [DISTWIDTH] IN BLOOD BY AUTOMATED COUNT: 13.9 % (ref 11.5–14.5)
EST. GFR  (AFRICAN AMERICAN): 43.8 ML/MIN/1.73 M^2
EST. GFR  (NON AFRICAN AMERICAN): 38 ML/MIN/1.73 M^2
GLUCOSE SERPL-MCNC: 138 MG/DL (ref 70–110)
GLUCOSE UR QL STRIP: NEGATIVE
HCT VFR BLD AUTO: 46 % (ref 37–48.5)
HGB BLD-MCNC: 15.4 G/DL (ref 12–16)
HGB UR QL STRIP: ABNORMAL
HYALINE CASTS #/AREA URNS LPF: 0 /LPF
IMM GRANULOCYTES # BLD AUTO: 0.04 K/UL (ref 0–0.04)
IMM GRANULOCYTES NFR BLD AUTO: 0.3 % (ref 0–0.5)
KETONES UR QL STRIP: NEGATIVE
LEUKOCYTE ESTERASE UR QL STRIP: NEGATIVE
LIPASE SERPL-CCNC: 37 U/L (ref 4–60)
LYMPHOCYTES # BLD AUTO: 2.8 K/UL (ref 1–4.8)
LYMPHOCYTES NFR BLD: 22.3 % (ref 18–48)
MCH RBC QN AUTO: 30.6 PG (ref 27–31)
MCHC RBC AUTO-ENTMCNC: 33.5 G/DL (ref 32–36)
MCV RBC AUTO: 92 FL (ref 82–98)
MICROSCOPIC COMMENT: ABNORMAL
MONOCYTES # BLD AUTO: 1 K/UL (ref 0.3–1)
MONOCYTES NFR BLD: 7.9 % (ref 4–15)
NEUTROPHILS # BLD AUTO: 8.3 K/UL (ref 1.8–7.7)
NEUTROPHILS NFR BLD: 67.4 % (ref 38–73)
NITRITE UR QL STRIP: NEGATIVE
NRBC BLD-RTO: 0 /100 WBC
PH UR STRIP: 6 [PH] (ref 5–8)
PLATELET # BLD AUTO: 287 K/UL (ref 150–450)
PMV BLD AUTO: 9.5 FL (ref 9.2–12.9)
POTASSIUM SERPL-SCNC: 4.3 MMOL/L (ref 3.5–5.1)
PROT SERPL-MCNC: 7.3 G/DL (ref 6–8.4)
PROT UR QL STRIP: ABNORMAL
RBC # BLD AUTO: 5.03 M/UL (ref 4–5.4)
RBC #/AREA URNS HPF: >100 /HPF (ref 0–4)
SARS-COV-2 RDRP RESP QL NAA+PROBE: NEGATIVE
SODIUM SERPL-SCNC: 138 MMOL/L (ref 136–145)
SP GR UR STRIP: 1.02 (ref 1–1.03)
SQUAMOUS #/AREA URNS HPF: 1 /HPF
TROPONIN I SERPL DL<=0.01 NG/ML-MCNC: 0.01 NG/ML (ref 0–0.03)
URN SPEC COLLECT METH UR: ABNORMAL
UROBILINOGEN UR STRIP-ACNC: NEGATIVE EU/DL
WBC # BLD AUTO: 12.37 K/UL (ref 3.9–12.7)
WBC #/AREA URNS HPF: 1 /HPF (ref 0–5)

## 2021-11-26 PROCEDURE — 71045 XR CHEST AP PORTABLE: ICD-10-PCS | Mod: 26,,, | Performed by: RADIOLOGY

## 2021-11-26 PROCEDURE — 74176 CT ABD & PELVIS W/O CONTRAST: CPT | Mod: 26,,, | Performed by: RADIOLOGY

## 2021-11-26 PROCEDURE — 83880 ASSAY OF NATRIURETIC PEPTIDE: CPT | Performed by: INTERNAL MEDICINE

## 2021-11-26 PROCEDURE — 80053 COMPREHEN METABOLIC PANEL: CPT | Performed by: INTERNAL MEDICINE

## 2021-11-26 PROCEDURE — 81000 URINALYSIS NONAUTO W/SCOPE: CPT | Performed by: INTERNAL MEDICINE

## 2021-11-26 PROCEDURE — 96375 TX/PRO/DX INJ NEW DRUG ADDON: CPT

## 2021-11-26 PROCEDURE — 71045 X-RAY EXAM CHEST 1 VIEW: CPT | Mod: 26,,, | Performed by: RADIOLOGY

## 2021-11-26 PROCEDURE — 85025 COMPLETE CBC W/AUTO DIFF WBC: CPT | Performed by: INTERNAL MEDICINE

## 2021-11-26 PROCEDURE — 63600175 PHARM REV CODE 636 W HCPCS: Performed by: INTERNAL MEDICINE

## 2021-11-26 PROCEDURE — U0002 COVID-19 LAB TEST NON-CDC: HCPCS | Performed by: INTERNAL MEDICINE

## 2021-11-26 PROCEDURE — 83690 ASSAY OF LIPASE: CPT | Performed by: INTERNAL MEDICINE

## 2021-11-26 PROCEDURE — 93010 ELECTROCARDIOGRAM REPORT: CPT | Mod: ,,, | Performed by: INTERNAL MEDICINE

## 2021-11-26 PROCEDURE — 93005 ELECTROCARDIOGRAM TRACING: CPT

## 2021-11-26 PROCEDURE — 74176 CT ABD & PELVIS W/O CONTRAST: CPT | Mod: TC

## 2021-11-26 PROCEDURE — 96376 TX/PRO/DX INJ SAME DRUG ADON: CPT

## 2021-11-26 PROCEDURE — 74176 CT RENAL STONE STUDY ABD PELVIS WO: ICD-10-PCS | Mod: 26,,, | Performed by: RADIOLOGY

## 2021-11-26 PROCEDURE — 96374 THER/PROPH/DIAG INJ IV PUSH: CPT

## 2021-11-26 PROCEDURE — 99285 EMERGENCY DEPT VISIT HI MDM: CPT | Mod: 25

## 2021-11-26 PROCEDURE — 71045 X-RAY EXAM CHEST 1 VIEW: CPT | Mod: TC,FY

## 2021-11-26 PROCEDURE — 84484 ASSAY OF TROPONIN QUANT: CPT | Performed by: INTERNAL MEDICINE

## 2021-11-26 PROCEDURE — 93010 EKG 12-LEAD: ICD-10-PCS | Mod: ,,, | Performed by: INTERNAL MEDICINE

## 2021-11-26 RX ORDER — HYDROMORPHONE HYDROCHLORIDE 2 MG/ML
1 INJECTION, SOLUTION INTRAMUSCULAR; INTRAVENOUS; SUBCUTANEOUS
Status: DISCONTINUED | OUTPATIENT
Start: 2021-11-26 | End: 2021-11-26

## 2021-11-26 RX ORDER — TRAMADOL HYDROCHLORIDE 50 MG/1
50 TABLET ORAL EVERY 6 HOURS PRN
Qty: 12 TABLET | Refills: 0 | Status: SHIPPED | OUTPATIENT
Start: 2021-11-26 | End: 2022-05-02

## 2021-11-26 RX ORDER — KETOROLAC TROMETHAMINE 30 MG/ML
15 INJECTION, SOLUTION INTRAMUSCULAR; INTRAVENOUS
Status: COMPLETED | OUTPATIENT
Start: 2021-11-26 | End: 2021-11-26

## 2021-11-26 RX ORDER — ONDANSETRON 2 MG/ML
4 INJECTION INTRAMUSCULAR; INTRAVENOUS
Status: COMPLETED | OUTPATIENT
Start: 2021-11-26 | End: 2021-11-26

## 2021-11-26 RX ADMIN — ONDANSETRON 4 MG: 2 INJECTION INTRAMUSCULAR; INTRAVENOUS at 09:11

## 2021-11-26 RX ADMIN — KETOROLAC TROMETHAMINE 15 MG: 30 INJECTION, SOLUTION INTRAMUSCULAR at 09:11

## 2021-11-26 RX ADMIN — KETOROLAC TROMETHAMINE 15 MG: 30 INJECTION, SOLUTION INTRAMUSCULAR at 11:11

## 2021-11-29 ENCOUNTER — HOSPITAL ENCOUNTER (OUTPATIENT)
Dept: RADIOLOGY | Facility: HOSPITAL | Age: 81
Discharge: HOME | End: 2021-11-29
Attending: INTERNAL MEDICINE
Payer: MEDICARE

## 2021-11-29 VITALS — BODY MASS INDEX: 26.66 KG/M2 | HEIGHT: 65 IN | WEIGHT: 160 LBS

## 2021-11-29 DIAGNOSIS — Z17.0 MALIGNANT NEOPLASM OF UPPER-OUTER QUADRANT OF RIGHT BREAST IN FEMALE, ESTROGEN RECEPTOR POSITIVE: ICD-10-CM

## 2021-11-29 DIAGNOSIS — C50.411 MALIGNANT NEOPLASM OF UPPER-OUTER QUADRANT OF RIGHT BREAST IN FEMALE, ESTROGEN RECEPTOR POSITIVE: ICD-10-CM

## 2021-11-29 DIAGNOSIS — R63.0 ANOREXIA: ICD-10-CM

## 2021-11-29 LAB — GLUCOSE SERPL-MCNC: 100 MG/DL (ref 70–110)

## 2021-11-29 PROCEDURE — 82962 GLUCOSE BLOOD TEST: CPT | Mod: PO

## 2021-11-29 PROCEDURE — 78815 PET IMAGE W/CT SKULL-THIGH: CPT | Mod: TC,PO,PI

## 2021-11-30 ENCOUNTER — TELEPHONE (OUTPATIENT)
Dept: CARDIOLOGY | Facility: CLINIC | Age: 81
End: 2021-11-30
Payer: MEDICARE

## 2021-12-01 ENCOUNTER — OFFICE VISIT (OUTPATIENT)
Dept: HEMATOLOGY/ONCOLOGY | Facility: CLINIC | Age: 81
End: 2021-12-01
Payer: MEDICARE

## 2021-12-01 VITALS
HEART RATE: 82 BPM | WEIGHT: 168 LBS | RESPIRATION RATE: 20 BRPM | DIASTOLIC BLOOD PRESSURE: 81 MMHG | SYSTOLIC BLOOD PRESSURE: 148 MMHG | BODY MASS INDEX: 29.77 KG/M2 | HEIGHT: 63 IN

## 2021-12-01 DIAGNOSIS — C50.411 MALIGNANT NEOPLASM OF UPPER-OUTER QUADRANT OF RIGHT BREAST IN FEMALE, ESTROGEN RECEPTOR POSITIVE: Primary | ICD-10-CM

## 2021-12-01 DIAGNOSIS — Z17.0 MALIGNANT NEOPLASM OF UPPER-OUTER QUADRANT OF RIGHT BREAST IN FEMALE, ESTROGEN RECEPTOR POSITIVE: Primary | ICD-10-CM

## 2021-12-01 PROCEDURE — 99213 PR OFFICE/OUTPT VISIT, EST, LEVL III, 20-29 MIN: ICD-10-PCS | Mod: S$GLB,,, | Performed by: INTERNAL MEDICINE

## 2021-12-01 PROCEDURE — 99213 OFFICE O/P EST LOW 20 MIN: CPT | Mod: S$GLB,,, | Performed by: INTERNAL MEDICINE

## 2021-12-06 ENCOUNTER — TELEPHONE (OUTPATIENT)
Dept: CARDIOLOGY | Facility: CLINIC | Age: 81
End: 2021-12-06
Payer: MEDICARE

## 2021-12-08 ENCOUNTER — OFFICE VISIT (OUTPATIENT)
Dept: UROLOGY | Facility: CLINIC | Age: 81
End: 2021-12-08
Payer: MEDICARE

## 2021-12-08 VITALS
WEIGHT: 168 LBS | DIASTOLIC BLOOD PRESSURE: 70 MMHG | BODY MASS INDEX: 29.77 KG/M2 | SYSTOLIC BLOOD PRESSURE: 131 MMHG | HEART RATE: 95 BPM | HEIGHT: 63 IN

## 2021-12-08 DIAGNOSIS — N20.1 RIGHT URETERAL STONE: Primary | ICD-10-CM

## 2021-12-08 DIAGNOSIS — N22 CALCULUS OF URINARY TRACT IN DISEASES CLASSIFIED ELSEWHERE: ICD-10-CM

## 2021-12-08 PROCEDURE — 99999 PR PBB SHADOW E&M-EST. PATIENT-LVL V: ICD-10-PCS | Mod: PBBFAC,,, | Performed by: NURSE PRACTITIONER

## 2021-12-08 PROCEDURE — 99999 PR PBB SHADOW E&M-EST. PATIENT-LVL V: CPT | Mod: PBBFAC,,, | Performed by: NURSE PRACTITIONER

## 2021-12-08 PROCEDURE — 99215 OFFICE O/P EST HI 40 MIN: CPT | Mod: PBBFAC,PN | Performed by: NURSE PRACTITIONER

## 2021-12-08 PROCEDURE — 99203 PR OFFICE/OUTPT VISIT, NEW, LEVL III, 30-44 MIN: ICD-10-PCS | Mod: S$PBB,,, | Performed by: NURSE PRACTITIONER

## 2021-12-08 PROCEDURE — 99203 OFFICE O/P NEW LOW 30 MIN: CPT | Mod: S$PBB,,, | Performed by: NURSE PRACTITIONER

## 2021-12-09 ENCOUNTER — TELEPHONE (OUTPATIENT)
Dept: CARDIOLOGY | Facility: CLINIC | Age: 81
End: 2021-12-09
Payer: MEDICARE

## 2021-12-09 ENCOUNTER — TELEPHONE (OUTPATIENT)
Dept: UROLOGY | Facility: CLINIC | Age: 81
End: 2021-12-09
Payer: MEDICARE

## 2021-12-14 ENCOUNTER — TELEPHONE (OUTPATIENT)
Dept: UROLOGY | Facility: CLINIC | Age: 81
End: 2021-12-14
Payer: MEDICARE

## 2021-12-14 DIAGNOSIS — N28.1 RENAL CYSTS, ACQUIRED, BILATERAL: ICD-10-CM

## 2021-12-14 DIAGNOSIS — N20.0 KIDNEY STONES: Primary | ICD-10-CM

## 2021-12-16 ENCOUNTER — TELEPHONE (OUTPATIENT)
Dept: UROLOGY | Facility: CLINIC | Age: 81
End: 2021-12-16
Payer: MEDICARE

## 2021-12-16 DIAGNOSIS — N20.0 KIDNEY STONES: Primary | ICD-10-CM

## 2021-12-20 ENCOUNTER — LAB VISIT (OUTPATIENT)
Dept: LAB | Facility: HOSPITAL | Age: 81
End: 2021-12-20
Attending: STUDENT IN AN ORGANIZED HEALTH CARE EDUCATION/TRAINING PROGRAM
Payer: MEDICARE

## 2021-12-20 ENCOUNTER — OFFICE VISIT (OUTPATIENT)
Dept: INFECTIOUS DISEASES | Facility: CLINIC | Age: 81
End: 2021-12-20
Payer: MEDICARE

## 2021-12-20 VITALS
OXYGEN SATURATION: 96 % | TEMPERATURE: 98 F | SYSTOLIC BLOOD PRESSURE: 142 MMHG | HEART RATE: 74 BPM | BODY MASS INDEX: 29.73 KG/M2 | DIASTOLIC BLOOD PRESSURE: 80 MMHG | WEIGHT: 167.81 LBS | HEIGHT: 63 IN

## 2021-12-20 DIAGNOSIS — Z17.0 MALIGNANT NEOPLASM OF UPPER-OUTER QUADRANT OF RIGHT BREAST IN FEMALE, ESTROGEN RECEPTOR POSITIVE: Primary | ICD-10-CM

## 2021-12-20 DIAGNOSIS — C50.411 MALIGNANT NEOPLASM OF UPPER-OUTER QUADRANT OF RIGHT BREAST IN FEMALE, ESTROGEN RECEPTOR POSITIVE: ICD-10-CM

## 2021-12-20 DIAGNOSIS — Z17.0 MALIGNANT NEOPLASM OF UPPER-OUTER QUADRANT OF RIGHT BREAST IN FEMALE, ESTROGEN RECEPTOR POSITIVE: ICD-10-CM

## 2021-12-20 DIAGNOSIS — C50.411 MALIGNANT NEOPLASM OF UPPER-OUTER QUADRANT OF RIGHT BREAST IN FEMALE, ESTROGEN RECEPTOR POSITIVE: Primary | ICD-10-CM

## 2021-12-20 LAB
ALBUMIN SERPL BCP-MCNC: 3.9 G/DL (ref 3.5–5.2)
ALP SERPL-CCNC: 81 U/L (ref 55–135)
ALT SERPL W/O P-5'-P-CCNC: 17 U/L (ref 10–44)
ANION GAP SERPL CALC-SCNC: 7 MMOL/L (ref 8–16)
AST SERPL-CCNC: 21 U/L (ref 10–40)
BASOPHILS # BLD AUTO: 0.08 K/UL (ref 0–0.2)
BASOPHILS NFR BLD: 0.9 % (ref 0–1.9)
BILIRUB SERPL-MCNC: 0.7 MG/DL (ref 0.1–1)
BUN SERPL-MCNC: 17 MG/DL (ref 8–23)
CALCIUM SERPL-MCNC: 9.7 MG/DL (ref 8.7–10.5)
CHLORIDE SERPL-SCNC: 103 MMOL/L (ref 95–110)
CO2 SERPL-SCNC: 28 MMOL/L (ref 23–29)
CREAT SERPL-MCNC: 1.1 MG/DL (ref 0.5–1.4)
CRP SERPL-MCNC: 0.17 MG/DL
DIFFERENTIAL METHOD: ABNORMAL
EOSINOPHIL # BLD AUTO: 0.4 K/UL (ref 0–0.5)
EOSINOPHIL NFR BLD: 3.8 % (ref 0–8)
ERYTHROCYTE [DISTWIDTH] IN BLOOD BY AUTOMATED COUNT: 14 % (ref 11.5–14.5)
ERYTHROCYTE [SEDIMENTATION RATE] IN BLOOD BY WESTERGREN METHOD: 6 MM/HR (ref 0–20)
EST. GFR  (AFRICAN AMERICAN): 53.7 ML/MIN/1.73 M^2
EST. GFR  (NON AFRICAN AMERICAN): 46.5 ML/MIN/1.73 M^2
GLUCOSE SERPL-MCNC: 86 MG/DL (ref 70–110)
HCT VFR BLD AUTO: 48.2 % (ref 37–48.5)
HGB BLD-MCNC: 15.4 G/DL (ref 12–16)
IMM GRANULOCYTES # BLD AUTO: 0.05 K/UL (ref 0–0.04)
IMM GRANULOCYTES NFR BLD AUTO: 0.5 % (ref 0–0.5)
LYMPHOCYTES # BLD AUTO: 2.7 K/UL (ref 1–4.8)
LYMPHOCYTES NFR BLD: 29.2 % (ref 18–48)
MCH RBC QN AUTO: 30.4 PG (ref 27–31)
MCHC RBC AUTO-ENTMCNC: 32 G/DL (ref 32–36)
MCV RBC AUTO: 95 FL (ref 82–98)
MONOCYTES # BLD AUTO: 0.9 K/UL (ref 0.3–1)
MONOCYTES NFR BLD: 9.6 % (ref 4–15)
NEUTROPHILS # BLD AUTO: 5.2 K/UL (ref 1.8–7.7)
NEUTROPHILS NFR BLD: 56 % (ref 38–73)
NRBC BLD-RTO: 0 /100 WBC
PLATELET # BLD AUTO: 294 K/UL (ref 150–450)
PMV BLD AUTO: 9.3 FL (ref 9.2–12.9)
POTASSIUM SERPL-SCNC: 4.3 MMOL/L (ref 3.5–5.1)
PROT SERPL-MCNC: 7.2 G/DL (ref 6–8.4)
RBC # BLD AUTO: 5.06 M/UL (ref 4–5.4)
SODIUM SERPL-SCNC: 138 MMOL/L (ref 136–145)
WBC # BLD AUTO: 9.27 K/UL (ref 3.9–12.7)

## 2021-12-20 PROCEDURE — 99214 OFFICE O/P EST MOD 30 MIN: CPT | Mod: S$GLB,,, | Performed by: STUDENT IN AN ORGANIZED HEALTH CARE EDUCATION/TRAINING PROGRAM

## 2021-12-20 PROCEDURE — 80053 COMPREHEN METABOLIC PANEL: CPT | Performed by: STUDENT IN AN ORGANIZED HEALTH CARE EDUCATION/TRAINING PROGRAM

## 2021-12-20 PROCEDURE — 86140 C-REACTIVE PROTEIN: CPT | Performed by: STUDENT IN AN ORGANIZED HEALTH CARE EDUCATION/TRAINING PROGRAM

## 2021-12-20 PROCEDURE — 99214 PR OFFICE/OUTPT VISIT, EST, LEVL IV, 30-39 MIN: ICD-10-PCS | Mod: S$GLB,,, | Performed by: STUDENT IN AN ORGANIZED HEALTH CARE EDUCATION/TRAINING PROGRAM

## 2021-12-20 PROCEDURE — 85651 RBC SED RATE NONAUTOMATED: CPT | Performed by: STUDENT IN AN ORGANIZED HEALTH CARE EDUCATION/TRAINING PROGRAM

## 2021-12-20 PROCEDURE — 36415 COLL VENOUS BLD VENIPUNCTURE: CPT | Performed by: STUDENT IN AN ORGANIZED HEALTH CARE EDUCATION/TRAINING PROGRAM

## 2021-12-20 PROCEDURE — 85025 COMPLETE CBC W/AUTO DIFF WBC: CPT | Performed by: STUDENT IN AN ORGANIZED HEALTH CARE EDUCATION/TRAINING PROGRAM

## 2022-01-06 ENCOUNTER — OFFICE VISIT (OUTPATIENT)
Dept: CARDIOLOGY | Facility: CLINIC | Age: 82
End: 2022-01-06
Payer: MEDICARE

## 2022-01-06 ENCOUNTER — OFFICE VISIT (OUTPATIENT)
Dept: INFECTIOUS DISEASES | Facility: CLINIC | Age: 82
End: 2022-01-06
Payer: MEDICARE

## 2022-01-06 VITALS
HEIGHT: 63 IN | SYSTOLIC BLOOD PRESSURE: 138 MMHG | TEMPERATURE: 98 F | WEIGHT: 166 LBS | DIASTOLIC BLOOD PRESSURE: 78 MMHG | BODY MASS INDEX: 29.41 KG/M2 | HEART RATE: 82 BPM | OXYGEN SATURATION: 95 %

## 2022-01-06 VITALS
WEIGHT: 165 LBS | HEIGHT: 63 IN | BODY MASS INDEX: 29.23 KG/M2 | HEART RATE: 72 BPM | SYSTOLIC BLOOD PRESSURE: 120 MMHG | DIASTOLIC BLOOD PRESSURE: 80 MMHG

## 2022-01-06 DIAGNOSIS — I10 ESSENTIAL HYPERTENSION: ICD-10-CM

## 2022-01-06 DIAGNOSIS — F17.200 SMOKING: Primary | ICD-10-CM

## 2022-01-06 DIAGNOSIS — I49.5 SSS (SICK SINUS SYNDROME): ICD-10-CM

## 2022-01-06 DIAGNOSIS — Z95.0 CARDIAC PACEMAKER IN SITU: ICD-10-CM

## 2022-01-06 DIAGNOSIS — I25.810 CORONARY ARTERY DISEASE INVOLVING AUTOLOGOUS VEIN CORONARY BYPASS GRAFT WITHOUT ANGINA PECTORIS: ICD-10-CM

## 2022-01-06 DIAGNOSIS — E78.5 DYSLIPIDEMIA: Primary | ICD-10-CM

## 2022-01-06 DIAGNOSIS — E11.9 DIABETES MELLITUS WITHOUT COMPLICATION: ICD-10-CM

## 2022-01-06 DIAGNOSIS — R05.9 COUGH: ICD-10-CM

## 2022-01-06 DIAGNOSIS — Z95.1 HX OF CABG: ICD-10-CM

## 2022-01-06 PROCEDURE — 99214 OFFICE O/P EST MOD 30 MIN: CPT | Mod: S$GLB,,, | Performed by: STUDENT IN AN ORGANIZED HEALTH CARE EDUCATION/TRAINING PROGRAM

## 2022-01-06 PROCEDURE — 99214 PR OFFICE/OUTPT VISIT, EST, LEVL IV, 30-39 MIN: ICD-10-PCS | Mod: S$GLB,,, | Performed by: NURSE PRACTITIONER

## 2022-01-06 PROCEDURE — 99214 PR OFFICE/OUTPT VISIT, EST, LEVL IV, 30-39 MIN: ICD-10-PCS | Mod: S$GLB,,, | Performed by: STUDENT IN AN ORGANIZED HEALTH CARE EDUCATION/TRAINING PROGRAM

## 2022-01-06 PROCEDURE — 99214 OFFICE O/P EST MOD 30 MIN: CPT | Mod: S$GLB,,, | Performed by: NURSE PRACTITIONER

## 2022-01-06 RX ORDER — ALBUTEROL SULFATE 90 UG/1
1-2 AEROSOL, METERED RESPIRATORY (INHALATION) 2 TIMES DAILY
Qty: 0.1 G | Refills: 2 | Status: SHIPPED | OUTPATIENT
Start: 2022-01-06 | End: 2022-08-18

## 2022-01-06 NOTE — PROGRESS NOTES
Subjective: F/u r/l sternal wound infection        Patient ID: Mirta Guerin is a 81 y.o. female.    Chief Complaint:: Follow-up    HPI    HPI: Mirta Guerin is a 81 y.o. female active smoker, with history of HTN, diabetes, HLD, CAD s/p CABG in July/2016, mitral valve replacement, GERD, NEGRITA, melanoma in 2018 and breast cancer diagnosed in October 2015 s/p partial mastectomy and sentinel node biopsy, followed by adjuvant radiation therapy, and Arimidex since Feb/2016. She received both COVID-19 shots, and plans to get the booster, in addition to pneumococcus and influenza vaccines.      Patient was referred by Dr Fontenot given concern for sternal wound infection. Had a PET CT scan done in Nov/2021 which revealed uptake in the sternum, cannot exclude inflammation/infection vs post-op changes.      She denies fever, chills, nausea, vomit, chest pain, abdominal pain, dysuria or change in bowel movements. She does admit to persistent productive cough and a 20lb weight loss in the last year    INTERVAL HISTORY  1/6/22:  Patient is here for follow-up, no acute complaints.  Still smoking 4-6 cigarettes a day we will attempt to quit soon. Patient gained some pounds during the holidays.     Review of patient's allergies indicates:   Allergen Reactions    Bacitracin zinc-polymyxin b Rash    Adhesive Rash    Benazepril Other (See Comments)     sleepy    Codeine Nausea And Vomiting    Polysporin [bacitracin-polymyxin b] Rash     Past Medical History:   Diagnosis Date    Anxiety     AV block     Breast cancer     Cardiac pacemaker in situ     Coronary artery disease     Diabetes mellitus     GERD (gastroesophageal reflux disease)     HLD (hyperlipidemia)     Hypertension     Lung disease     Melanoma     Mitral valve disorder     NEGRITA (obstructive sleep apnea)     Prediabetes     Psoriasis     Spinal stenosis     Squamous cell carcinoma of skin     right anterior scalp    Vitamin D deficiency   "    Past Surgical History:   Procedure Laterality Date    BREAST LUMPECTOMY      CARDIAC PACEMAKER PLACEMENT  04/05/2012    CORONARY ARTERY BYPASS GRAFT  07/2017    MASTECTOMY, PARTIAL      SKIN CANCER EXCISION      UPPER GASTROINTESTINAL ENDOSCOPY       Social History     Tobacco Use    Smoking status: Current Every Day Smoker     Packs/day: 0.25     Years: 50.00     Pack years: 12.50     Types: Cigarettes     Start date: 1954    Smokeless tobacco: Never Used    Tobacco comment: 4-6 a day as of 8/16/21   Substance Use Topics    Alcohol use: Yes     Comment: occasional        Family History   Problem Relation Age of Onset    Cancer Mother         ovarian    Coronary artery disease Father     Stroke Father     Stroke Brother     Stroke Maternal Grandfather     Cancer Paternal Grandmother         breast    Stroke Paternal Grandfather          Review of Systems   Constitutional: Negative for fever.   HENT: Negative for sinus pain.    Respiratory: Positive for cough. Negative for shortness of breath.    Cardiovascular: Negative for chest pain.   Gastrointestinal: Negative for abdominal pain, diarrhea and nausea.   Genitourinary: Negative for dysuria.   Musculoskeletal: Negative for back pain.   Skin: Negative for rash.   Neurological: Negative for headaches.           Objective:      Blood pressure 138/78, pulse 82, temperature 98.1 °F (36.7 °C), height 5' 3" (1.6 m), weight 75.3 kg (166 lb), SpO2 95 %. Body mass index is 29.41 kg/m².  Physical Exam  Constitutional:       Appearance: Normal appearance.   HENT:      Head: Normocephalic.      Mouth/Throat:      Mouth: Mucous membranes are moist.      Pharynx: Oropharynx is clear.   Eyes:      Extraocular Movements: Extraocular movements intact.      Pupils: Pupils are equal, round, and reactive to light.   Cardiovascular:      Rate and Rhythm: Normal rate and regular rhythm.      Pulses: Normal pulses.      Heart sounds: Normal heart sounds.   Pulmonary: "      Effort: Pulmonary effort is normal.      Comments: Mostly clear, minimal expiratory secretions bilaterally  Abdominal:      General: Bowel sounds are normal.      Tenderness: There is no abdominal tenderness.   Musculoskeletal:         General: Normal range of motion.      Cervical back: Normal range of motion and neck supple.      Right lower leg: No edema.      Left lower leg: No edema.   Skin:     General: Skin is warm.      Capillary Refill: Capillary refill takes less than 2 seconds.      Findings: No rash.   Neurological:      General: No focal deficit present.      Mental Status: She is alert and oriented to person, place, and time. Mental status is at baseline.   Psychiatric:         Thought Content: Thought content normal.        Recent Diagnostics:  PET CT scan 11/29/21:  1. No discrete FDG avid breast mass or axillary lymphadenopathy to specifically suggest residual/recurrent malignancy.  2. Focal increased FDG activity along the inferior sternal wires, possibly postprocedural, however underlying infection/malignancy is not excluded and correlation with the symptoms/clinical history would be warranted.  3. Numerous additional, and incidental findings as noted above.     CT renal 11/26/21:  1. Mild right hydroureteronephrosis secondary to an obstructive 4 mm stone within the distal right ureter at the ureterovesicular junction.  Prominent nonspecific right perinephric and periureteral fat stranding, with superimposed infection not excluded.  Additional nonobstructive right renal stones.  2. Cholelithiasis without evidence of acute cholecystitis.  3. Diverticulosis coli without evidence of diverticulitis.  4. Additional findings, as above.        Assessment and Plan:           1. Cough, likely bronchitis, no active signs of infection, active smoker   Proventil PRN     2. S/p CABG in July 2016 - rule out sternal wound infection               No clinical evidence of active infection                          PET CT scan suggestive of infection, cannot exclude inflammation/post-procedure changes, favor the latter              Labs reviewed, CBC w/diff, CMP, ESR and CRP normal                 3. H/o right breast cancer              Heme/onc following - Dr Fontenot      4. Weight loss, improving - gained a couple of pounds during the holidays    5. Active smoker    Counseled at length regarding smoking cessation, will try gum/others at home               Follow up as needed, PRN     This note was created using Dragon voice recognition software that occasionally misinterpreted phrases or words.

## 2022-01-06 NOTE — ASSESSMENT & PLAN NOTE
Continue low-fat low-cholesterol diet Crestor 10 mg daily encouraged her to repeat some blood work in the near future

## 2022-01-06 NOTE — PROGRESS NOTES
Subjective:    Patient ID:  Mirta Guerin is a 81 y.o. female patient here for evaluation Hypertension and Coronary Artery Disease      History of Present Illness:     Patient is here for follow-up visit  Denies chest pain or shortness of breath.  Denies recent fever cough chills or congestion.  Denies blood in the urine or blood in the stool.  Denies myalgias  Denies orthopnea or peripheral edema  Denies nausea vomiting or dyspepsia  No recent arm neck or jaw pain.    No lightheadedness or dizziness        Review of patient's allergies indicates:   Allergen Reactions    Bacitracin zinc-polymyxin b Rash    Adhesive Rash    Benazepril Other (See Comments)     sleepy    Codeine Nausea And Vomiting    Polysporin [bacitracin-polymyxin b] Rash       Past Medical History:   Diagnosis Date    Anxiety     AV block     Breast cancer     Cardiac pacemaker in situ     Coronary artery disease     Diabetes mellitus     GERD (gastroesophageal reflux disease)     HLD (hyperlipidemia)     Hypertension     Lung disease     Melanoma     Mitral valve disorder     NEGRITA (obstructive sleep apnea)     Prediabetes     Psoriasis     Spinal stenosis     Squamous cell carcinoma of skin     right anterior scalp    Vitamin D deficiency      Past Surgical History:   Procedure Laterality Date    BREAST LUMPECTOMY      CARDIAC PACEMAKER PLACEMENT  04/05/2012    CORONARY ARTERY BYPASS GRAFT  07/2017    MASTECTOMY, PARTIAL      SKIN CANCER EXCISION      UPPER GASTROINTESTINAL ENDOSCOPY       Social History     Tobacco Use    Smoking status: Current Every Day Smoker     Packs/day: 0.25     Years: 50.00     Pack years: 12.50     Types: Cigarettes     Start date: 1954    Smokeless tobacco: Never Used    Tobacco comment: 4-6 a day as of 8/16/21   Substance Use Topics    Alcohol use: Yes     Comment: occasional    Drug use: No        Review of Systems:    As noted in HPI in addition      REVIEW OF  SYSTEMS  CARDIOVASCULAR: No recent chest pain, palpitations, arm, neck, or jaw pain  RESPIRATORY: No recent fever, cough chills, SOB or congestion  : No blood in the urine  GI: No Nausea, vomiting, constipation, diarrhea, blood, or reflux.  MUSCULOSKELETAL: No myalgias  NEURO: No lightheadedness or dizziness  EYES: No Double vision, blurry, vision or headache              Objective        Vitals:    01/06/22 1415   BP: 120/80   Pulse: 72       LIPIDS - LAST 2   No results found for: CHOL, HDL, LDLCALC, TRIG, CHOLHDL    CBC - LAST 2  Lab Results   Component Value Date    WBC 9.27 12/20/2021    WBC 12.37 11/26/2021    RBC 5.06 12/20/2021    RBC 5.03 11/26/2021    HGB 15.4 12/20/2021    HGB 15.4 11/26/2021    HCT 48.2 12/20/2021    HCT 46.0 11/26/2021    MCV 95 12/20/2021    MCV 92 11/26/2021    MCH 30.4 12/20/2021    MCH 30.6 11/26/2021    MCHC 32.0 12/20/2021    MCHC 33.5 11/26/2021    RDW 14.0 12/20/2021    RDW 13.9 11/26/2021     12/20/2021     11/26/2021    MPV 9.3 12/20/2021    MPV 9.5 11/26/2021    GRAN 5.2 12/20/2021    GRAN 56.0 12/20/2021    LYMPH 2.7 12/20/2021    LYMPH 29.2 12/20/2021    MONO 0.9 12/20/2021    MONO 9.6 12/20/2021    BASO 0.08 12/20/2021    BASO 0.07 11/26/2021    NRBC 0 12/20/2021    NRBC 0 11/26/2021       CHEMISTRY & LIVER FUNCTION - LAST 2  Lab Results   Component Value Date     12/20/2021     11/26/2021    K 4.3 12/20/2021    K 4.3 11/26/2021     12/20/2021     11/26/2021    CO2 28 12/20/2021    CO2 22 (L) 11/26/2021    ANIONGAP 7 (L) 12/20/2021    ANIONGAP 14 11/26/2021    BUN 17 12/20/2021    BUN 28 (H) 11/26/2021    CREATININE 1.1 12/20/2021    CREATININE 1.3 11/26/2021    GLU 86 12/20/2021     (H) 11/26/2021    CALCIUM 9.7 12/20/2021    CALCIUM 10.1 11/26/2021    ALBUMIN 3.9 12/20/2021    ALBUMIN 3.7 11/26/2021    PROT 7.2 12/20/2021    PROT 7.3 11/26/2021    ALKPHOS 81 12/20/2021    ALKPHOS 89 11/26/2021    ALT 17 12/20/2021    ALT 14  11/26/2021    AST 21 12/20/2021    AST 22 11/26/2021    BILITOT 0.7 12/20/2021    BILITOT 0.4 11/26/2021        CARDIAC PROFILE - LAST 2  Lab Results   Component Value Date     (H) 11/26/2021    TROPONINI 0.014 11/26/2021        COAGULATION - LAST 2  No results found for: LABPT, INR, APTT    ENDOCRINE & PSA - LAST 2  No results found for: HGBA1C, MICROALBUR, TSH, PROCAL, PSA     ECHOCARDIOGRAM RESULTS  No results found for this or any previous visit.      CURRENT/PREVIOUS VISIT EKG  Results for orders placed or performed during the hospital encounter of 11/26/21   EKG 12-lead    Collection Time: 11/26/21  9:54 PM    Narrative    Test Reason : R11.2,    Vent. Rate : 074 BPM     Atrial Rate : 074 BPM     P-R Int : 170 ms          QRS Dur : 094 ms      QT Int : 408 ms       P-R-T Axes : 063 -43 030 degrees     QTc Int : 452 ms    Normal sinus rhythm  Possible Left atrial enlargement  Left axis deviation  LVH  Anteroseptal infarct ,age undetermined  Abnormal ECG  When compared with ECG of 23-MAR-1989 08:40,  Anteroseptal infarct is now Present  Confirmed by Hilario Roe MD ([id number]) on 11/29/2021 1:32:32 PM    Referred By: [email or web address]   SELF           Confirmed By:Hilario Roe MD     No valid procedures specified.   Results for orders placed during the hospital encounter of 11/17/21    Nuclear Stress - Cardiology Interpreted    Interpretation Summary    Normal myocardial perfusion scan. There is no evidence of myocardial ischemia or infarction.    The gated perfusion images showed an ejection fraction of 67% post stress. Normal ejection fraction is greater than 53%.    There is normal wall motion post stress.    LV cavity size is  and normal at stress.    The EKG portion of this study is negative for ischemia.    The patient reported chest pain during the stress test.    There were no arrhythmias during stress.    No valid procedures specified.    PHYSICAL EXAM  CONSTITUTIONAL: Well built, well nourished in no  apparent distress  NECK: no carotid bruit, no JVD  LUNGS: CTA  CHEST WALL: no tenderness  HEART: regular rate and rhythm, S1, S2 normal, no murmur, click, rub or gallop   ABDOMEN: soft, non-tender; bowel sounds normal; no masses,  no organomegaly  EXTREMITIES: Extremities normal, no edema, no calf tenderness noted  NEURO: AAO X 3    I HAVE REVIEWED :    The vital signs, nurses notes, and all the pertinent radiology and labs.        Current Outpatient Medications   Medication Instructions    albuterol (PROVENTIL/VENTOLIN HFA) 90 mcg/actuation inhaler 1-2 puffs, Inhalation, 2 times daily, Rescue    amLODIPine (NORVASC) 5 MG tablet TAKE 1 TABLET BY MOUTH  TWICE DAILY    anastrozole (ARIMIDEX) 1 mg Tab No dose, route, or frequency recorded.    aspirin (ECOTRIN) 81 mg, Oral, Daily    cloNIDine (CATAPRES) 0.1 MG tablet TAKE 1 TABLET BY MOUTH EVERY DAY AS NEEDED FOR SYSTOLIC PRESSURE GREATER THAN 170    co-enzyme Q-10 100 mg, Oral, Daily    docusate sodium (COLACE) 100 mg, Oral, 2 times daily    DULoxetine (CYMBALTA) 20 MG capsule TAKE 1 CAPSULE BY MOUTH  ONCE DAILY    fluticasone propionate (FLONASE) 50 mcg/actuation nasal spray No dose, route, or frequency recorded.    gabapentin (NEURONTIN) 300 MG capsule TAKE 1 CAPSULE BY MOUTH IN  THE EVENING    hydrocortisone 2.5 % cream Topical (Top), 2 times daily    loratadine (CLARITIN) 10 mg, Oral, Daily    metFORMIN (GLUCOPHAGE) 500 mg, Oral, 2 times daily with meals    metoprolol succinate (TOPROL-XL) 50 MG 24 hr tablet TAKE 1 TABLET BY MOUTH ONCE DAILY    MULTIVIT-MIN/FA/LUTEIN/ZEAXANT (MACULAR VITAMIN ORAL) Oral    olmesartan (BENICAR) 40 MG tablet TAKE 1 TABLET BY MOUTH ONCE DAILY    rosuvastatin (CRESTOR) 10 MG tablet TAKE 1 TABLET BY MOUTH AT  BEDTIME    traMADoL (ULTRAM) 50 mg, Oral, Every 6 hours PRN    traMADoL (ULTRAM) 50 mg, Oral, Every 6 hours PRN    vitamin D (VITAMIN D3) 1,000 Units, Oral, Daily          Assessment & Plan     Coronary artery  disease involving autologous vein coronary bypass graft without angina pectoris  No anginal symptoms  Continue ASA daily  Continue Crestor every night    Essential hypertension  Controlled on current regimen   Continue the same    SSS (sick sinus syndrome)  Functioning Pacemaker in situ    Dyslipidemia  Continue low-fat low-cholesterol diet Crestor 10 mg daily encouraged her to repeat some blood work in the near future    Hx of CABG  Stable.     Diabetes mellitus without complication  Check HgA1C  Currently on metformin 500 mg PO BID    Cardiac pacemaker in situ  Functioning device in situ    -last check    · 1. Normal device function.  · 2. No changes made.  · 3. RTC: Six months         No follow-ups on file.

## 2022-02-03 ENCOUNTER — OFFICE VISIT (OUTPATIENT)
Dept: PODIATRY | Facility: CLINIC | Age: 82
End: 2022-02-03
Payer: MEDICARE

## 2022-02-03 VITALS
SYSTOLIC BLOOD PRESSURE: 136 MMHG | BODY MASS INDEX: 28.35 KG/M2 | HEART RATE: 77 BPM | TEMPERATURE: 97 F | HEIGHT: 63 IN | WEIGHT: 160 LBS | DIASTOLIC BLOOD PRESSURE: 73 MMHG

## 2022-02-03 DIAGNOSIS — E11.49 TYPE II DIABETES MELLITUS WITH NEUROLOGICAL MANIFESTATIONS: ICD-10-CM

## 2022-02-03 DIAGNOSIS — L60.0 INGROWN NAIL: Primary | ICD-10-CM

## 2022-02-03 DIAGNOSIS — E11.9 COMPREHENSIVE DIABETIC FOOT EXAMINATION, TYPE 2 DM, ENCOUNTER FOR: ICD-10-CM

## 2022-02-03 PROCEDURE — 99214 OFFICE O/P EST MOD 30 MIN: CPT | Mod: PBBFAC,PN | Performed by: PODIATRIST

## 2022-02-03 PROCEDURE — 99213 PR OFFICE/OUTPT VISIT, EST, LEVL III, 20-29 MIN: ICD-10-PCS | Mod: S$PBB,,, | Performed by: PODIATRIST

## 2022-02-03 PROCEDURE — 99999 PR PBB SHADOW E&M-EST. PATIENT-LVL IV: ICD-10-PCS | Mod: PBBFAC,,, | Performed by: PODIATRIST

## 2022-02-03 PROCEDURE — 99213 OFFICE O/P EST LOW 20 MIN: CPT | Mod: S$PBB,,, | Performed by: PODIATRIST

## 2022-02-03 PROCEDURE — 99999 PR PBB SHADOW E&M-EST. PATIENT-LVL IV: CPT | Mod: PBBFAC,,, | Performed by: PODIATRIST

## 2022-02-03 NOTE — PROGRESS NOTES
Subjective:       Patient ID: Mirta Guerin is a 81 y.o. female.    Chief Complaint: Follow-up, Diabetes Mellitus, and Nail Problem  Patient presents today for followup she is a history of chronically ingrown toenails with signs of infection especially on her left hallux.     Follow-up  Associated symptoms include arthralgias, joint swelling and a rash.   Nail Problem  Associated symptoms include arthralgias, joint swelling and a rash.   Ingrown Toenail  Associated symptoms include arthralgias, joint swelling and a rash.        Review of Systems   Musculoskeletal: Positive for arthralgias and joint swelling.   Skin: Positive for color change, rash and wound.   All other systems reviewed and are negative.      Objective:      Physical Exam  Constitutional:       Appearance: She is well-developed.   Cardiovascular:      Pulses:           Dorsalis pedis pulses are 1+ on the right side and 1+ on the left side.        Posterior tibial pulses are 1+ on the right side and 1+ on the left side.   Pulmonary:      Effort: Pulmonary effort is normal.   Musculoskeletal:         General: Deformity present. Normal range of motion.   Feet:      Right foot:      Protective Sensation: 4 sites tested. 1 site sensed.     Skin integrity: Dry skin present.      Left foot:      Protective Sensation: 4 sites tested. 1 site sensed.     Skin integrity: Dry skin present.   Skin:     General: Skin is warm.      Capillary Refill: Capillary refill takes more than 3 seconds.   Neurological:      Deep Tendon Reflexes: Reflexes abnormal.   Psychiatric:         Behavior: Behavior normal.         Thought Content: Thought content normal.         Judgment: Judgment normal.       Patient is noted to have positive erythema positive edema at the medial lateral aspect of the patient's left hallux upon debridement of the lateral border left hallux there is purulent drainage noted as well as obvious signs of infection and pain to palpation. Patient is  noted to have a well-defined area of hyperkeratotic tissue on the dorsal lateral aspect of the fifth digit left there is positive pain noted upon palpation of this area no sign of infection is noted. patient's previously noted psoriasis is more well-controlled on today's visit that ever seen it since it started seeing the patient there are mild erythematous plaques or areas of skin breakdown especially on the heel.        Assessment:       1. Ingrown nail    2. Type II diabetes mellitus with neurological manifestations    3. Comprehensive diabetic foot examination, type 2 DM, encounter for        Plan:        Following examination I aggressively debrided both the medial and lateral border of the patient's left hallux I did advise the patient is obvious signs of infection with purulent drainage noted at the lateral border left hallux. Sterile saline was used to flush and irrigated the lateral border left hallux bacitracin ointment and a well-padded dry dressing applied patient advised to keep antibiotic ointment on the area for the next 3 days if this does not completely resolve if it continues to be red and painful we need to see her back for followup sooner than the regularly scheduled 12 week appointment. I debrided the hyperkeratotic tissue from the fifth digit left patient stated that this felt much better I advised her this is likely due to a shoe rubbing and irritation of this area recommended followup 12 weeks. .Patient states her psoriasis is doing as good as it's been in many years and is very well controlled especially on her feet.  Patient relates she has been under lot of stress with family issues of this definitely aggravates her psoriasis.Complete diabetic evaluation was performed today. Pt developed an area of psoriatic breakdown on her left heel this needs to be monitored closely. Calmoseptine ointment disp.   Patient states this ointment has been wonderful and see only thing that has really helped  the psoriasis on her heels.  Patient relates today that the gabapentin continues to work very well.  Patient did have a severely incurvated ingrown toenail on the lateral border of the left hallux.  After removing the nail the patient indicated felt much better bacitracin ointment a light dressing applied patient will monitor this area closely.  Patient states that she is recently started using and all natural native American ointment on the areas where she has psoriasis and is made a dramatic improvement and while she knows the psoriasis is never going to go away it definitely keeps it better controlled.  Patient states her psoriasis is being well controlled with an all natural organic compound cream that she has been applying.  This note was created using [employer] voice recognition software that occasionally misinterpreted phrases or words.

## 2022-02-07 ENCOUNTER — TELEPHONE (OUTPATIENT)
Dept: UROLOGY | Facility: CLINIC | Age: 82
End: 2022-02-07
Payer: MEDICARE

## 2022-02-07 NOTE — TELEPHONE ENCOUNTER
----- Message from Jacky Reyes sent at 2/7/2022 10:40 AM CST -----  Contact: pt at [phone]  Type: Needs Medical Advice  Who Called:  pt  Best Call Back Number: [phone]  Additional Information: pt is calling the office to find out why she has to have an Xray and [facility] again when she had them done in November of 2021. Please call back and advise.

## 2022-02-07 NOTE — TELEPHONE ENCOUNTER
Returned call and spoke with patient informed the NP would like her to do the imaging in 6 months prior to her appt, patient verbally understood.

## 2022-03-02 ENCOUNTER — OFFICE VISIT (OUTPATIENT)
Dept: HEMATOLOGY/ONCOLOGY | Facility: CLINIC | Age: 82
End: 2022-03-02
Payer: MEDICARE

## 2022-03-02 VITALS
HEART RATE: 97 BPM | WEIGHT: 162.19 LBS | BODY MASS INDEX: 28.74 KG/M2 | HEIGHT: 63 IN | RESPIRATION RATE: 18 BRPM | SYSTOLIC BLOOD PRESSURE: 152 MMHG | TEMPERATURE: 98 F | DIASTOLIC BLOOD PRESSURE: 79 MMHG

## 2022-03-02 DIAGNOSIS — C50.411 MALIGNANT NEOPLASM OF UPPER-OUTER QUADRANT OF RIGHT BREAST IN FEMALE, ESTROGEN RECEPTOR POSITIVE: Primary | ICD-10-CM

## 2022-03-02 DIAGNOSIS — Z17.0 MALIGNANT NEOPLASM OF UPPER-OUTER QUADRANT OF RIGHT BREAST IN FEMALE, ESTROGEN RECEPTOR POSITIVE: Primary | ICD-10-CM

## 2022-03-02 PROCEDURE — 99213 OFFICE O/P EST LOW 20 MIN: CPT | Mod: S$GLB,,, | Performed by: INTERNAL MEDICINE

## 2022-03-02 PROCEDURE — 99213 PR OFFICE/OUTPT VISIT, EST, LEVL III, 20-29 MIN: ICD-10-PCS | Mod: S$GLB,,, | Performed by: INTERNAL MEDICINE

## 2022-03-02 NOTE — PROGRESS NOTES
[facility] Hematology Oncology In Office Subsequent Encounter note    3/2/22  Subjective:      Patient ID:   Mirta Guerin  81 y.o. female  1940  James Jung M.D.. Barbie Castillo M.D., Tony Goldberg.      Chief Complaint:breast cancer follow-up    HPI:  81 y.o. female with diagnosis of stage I right breast cancer.Diagnosed in 2015. Treated with partial mastectomy and sentinel node biopsy, followed by adjuvant radiation therapy, she started adjuvant Arimidex in 2016.    ERP was positive, PRP was positive, HER-2/asaf was negative.hot flash symptoms are controlled on Cymbalta. Joint pain symptoms have not been a problem for her. She has history of hypertension, diabetes, GERD,hypercholesterolemia, DJD, and sleep apnea syndrome.    She has history of left partial mastectomy and sentinel node biopsy, pacemaker placement, facial surgery, right and left knee replacement, complete hysterectomy. She had CABG surgery in 2016.    She had a skin cancer removed from her right scalp ,she believes it was a melanoma, per Dr. Lee in 2018, she also saw Dr. Hi for wide excision of the area.    She had stage 0 melanoma in situ, margins were clear on Moh's surgery/    Since her CABG surgery, she complains of continued substernal chest pain. CAT scan in 2016 at [facility] did show some inflammation in the soft tissue in the sternal area. I suspect she has costochrondral joint sx.    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    Her dad had CVA, her mother  of ovarian cancer at age 86, her brother has had 2 or 3 CVA, her sister has had bladder cancer, she has a daughter with hypoglycemia.    She is allergic to codeine benazepril, Polysporin, and triple antibiotics ointment.    Dr. James Jung is her primary care physician.   Dr. Juarez is her cardiologist.  Dr. Talbot is her GYN.    She  has been diagnosed with sleep apnea syndrome.    She is tolerating the Arimadex fine,  With the addition of Cymbalta daily.  HF sx controlled.  She has history of restless leg syndrome and peripheral neuropathy symptoms in the fingers and feet.  She has hx of osteopenia, took Reclast in the past.  Hx LBP, S/P injection x's 9, sx decreased.    She is 5 foot 2 inches tall and weighs 181 pounds    She got 2/2 covid 19 vaccines, she has not had covid 19 infection.  She plans to take booster, also flu and pneumovax.    Appetite is good but she has lost 20# over the last year.    Dr Lee of dermatology has removed several skin cancers.    For evaluation of 20# weight loss, a PET scan was done.  This showed uptake in sternum.  Hx of CABG surgery, c/o pain at sternal area.  Pet suggests infection or post op changes.    Referred back to Dr. Juarez and to Dr. Saravia for eval?  Conclusion is that PET uptake at sternum is post op change and not infection.  Observe for now.    ROS:   GEN: normal without any fever, night sweats or weight loss  HEENT: normal with no HA's, sore throat, stiff neck, changes in vision  CV: see history of present illness.   no CP, SOB, PND, STEPHENS or orthopnea  PULM: see history of present illness.   no SOB, cough, hemoptysis, sputum or pleuritic pain  GI: normal with no abdominal pain, nausea, vomiting, constipation, diarrhea, melanotic stools, BRBPR, or hematemesis  : normal with no hematuria, dysuria  BREAST: see history of present illness  SKIN: normal with no rash, erythema, bruising, or swelling     Past Medical History:   Diagnosis Date    Anxiety     AV block     Breast cancer     Cardiac pacemaker in situ     Coronary artery disease     Diabetes mellitus     GERD (gastroesophageal reflux disease)     HLD (hyperlipidemia)     Hypertension     Lung disease     Melanoma     Mitral valve disorder     NEGRITA (obstructive sleep apnea)     Prediabetes     Psoriasis     Spinal stenosis      Squamous cell carcinoma of skin     right anterior scalp    Vitamin D deficiency      Past Surgical History:   Procedure Laterality Date    BREAST LUMPECTOMY      CARDIAC PACEMAKER PLACEMENT  04/05/2012    CORONARY ARTERY BYPASS GRAFT  07/2017    MASTECTOMY, PARTIAL      SKIN CANCER EXCISION      UPPER GASTROINTESTINAL ENDOSCOPY         Review of patient's allergies indicates:   Allergen Reactions    Benazepril Other (See Comments)     sleepy    Codeine Nausea And Vomiting    Polysporin [bacitracin-polymyxin b] Rash           Current Outpatient Medications:     albuterol (PROVENTIL/VENTOLIN HFA) 90 mcg/actuation inhaler, Inhale 1-2 puffs into the lungs 2 (two) times a day. Rescue, Disp: 0.1 g, Rfl: 2    amLODIPine (NORVASC) 5 MG tablet, TAKE 1 TABLET BY MOUTH  TWICE DAILY, Disp: 180 tablet, Rfl: 3    anastrozole (ARIMIDEX) 1 mg Tab, , Disp: , Rfl:     aspirin (ECOTRIN) 81 MG EC tablet, Take 81 mg by mouth once daily., Disp: , Rfl:     cloNIDine (CATAPRES) 0.1 MG tablet, TAKE 1 TABLET BY MOUTH EVERY DAY AS NEEDED FOR SYSTOLIC PRESSURE GREATER THAN 170, Disp: , Rfl:     co-enzyme Q-10 50 mg capsule, Take 100 mg by mouth once daily., Disp: , Rfl:     docusate sodium (COLACE) 100 MG capsule, Take 100 mg by mouth 2 (two) times daily., Disp: , Rfl:     DULoxetine (CYMBALTA) 20 MG capsule, TAKE 1 CAPSULE BY MOUTH  ONCE DAILY, Disp: 30 capsule, Rfl: 4    fluticasone propionate (FLONASE) 50 mcg/actuation nasal spray, , Disp: , Rfl:     gabapentin (NEURONTIN) 300 MG capsule, TAKE 1 CAPSULE BY MOUTH IN  THE EVENING, Disp: 90 capsule, Rfl: 3    loratadine (CLARITIN) 10 mg tablet, Take 10 mg by mouth once daily., Disp: , Rfl:     metformin (GLUCOPHAGE) 500 MG tablet, Take 500 mg by mouth 2 (two) times daily with meals., Disp: , Rfl:     metoprolol succinate (TOPROL-XL) 50 MG 24 hr tablet, TAKE 1 TABLET BY MOUTH ONCE DAILY, Disp: 90 tablet, Rfl: 3    MULTIVIT-MIN/FA/LUTEIN/ZEAXANT (MACULAR VITAMIN  "ORAL), Take by mouth., Disp: , Rfl:     olmesartan (BENICAR) 40 MG tablet, TAKE 1 TABLET BY MOUTH ONCE DAILY, Disp: 90 tablet, Rfl: 3    rosuvastatin (CRESTOR) 10 MG tablet, TAKE 1 TABLET BY MOUTH AT  BEDTIME, Disp: 90 tablet, Rfl: 3    traMADoL (ULTRAM) 50 mg tablet, Take 1 tablet (50 mg total) by mouth every 6 (six) hours as needed for Pain., Disp: 12 tablet, Rfl: 0    traMADoL (ULTRAM) 50 mg tablet, TAKE 1 TABLET (50 MG TOTAL) BY MOUTH EVERY 6 (SIX) HOURS AS NEEDED FOR PAIN., Disp: 28 tablet, Rfl: 5    vitamin D 1000 units Tab, Take 1,000 Units by mouth once daily., Disp: , Rfl:     hydrocortisone 2.5 % cream, Apply topically 2 (two) times daily., Disp: 453.6 g, Rfl: 2          Objective:   Vitals:  Blood pressure (!) 152/79, pulse 97, temperature 97.6 °F (36.4 °C), resp. rate 18, height 5' 3" (1.6 m), weight 73.6 kg (162 lb 3.2 oz).    Physical Examination:   GEN: no apparent distress, comfortable  HEAD: atraumatic and normocephalic  EYES: no pallor, no icterus.  She has a healing scabbed over surgical site at the right scalp, without satellite lesions or palpable lymphadenopathy at the neck  ENT:  no pharyngeal erythema, external ears WNL; no nasal discharge  NECK: no masses, thyroid normal, trachea midline, no LAD/LN's, supple  CV: RRR with no murmur,  the skin  at the sternal area is healed and closed, nontender without warmth or redness or fluctuance  CHEST: Normal respiratory effort; CTAB; distantbreath sounds; no wheeze or crackles  ABDOM: nontender and nondistended; soft;  no rebound/guarding, L/S NP  MUSC/Skeletal: ROM normal; no crepitus; joints normal  EXTREM: no clubbing, cyanosis, inflammation or swelling  SKIN: no rashes, lesions, ulcers, petechia  : no cvat  NEURO: grossly intact; motor/sensory WNL;  no tremors  PSYCH: normal mood, affect and behavior  LYMPH: normal cervical, supraclavicular, axillary and groin LN's  BREAST:the left breast shows postoperative change, nontender, without " palpable mass. The right breast is nontender without palpable mass.      Labs:     Radiology/Diagnostic Studies:    Mammogram 2/2019 showed 1.5 oval well circumscribed mass at 8 o'clock at L breast.  Recheck site with U/S now is stable at 8 o'clock of L breast.  Likely a lipoma, per radiologist.    Mamm 5/28/20 DIS Stable, recheck 1 year.  6/2021.    Call for Mamm DIS.        Assessment:   (1) 81 y.o. female with diagnosis of stage I right breast cancer treated with right partial mastectomy, adjuvant radiation therapy, and continues on adjuvant Arimidex now. Originally diagnosed in May 2015. No evidence of disease on physical exam.Due for mammogram in June 2021.    (2) hot flash symptoms secondary to Arimidex are managed with Cymbalta 20 mg by mouth daily.    (3)sleep apnea syndrome under management of pulmonary, make referral to Dr. Mancera at patient's request.    (4)atypical L chest pain     (5)recent removal of melanoma in situ from right scalp,   Margins clear on Moh's.  Observe for now.  Operative site at R scalp shows post op changes, with out mass or melanotic nodules being seen.    (6)Abnormal PET scan, for cardiology and ID eval?  Negative evaluation.    RTC 6 months.

## 2022-03-04 ENCOUNTER — TELEPHONE (OUTPATIENT)
Dept: HEMATOLOGY/ONCOLOGY | Facility: CLINIC | Age: 82
End: 2022-03-04
Payer: MEDICARE

## 2022-03-04 DIAGNOSIS — C50.411 MALIGNANT NEOPLASM OF UPPER-OUTER QUADRANT OF RIGHT FEMALE BREAST: Primary | ICD-10-CM

## 2022-03-04 DIAGNOSIS — Z17.0 MALIGNANT NEOPLASM OF UPPER-OUTER QUADRANT OF RIGHT BREAST IN FEMALE, ESTROGEN RECEPTOR POSITIVE: Primary | ICD-10-CM

## 2022-03-04 DIAGNOSIS — C50.411 MALIGNANT NEOPLASM OF UPPER-OUTER QUADRANT OF RIGHT BREAST IN FEMALE, ESTROGEN RECEPTOR POSITIVE: Primary | ICD-10-CM

## 2022-03-04 NOTE — TELEPHONE ENCOUNTER
----- Message from Enedelia Cross sent at 3/3/2022  4:27 PM CST -----  Please put in mammogram orders to [facility] for the patient.

## 2022-03-04 NOTE — TELEPHONE ENCOUNTER
----- Message from Enedelia Cross sent at 3/3/2022  4:27 PM CST -----  Please put in mammogram orders to [facility] for the patient.

## 2022-03-11 ENCOUNTER — OFFICE VISIT (OUTPATIENT)
Dept: ORTHOPEDICS | Facility: CLINIC | Age: 82
End: 2022-03-11
Payer: MEDICARE

## 2022-03-11 VITALS — WEIGHT: 162 LBS | BODY MASS INDEX: 28.7 KG/M2 | HEIGHT: 63 IN

## 2022-03-11 DIAGNOSIS — M47.816 LUMBAR FACET ARTHROPATHY: ICD-10-CM

## 2022-03-11 DIAGNOSIS — M51.36 DISC DEGENERATION, LUMBAR: Primary | ICD-10-CM

## 2022-03-11 DIAGNOSIS — M43.16 SPONDYLOLISTHESIS OF LUMBAR REGION: ICD-10-CM

## 2022-03-11 PROBLEM — K21.9 GASTROESOPHAGEAL REFLUX DISEASE: Status: ACTIVE | Noted: 2022-03-11

## 2022-03-11 PROBLEM — Z87.891 FORMER SMOKER: Status: ACTIVE | Noted: 2022-03-11

## 2022-03-11 PROBLEM — N39.0 URINARY TRACT INFECTION: Status: ACTIVE | Noted: 2022-03-11

## 2022-03-11 PROBLEM — Z78.0 POSTMENOPAUSAL: Status: ACTIVE | Noted: 2022-03-11

## 2022-03-11 PROBLEM — I49.3 VENTRICULAR PREMATURE BEATS: Status: ACTIVE | Noted: 2022-03-11

## 2022-03-11 PROBLEM — R06.2 WHEEZING: Status: ACTIVE | Noted: 2022-03-11

## 2022-03-11 PROBLEM — Z87.898 HISTORY OF ABNORMAL MAMMOGRAM: Status: ACTIVE | Noted: 2022-03-11

## 2022-03-11 PROBLEM — R06.02 SHORTNESS OF BREATH: Status: ACTIVE | Noted: 2022-03-11

## 2022-03-11 PROBLEM — I34.0 MITRAL VALVE INSUFFICIENCY: Status: ACTIVE | Noted: 2022-03-11

## 2022-03-11 PROBLEM — M54.50 LOW BACK PAIN: Status: ACTIVE | Noted: 2022-03-11

## 2022-03-11 PROBLEM — C50.919 MALIGNANT NEOPLASM OF BREAST: Status: ACTIVE | Noted: 2022-03-11

## 2022-03-11 PROBLEM — Z79.899 HIGH RISK MEDICATION USE: Status: ACTIVE | Noted: 2022-03-11

## 2022-03-11 PROBLEM — J20.9 ACUTE BRONCHITIS: Status: ACTIVE | Noted: 2022-03-11

## 2022-03-11 PROBLEM — J06.9 ACUTE UPPER RESPIRATORY INFECTION: Status: ACTIVE | Noted: 2022-03-11

## 2022-03-11 PROBLEM — R31.9 HEMATURIA: Status: ACTIVE | Noted: 2022-03-11

## 2022-03-11 PROBLEM — M19.90 OSTEOARTHRITIS: Status: ACTIVE | Noted: 2022-03-11

## 2022-03-11 PROBLEM — R00.0 SINUS TACHYCARDIA: Status: ACTIVE | Noted: 2022-03-11

## 2022-03-11 PROBLEM — I27.20 PULMONARY HYPERTENSION: Status: ACTIVE | Noted: 2022-03-11

## 2022-03-11 PROBLEM — E55.9 VITAMIN D DEFICIENCY: Status: ACTIVE | Noted: 2022-03-11

## 2022-03-11 PROBLEM — N20.0 CALCULUS OF KIDNEY: Status: ACTIVE | Noted: 2022-03-11

## 2022-03-11 PROBLEM — Z23 NEED FOR IMMUNIZATION AGAINST INFLUENZA: Status: ACTIVE | Noted: 2022-03-11

## 2022-03-11 PROBLEM — M85.80 OSTEOPENIA: Status: ACTIVE | Noted: 2022-03-11

## 2022-03-11 PROBLEM — R09.89 PULMONARY CONGESTION: Status: ACTIVE | Noted: 2022-03-11

## 2022-03-11 PROCEDURE — 99213 PR OFFICE/OUTPT VISIT, EST, LEVL III, 20-29 MIN: ICD-10-PCS | Mod: S$GLB,,, | Performed by: PHYSICIAN ASSISTANT

## 2022-03-11 PROCEDURE — 99213 OFFICE O/P EST LOW 20 MIN: CPT | Mod: S$GLB,,, | Performed by: PHYSICIAN ASSISTANT

## 2022-03-11 NOTE — PROGRESS NOTES
Subjective:       Patient ID: Mirta Guerin is a 81 y.o. female.    Chief Complaint: Pain of the Lumbar Spine (Patient is having lumbar pain, she is needing refills on her medicine.)      History of Present Illness  Patient is here follow-up for her chronic low back pain.  She also uses tramadol approximately twice a day to help manage her symptoms.  She also uses activity avoidance or management.  She has had lumbar rhizotomy procedures in the past that have helped.  Right now she is content with managing with activity modification and a minimal amount of tramadol.    Current Medications  Current Outpatient Medications   Medication Sig Dispense Refill    albuterol (PROVENTIL/VENTOLIN HFA) 90 mcg/actuation inhaler Inhale 1-2 puffs into the lungs 2 (two) times a day. Rescue 0.1 g 2    amLODIPine (NORVASC) 5 MG tablet TAKE 1 TABLET BY MOUTH  TWICE DAILY 180 tablet 3    anastrozole (ARIMIDEX) 1 mg Tab       aspirin (ECOTRIN) 81 MG EC tablet Take 81 mg by mouth once daily.      cloNIDine (CATAPRES) 0.1 MG tablet TAKE 1 TABLET BY MOUTH EVERY DAY AS NEEDED FOR SYSTOLIC PRESSURE GREATER THAN 170      co-enzyme Q-10 50 mg capsule Take 100 mg by mouth once daily.      docusate sodium (COLACE) 100 MG capsule Take 100 mg by mouth 2 (two) times daily.      DULoxetine (CYMBALTA) 20 MG capsule TAKE 1 CAPSULE BY MOUTH  ONCE DAILY 30 capsule 4    fluticasone propionate (FLONASE) 50 mcg/actuation nasal spray       gabapentin (NEURONTIN) 300 MG capsule TAKE 1 CAPSULE BY MOUTH IN  THE EVENING 90 capsule 3    hydrocortisone 2.5 % cream Apply topically 2 (two) times daily. 453.6 g 2    loratadine (CLARITIN) 10 mg tablet Take 10 mg by mouth once daily.      metformin (GLUCOPHAGE) 500 MG tablet Take 500 mg by mouth 2 (two) times daily with meals.      metoprolol succinate (TOPROL-XL) 50 MG 24 hr tablet TAKE 1 TABLET BY MOUTH ONCE DAILY 90 tablet 3    MULTIVIT-MIN/FA/LUTEIN/ZEAXANT (MACULAR VITAMIN ORAL) Take by mouth.       olmesartan (BENICAR) 40 MG tablet TAKE 1 TABLET BY MOUTH ONCE DAILY 90 tablet 3    rosuvastatin (CRESTOR) 10 MG tablet TAKE 1 TABLET BY MOUTH AT  BEDTIME 90 tablet 3    traMADoL (ULTRAM) 50 mg tablet Take 1 tablet (50 mg total) by mouth every 6 (six) hours as needed for Pain. 12 tablet 0    traMADoL (ULTRAM) 50 mg tablet TAKE 1 TABLET (50 MG TOTAL) BY MOUTH EVERY 6 (SIX) HOURS AS NEEDED FOR PAIN. 28 tablet 5    vitamin D 1000 units Tab Take 1,000 Units by mouth once daily.       No current facility-administered medications for this visit.       Allergies  Review of patient's allergies indicates:   Allergen Reactions    Bacitracin zinc-polymyxin b Rash    Adhesive Rash    Benazepril Other (See Comments)     sleepy    Codeine Nausea And Vomiting    Polysporin [bacitracin-polymyxin b] Rash       Past Medical History  Past Medical History:   Diagnosis Date    Anxiety     AV block     Breast cancer     Cardiac pacemaker in situ     Coronary artery disease     Diabetes mellitus     GERD (gastroesophageal reflux disease)     HLD (hyperlipidemia)     Hypertension     Lung disease     Melanoma     Mitral valve disorder     NEGRITA (obstructive sleep apnea)     Prediabetes     Psoriasis     Spinal stenosis     Squamous cell carcinoma of skin     right anterior scalp    Vitamin D deficiency        Surgical History  Past Surgical History:   Procedure Laterality Date    BREAST LUMPECTOMY      CARDIAC PACEMAKER PLACEMENT  04/05/2012    CORONARY ARTERY BYPASS GRAFT  07/2017    MASTECTOMY, PARTIAL      SKIN CANCER EXCISION      UPPER GASTROINTESTINAL ENDOSCOPY         Family History:   Family History   Problem Relation Age of Onset    Cancer Mother         ovarian    Coronary artery disease Father     Stroke Father     Stroke Brother     Stroke Maternal Grandfather     Cancer Paternal Grandmother         breast    Stroke Paternal Grandfather        Social History:   Social History      Socioeconomic History    Marital status:    Tobacco Use    Smoking status: Current Every Day Smoker     Packs/day: 0.25     Years: 50.00     Pack years: 12.50     Types: Cigarettes     Start date: 1956    Smokeless tobacco: Never Used    Tobacco comment: 4-6 a day as of 8/16/21   Substance and Sexual Activity    Alcohol use: Yes     Comment: occasional    Drug use: No    Sexual activity: Never       Hospitalization/Major Diagnostic Procedure:     Review of Systems     General/Constitutional:  Chills denies. Fatigue denies. Fever denies. Weight gain denies. Weight loss denies.    Respiratory:  Shortness of breath denies.    Cardiovascular:  Chest pain denies.    Gastrointestinal:  Constipation denies. Diarrhea denies. Nausea denies. Vomiting denies.     Hematology:  Easy bruising denies. Prolonged bleeding denies.     Genitourinary:  Frequent urination denies. Pain in lower back denies. Painful urination denies.     Musculoskeletal:  See HPI for details    Skin:  Rash denies.    Neurologic:  Dizziness denies. Gait abnormalities denies. Seizures denies. Tingling/Numbess denies.    Psychiatric:  Anxiety denies. Depressed mood denies.     Objective:   Vital Signs: There were no vitals filed for this visit.     Physical Exam      General Examination:     Constitutional: The patient is alert and oriented to lace person and time. Mood is pleasant.     Head/Face: Normal facial features normal eyebrows    Eyes: Normal extraocular motion bilaterally    Lungs: Respirations are equal and unlabored    Gait is coordinated.    Cardiovascular: There are no swelling or varicosities present.    Lymphatic: Negative for adenopathy    Skin: Normal    Neurological: Level of consciousness normal. Oriented to place person and time and situation    Psychiatric: Oriented to time place person and situation     Patient is elderly and she appears fatigued today.  lumbar exam: Antalgic gait and antalgic sit to stand. Lumbar  range of motion remains diminished but is within functional limitations for her.    Assessment:       1. Disc degeneration, lumbar    2. Spondylolisthesis of lumbar region    3. Lumbar facet arthropathy        Plan:       Mirta was seen today for pain.    Diagnoses and all orders for this visit:    Disc degeneration, lumbar  -     X-Ray Lumbar Spine Ap And Lateral  -     X-Ray Pelvis Routine AP    Spondylolisthesis of lumbar region  -     X-Ray Lumbar Spine Ap And Lateral  -     X-Ray Pelvis Routine AP    Lumbar facet arthropathy  -     X-Ray Lumbar Spine Ap And Lateral  -     X-Ray Pelvis Routine AP         No follow-ups on file.    We discussed repeating the facet rhizotomy procedure but the patient has some significant problems remembering who did this and wear.  I did refresh her memory however at this time she is content managing with modified activities.  She really does not take an overwhelming amount of medication to manage and lateral mild prescribing tramadol for her so that she may maintain her quality of life.  Follow-up in 3 months or sooner    This note was created using Dragon voice recognition software that occasionally misinterpreted phrases or words.

## 2022-05-02 ENCOUNTER — TELEPHONE (OUTPATIENT)
Dept: ORTHOPEDICS | Facility: CLINIC | Age: 82
End: 2022-05-02

## 2022-05-02 DIAGNOSIS — M43.16 SPONDYLOLISTHESIS OF LUMBAR REGION: ICD-10-CM

## 2022-05-02 DIAGNOSIS — M51.36 DISC DEGENERATION, LUMBAR: ICD-10-CM

## 2022-05-02 DIAGNOSIS — M47.816 LUMBAR FACET ARTHROPATHY: ICD-10-CM

## 2022-05-02 RX ORDER — TRAMADOL HYDROCHLORIDE 50 MG/1
50 TABLET ORAL EVERY 6 HOURS PRN
Qty: 28 TABLET | Refills: 5 | Status: SHIPPED | OUTPATIENT
Start: 2022-05-02 | End: 2022-10-20 | Stop reason: SDUPTHER

## 2022-05-02 NOTE — TELEPHONE ENCOUNTER
----- Message from Mitzi Batres sent at 5/2/2022  1:09 PM CDT -----  Phone #: [phone]  Patient is requesting a refill of Tramadol        Pharmacy:   [facility]/pharmacy #[id number] - [place], [place] - [address]  [address] [place] [address]  Phone: [phone] Fax: [phone]

## 2022-05-04 ENCOUNTER — HOSPITAL ENCOUNTER (OUTPATIENT)
Dept: RADIOLOGY | Facility: HOSPITAL | Age: 82
Discharge: HOME | End: 2022-05-04
Attending: INTERNAL MEDICINE
Payer: MEDICARE

## 2022-05-04 DIAGNOSIS — C50.411 MALIGNANT NEOPLASM OF UPPER-OUTER QUADRANT OF RIGHT FEMALE BREAST: ICD-10-CM

## 2022-05-04 DIAGNOSIS — C50.411 MALIGNANT NEOPLASM OF UPPER-OUTER QUADRANT OF RIGHT BREAST IN FEMALE, ESTROGEN RECEPTOR POSITIVE: ICD-10-CM

## 2022-05-04 DIAGNOSIS — Z17.0 MALIGNANT NEOPLASM OF UPPER-OUTER QUADRANT OF RIGHT BREAST IN FEMALE, ESTROGEN RECEPTOR POSITIVE: ICD-10-CM

## 2022-05-05 ENCOUNTER — OFFICE VISIT (OUTPATIENT)
Dept: PODIATRY | Facility: CLINIC | Age: 82
End: 2022-05-05
Payer: MEDICARE

## 2022-05-05 VITALS
SYSTOLIC BLOOD PRESSURE: 150 MMHG | HEIGHT: 63 IN | BODY MASS INDEX: 27.46 KG/M2 | WEIGHT: 155 LBS | TEMPERATURE: 98 F | HEART RATE: 92 BPM | DIASTOLIC BLOOD PRESSURE: 81 MMHG

## 2022-05-05 DIAGNOSIS — E11.49 TYPE II DIABETES MELLITUS WITH NEUROLOGICAL MANIFESTATIONS: Primary | ICD-10-CM

## 2022-05-05 DIAGNOSIS — L60.0 INGROWN NAIL: ICD-10-CM

## 2022-05-05 DIAGNOSIS — E11.9 COMPREHENSIVE DIABETIC FOOT EXAMINATION, TYPE 2 DM, ENCOUNTER FOR: ICD-10-CM

## 2022-05-05 PROCEDURE — 99999 PR PBB SHADOW E&M-EST. PATIENT-LVL IV: ICD-10-PCS | Mod: PBBFAC,,, | Performed by: PODIATRIST

## 2022-05-05 PROCEDURE — 99213 PR OFFICE/OUTPT VISIT, EST, LEVL III, 20-29 MIN: ICD-10-PCS | Mod: S$PBB,,, | Performed by: PODIATRIST

## 2022-05-05 PROCEDURE — 99214 OFFICE O/P EST MOD 30 MIN: CPT | Mod: PBBFAC,PN | Performed by: PODIATRIST

## 2022-05-05 PROCEDURE — 99999 PR PBB SHADOW E&M-EST. PATIENT-LVL IV: CPT | Mod: PBBFAC,,, | Performed by: PODIATRIST

## 2022-05-05 PROCEDURE — 99213 OFFICE O/P EST LOW 20 MIN: CPT | Mod: S$PBB,,, | Performed by: PODIATRIST

## 2022-05-05 NOTE — PROGRESS NOTES
Subjective:       Patient ID: Mirta Guerin is a 81 y.o. female.    Chief Complaint: Diabetes Mellitus, Nail Problem, and Follow-up  Patient presents today for followup she is a history of chronically ingrown toenails with signs of infection especially on her left hallux.     Follow-up  Associated symptoms include arthralgias, joint swelling and a rash.   Nail Problem  Associated symptoms include arthralgias, joint swelling and a rash.   Ingrown Toenail  Associated symptoms include arthralgias, joint swelling and a rash.        Review of Systems   Musculoskeletal: Positive for arthralgias and joint swelling.   Skin: Positive for color change, rash and wound.   All other systems reviewed and are negative.      Objective:      Physical Exam  Constitutional:       Appearance: She is well-developed.   Cardiovascular:      Pulses:           Dorsalis pedis pulses are 1+ on the right side and 1+ on the left side.        Posterior tibial pulses are 1+ on the right side and 1+ on the left side.   Pulmonary:      Effort: Pulmonary effort is normal.   Musculoskeletal:         General: Deformity present. Normal range of motion.   Feet:      Right foot:      Protective Sensation: 4 sites tested. 1 site sensed.     Skin integrity: Dry skin present.      Left foot:      Protective Sensation: 4 sites tested. 1 site sensed.     Skin integrity: Dry skin present.   Skin:     General: Skin is warm.      Capillary Refill: Capillary refill takes more than 3 seconds.   Neurological:      Deep Tendon Reflexes: Reflexes abnormal.   Psychiatric:         Behavior: Behavior normal.         Thought Content: Thought content normal.         Judgment: Judgment normal.       Patient is noted to have positive erythema positive edema at the medial lateral aspect of the patient's left hallux upon debridement of the lateral border left hallux there is purulent drainage noted as well as obvious signs of infection and pain to palpation. Patient is  noted to have a well-defined area of hyperkeratotic tissue on the dorsal lateral aspect of the fifth digit left there is positive pain noted upon palpation of this area no sign of infection is noted. patient's previously noted psoriasis is more well-controlled on today's visit that ever seen it since it started seeing the patient there are mild erythematous plaques or areas of skin breakdown especially on the heel.        Assessment:       1. Type II diabetes mellitus with neurological manifestations    2. Comprehensive diabetic foot examination, type 2 DM, encounter for    3. Ingrown nail        Plan:        Following examination I aggressively debrided both the medial and lateral border of the patient's left hallux I did advise the patient is obvious signs of infection with purulent drainage noted at the lateral border left hallux. Sterile saline was used to flush and irrigated the lateral border left hallux bacitracin ointment and a well-padded dry dressing applied patient advised to keep antibiotic ointment on the area for the next 3 days if this does not completely resolve if it continues to be red and painful we need to see her back for followup sooner than the regularly scheduled 12 week appointment. I debrided the hyperkeratotic tissue from the fifth digit left patient stated that this felt much better I advised her this is likely due to a shoe rubbing and irritation of this area recommended followup 12 weeks. .Patient states her psoriasis is doing as good as it's been in many years and is very well controlled especially on her feet.  Patient relates she has been under lot of stress with family issues of this definitely aggravates her psoriasis.Complete diabetic evaluation was performed today. Pt developed an area of psoriatic breakdown on her left heel this needs to be monitored closely. Calmoseptine ointment disp.   Patient states this ointment has been wonderful and see only thing that has really helped  the psoriasis on her heels.  Patient relates today that the gabapentin continues to work very well.  Patient did have a severely incurvated ingrown toenail on the lateral border of the left hallux.  After removing the nail the patient indicated felt much better bacitracin ointment a light dressing applied patient will monitor this area closely.  Patient states that she is recently started using and all natural native American ointment on the areas where she has psoriasis and is made a dramatic improvement and while she knows the psoriasis is never going to go away it definitely keeps it better controlled.  Patient states her psoriasis is being well controlled with an all natural organic compound cream that she has been applying.  This note was created using "[employer] voice recognition software that occasionally misinterpreted phrases or words.

## 2022-05-09 ENCOUNTER — TELEPHONE (OUTPATIENT)
Dept: HEMATOLOGY/ONCOLOGY | Facility: CLINIC | Age: 82
End: 2022-05-09

## 2022-05-09 ENCOUNTER — HOSPITAL ENCOUNTER (OUTPATIENT)
Dept: RADIOLOGY | Facility: HOSPITAL | Age: 82
Discharge: HOME | End: 2022-05-09
Attending: INTERNAL MEDICINE
Payer: MEDICARE

## 2022-05-09 VITALS — WEIGHT: 155 LBS | HEIGHT: 63 IN | BODY MASS INDEX: 27.46 KG/M2

## 2022-05-09 PROCEDURE — 76642 ULTRASOUND BREAST LIMITED: CPT | Mod: TC,PO,RT

## 2022-05-09 PROCEDURE — 77065 DX MAMMO INCL CAD UNI: CPT | Mod: TC,PO,RT

## 2022-05-10 NOTE — TELEPHONE ENCOUNTER
Spoke with pt. She verbalized understanding.   
Tell her the mammogram showed a small lipoma on the right chest, no cancer was seen.  Follow-up mammogram will be done in its 1 year.    She was last seen in the office on March 2nd.  She will return to clinic here in late August or early September.  
Detail Level: Simple
Detail Level: Generalized

## 2022-06-06 ENCOUNTER — HOSPITAL ENCOUNTER (OUTPATIENT)
Dept: RADIOLOGY | Facility: HOSPITAL | Age: 82
Discharge: HOME | End: 2022-06-06
Attending: NURSE PRACTITIONER
Payer: MEDICARE

## 2022-06-06 DIAGNOSIS — N20.0 KIDNEY STONES: ICD-10-CM

## 2022-06-06 DIAGNOSIS — N28.1 RENAL CYSTS, ACQUIRED, BILATERAL: ICD-10-CM

## 2022-06-06 PROCEDURE — 74018 RADEX ABDOMEN 1 VIEW: CPT | Mod: TC,FY

## 2022-06-06 PROCEDURE — 74018 RADEX ABDOMEN 1 VIEW: CPT | Mod: 26,,, | Performed by: RADIOLOGY

## 2022-06-06 PROCEDURE — 74018 XR ABDOMEN AP 1 VIEW: ICD-10-PCS | Mod: 26,,, | Performed by: RADIOLOGY

## 2022-06-23 ENCOUNTER — OFFICE VISIT (OUTPATIENT)
Dept: UROLOGY | Facility: CLINIC | Age: 82
End: 2022-06-23
Payer: MEDICARE

## 2022-06-23 VITALS
BODY MASS INDEX: 27.39 KG/M2 | SYSTOLIC BLOOD PRESSURE: 140 MMHG | HEART RATE: 94 BPM | HEIGHT: 63 IN | DIASTOLIC BLOOD PRESSURE: 82 MMHG | WEIGHT: 154.56 LBS

## 2022-06-23 DIAGNOSIS — N20.0 KIDNEY STONES: Primary | ICD-10-CM

## 2022-06-23 PROCEDURE — 99214 PR OFFICE/OUTPT VISIT, EST, LEVL IV, 30-39 MIN: ICD-10-PCS | Mod: S$PBB,,, | Performed by: NURSE PRACTITIONER

## 2022-06-23 PROCEDURE — 99214 OFFICE O/P EST MOD 30 MIN: CPT | Mod: S$PBB,,, | Performed by: NURSE PRACTITIONER

## 2022-06-23 PROCEDURE — 99214 OFFICE O/P EST MOD 30 MIN: CPT | Mod: PBBFAC,PN | Performed by: NURSE PRACTITIONER

## 2022-06-23 PROCEDURE — 99999 PR PBB SHADOW E&M-EST. PATIENT-LVL IV: ICD-10-PCS | Mod: PBBFAC,,, | Performed by: NURSE PRACTITIONER

## 2022-06-23 PROCEDURE — 99999 PR PBB SHADOW E&M-EST. PATIENT-LVL IV: CPT | Mod: PBBFAC,,, | Performed by: NURSE PRACTITIONER

## 2022-06-23 NOTE — PROGRESS NOTES
[facility] Note  Staff: LUCIO AlcarazC    PCP: MD Erich    Chief Complaint: [id number] of Kidney stones    Subjective:        HPI: Mirta Guerin is a 81 y.o. female presents today in office for routine f/up visit.  Pt was last evaluated by me on 12/8/21 as a NP regarding hx of ureteral stone.     HX:  On 11/26/21 pt was evaluated at [facility] ED with c/o RIGHT flank pain that initial onset was 7 hrs prior.  Pain went to right flank area and radiates into groin.  She has never had kidney stones in the past, but has had UTIs in the past.     UA in ER showed 3+ Blood, UA Micro showed >100 RBCs, 1 WBC, occasional bacteria     CT RSS on 11/27/21 showed:  1. Mild right hydroureteronephrosis secondary to an obstructive 4 mm stone within the distal right ureter at the ureterovesicular junction.  Prominent nonspecific right perinephric and periureteral fat stranding, with superimposed infection not excluded.  Additional nonobstructive right renal stones.  2. Cholelithiasis without evidence of acute cholecystitis.  3. Diverticulosis coli without evidence of diverticulitis.  4. Additional findings, as above.  The preliminary and final reports are concordant.     OV 12/21:  Pt remains with right sided lower back pains.  She is unsure at this time whether she passed the 4 mm stone from ED visit.  She is no longer seeing blood in her urine.  No problems with urination noted at this time.    TODAY:  Pt overall doing well with no complaints voiced at this time.  No recent flank pain, dysuria or difficulty urinating.  She has not had anymore stone episodes since last ov with me.  No UA done in office today.    KUB imaging performed on 6/6/22 for recheck of stone burden showed the following:  FINDINGS:  Gas and stool in nondistended colon and small amount of gas in nondistended small bowel with just very mild nonspecific, nonobstructive generalized increase in amount of bowel gas.   Vascular calcifications.  postoperative changes near the chest along with cardiac pacing leads.     Osseous degenerative changes.  SI joint sclerosis.  Some small calcifications projected over the kidneys could be renal stones.  Typical opaque ureteral stone is not identified although the stool does partially obscure the urinary tracts.  If indicated clinically renal stone protocol CT could be obtained     Impression:  Nonspecific nonobstructive generalized increase in amount of bowel gas.  Multiple vascular calcifications.  Small calcifications projecting over the kidneys question also small bilateral renal stones       REVIEW OF SYSTEMS:  A comprehensive 10 system review was performed and is negative except as noted above in HPI    PMHx:  Past Medical History:   Diagnosis Date    Anxiety     AV block     Breast cancer     Cardiac pacemaker in situ     Coronary artery disease     Diabetes mellitus     GERD (gastroesophageal reflux disease)     HLD (hyperlipidemia)     Hypertension     Lung disease     Melanoma     Mitral valve disorder     NEGRITA (obstructive sleep apnea)     Prediabetes     Psoriasis     Spinal stenosis     Squamous cell carcinoma of skin     right anterior scalp    Vitamin D deficiency      PSHx:  Past Surgical History:   Procedure Laterality Date    BREAST CYST ASPIRATION      BREAST LUMPECTOMY      CARDIAC PACEMAKER PLACEMENT  04/05/2012    CORONARY ARTERY BYPASS GRAFT  07/2017    MASTECTOMY, PARTIAL      SKIN CANCER EXCISION      UPPER GASTROINTESTINAL ENDOSCOPY       Allergies:  Bacitracin zinc-polymyxin b, Adhesive, Benazepril, Codeine, and Polysporin [bacitracin-polymyxin b]    Medications: reviewed     Objective:     Vitals:    06/23/22 1130   BP: (!) 140/82   Pulse: 94     Physical Exam  Vitals reviewed.   Constitutional:       Appearance: She is well-developed.   HENT:      Head: Normocephalic and atraumatic.   Eyes:      Conjunctiva/sclera: Conjunctivae normal.       Pupils: Pupils are equal, round, and reactive to light.   Cardiovascular:      Rate and Rhythm: Normal rate and regular rhythm.      Heart sounds: Normal heart sounds.   Pulmonary:      Effort: Pulmonary effort is normal.      Breath sounds: Normal breath sounds.   Abdominal:      General: Bowel sounds are normal.      Palpations: Abdomen is soft.   Musculoskeletal:         General: Normal range of motion.      Cervical back: Normal range of motion and neck supple.   Skin:     General: Skin is warm and dry.   Neurological:      Mental Status: She is alert and oriented to person, place, and time.      Deep Tendon Reflexes: Reflexes are normal and symmetric.   Psychiatric:         Behavior: Behavior normal.         Thought Content: Thought content normal.         Judgment: Judgment normal.       Assessment:       1. Kidney stones          Plan:     Pt doing well with no other stone episodes.  Pt would like to defer any future imaging for checking of stones unless she becomes symptomatic in the future.  Therefore we will not schedule another KUB in 6 months.    F[facility] [place] [place] [facility] for routine recheck of symptoms at that time due to being closer to pt's residence.    [facility]: Active    Melissa Myles, JOSEP-C

## 2022-07-21 ENCOUNTER — LAB VISIT (OUTPATIENT)
Dept: LAB | Facility: HOSPITAL | Age: 82
End: 2022-07-21
Attending: NURSE PRACTITIONER
Payer: MEDICARE

## 2022-07-21 DIAGNOSIS — E11.9 DIABETES MELLITUS WITHOUT COMPLICATION: ICD-10-CM

## 2022-07-21 DIAGNOSIS — E78.5 DYSLIPIDEMIA: ICD-10-CM

## 2022-07-21 LAB
CHOLEST SERPL-MCNC: 156 MG/DL (ref 120–199)
CHOLEST/HDLC SERPL: 3.3 {RATIO} (ref 2–5)
ESTIMATED AVG GLUCOSE: 120 MG/DL (ref 68–131)
HBA1C MFR BLD: 5.8 % (ref 4–5.6)
HDLC SERPL-MCNC: 48 MG/DL (ref 40–75)
HDLC SERPL: 30.8 % (ref 20–50)
LDLC SERPL CALC-MCNC: 83.4 MG/DL (ref 63–159)
NONHDLC SERPL-MCNC: 108 MG/DL
TRIGL SERPL-MCNC: 123 MG/DL (ref 30–150)

## 2022-07-21 PROCEDURE — 36415 COLL VENOUS BLD VENIPUNCTURE: CPT | Performed by: NURSE PRACTITIONER

## 2022-07-21 PROCEDURE — 80061 LIPID PANEL: CPT | Performed by: NURSE PRACTITIONER

## 2022-07-21 PROCEDURE — 83036 HEMOGLOBIN GLYCOSYLATED A1C: CPT | Performed by: NURSE PRACTITIONER

## 2022-08-03 ENCOUNTER — HOSPITAL ENCOUNTER (OUTPATIENT)
Dept: RADIOLOGY | Facility: HOSPITAL | Age: 82
Discharge: HOME | End: 2022-08-03
Attending: NURSE PRACTITIONER
Payer: MEDICARE

## 2022-08-03 DIAGNOSIS — N22 CALCULUS OF URINARY TRACT IN DISEASES CLASSIFIED ELSEWHERE: ICD-10-CM

## 2022-08-03 PROCEDURE — 76770 US EXAM ABDO BACK WALL COMP: CPT | Mod: 26,,, | Performed by: RADIOLOGY

## 2022-08-03 PROCEDURE — 74176 CT ABD & PELVIS W/O CONTRAST: CPT | Mod: 26,,, | Performed by: RADIOLOGY

## 2022-08-03 PROCEDURE — 74176 CT RENAL STONE STUDY ABD PELVIS WO: ICD-10-PCS | Mod: 26,,, | Performed by: RADIOLOGY

## 2022-08-03 PROCEDURE — 76770 US RETROPERITONEAL COMPLETE: ICD-10-PCS | Mod: 26,,, | Performed by: RADIOLOGY

## 2022-08-03 PROCEDURE — 76770 US EXAM ABDO BACK WALL COMP: CPT | Mod: TC

## 2022-08-03 PROCEDURE — 74176 CT ABD & PELVIS W/O CONTRAST: CPT | Mod: TC

## 2022-08-04 ENCOUNTER — OFFICE VISIT (OUTPATIENT)
Dept: PODIATRY | Facility: CLINIC | Age: 82
End: 2022-08-04
Payer: MEDICARE

## 2022-08-04 VITALS
SYSTOLIC BLOOD PRESSURE: 152 MMHG | DIASTOLIC BLOOD PRESSURE: 77 MMHG | TEMPERATURE: 98 F | HEART RATE: 75 BPM | BODY MASS INDEX: 26.58 KG/M2 | HEIGHT: 63 IN | WEIGHT: 150 LBS

## 2022-08-04 DIAGNOSIS — L60.0 INGROWN NAIL: Primary | ICD-10-CM

## 2022-08-04 DIAGNOSIS — E11.9 COMPREHENSIVE DIABETIC FOOT EXAMINATION, TYPE 2 DM, ENCOUNTER FOR: ICD-10-CM

## 2022-08-04 DIAGNOSIS — E11.9 DIABETES MELLITUS WITHOUT COMPLICATION: ICD-10-CM

## 2022-08-04 DIAGNOSIS — E11.49 TYPE II DIABETES MELLITUS WITH NEUROLOGICAL MANIFESTATIONS: ICD-10-CM

## 2022-08-04 PROCEDURE — 99213 PR OFFICE/OUTPT VISIT, EST, LEVL III, 20-29 MIN: ICD-10-PCS | Mod: S$PBB,,, | Performed by: PODIATRIST

## 2022-08-04 PROCEDURE — 99213 OFFICE O/P EST LOW 20 MIN: CPT | Mod: S$PBB,,, | Performed by: PODIATRIST

## 2022-08-04 PROCEDURE — 99999 PR PBB SHADOW E&M-EST. PATIENT-LVL IV: CPT | Mod: PBBFAC,,, | Performed by: PODIATRIST

## 2022-08-04 PROCEDURE — 99999 PR PBB SHADOW E&M-EST. PATIENT-LVL IV: ICD-10-PCS | Mod: PBBFAC,,, | Performed by: PODIATRIST

## 2022-08-04 PROCEDURE — 99214 OFFICE O/P EST MOD 30 MIN: CPT | Mod: PBBFAC,PN | Performed by: PODIATRIST

## 2022-08-07 NOTE — PROGRESS NOTES
Subjective:       Patient ID: Mirta Guerin is a 82 y.o. female.    Chief Complaint: Diabetes Mellitus, Follow-up, Ingrown Toenail, and Nail Problem  Patient presents today for followup she is a history of chronically ingrown toenails with signs of infection especially on her left hallux.     Nail Problem  Associated symptoms include arthralgias, joint swelling and a rash.   Follow-up  Associated symptoms include arthralgias, joint swelling and a rash.   Ingrown Toenail  Associated symptoms include arthralgias, joint swelling and a rash.        Review of Systems   Musculoskeletal: Positive for arthralgias and joint swelling.   Skin: Positive for color change, rash and wound.   All other systems reviewed and are negative.      Objective:      Physical Exam  Constitutional:       Appearance: She is well-developed.   Cardiovascular:      Pulses:           Dorsalis pedis pulses are 1+ on the right side and 1+ on the left side.        Posterior tibial pulses are 1+ on the right side and 1+ on the left side.   Pulmonary:      Effort: Pulmonary effort is normal.   Musculoskeletal:         General: Deformity present. Normal range of motion.   Feet:      Right foot:      Protective Sensation: 4 sites tested. 1 site sensed.     Skin integrity: Dry skin present.      Left foot:      Protective Sensation: 4 sites tested. 1 site sensed.     Skin integrity: Dry skin present.   Skin:     General: Skin is warm.      Capillary Refill: Capillary refill takes more than 3 seconds.   Neurological:      Deep Tendon Reflexes: Reflexes abnormal.   Psychiatric:         Behavior: Behavior normal.         Thought Content: Thought content normal.         Judgment: Judgment normal.       Patient is noted to have positive erythema positive edema at the medial lateral aspect of the patient's left hallux upon debridement of the lateral border left hallux there is purulent drainage noted as well as obvious signs of infection and pain to  palpation. Patient is noted to have a well-defined area of hyperkeratotic tissue on the dorsal lateral aspect of the fifth digit left there is positive pain noted upon palpation of this area no sign of infection is noted. patient's previously noted psoriasis is more well-controlled on today's visit that ever seen it since it started seeing the patient there are mild erythematous plaques or areas of skin breakdown especially on the heel.        Assessment:       1. Ingrown nail    2. Type II diabetes mellitus with neurological manifestations    3. Diabetes mellitus without complication    4. Comprehensive diabetic foot examination, type 2 DM, encounter for        Plan:        Following examination I aggressively debrided both the medial and lateral border of the patient's left hallux I did advise the patient is obvious signs of infection with purulent drainage noted at the lateral border left hallux. Sterile saline was used to flush and irrigated the lateral border left hallux bacitracin ointment and a well-padded dry dressing applied patient advised to keep antibiotic ointment on the area for the next 3 days if this does not completely resolve if it continues to be red and painful we need to see her back for followup sooner than the regularly scheduled 12 week appointment. I debrided the hyperkeratotic tissue from the fifth digit left patient stated that this felt much better I advised her this is likely due to a shoe rubbing and irritation of this area recommended followup 12 weeks. .Patient states her psoriasis is doing as good as it's been in many years and is very well controlled especially on her feet.  Patient relates she has been under lot of stress with family issues of this definitely aggravates her psoriasis.Complete diabetic evaluation was performed today. Pt developed an area of psoriatic breakdown on her left heel this needs to be monitored closely. Calmoseptine ointment disp.   Patient states this  ointment has been wonderful and see only thing that has really helped the psoriasis on her heels.  Patient relates today that the gabapentin continues to work very well.  Patient did have a severely incurvated ingrown toenail on the lateral border of the left hallux.  After removing the nail the patient indicated felt much better bacitracin ointment a light dressing applied patient will monitor this area closely.  Patient states that she is recently started using and all natural native American ointment on the areas where she has psoriasis and is made a dramatic improvement and while she knows the psoriasis is never going to go away it definitely keeps it better controlled.  Patient states her psoriasis is being well controlled with an all natural organic compound cream that she has been applying.  This note was created using [employer] voice recognition software that occasionally misinterpreted phrases or words.

## 2022-08-18 DIAGNOSIS — R05.9 COUGH: ICD-10-CM

## 2022-08-18 RX ORDER — ALBUTEROL SULFATE 90 UG/1
AEROSOL, METERED RESPIRATORY (INHALATION)
Qty: 18 G | Refills: 0 | Status: SHIPPED | OUTPATIENT
Start: 2022-08-18

## 2022-08-31 ENCOUNTER — TELEPHONE (OUTPATIENT)
Dept: CARDIOLOGY | Facility: CLINIC | Age: 82
End: 2022-08-31
Payer: MEDICARE

## 2022-08-31 NOTE — TELEPHONE ENCOUNTER
----- Message from Jo Omer MA sent at 8/31/2022  3:55 PM CDT -----  Contact: I would be interested  Type: Needs Medical Advice    Who Called: VIKKI MURPHY [[id number]]  Best Call Back Number: [phone]  Inquiry/Question: Please call VIKKI MURPHY [[id number]] regarding appt concerns       Thank you~

## 2022-09-07 ENCOUNTER — TELEPHONE (OUTPATIENT)
Dept: CARDIOLOGY | Facility: CLINIC | Age: 82
End: 2022-09-07
Payer: MEDICARE

## 2022-09-07 NOTE — TELEPHONE ENCOUNTER
----- Message from Jo Omer MA sent at 9/7/2022  4:12 PM CDT -----  Contact: VIKKI MURPHY [[id number]]  Type: Needs Medical Advice    Who Called: VIKKI MURPHY [[id number]]  Best Call Back Number: [phone]  Inquiry/Question: Please call VIKKI MURPHY [[id number]] regarding missed call from the clinic      Thank you~

## 2022-09-12 ENCOUNTER — OFFICE VISIT (OUTPATIENT)
Dept: HEMATOLOGY/ONCOLOGY | Facility: CLINIC | Age: 82
End: 2022-09-12
Payer: MEDICARE

## 2022-09-12 VITALS
BODY MASS INDEX: 26.96 KG/M2 | TEMPERATURE: 98 F | HEART RATE: 84 BPM | RESPIRATION RATE: 20 BRPM | HEIGHT: 63 IN | DIASTOLIC BLOOD PRESSURE: 87 MMHG | WEIGHT: 152.19 LBS | SYSTOLIC BLOOD PRESSURE: 164 MMHG

## 2022-09-12 DIAGNOSIS — R91.8 MULTIPLE PULMONARY NODULES: ICD-10-CM

## 2022-09-12 DIAGNOSIS — R91.8 ABNORMAL CT SCAN OF LUNG: Primary | ICD-10-CM

## 2022-09-12 PROCEDURE — 99214 PR OFFICE/OUTPT VISIT, EST, LEVL IV, 30-39 MIN: ICD-10-PCS | Mod: S$GLB,,, | Performed by: INTERNAL MEDICINE

## 2022-09-12 PROCEDURE — 99214 OFFICE O/P EST MOD 30 MIN: CPT | Mod: S$GLB,,, | Performed by: INTERNAL MEDICINE

## 2022-09-12 NOTE — LETTER
September 13, 2022        James Jung MD  [address] [place] [address]             [facility] - Hematology Oncology  [address] [facility] [address]  Phone: [phone]  Fax: [phone]   Patient: Mirta Guerin   MR Number: [id number]   YOB: 1940   Date of Visit: 9/12/2022       Dear Dr. Jung:    Thank you for referring Mirta Guerin to me for evaluation. Below are the relevant portions of my assessment and plan of care.            If you have questions, please do not hesitate to call me. I look forward to following Mirta along with you.    Sincerely,      ZOE Fontenot MD           CC    No Recipients

## 2022-09-13 ENCOUNTER — TELEPHONE (OUTPATIENT)
Dept: HEMATOLOGY/ONCOLOGY | Facility: CLINIC | Age: 82
End: 2022-09-13

## 2022-09-13 NOTE — PROGRESS NOTES
[facility] Hematology Oncology In Office Subsequent Encounter note    22  Subjective:      Patient ID:   Mirta Guerin  82 y.o. female  1940  James Jung M.D.. Barbie Castillo M.D., Tony Goldberg.      Chief Complaint:breast cancer follow-up    HPI:  82 y.o. female with diagnosis of stage I right breast cancer.Diagnosed in 2015. Treated with partial mastectomy and sentinel node biopsy, followed by adjuvant radiation therapy, she started adjuvant Arimidex in 2016.    ERP was positive, PRP was positive, HER-2/asaf was negative.hot flash symptoms are controlled on Cymbalta. Joint pain symptoms have not been a problem for her. She has history of hypertension, diabetes, GERD,hypercholesterolemia, DJD, and sleep apnea syndrome.    She has history of left partial mastectomy and sentinel node biopsy, pacemaker placement, facial surgery, right and left knee replacement, complete hysterectomy. She had CABG surgery in 2016.    She had a skin cancer removed from her right scalp ,she believes it was a melanoma, per Dr. Lee in 2018, she also saw Dr. Hi for wide excision of the area.    She had stage 0 melanoma in situ, margins were clear on Moh's surgery/    Since her CABG surgery, she complains of continued substernal chest pain. CAT scan in 2016 at [facility] did show some inflammation in the soft tissue in the sternal area. I suspect she has costochrondral joint sx.    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    Her dad had CVA, her mother  of ovarian cancer at age 86, her brother has had 2 or 3 CVA, her sister has had bladder cancer, she has a daughter with hypoglycemia.    She is allergic to codeine benazepril, Polysporin, and triple antibiotics ointment.    Dr. James Jung is her primary care physician.   Dr. Juarez is her cardiologist.  Dr. Talbot is her GYN.    She  has been diagnosed with sleep apnea syndrome.    She is tolerating the Arimadex fine,  With the addition of Cymbalta daily.  HF sx controlled.  She has history of restless leg syndrome and peripheral neuropathy symptoms in the fingers and feet.  She has hx of osteopenia, took Reclast in the past.  Hx LBP, S/P injection x's 9, sx decreased.    She is 5 foot 2 inches tall and weighs 181 pounds    She got 2/2 covid 19 vaccines, she has not had covid 19 infection.  She plans to take booster, also flu and pneumovax.    Appetite is good but she has lost 20# over the last year.    Dr Lee of dermatology has removed several skin cancers.    For evaluation of 20# weight loss, a PET scan was done.  This showed uptake in sternum.  Hx of CABG surgery, c/o pain at sternal area.  Pet suggests infection or post op changes.    Referred back to Dr. Juarez and to Dr. Saravia for eval?  Conclusion is that PET uptake at sternum is post op change and not infection.  Observe for now.    She has sleep apnea, non compliant in using the C pap mask.  DIS mamm negative for cancer.    CAT of abdomin multiple pulmonary nodules, small, likely granulomas, multiple cysts seen.  Re check CAT 2/2023.  RTC 3/2023.    ROS:   GEN: normal without any fever, night sweats or weight loss  HEENT: normal with no HA's, sore throat, stiff neck, changes in vision  CV: see history of present illness.   no CP, SOB, PND, STEPHENS or orthopnea  PULM: see history of present illness.   no SOB, cough, hemoptysis, sputum or pleuritic pain  GI: normal with no abdominal pain, nausea, vomiting, constipation, diarrhea, melanotic stools, BRBPR, or hematemesis  : normal with no hematuria, dysuria  BREAST: see history of present illness  SKIN: normal with no rash, erythema, bruising, or swelling     Past Medical History:   Diagnosis Date    Anxiety     AV block     Breast cancer     Cardiac pacemaker in situ     Coronary artery disease     Diabetes mellitus     GERD  (gastroesophageal reflux disease)     HLD (hyperlipidemia)     Hypertension     Lung disease     Melanoma     Mitral valve disorder     NEGRITA (obstructive sleep apnea)     Prediabetes     Psoriasis     Spinal stenosis     Squamous cell carcinoma of skin     right anterior scalp    Vitamin D deficiency      Past Surgical History:   Procedure Laterality Date    BREAST CYST ASPIRATION      BREAST LUMPECTOMY      CARDIAC PACEMAKER PLACEMENT  04/05/2012    CORONARY ARTERY BYPASS GRAFT  07/2017    MASTECTOMY, PARTIAL      SKIN CANCER EXCISION      UPPER GASTROINTESTINAL ENDOSCOPY         Review of patient's allergies indicates:   Allergen Reactions    Benazepril Other (See Comments)     sleepy    Codeine Nausea And Vomiting    Polysporin [bacitracin-polymyxin b] Rash           Current Outpatient Medications:     albuterol (PROVENTIL/VENTOLIN HFA) 90 mcg/actuation inhaler, INHALE 1-2 PUFFS INTO THE LUNGS 2 (TWO) TIMES A DAY., Disp: 18 g, Rfl: 0    amLODIPine (NORVASC) 5 MG tablet, TAKE 1 TABLET BY MOUTH  TWICE DAILY, Disp: 180 tablet, Rfl: 3    anastrozole (ARIMIDEX) 1 mg Tab, , Disp: , Rfl:     aspirin (ECOTRIN) 81 MG EC tablet, Take 81 mg by mouth once daily., Disp: , Rfl:     cloNIDine (CATAPRES) 0.1 MG tablet, TAKE 1 TABLET BY MOUTH EVERY DAY AS NEEDED FOR SYSTOLIC PRESSURE GREATER THAN 170, Disp: , Rfl:     co-enzyme Q-10 50 mg capsule, Take 100 mg by mouth once daily., Disp: , Rfl:     docusate sodium (COLACE) 100 MG capsule, Take 100 mg by mouth 2 (two) times daily., Disp: , Rfl:     DULoxetine (CYMBALTA) 20 MG capsule, TAKE 1 CAPSULE BY MOUTH  ONCE DAILY, Disp: 30 capsule, Rfl: 4    fluticasone propionate (FLONASE) 50 mcg/actuation nasal spray, , Disp: , Rfl:     gabapentin (NEURONTIN) 300 MG capsule, TAKE 1 CAPSULE BY MOUTH IN  THE EVENING, Disp: 90 capsule, Rfl: 3    ketoconazole (NIZORAL) 2 % cream, Apply to rash under breasts and abdomen bid when needed., Disp: 60 g, Rfl: 1    loratadine (CLARITIN) 10 mg tablet,  "Take 10 mg by mouth once daily., Disp: , Rfl:     metformin (GLUCOPHAGE) 500 MG tablet, Take 500 mg by mouth 2 (two) times daily with meals., Disp: , Rfl:     metoprolol succinate (TOPROL-XL) 50 MG 24 hr tablet, TAKE 1 TABLET BY MOUTH ONCE DAILY, Disp: 90 tablet, Rfl: 3    MULTIVIT-MIN/FA/LUTEIN/ZEAXANT (MACULAR VITAMIN ORAL), Take by mouth., Disp: , Rfl:     mupirocin (BACTROBAN) 2 % ointment, Apply to cat scratches twice a day after washing with soap and water., Disp: 30 g, Rfl: 0    olmesartan (BENICAR) 40 MG tablet, TAKE 1 TABLET BY MOUTH ONCE DAILY, Disp: 90 tablet, Rfl: 3    rosuvastatin (CRESTOR) 10 MG tablet, TAKE 1 TABLET BY MOUTH AT  BEDTIME, Disp: 90 tablet, Rfl: 3    traMADoL (ULTRAM) 50 mg tablet, Take 1 tablet (50 mg total) by mouth every 6 (six) hours as needed for Pain., Disp: 28 tablet, Rfl: 5    vitamin D 1000 units Tab, Take 1,000 Units by mouth once daily., Disp: , Rfl:     hydrocortisone 2.5 % cream, Apply topically 2 (two) times daily., Disp: 453.6 g, Rfl: 2          Objective:   Vitals:  Blood pressure (!) 164/87, pulse 84, temperature 97.6 °F (36.4 °C), resp. rate 20, height 5' 3" (1.6 m), weight 69 kg (152 lb 3.2 oz).    Physical Examination:   GEN: no apparent distress, comfortable  HEAD: atraumatic and normocephalic  EYES: no pallor, no icterus.  She has a healing scabbed over surgical site at the right scalp, without satellite lesions or palpable lymphadenopathy at the neck  ENT:  no pharyngeal erythema, external ears WNL; no nasal discharge  NECK: no masses, thyroid normal, trachea midline, no LAD/LN's, supple  CV: RRR with no murmur,  the skin  at the sternal area is healed and closed, nontender without warmth or redness or fluctuance  CHEST: Normal respiratory effort; CTAB; distantbreath sounds; no wheeze or crackles  ABDOM: nontender and nondistended; soft;  no rebound/guarding, L/S NP  MUSC/Skeletal: ROM normal; no crepitus; joints normal  EXTREM: no clubbing, cyanosis, inflammation " or swelling  SKIN: no rashes, lesions, ulcers, petechia  : no cvat  NEURO: grossly intact; motor/sensory WNL;  no tremors  PSYCH: normal mood, affect and behavior  LYMPH: normal cervical, supraclavicular, axillary and groin LN's  BREAST:the left breast shows postoperative change, nontender, without palpable mass. The right breast is nontender without palpable mass.      Labs:     Radiology/Diagnostic Studies:    Mammogram 2/2019 showed 1.5 oval well circumscribed mass at 8 o'clock at L breast.  Recheck site with U/S now is stable at 8 o'clock of L breast.  Likely a lipoma, per radiologist.        Assessment:   (1) 82 y.o. female with diagnosis of stage I right breast cancer treated with right partial mastectomy, adjuvant radiation therapy, and continues on adjuvant Arimidex now. Originally diagnosed in May 2015. No evidence of disease on physical exam.    (2) hot flash symptoms secondary to Arimidex are managed with Cymbalta 20 mg by mouth daily.    (3)sleep apnea syndrome under management of pulmonary,  Dr. Mancera     (4)pulmonary nodules, granulomata?, re check 2/2023.    (5)recent removal of melanoma in situ from right scalp,   Margins clear on Moh's.  Observe for now.  Operative site at R scalp shows post op changes, with out mass or melanotic nodules being seen.    (6)Abnormal PET scan, for cardiology and ID eval?  Negative evaluation.    RTC 6 months.

## 2022-09-15 ENCOUNTER — TELEPHONE (OUTPATIENT)
Dept: HEMATOLOGY/ONCOLOGY | Facility: CLINIC | Age: 82
End: 2022-09-15

## 2022-09-15 ENCOUNTER — OFFICE VISIT (OUTPATIENT)
Dept: PULMONOLOGY | Facility: CLINIC | Age: 82
End: 2022-09-15
Payer: MEDICARE

## 2022-09-15 VITALS
HEART RATE: 91 BPM | BODY MASS INDEX: 26.22 KG/M2 | WEIGHT: 148 LBS | OXYGEN SATURATION: 96 % | SYSTOLIC BLOOD PRESSURE: 160 MMHG | DIASTOLIC BLOOD PRESSURE: 85 MMHG | HEIGHT: 63 IN

## 2022-09-15 DIAGNOSIS — Z17.0 MALIGNANT NEOPLASM OF UPPER-OUTER QUADRANT OF RIGHT BREAST IN FEMALE, ESTROGEN RECEPTOR POSITIVE: Primary | ICD-10-CM

## 2022-09-15 DIAGNOSIS — C50.411 MALIGNANT NEOPLASM OF UPPER-OUTER QUADRANT OF RIGHT BREAST IN FEMALE, ESTROGEN RECEPTOR POSITIVE: Primary | ICD-10-CM

## 2022-09-15 DIAGNOSIS — G47.33 OSA (OBSTRUCTIVE SLEEP APNEA): Primary | ICD-10-CM

## 2022-09-15 PROCEDURE — 99213 OFFICE O/P EST LOW 20 MIN: CPT | Mod: S$GLB,,, | Performed by: NURSE PRACTITIONER

## 2022-09-15 PROCEDURE — 99213 PR OFFICE/OUTPT VISIT, EST, LEVL III, 20-29 MIN: ICD-10-PCS | Mod: S$GLB,,, | Performed by: NURSE PRACTITIONER

## 2022-09-15 NOTE — PROGRESS NOTES
SUBJECTIVE:    Patient ID: Mirta Guerin is a 82 y.o. female.    Chief Complaint: No chief complaint on file.      Patient here today feeling well.  She is not sure why she is here today, appointment was scheduled at her last visit a year ago. She is still not wearing her CPAP states she can not tolerate it.  She smokes 6 cigarettes a day, denies dyspnea. She does cough occasionally.    Past Medical History:   Diagnosis Date    Anxiety     AV block     Breast cancer     Cardiac pacemaker in situ     Coronary artery disease     Diabetes mellitus     GERD (gastroesophageal reflux disease)     HLD (hyperlipidemia)     Hypertension     Lung disease     Melanoma     Mitral valve disorder     NEGRITA (obstructive sleep apnea)     Prediabetes     Psoriasis     Spinal stenosis     Squamous cell carcinoma of skin     right anterior scalp    Vitamin D deficiency      Past Surgical History:   Procedure Laterality Date    BREAST CYST ASPIRATION      BREAST LUMPECTOMY      CARDIAC PACEMAKER PLACEMENT  04/05/2012    CORONARY ARTERY BYPASS GRAFT  07/2017    MASTECTOMY, PARTIAL      SKIN CANCER EXCISION      UPPER GASTROINTESTINAL ENDOSCOPY       Family History   Problem Relation Age of Onset    Cancer Mother         ovarian    Coronary artery disease Father     Stroke Father     Stroke Brother     Stroke Maternal Grandfather     Cancer Paternal Grandmother         breast    Stroke Paternal Grandfather         Social History:   Marital Status:   Occupation: Data Unavailable  Alcohol History:  reports current alcohol use.  Tobacco History:  reports that she has been smoking cigarettes. She started smoking about 68 years ago. She has a 12.50 pack-year smoking history. She has never used smokeless tobacco.  Drug History:  reports no history of drug use.    Review of patient's allergies indicates:   Allergen Reactions    Benazepril Other (See Comments)     sleepy    Codeine Nausea And Vomiting    Polysporin  [bacitracin-polymyxin b] Rash       Current Outpatient Medications   Medication Sig Dispense Refill    albuterol (PROVENTIL/VENTOLIN HFA) 90 mcg/actuation inhaler INHALE 1-2 PUFFS INTO THE LUNGS 2 (TWO) TIMES A DAY. 18 g 0    amLODIPine (NORVASC) 5 MG tablet TAKE 1 TABLET BY MOUTH  TWICE DAILY 180 tablet 3    anastrozole (ARIMIDEX) 1 mg Tab       aspirin (ECOTRIN) 81 MG EC tablet Take 81 mg by mouth once daily.      cloNIDine (CATAPRES) 0.1 MG tablet TAKE 1 TABLET BY MOUTH EVERY DAY AS NEEDED FOR SYSTOLIC PRESSURE GREATER THAN 170      co-enzyme Q-10 50 mg capsule Take 100 mg by mouth once daily.      docusate sodium (COLACE) 100 MG capsule Take 100 mg by mouth 2 (two) times daily.      DULoxetine (CYMBALTA) 20 MG capsule TAKE 1 CAPSULE BY MOUTH  ONCE DAILY 30 capsule 4    fluticasone propionate (FLONASE) 50 mcg/actuation nasal spray       gabapentin (NEURONTIN) 300 MG capsule TAKE 1 CAPSULE BY MOUTH IN  THE EVENING 90 capsule 3    ketoconazole (NIZORAL) 2 % cream Apply to rash under breasts and abdomen bid when needed. 60 g 1    loratadine (CLARITIN) 10 mg tablet Take 10 mg by mouth once daily.      metformin (GLUCOPHAGE) 500 MG tablet Take 500 mg by mouth 2 (two) times daily with meals.      metoprolol succinate (TOPROL-XL) 50 MG 24 hr tablet TAKE 1 TABLET BY MOUTH ONCE DAILY 90 tablet 3    MULTIVIT-MIN/FA/LUTEIN/ZEAXANT (MACULAR VITAMIN ORAL) Take by mouth.      mupirocin (BACTROBAN) 2 % ointment Apply to cat scratches twice a day after washing with soap and water. 30 g 0    olmesartan (BENICAR) 40 MG tablet TAKE 1 TABLET BY MOUTH ONCE DAILY 90 tablet 3    rosuvastatin (CRESTOR) 10 MG tablet TAKE 1 TABLET BY MOUTH AT  BEDTIME 90 tablet 3    traMADoL (ULTRAM) 50 mg tablet Take 1 tablet (50 mg total) by mouth every 6 (six) hours as needed for Pain. 28 tablet 5    vitamin D 1000 units Tab Take 1,000 Units by mouth once daily.      hydrocortisone 2.5 % cream Apply topically 2 (two) times daily. 453.6 g 2     No  "current facility-administered medications for this visit.           General: feeling well   Eyes: Vision is good.  ENT:  No suffering with sinus issues any more   Heart:: No chest pain or palpitations.  Lungs: No cough, sputum, or wheezing.  GI: no N/V or reflux .  : no issues   Musculoskeletal: No joint pain or myalgias.  Skin: has had several skin cancers removed from face  Neuro: forgetful   Lymph: swelling occasionally   Psych: No anxiety or depression.  Endo:weight stable     OBJECTIVE:      BP (!) 160/85 (BP Location: Left arm, Patient Position: Sitting, BP Method: Medium (Manual))   Pulse 91   Ht 5' 3" (1.6 m)   Wt 67.1 kg (148 lb)   SpO2 96%   BMI 26.22 kg/m²     Physical Exam  GENERAL: Older patient in no distress.  HEENT: Pupils equal and reactive. Extraocular movements intact. Nose intact.      Pharynx moist.   NECK: Supple.   HEART: Regular rate and rhythm. No murmur or gallop auscultated.  LUNGS: Clear to auscultation and percussion. Lung excursion symmetrical. No change in fremitus. No adventitial noises.  ABDOMEN: Bowel sounds present. Non-tender, no masses palpated.  EXTREMITIES: Normal muscle tone and joint movement, no cyanosis or clubbing.   LYMPHATICS: No adenopathy palpated, no edema.  SKIN: Dry, intact, no lesions.   NEURO: Cranial nerves II-XII intact. Motor strength 5/5 bilaterally, upper and lower extremities.  PSYCH: Appropriate affect.    Assessment:       1. NEGRITA (obstructive sleep apnea)            Plan:       NEGRITA (obstructive sleep apnea)         You should sleep on your CPAP  You should quit smoking      Follow up if symptoms worsen or fail to improve.              "

## 2022-09-16 DIAGNOSIS — C50.411 MALIGNANT NEOPLASM OF UPPER-OUTER QUADRANT OF RIGHT BREAST IN FEMALE, ESTROGEN RECEPTOR POSITIVE: ICD-10-CM

## 2022-09-16 DIAGNOSIS — Z17.0 MALIGNANT NEOPLASM OF UPPER-OUTER QUADRANT OF RIGHT BREAST IN FEMALE, ESTROGEN RECEPTOR POSITIVE: ICD-10-CM

## 2022-09-20 RX ORDER — DULOXETIN HYDROCHLORIDE 20 MG/1
20 CAPSULE, DELAYED RELEASE ORAL DAILY
Qty: 90 CAPSULE | Refills: 4 | Status: SHIPPED | OUTPATIENT
Start: 2022-09-20 | End: 2023-12-11

## 2022-09-27 ENCOUNTER — OFFICE VISIT (OUTPATIENT)
Dept: CARDIOLOGY | Facility: CLINIC | Age: 82
End: 2022-09-27
Payer: MEDICARE

## 2022-09-27 ENCOUNTER — TELEPHONE (OUTPATIENT)
Dept: CARDIOLOGY | Facility: CLINIC | Age: 82
End: 2022-09-27

## 2022-09-27 VITALS
BODY MASS INDEX: 26.57 KG/M2 | HEART RATE: 72 BPM | HEIGHT: 63 IN | SYSTOLIC BLOOD PRESSURE: 120 MMHG | DIASTOLIC BLOOD PRESSURE: 80 MMHG | WEIGHT: 149.94 LBS

## 2022-09-27 DIAGNOSIS — I10 ESSENTIAL HYPERTENSION: ICD-10-CM

## 2022-09-27 DIAGNOSIS — Z95.0 CARDIAC PACEMAKER IN SITU: ICD-10-CM

## 2022-09-27 DIAGNOSIS — I25.810 CORONARY ARTERY DISEASE INVOLVING AUTOLOGOUS VEIN CORONARY BYPASS GRAFT WITHOUT ANGINA PECTORIS: ICD-10-CM

## 2022-09-27 DIAGNOSIS — Z95.0 CARDIAC PACEMAKER IN SITU: Primary | ICD-10-CM

## 2022-09-27 DIAGNOSIS — E11.9 DIABETES MELLITUS WITHOUT COMPLICATION: ICD-10-CM

## 2022-09-27 DIAGNOSIS — I49.5 SSS (SICK SINUS SYNDROME): ICD-10-CM

## 2022-09-27 DIAGNOSIS — E78.5 DYSLIPIDEMIA: ICD-10-CM

## 2022-09-27 DIAGNOSIS — Z95.1 HX OF CABG: Primary | ICD-10-CM

## 2022-09-27 PROCEDURE — 99214 PR OFFICE/OUTPT VISIT, EST, LEVL IV, 30-39 MIN: ICD-10-PCS | Mod: S$GLB,,, | Performed by: INTERNAL MEDICINE

## 2022-09-27 PROCEDURE — 99214 OFFICE O/P EST MOD 30 MIN: CPT | Mod: S$GLB,,, | Performed by: INTERNAL MEDICINE

## 2022-09-27 PROCEDURE — 93000 ELECTROCARDIOGRAM COMPLETE: CPT | Mod: S$GLB,,, | Performed by: INTERNAL MEDICINE

## 2022-09-27 PROCEDURE — 93000 EKG 12-LEAD: ICD-10-PCS | Mod: S$GLB,,, | Performed by: INTERNAL MEDICINE

## 2022-09-27 NOTE — PROGRESS NOTES
Subjective:    Patient ID:  Mirta Guerin is a 82 y.o. female patient here for evaluation Annual Exam      History of Present Illness:     Patient is here for follow-up visit  Denies chest pain or shortness of breath.  Denies recent fever cough chills or congestion.  Denies blood in the urine or blood in the stool.  Denies myalgias  Denies orthopnea or peripheral edema  Denies nausea vomiting or dyspepsia  No recent arm neck or jaw pain.    No lightheadedness or dizziness.  She has been having a longer memory recall that has been aggravating her.   She has lost some subq fat around the pacer site and can feel it move easily.            Review of patient's allergies indicates:   Allergen Reactions    Bacitracin zinc-polymyxin b Rash    Adhesive Rash    Benazepril Other (See Comments)     sleepy    Codeine Nausea And Vomiting    Polysporin [bacitracin-polymyxin b] Rash       Past Medical History:   Diagnosis Date    Anxiety     AV block     Breast cancer     Cardiac pacemaker in situ     Coronary artery disease     Diabetes mellitus     GERD (gastroesophageal reflux disease)     HLD (hyperlipidemia)     Hypertension     Lung disease     Melanoma     Mitral valve disorder     NEGRITA (obstructive sleep apnea)     Prediabetes     Psoriasis     Spinal stenosis     Squamous cell carcinoma of skin     right anterior scalp    Vitamin D deficiency      Past Surgical History:   Procedure Laterality Date    BREAST CYST ASPIRATION      BREAST LUMPECTOMY      CARDIAC PACEMAKER PLACEMENT  04/05/2012    CORONARY ARTERY BYPASS GRAFT  07/2017    MASTECTOMY, PARTIAL      SKIN CANCER EXCISION      UPPER GASTROINTESTINAL ENDOSCOPY       Social History     Tobacco Use    Smoking status: Every Day     Packs/day: 0.25     Years: 50.00     Pack years: 12.50     Types: Cigarettes     Start date: 1954    Smokeless tobacco: Never    Tobacco comments:     4-6 a day as of 8/16/21--got up to smoking a pack a day   Substance Use Topics     Alcohol use: Yes     Comment: occasional    Drug use: No        Review of Systems:    As noted in HPI in addition                Objective        Vitals:    09/27/22 1048   BP: 120/80   Pulse: 72       LIPIDS - LAST 2   Lab Results   Component Value Date    CHOL 156 07/21/2022    HDL 48 07/21/2022    LDLCALC 83.4 07/21/2022    TRIG 123 07/21/2022    CHOLHDL 30.8 07/21/2022       CBC - LAST 2  Lab Results   Component Value Date    WBC 9.27 12/20/2021    WBC 12.37 11/26/2021    RBC 5.06 12/20/2021    RBC 5.03 11/26/2021    HGB 15.4 12/20/2021    HGB 15.4 11/26/2021    HCT 48.2 12/20/2021    HCT 46.0 11/26/2021    MCV 95 12/20/2021    MCV 92 11/26/2021    MCH 30.4 12/20/2021    MCH 30.6 11/26/2021    MCHC 32.0 12/20/2021    MCHC 33.5 11/26/2021    RDW 14.0 12/20/2021    RDW 13.9 11/26/2021     12/20/2021     11/26/2021    MPV 9.3 12/20/2021    MPV 9.5 11/26/2021    GRAN 5.2 12/20/2021    GRAN 56.0 12/20/2021    LYMPH 2.7 12/20/2021    LYMPH 29.2 12/20/2021    MONO 0.9 12/20/2021    MONO 9.6 12/20/2021    BASO 0.08 12/20/2021    BASO 0.07 11/26/2021    NRBC 0 12/20/2021    NRBC 0 11/26/2021       CHEMISTRY & LIVER FUNCTION - LAST 2  Lab Results   Component Value Date     12/20/2021     11/26/2021    K 4.3 12/20/2021    K 4.3 11/26/2021     12/20/2021     11/26/2021    CO2 28 12/20/2021    CO2 22 (L) 11/26/2021    ANIONGAP 7 (L) 12/20/2021    ANIONGAP 14 11/26/2021    BUN 17 12/20/2021    BUN 28 (H) 11/26/2021    CREATININE 1.1 12/20/2021    CREATININE 1.3 11/26/2021    GLU 86 12/20/2021     (H) 11/26/2021    CALCIUM 9.7 12/20/2021    CALCIUM 10.1 11/26/2021    ALBUMIN 3.9 12/20/2021    ALBUMIN 3.7 11/26/2021    PROT 7.2 12/20/2021    PROT 7.3 11/26/2021    ALKPHOS 81 12/20/2021    ALKPHOS 89 11/26/2021    ALT 17 12/20/2021    ALT 14 11/26/2021    AST 21 12/20/2021    AST 22 11/26/2021    BILITOT 0.7 12/20/2021    BILITOT 0.4 11/26/2021        CARDIAC PROFILE - LAST 2  Lab  Results   Component Value Date     (H) 11/26/2021    TROPONINI 0.014 11/26/2021        COAGULATION - LAST 2  No results found for: LABPT, INR, APTT    ENDOCRINE & PSA - LAST 2  Lab Results   Component Value Date    HGBA1C 5.8 (H) 07/21/2022        ECHOCARDIOGRAM RESULTS  No results found for this or any previous visit.      CURRENT/PREVIOUS VISIT EKG  Results for orders placed or performed during the hospital encounter of 11/26/21   EKG 12-lead    Collection Time: 11/26/21  9:54 PM    Narrative    Test Reason : R11.2,    Vent. Rate : 074 BPM     Atrial Rate : 074 BPM     P-R Int : 170 ms          QRS Dur : 094 ms      QT Int : 408 ms       P-R-T Axes : 063 -43 030 degrees     QTc Int : 452 ms    Normal sinus rhythm  Possible Left atrial enlargement  Left axis deviation  LVH  Anteroseptal infarct ,age undetermined  Abnormal ECG  When compared with ECG of 23-MAR-1989 08:40,  Anteroseptal infarct is now Present  Confirmed by Hilario Roe MD ([id number]) on 11/29/2021 1:32:32 PM    Referred By: KATIE   SELF           Confirmed By:Hilario Roe MD     No valid procedures specified.   Results for orders placed during the hospital encounter of 11/17/21    Nuclear Stress - Cardiology Interpreted    Interpretation Summary    Normal myocardial perfusion scan. There is no evidence of myocardial ischemia or infarction.    The gated perfusion images showed an ejection fraction of 67% post stress. Normal ejection fraction is greater than 53%.    There is normal wall motion post stress.    LV cavity size is  and normal at stress.    The EKG portion of this study is negative for ischemia.    The patient reported chest pain during the stress test.    There were no arrhythmias during stress.        PHYSICAL EXAM  CONSTITUTIONAL: Well built, well nourished in no apparent distress  NECK: no carotid bruit, no JVD  LUNGS: CTA  CHEST WALL: no tenderness  HEART: regular rate and rhythm, S1, S2 normal, no murmur, click, rub or gallop    ABDOMEN: soft, non-tender; bowel sounds normal; no masses,  no organomegaly  EXTREMITIES: Extremities normal, no edema, no calf tenderness noted  NEURO: AAO X 3    I HAVE REVIEWED :    The vital signs, nurses notes, and all the pertinent radiology and labs.        Current Outpatient Medications   Medication Instructions    albuterol (PROVENTIL/VENTOLIN HFA) 90 mcg/actuation inhaler INHALE 1-2 PUFFS INTO THE LUNGS 2 (TWO) TIMES A DAY.    amLODIPine (NORVASC) 5 MG tablet TAKE 1 TABLET BY MOUTH  TWICE DAILY    anastrozole (ARIMIDEX) 1 mg Tab No dose, route, or frequency recorded.    aspirin (ECOTRIN) 81 mg, Oral, Daily    cloNIDine (CATAPRES) 0.1 MG tablet TAKE 1 TABLET BY MOUTH EVERY DAY AS NEEDED FOR SYSTOLIC PRESSURE GREATER THAN 170    co-enzyme Q-10 100 mg, Oral, Daily    docusate sodium (COLACE) 100 mg, Oral, 2 times daily    DULoxetine (CYMBALTA) 20 mg, Oral, Daily    fluticasone propionate (FLONASE) 50 mcg/actuation nasal spray No dose, route, or frequency recorded.    gabapentin (NEURONTIN) 300 MG capsule TAKE 1 CAPSULE BY MOUTH IN  THE EVENING    hydrocortisone 2.5 % cream Topical (Top), 2 times daily    ketoconazole (NIZORAL) 2 % cream Apply to rash under breasts and abdomen bid when needed.    loratadine (CLARITIN) 10 mg, Oral, Daily    metFORMIN (GLUCOPHAGE) 500 mg, Oral, 2 times daily with meals    metoprolol succinate (TOPROL-XL) 50 MG 24 hr tablet TAKE 1 TABLET BY MOUTH ONCE DAILY    MULTIVIT-MIN/FA/LUTEIN/ZEAXANT (MACULAR VITAMIN ORAL) Oral    mupirocin (BACTROBAN) 2 % ointment Apply to cat scratches twice a day after washing with soap and water.    olmesartan (BENICAR) 40 MG tablet TAKE 1 TABLET BY MOUTH ONCE DAILY    rosuvastatin (CRESTOR) 10 MG tablet TAKE 1 TABLET BY MOUTH AT  BEDTIME    traMADoL (ULTRAM) 50 mg, Oral, Every 6 hours PRN    vitamin D (VITAMIN D3) 1,000 Units, Oral, Daily          Assessment & Plan     Coronary artery disease involving autologous vein coronary bypass graft without  angina pectoris  No angina  Doing well  Continue ASA and Statin therapy      Essential hypertension  Controlled on current regimen   Continue the same    SSS (sick sinus syndrome)  Functioning Pacemaker in situ    Cardiac pacemaker in situ  [employer] Device  Check is warranted as it is time          No follow-ups on file.

## 2022-10-18 ENCOUNTER — TELEPHONE (OUTPATIENT)
Dept: CARDIOLOGY | Facility: CLINIC | Age: 82
End: 2022-10-18
Payer: MEDICARE

## 2022-10-18 DIAGNOSIS — Z95.0 CARDIAC PACEMAKER IN SITU: Primary | ICD-10-CM

## 2022-10-20 DIAGNOSIS — M51.36 DISC DEGENERATION, LUMBAR: ICD-10-CM

## 2022-10-20 DIAGNOSIS — M47.816 LUMBAR FACET ARTHROPATHY: ICD-10-CM

## 2022-10-20 DIAGNOSIS — M43.16 SPONDYLOLISTHESIS OF LUMBAR REGION: ICD-10-CM

## 2022-10-20 RX ORDER — TRAMADOL HYDROCHLORIDE 50 MG/1
50 TABLET ORAL EVERY 6 HOURS PRN
Qty: 14 TABLET | Refills: 0 | Status: SHIPPED | OUTPATIENT
Start: 2022-10-20 | End: 2022-10-24

## 2022-10-20 RX ORDER — TRAMADOL HYDROCHLORIDE 50 MG/1
TABLET ORAL
Qty: 28 TABLET | OUTPATIENT
Start: 2022-10-20

## 2022-10-20 NOTE — TELEPHONE ENCOUNTER
Patient requests a refill of Tramadol but has not been seen in the office for almost 6 months. I issued a small refill and she is scheduled to come in to the office next Monday

## 2022-10-24 ENCOUNTER — OFFICE VISIT (OUTPATIENT)
Dept: ORTHOPEDICS | Facility: CLINIC | Age: 82
End: 2022-10-24
Payer: MEDICARE

## 2022-10-24 VITALS — HEIGHT: 63 IN | BODY MASS INDEX: 26.4 KG/M2 | WEIGHT: 149 LBS

## 2022-10-24 DIAGNOSIS — M48.061 FORAMINAL STENOSIS OF LUMBAR REGION: ICD-10-CM

## 2022-10-24 DIAGNOSIS — M43.16 SPONDYLOLISTHESIS OF LUMBAR REGION: ICD-10-CM

## 2022-10-24 DIAGNOSIS — M51.36 DISC DEGENERATION, LUMBAR: Primary | ICD-10-CM

## 2022-10-24 DIAGNOSIS — M43.10 ACQUIRED SPONDYLOLISTHESIS: ICD-10-CM

## 2022-10-24 DIAGNOSIS — M47.816 LUMBAR FACET ARTHROPATHY: ICD-10-CM

## 2022-10-24 PROCEDURE — 99213 PR OFFICE/OUTPT VISIT, EST, LEVL III, 20-29 MIN: ICD-10-PCS | Mod: S$GLB,,, | Performed by: ORTHOPAEDIC SURGERY

## 2022-10-24 PROCEDURE — 99213 OFFICE O/P EST LOW 20 MIN: CPT | Mod: S$GLB,,, | Performed by: ORTHOPAEDIC SURGERY

## 2022-10-24 RX ORDER — TRAMADOL HYDROCHLORIDE 50 MG/1
50 TABLET ORAL EVERY 4 HOURS PRN
Qty: 42 TABLET | Refills: 2 | Status: SHIPPED | OUTPATIENT
Start: 2022-10-24 | End: 2022-11-14

## 2022-10-24 NOTE — PROGRESS NOTES
Subjective:       Patient ID: Mirta Guerin is a 82 y.o. female.    Chief Complaint: Pain of the Lumbar Spine (Patient is having lumbar pain, trouble walking standing bending, she has no radiating pain.)      History of Present Illness    Prior to meeting with the patient I reviewed the medical chart in [facility]. This included reviewing the previous progress notes from our office, review of the patient's last appointment with their primary care provider, review of any visits to the emergency room, and review of any pain management appointments or procedures.   Patient is here follow-up for chronic low back pain and bilateral lower extremity radiculitis dysesthesia.  Unfortunately she has some significant issues with decreased endurance and malaise since a COVID diagnosis about 2 months ago.  Overall symptoms for her lumbar spine are unchanged.    Current Medications  Current Outpatient Medications   Medication Sig Dispense Refill    albuterol (PROVENTIL/VENTOLIN HFA) 90 mcg/actuation inhaler INHALE 1-2 PUFFS INTO THE LUNGS 2 (TWO) TIMES A DAY. 18 g 0    amLODIPine (NORVASC) 5 MG tablet TAKE 1 TABLET BY MOUTH  TWICE DAILY 180 tablet 3    anastrozole (ARIMIDEX) 1 mg Tab       aspirin (ECOTRIN) 81 MG EC tablet Take 81 mg by mouth once daily.      cloNIDine (CATAPRES) 0.1 MG tablet TAKE 1 TABLET BY MOUTH EVERY DAY AS NEEDED FOR SYSTOLIC PRESSURE GREATER THAN 170      co-enzyme Q-10 50 mg capsule Take 100 mg by mouth once daily.      docusate sodium (COLACE) 100 MG capsule Take 100 mg by mouth 2 (two) times daily.      DULoxetine (CYMBALTA) 20 MG capsule Take 1 capsule (20 mg total) by mouth once daily. 90 capsule 4    fluticasone propionate (FLONASE) 50 mcg/actuation nasal spray       hydrocortisone 2.5 % cream Apply topically 2 (two) times daily. 453.6 g 2    ketoconazole (NIZORAL) 2 % cream Apply to rash under breasts and abdomen bid when needed. 60 g 1    loratadine (CLARITIN) 10 mg tablet Take 10 mg by mouth once  daily.      metformin (GLUCOPHAGE) 500 MG tablet Take 500 mg by mouth 2 (two) times daily with meals.      metoprolol succinate (TOPROL-XL) 50 MG 24 hr tablet TAKE 1 TABLET BY MOUTH ONCE DAILY 90 tablet 3    MULTIVIT-MIN/FA/LUTEIN/ZEAXANT (MACULAR VITAMIN ORAL) Take by mouth.      mupirocin (BACTROBAN) 2 % ointment Apply to cat scratches twice a day after washing with soap and water. 30 g 0    olmesartan (BENICAR) 40 MG tablet TAKE 1 TABLET BY MOUTH ONCE DAILY 90 tablet 3    rosuvastatin (CRESTOR) 10 MG tablet TAKE 1 TABLET BY MOUTH AT  BEDTIME 90 tablet 3    traMADoL (ULTRAM) 50 mg tablet Take 1 tablet (50 mg total) by mouth every 6 (six) hours as needed for Pain. 14 tablet 0    vitamin D 1000 units Tab Take 1,000 Units by mouth once daily.      gabapentin (NEURONTIN) 300 MG capsule TAKE 1 CAPSULE BY MOUTH IN  THE EVENING (Patient not taking: No sig reported) 90 capsule 3     No current facility-administered medications for this visit.       Allergies  Review of patient's allergies indicates:   Allergen Reactions    Bacitracin zinc-polymyxin b Rash    Adhesive Rash    Benazepril Other (See Comments)     sleepy    Codeine Nausea And Vomiting    Polysporin [bacitracin-polymyxin b] Rash       Past Medical History  Past Medical History:   Diagnosis Date    Anxiety     AV block     Breast cancer     Cardiac pacemaker in situ     Coronary artery disease     Diabetes mellitus     GERD (gastroesophageal reflux disease)     HLD (hyperlipidemia)     Hypertension     Lung disease     Melanoma     Mitral valve disorder     NEGRITA (obstructive sleep apnea)     Prediabetes     Psoriasis     Spinal stenosis     Squamous cell carcinoma of skin     right anterior scalp    Vitamin D deficiency        Surgical History  Past Surgical History:   Procedure Laterality Date    BREAST CYST ASPIRATION      BREAST LUMPECTOMY      CARDIAC PACEMAKER PLACEMENT  04/05/2012    CORONARY ARTERY BYPASS GRAFT  07/2017    MASTECTOMY, PARTIAL      SKIN  CANCER EXCISION      UPPER GASTROINTESTINAL ENDOSCOPY         Family History:   Family History   Problem Relation Age of Onset    Cancer Mother         ovarian    Coronary artery disease Father     Stroke Father     Stroke Brother     Stroke Maternal Grandfather     Cancer Paternal Grandmother         breast    Stroke Paternal Grandfather        Social History:   Social History     Socioeconomic History    Marital status:    Tobacco Use    Smoking status: Every Day     Packs/day: 0.25     Years: 50.00     Pack years: 12.50     Types: Cigarettes     Start date: 1954    Smokeless tobacco: Never    Tobacco comments:     4-6 a day as of 8/16/21--got up to smoking a pack a day   Substance and Sexual Activity    Alcohol use: Yes     Comment: occasional    Drug use: No    Sexual activity: Never       Hospitalization/Major Diagnostic Procedure:     Review of Systems     General/Constitutional:  Chills denies. Fatigue denies. Fever denies. Weight gain denies. Weight loss denies.    Respiratory:  Shortness of breath denies.    Cardiovascular:  Chest pain denies.    Gastrointestinal:  Constipation denies. Diarrhea denies. Nausea denies. Vomiting denies.     Hematology:  Easy bruising denies. Prolonged bleeding denies.     Genitourinary:  Frequent urination denies. Pain in lower back denies. Painful urination denies.     Musculoskeletal:  See HPI for details    Skin:  Rash denies.    Neurologic:  Dizziness denies. Gait abnormalities denies. Seizures denies. Tingling/Numbess denies.    Psychiatric:  Anxiety denies. Depressed mood denies.     Objective:   Vital Signs: There were no vitals filed for this visit.     Physical Exam      General Examination:     Constitutional: The patient is alert and oriented to lace person and time. Mood is pleasant.     Head/Face: Normal facial features normal eyebrows    Eyes: Normal extraocular motion bilaterally    Lungs: Respirations are equal and unlabored    Gait is  coordinated.    Cardiovascular: There are no swelling or varicosities present.    Lymphatic: Negative for adenopathy    Skin: Normal    Neurological: Level of consciousness normal. Oriented to place person and time and situation    Psychiatric: Oriented to time place person and situation    Patient is elderly and she appears fatigued today.  lumbar exam: Antalgic gait and antalgic sit to stand. Lumbar range of motion remains diminished but is within functional limitations for her.    XRAY Report/ Interpretation:  Lumbar AP and lateral x-rays taken in the office today reviewed the patient demonstrates advanced multilevel lumbar degenerative disc disease and facet arthropathy with foraminal stenosis and spondylolisthesis      Assessment:       1. Disc degeneration, lumbar    2. Spondylolisthesis of lumbar region    3. Lumbar facet arthropathy          Plan:       Mirta was seen today for pain.    Diagnoses and all orders for this visit:    Disc degeneration, lumbar  -     X-Ray Lumbar Spine Ap And Lateral    Spondylolisthesis of lumbar region  -     X-Ray Lumbar Spine Ap And Lateral    Lumbar facet arthropathy  -     X-Ray Lumbar Spine Ap And Lateral         No follow-ups on file.  This is to attest that the physician's assistant Reuben Dunbar served in the capacity as a scribe for this patient's encounter.  This is also to verify that I have reviewed the patient's history and helped formulate the treatment plan and discussed it with the physician's assistant.  I have actively participated  in the evaluation and treatment plan for this patient visit.  The treatment plan and medical decision-making is as outlined below:    At her advanced age the patient is not a good candidate for surgery.  I will continue to prescribe the tramadol for her to help her manage her symptoms.  Follow-up in 3-5 months or sooner if needed.    Treatment options were discussed with regards to the nature of the medical condition.  Conservative pain intervention and surgical options were discussed in detail. The probability of success of each separate treatment option was discussed. The patient expressed a clear understanding of the treatment options. With regards to surgery, the procedure risk, benefits, complications, and outcomes were discussed. No guarantees were given with regards to surgical outcome.   The risk of complications, morbidity, and mortality of patient management decisions have been made at the time of this visit. These are associated with the patient's problems, diagnostic procedures and treatment options. This includes the possible management options selected and those considered but not selected by the patient after shared medical decision making we discussed with the patient.     This note was created using Dragon voice recognition software that occasionally misinterpreted phrases or words.

## 2022-11-03 ENCOUNTER — HOSPITAL ENCOUNTER (OUTPATIENT)
Dept: RADIOLOGY | Facility: HOSPITAL | Age: 82
Discharge: HOME | End: 2022-11-03
Attending: INTERNAL MEDICINE
Payer: MEDICARE

## 2022-11-03 VITALS — HEIGHT: 63 IN | BODY MASS INDEX: 26.17 KG/M2 | WEIGHT: 147.69 LBS

## 2022-11-03 DIAGNOSIS — Z17.0 MALIGNANT NEOPLASM OF UPPER-OUTER QUADRANT OF RIGHT BREAST IN FEMALE, ESTROGEN RECEPTOR POSITIVE: ICD-10-CM

## 2022-11-03 DIAGNOSIS — C50.411 MALIGNANT NEOPLASM OF UPPER-OUTER QUADRANT OF RIGHT BREAST IN FEMALE, ESTROGEN RECEPTOR POSITIVE: ICD-10-CM

## 2022-11-03 PROCEDURE — 77062 BREAST TOMOSYNTHESIS BI: CPT | Mod: TC

## 2022-11-03 PROCEDURE — 77066 MAMMO DIGITAL DIAGNOSTIC BILAT WITH TOMO: ICD-10-PCS | Mod: 26,,, | Performed by: RADIOLOGY

## 2022-11-03 PROCEDURE — 77062 BREAST TOMOSYNTHESIS BI: CPT | Mod: 26,,, | Performed by: RADIOLOGY

## 2022-11-03 PROCEDURE — 77062 MAMMO DIGITAL DIAGNOSTIC BILAT WITH TOMO: ICD-10-PCS | Mod: 26,,, | Performed by: RADIOLOGY

## 2022-11-03 PROCEDURE — 77066 DX MAMMO INCL CAD BI: CPT | Mod: 26,,, | Performed by: RADIOLOGY

## 2022-11-17 ENCOUNTER — OFFICE VISIT (OUTPATIENT)
Dept: PODIATRY | Facility: CLINIC | Age: 82
End: 2022-11-17
Payer: MEDICARE

## 2022-11-17 VITALS
WEIGHT: 145 LBS | HEART RATE: 68 BPM | BODY MASS INDEX: 25.69 KG/M2 | HEIGHT: 63 IN | SYSTOLIC BLOOD PRESSURE: 145 MMHG | DIASTOLIC BLOOD PRESSURE: 87 MMHG | TEMPERATURE: 97 F

## 2022-11-17 DIAGNOSIS — E11.49 TYPE II DIABETES MELLITUS WITH NEUROLOGICAL MANIFESTATIONS: ICD-10-CM

## 2022-11-17 DIAGNOSIS — L60.0 INGROWN NAIL: Primary | ICD-10-CM

## 2022-11-17 DIAGNOSIS — E11.9 COMPREHENSIVE DIABETIC FOOT EXAMINATION, TYPE 2 DM, ENCOUNTER FOR: ICD-10-CM

## 2022-11-17 PROCEDURE — 99213 OFFICE O/P EST LOW 20 MIN: CPT | Mod: PBBFAC,PN | Performed by: PODIATRIST

## 2022-11-17 PROCEDURE — 99999 PR PBB SHADOW E&M-EST. PATIENT-LVL III: CPT | Mod: PBBFAC,,, | Performed by: PODIATRIST

## 2022-11-17 PROCEDURE — 99999 PR PBB SHADOW E&M-EST. PATIENT-LVL III: ICD-10-PCS | Mod: PBBFAC,,, | Performed by: PODIATRIST

## 2022-11-17 PROCEDURE — 99213 PR OFFICE/OUTPT VISIT, EST, LEVL III, 20-29 MIN: ICD-10-PCS | Mod: S$PBB,,, | Performed by: PODIATRIST

## 2022-11-17 PROCEDURE — 99213 OFFICE O/P EST LOW 20 MIN: CPT | Mod: S$PBB,,, | Performed by: PODIATRIST

## 2022-11-17 RX ORDER — OLMESARTAN MEDOXOMIL 40 MG/1
40 TABLET ORAL
COMMUNITY
Start: 2022-11-02 | End: 2023-04-10 | Stop reason: SDUPTHER

## 2022-11-20 NOTE — PROGRESS NOTES
Subjective:       Patient ID: Mirta Guerin is a 82 y.o. female.    Chief Complaint: Follow-up, Nail Problem, and Diabetes Mellitus  Patient presents today for followup she is a history of chronically ingrown toenails with signs of infection especially on her left hallux.     Follow-up  Associated symptoms include arthralgias, joint swelling and a rash.   Ingrown Toenail  Associated symptoms include arthralgias, joint swelling and a rash.   Nail Problem  Associated symptoms include arthralgias, joint swelling and a rash.      Review of Systems   Musculoskeletal:  Positive for arthralgias and joint swelling.   Skin:  Positive for color change, rash and wound.   All other systems reviewed and are negative.    Objective:      Physical Exam  Constitutional:       Appearance: She is well-developed.   Cardiovascular:      Pulses:           Dorsalis pedis pulses are 1+ on the right side and 1+ on the left side.        Posterior tibial pulses are 1+ on the right side and 1+ on the left side.   Pulmonary:      Effort: Pulmonary effort is normal.   Musculoskeletal:         General: Deformity present. Normal range of motion.   Feet:      Right foot:      Protective Sensation: 4 sites tested.   1 site sensed.     Skin integrity: Dry skin present.      Left foot:      Protective Sensation: 4 sites tested.   1 site sensed.     Skin integrity: Dry skin present.   Skin:     General: Skin is warm.      Capillary Refill: Capillary refill takes more than 3 seconds.   Neurological:      Deep Tendon Reflexes: Reflexes abnormal.   Psychiatric:         Behavior: Behavior normal.         Thought Content: Thought content normal.         Judgment: Judgment normal.     Patient is noted to have positive erythema positive edema at the medial lateral aspect of the patient's left hallux upon debridement of the lateral border left hallux there is purulent drainage noted as well as obvious signs of infection and pain to palpation. Patient is  noted to have a well-defined area of hyperkeratotic tissue on the dorsal lateral aspect of the fifth digit left there is positive pain noted upon palpation of this area no sign of infection is noted. patient's previously noted psoriasis is more well-controlled on today's visit that ever seen it since it started seeing the patient there are mild erythematous plaques or areas of skin breakdown especially on the heel.        Assessment:       1. Ingrown nail    2. Type II diabetes mellitus with neurological manifestations    3. Comprehensive diabetic foot examination, type 2 DM, encounter for        Plan:        Following examination I aggressively debrided both the medial and lateral border of the patient's left hallux I did advise the patient is obvious signs of infection with purulent drainage noted at the lateral border left hallux. Sterile saline was used to flush and irrigated the lateral border left hallux bacitracin ointment and a well-padded dry dressing applied patient advised to keep antibiotic ointment on the area for the next 3 days if this does not completely resolve if it continues to be red and painful we need to see her back for followup sooner than the regularly scheduled 12 week appointment. I debrided the hyperkeratotic tissue from the fifth digit left patient stated that this felt much better I advised her this is likely due to a shoe rubbing and irritation of this area recommended followup 12 weeks. .Patient states her psoriasis is doing as good as it's been in many years and is very well controlled especially on her feet.  Patient relates she has been under lot of stress with family issues of this definitely aggravates her psoriasis.Complete diabetic evaluation was performed today. Pt developed an area of psoriatic breakdown on her left heel this needs to be monitored closely. Calmoseptine ointment disp.   Patient states this ointment has been wonderful and see only thing that has really helped  the psoriasis on her heels.  Patient relates today that the gabapentin continues to work very well.  Patient did have a severely incurvated ingrown toenail on the lateral border of the left hallux.  After removing the nail the patient indicated felt much better bacitracin ointment a light dressing applied patient will monitor this area closely.  Patient states that she is recently started using and all natural native American ointment on the areas where she has psoriasis and is made a dramatic improvement and while she knows the psoriasis is never going to go away it definitely keeps it better controlled.  Patient states her psoriasis is being well controlled with an all natural organic compound cream that she has been applying.  This note was created using [employer]. voice recognition software that occasionally misinterpreted phrases or words.

## 2023-01-11 ENCOUNTER — OFFICE VISIT (OUTPATIENT)
Dept: UROLOGY | Facility: CLINIC | Age: 83
End: 2023-01-11
Payer: MEDICARE

## 2023-01-11 VITALS
SYSTOLIC BLOOD PRESSURE: 142 MMHG | HEIGHT: 63 IN | BODY MASS INDEX: 25.34 KG/M2 | RESPIRATION RATE: 18 BRPM | WEIGHT: 143 LBS | DIASTOLIC BLOOD PRESSURE: 83 MMHG | HEART RATE: 81 BPM

## 2023-01-11 DIAGNOSIS — N20.0 KIDNEY STONES: Primary | ICD-10-CM

## 2023-01-11 PROCEDURE — 99999 PR PBB SHADOW E&M-EST. PATIENT-LVL IV: ICD-10-PCS | Mod: PBBFAC,,, | Performed by: NURSE PRACTITIONER

## 2023-01-11 PROCEDURE — 99999 PR PBB SHADOW E&M-EST. PATIENT-LVL IV: CPT | Mod: PBBFAC,,, | Performed by: NURSE PRACTITIONER

## 2023-01-11 PROCEDURE — 99214 OFFICE O/P EST MOD 30 MIN: CPT | Mod: PBBFAC,PN | Performed by: NURSE PRACTITIONER

## 2023-01-11 PROCEDURE — 99214 PR OFFICE/OUTPT VISIT, EST, LEVL IV, 30-39 MIN: ICD-10-PCS | Mod: S$PBB,,, | Performed by: NURSE PRACTITIONER

## 2023-01-11 PROCEDURE — 99214 OFFICE O/P EST MOD 30 MIN: CPT | Mod: S$PBB,,, | Performed by: NURSE PRACTITIONER

## 2023-01-11 NOTE — PATIENT INSTRUCTIONS
-The patient was encouraged to drink at least 2 liters of water a day, limit iced tea and risa as well as foods high in oxalate.  They were cautioned to try to limit salt and red meat intake. Low oxalate diet (limit spinach, rhubarb, nuts, beets, potatoes, chocolate).  No vitamin C supplements. We also discussed adding citrate to the diet with the addition of ana or lemon juice to their water or alternatively with crystal light.      Get prompt medical attention if any of the following occur:  Severe pain that returns and not relieved by pain medicines  Repeated vomiting or unable to keep down fluids  Weakness, dizziness or fainting  Fever of 101.4ºF (38ºC) or higher, or as directed by your healthcare provider  Blood clots in urine  Foul smelling or cloudy urine  Unable to pass urine for 8 hours or increasing bladder pressure

## 2023-01-11 NOTE — PROGRESS NOTES
CHIEF COMPLAINT:    Mrs Guerin is a 82 y.o. female presenting for f/u kidney stones.  PRESENTING ILLNESS:    Mirta Guerin is a 82 y.o. female who presents for f/u kidney stones. Last clinic visit was 6/23/22 with Melissa Myles NP      HX:  On 11/26/21 pt was evaluated at [facility] ED with c/o RIGHT flank pain that initial onset was 7 hrs prior.  Pain went to right flank area and radiates into groin.  She has never had kidney stones in the past, but has had UTIs in the past.   UA in ER showed 3+ Blood, UA Micro showed >100 RBCs, 1 WBC, occasional bacteria   CT RSS on 11/27/21 showed:  1. Mild right hydroureteronephrosis secondary to an obstructive 4 mm stone within the distal right ureter at the ureterovesicular junction.  Prominent nonspecific right perinephric and periureteral fat stranding, with superimposed infection not excluded.  Additional nonobstructive right renal stones.  2. Cholelithiasis without evidence of acute cholecystitis.  3. Diverticulosis coli without evidence of diverticulitis.  4. Additional findings, as above.  The preliminary and final reports are concordant.   OV 12/21:  Pt remains with right sided lower back pains.  She is unsure at this time whether she passed the 4 mm stone from ED visit.  She is no longer seeing blood in her urine.  No problems with urination noted at this time.   6/23/22:  Pt overall doing well with no complaints voiced at this time.  No recent flank pain, dysuria or difficulty urinating.  She has not had anymore stone episodes since last ov with me.  No UA done in office today.   KUB imaging performed on 6/6/22 for recheck of stone burden showed the following:  FINDINGS:  Gas and stool in nondistended colon and small amount of gas in nondistended small bowel with just very mild nonspecific, nonobstructive generalized increase in amount of bowel gas.  Vascular calcifications.  postoperative changes near the chest along with cardiac pacing leads.   Osseous  degenerative changes.  SI joint sclerosis.  Some small calcifications projected over the kidneys could be renal stones.  Typical opaque ureteral stone is not identified although the stool does partially obscure the urinary tracts.  If indicated clinically renal stone protocol CT could be obtained   Impression:  Nonspecific nonobstructive generalized increase in amount of bowel gas.  Multiple vascular calcifications.  Small calcifications projecting over the kidneys question also small bilateral renal stones    1/11/23  Patient presents to clinic today with me for f/u kidney stones. Pt denies dysuria, flank pain, gross hematuria, fever, chills, nausea or vomiting. She has been doing well since last clinic visit. No further stone episodes or UTI's since last clinic visit. She did not want to have KUB done today since doing well but agrees to have KOURTNEY prior to next visit.  She does not drink as much water as recommended. Does not drink tea or cola. Moderate salt diet. Low red meat. Unsure how much oxalate she intakes.     Urine cultures:   No results found for: LABURIN      REVIEW OF SYSTEMS:    Review of Systems    Constitutional: Negative for fever and chills.   HENT: Negative for hearing loss.   Eyes: Negative for visual disturbance.   Respiratory: Negative for shortness of breath.   Cardiovascular: Negative for chest pain.   Gastrointestinal: Negative for nausea, vomiting, and constipation.   Genitourinary: See above  Neurological: Negative for dizziness.   Hematological: Does not bruise/bleed easily.   Psychiatric/Behavioral: Negative for confusion.     PATIENT HISTORY:    Past Medical History:   Diagnosis Date    Anxiety     AV block     Breast cancer     Cardiac pacemaker in situ     Coronary artery disease     Diabetes mellitus     GERD (gastroesophageal reflux disease)     HLD (hyperlipidemia)     Hypertension     Lung disease     Melanoma     Mitral valve disorder     NEGRITA (obstructive sleep apnea)      Prediabetes     Psoriasis     Spinal stenosis     Squamous cell carcinoma of skin     right anterior scalp    Vitamin D deficiency        Past Surgical History:   Procedure Laterality Date    BREAST CYST ASPIRATION      BREAST LUMPECTOMY      CARDIAC PACEMAKER PLACEMENT  04/05/2012    CORONARY ARTERY BYPASS GRAFT  07/2017    SKIN CANCER EXCISION      UPPER GASTROINTESTINAL ENDOSCOPY         Family History   Problem Relation Age of Onset    Cancer Mother         ovarian    Coronary artery disease Father     Stroke Father     Stroke Maternal Grandfather     Breast cancer Paternal Grandmother     Cancer Paternal Grandmother         breast    Stroke Paternal Grandfather     Stroke Brother     Ovarian cancer Neg Hx        Social History     Socioeconomic History    Marital status:    Tobacco Use    Smoking status: Every Day     Packs/day: 0.25     Years: 50.00     Pack years: 12.50     Types: Cigarettes     Start date: 1954    Smokeless tobacco: Never    Tobacco comments:     4-6 a day as of 8/16/21--got up to smoking a pack a day   Substance and Sexual Activity    Alcohol use: Yes     Comment: occasional    Drug use: No    Sexual activity: Never       Allergies:  Bacitracin zinc-polymyxin b, Adhesive, Benazepril, Codeine, and Polysporin [bacitracin-polymyxin b]    Medications:    Current Outpatient Medications:     albuterol (PROVENTIL/VENTOLIN HFA) 90 mcg/actuation inhaler, INHALE 1-2 PUFFS INTO THE LUNGS 2 (TWO) TIMES A DAY., Disp: 18 g, Rfl: 0    amLODIPine (NORVASC) 5 MG tablet, TAKE 1 TABLET BY MOUTH  TWICE DAILY, Disp: 180 tablet, Rfl: 3    anastrozole (ARIMIDEX) 1 mg Tab, , Disp: , Rfl:     aspirin (ECOTRIN) 81 MG EC tablet, Take 81 mg by mouth once daily., Disp: , Rfl:     cloNIDine (CATAPRES) 0.1 MG tablet, TAKE 1 TABLET BY MOUTH EVERY DAY AS NEEDED FOR SYSTOLIC PRESSURE GREATER THAN 170, Disp: , Rfl:     co-enzyme Q-10 50 mg capsule, Take 100 mg by mouth once daily., Disp: , Rfl:     docusate sodium  (COLACE) 100 MG capsule, Take 100 mg by mouth 2 (two) times daily., Disp: , Rfl:     fluticasone propionate (FLONASE) 50 mcg/actuation nasal spray, , Disp: , Rfl:     gabapentin (NEURONTIN) 300 MG capsule, TAKE 1 CAPSULE BY MOUTH IN  THE EVENING, Disp: 90 capsule, Rfl: 3    loratadine (CLARITIN) 10 mg tablet, Take 10 mg by mouth once daily., Disp: , Rfl:     metformin (GLUCOPHAGE) 500 MG tablet, Take 500 mg by mouth 2 (two) times daily with meals., Disp: , Rfl:     metoprolol succinate (TOPROL-XL) 50 MG 24 hr tablet, TAKE 1 TABLET BY MOUTH ONCE DAILY, Disp: 90 tablet, Rfl: 3    MULTIVIT-MIN/FA/LUTEIN/ZEAXANT (MACULAR VITAMIN ORAL), Take by mouth., Disp: , Rfl:     olmesartan (BENICAR) 40 MG tablet, 40 mg., Disp: , Rfl:     rosuvastatin (CRESTOR) 10 MG tablet, TAKE 1 TABLET BY MOUTH AT  BEDTIME, Disp: 90 tablet, Rfl: 3    vitamin D 1000 units Tab, Take 1,000 Units by mouth once daily., Disp: , Rfl:     DULoxetine (CYMBALTA) 20 MG capsule, Take 1 capsule (20 mg total) by mouth once daily., Disp: 90 capsule, Rfl: 4    PHYSICAL EXAMINATION:    Constitutional: She is oriented to person, place, and time. She appears well-developed and well-nourished.  She is in no apparent distress.    Neck: Normal ROM.     Cardiovascular: Normal rate.      Pulmonary/Chest: Effort normal. No respiratory distress.     Abdominal:  She exhibits no distension.  There is no CVA tenderness.     Neurological: She is alert and oriented to person, place, and time.     Skin: Skin is warm and dry.     Psych: Cooperative with normal affect.      Physical Exam      LABS:    Lab Results   Component Value Date    CREATININE 1.1 12/20/2021       IMPRESSION:    Encounter Diagnoses   Name Primary?    Kidney stones Yes       PLAN:  -KOURTNEY in 6 months ordered and scheduled  -General risk factors for kidney stones and the conservative measures to prevent kidney stones in the future were discussed with the patient in detail.  The patient was encouraged to drink  at least 2 liters of water a day, limit iced tea and risa as well as foods high in oxalate.  They were cautioned to try to limit salt and red meat intake. Low oxalate diet (limit spinach, rhubarb, nuts, beets, potatoes, chocolate).  No vitamin C supplements. We also discussed adding citrate to the diet with the addition of ana or lemon juice to their water or alternatively with crystal light.    Information regarding oxalate foods given to patient in clinic  -Get prompt medical attention if any of the following occur:  Severe pain that returns and not relieved by pain medicines  Repeated vomiting or unable to keep down fluids  Weakness, dizziness or fainting  Fever of 101.4ºF (38ºC) or higher, or as directed by your healthcare provider  Blood clots in urine  Foul smelling or cloudy urine  Unable to pass urine for 8 hours or increasing bladder pressure   -RTC 6 months at [facility]    I encouraged her or any of her family members to call or email me with questions and/or concerns.      30 minutes of total time spent on the encounter, which includes face to face time and non-face to face time preparing to see the patient (eg, review of tests), Obtaining and/or reviewing separately obtained history, Documenting clinical information in the electronic or other health record, Independently interpreting results (not separately reported) and communicating results to the patient/family/caregiver, or Care coordination (not separately reported).

## 2023-02-16 ENCOUNTER — OFFICE VISIT (OUTPATIENT)
Dept: PODIATRY | Facility: CLINIC | Age: 83
End: 2023-02-16
Payer: MEDICARE

## 2023-02-16 VITALS
WEIGHT: 143.06 LBS | BODY MASS INDEX: 25.35 KG/M2 | HEART RATE: 70 BPM | HEIGHT: 63 IN | SYSTOLIC BLOOD PRESSURE: 142 MMHG | DIASTOLIC BLOOD PRESSURE: 78 MMHG

## 2023-02-16 DIAGNOSIS — E11.9 COMPREHENSIVE DIABETIC FOOT EXAMINATION, TYPE 2 DM, ENCOUNTER FOR: ICD-10-CM

## 2023-02-16 DIAGNOSIS — L60.0 INGROWN NAIL: ICD-10-CM

## 2023-02-16 DIAGNOSIS — L40.9 PSORIASIS: ICD-10-CM

## 2023-02-16 DIAGNOSIS — E11.49 TYPE II DIABETES MELLITUS WITH NEUROLOGICAL MANIFESTATIONS: Primary | ICD-10-CM

## 2023-02-16 PROCEDURE — 99213 PR OFFICE/OUTPT VISIT, EST, LEVL III, 20-29 MIN: ICD-10-PCS | Mod: S$PBB,,, | Performed by: PODIATRIST

## 2023-02-16 PROCEDURE — 99213 OFFICE O/P EST LOW 20 MIN: CPT | Mod: S$PBB,,, | Performed by: PODIATRIST

## 2023-02-16 PROCEDURE — 99214 OFFICE O/P EST MOD 30 MIN: CPT | Mod: PBBFAC,PN | Performed by: PODIATRIST

## 2023-02-16 PROCEDURE — 99999 PR PBB SHADOW E&M-EST. PATIENT-LVL IV: CPT | Mod: PBBFAC,,, | Performed by: PODIATRIST

## 2023-02-16 PROCEDURE — 99999 PR PBB SHADOW E&M-EST. PATIENT-LVL IV: ICD-10-PCS | Mod: PBBFAC,,, | Performed by: PODIATRIST

## 2023-02-16 RX ORDER — TRAMADOL HYDROCHLORIDE 50 MG/1
50 TABLET ORAL EVERY 4 HOURS PRN
COMMUNITY
Start: 2022-12-16 | End: 2023-03-06 | Stop reason: SDUPTHER

## 2023-02-16 NOTE — PROGRESS NOTES
Subjective:       Patient ID: Mirta Guerin is a 82 y.o. female.    Chief Complaint: Ingrown Toenail (Left great toe)    Patient presents today for followup she is a history of chronically ingrown toenails with signs of infection especially on her left hallux.     Follow-up  Associated symptoms include arthralgias, joint swelling and a rash.   Ingrown Toenail  Associated symptoms include arthralgias, joint swelling and a rash.   Nail Problem  Associated symptoms include arthralgias, joint swelling and a rash.      Review of Systems   Musculoskeletal:  Positive for arthralgias and joint swelling.   Skin:  Positive for color change, rash and wound.   All other systems reviewed and are negative.    Objective:      Physical Exam  Constitutional:       Appearance: She is well-developed.   Cardiovascular:      Pulses:           Dorsalis pedis pulses are 1+ on the right side and 1+ on the left side.        Posterior tibial pulses are 1+ on the right side and 1+ on the left side.   Pulmonary:      Effort: Pulmonary effort is normal.   Musculoskeletal:         General: Deformity present. Normal range of motion.   Feet:      Right foot:      Protective Sensation: 4 sites tested.   1 site sensed.     Skin integrity: Dry skin present.      Left foot:      Protective Sensation: 4 sites tested.   1 site sensed.     Skin integrity: Dry skin present.   Skin:     General: Skin is warm.      Capillary Refill: Capillary refill takes more than 3 seconds.   Neurological:      Deep Tendon Reflexes: Reflexes abnormal.   Psychiatric:         Behavior: Behavior normal.         Thought Content: Thought content normal.         Judgment: Judgment normal.     Patient is noted to have positive erythema positive edema at the medial lateral aspect of the patient's left hallux upon debridement of the lateral border left hallux there is purulent drainage noted as well as obvious signs of infection and pain to palpation. Patient is noted to  have a well-defined area of hyperkeratotic tissue on the dorsal lateral aspect of the fifth digit left there is positive pain noted upon palpation of this area no sign of infection is noted. patient's previously noted psoriasis is more well-controlled on today's visit that ever seen it since it started seeing the patient there are mild erythematous plaques or areas of skin breakdown especially on the heel.        Assessment:       1. Type II diabetes mellitus with neurological manifestations    2. Comprehensive diabetic foot examination, type 2 DM, encounter for    3. Ingrown nail    4. Psoriasis        Plan:        Following examination I aggressively debrided both the medial and lateral border of the patient's left hallux I did advise the patient is obvious signs of infection with purulent drainage noted at the lateral border left hallux. Sterile saline was used to flush and irrigated the lateral border left hallux bacitracin ointment and a well-padded dry dressing applied patient advised to keep antibiotic ointment on the area for the next 3 days if this does not completely resolve if it continues to be red and painful we need to see her back for followup sooner than the regularly scheduled 12 week appointment. I debrided the hyperkeratotic tissue from the fifth digit left patient stated that this felt much better I advised her this is likely due to a shoe rubbing and irritation of this area recommended followup 12 weeks. .Patient states her psoriasis is doing as good as it's been in many years and is very well controlled especially on her feet.  Patient relates she has been under lot of stress with family issues of this definitely aggravates her psoriasis.Complete diabetic evaluation was performed today. Pt developed an area of psoriatic breakdown on her left heel this needs to be monitored closely. Calmoseptine ointment disp.   Patient states this ointment has been wonderful and see only thing that has really  helped the psoriasis on her heels.  Patient relates today that the gabapentin continues to work very well.  Patient did have a severely incurvated ingrown toenail on the lateral border of the left hallux.  After removing the nail the patient indicated felt much better bacitracin ointment a light dressing applied patient will monitor this area closely.  Patient states that she is recently started using and all natural native American ointment on the areas where she has psoriasis and is made a dramatic improvement and while she knows the psoriasis is never going to go away it definitely keeps it better controlled.  Patient states her psoriasis is being well controlled with an all natural organic compound cream that she has been applying.  This note was created using [employer] voice recognition software that occasionally misinterpreted phrases or words.

## 2023-02-27 ENCOUNTER — HOSPITAL ENCOUNTER (OUTPATIENT)
Dept: RADIOLOGY | Facility: HOSPITAL | Age: 83
Discharge: HOME | End: 2023-02-27
Attending: INTERNAL MEDICINE
Payer: MEDICARE

## 2023-02-27 DIAGNOSIS — R91.8 MULTIPLE PULMONARY NODULES: ICD-10-CM

## 2023-02-27 DIAGNOSIS — R91.8 ABNORMAL CT SCAN OF LUNG: ICD-10-CM

## 2023-02-27 PROCEDURE — 71250 CT THORAX DX C-: CPT | Mod: TC,PO

## 2023-03-06 ENCOUNTER — OFFICE VISIT (OUTPATIENT)
Dept: ORTHOPEDICS | Facility: CLINIC | Age: 83
End: 2023-03-06
Payer: MEDICARE

## 2023-03-06 VITALS — HEIGHT: 63 IN | WEIGHT: 143 LBS | BODY MASS INDEX: 25.34 KG/M2

## 2023-03-06 DIAGNOSIS — M47.816 LUMBAR FACET ARTHROPATHY: ICD-10-CM

## 2023-03-06 DIAGNOSIS — M43.16 SPONDYLOLISTHESIS OF LUMBAR REGION: Primary | ICD-10-CM

## 2023-03-06 DIAGNOSIS — M48.061 FORAMINAL STENOSIS OF LUMBAR REGION: ICD-10-CM

## 2023-03-06 DIAGNOSIS — M51.36 DISC DEGENERATION, LUMBAR: ICD-10-CM

## 2023-03-06 PROCEDURE — 99213 PR OFFICE/OUTPT VISIT, EST, LEVL III, 20-29 MIN: ICD-10-PCS | Mod: S$GLB,,, | Performed by: ORTHOPAEDIC SURGERY

## 2023-03-06 PROCEDURE — 99213 OFFICE O/P EST LOW 20 MIN: CPT | Mod: S$GLB,,, | Performed by: ORTHOPAEDIC SURGERY

## 2023-03-06 RX ORDER — TRAMADOL HYDROCHLORIDE 50 MG/1
50 TABLET ORAL EVERY 4 HOURS PRN
Qty: 42 TABLET | Refills: 0 | Status: SHIPPED | OUTPATIENT
Start: 2023-03-06 | End: 2023-03-13

## 2023-03-06 NOTE — PROGRESS NOTES
Subjective:       Patient ID: Mirta Guerin is a 82 y.o. female.    Chief Complaint: Pain of the Lumbar Spine (Patient is here following up for lumbar spine, she is also here for a refill on her medication.)      History of Present Illness    Prior to meeting with the patient I reviewed [facility]. This included reviewing the previous progress notes from our office, review of the patient's last appointment with their primary care provider, review of any visits to the emergency room, and review of any pain management appointments or procedures.   Patient is here follow-up for chronic low back pain and bilateral lower extremity radiculitis dysesthesia.  Overall her lumbar symptoms are unchanged.    Current Medications  Current Outpatient Medications   Medication Sig Dispense Refill    albuterol (PROVENTIL/VENTOLIN HFA) 90 mcg/actuation inhaler INHALE 1-2 PUFFS INTO THE LUNGS 2 (TWO) TIMES A DAY. 18 g 0    amLODIPine (NORVASC) 5 MG tablet TAKE 1 TABLET BY MOUTH  TWICE DAILY 180 tablet 3    anastrozole (ARIMIDEX) 1 mg Tab       aspirin (ECOTRIN) 81 MG EC tablet Take 81 mg by mouth once daily.      cloNIDine (CATAPRES) 0.1 MG tablet TAKE 1 TABLET BY MOUTH EVERY DAY AS NEEDED FOR SYSTOLIC PRESSURE GREATER THAN 170      co-enzyme Q-10 50 mg capsule Take 100 mg by mouth once daily.      docusate sodium (COLACE) 100 MG capsule Take 100 mg by mouth 2 (two) times daily.      DULoxetine (CYMBALTA) 20 MG capsule Take 1 capsule (20 mg total) by mouth once daily. 90 capsule 4    fluticasone propionate (FLONASE) 50 mcg/actuation nasal spray       gabapentin (NEURONTIN) 300 MG capsule TAKE 1 CAPSULE BY MOUTH IN  THE EVENING 90 capsule 3    loratadine (CLARITIN) 10 mg tablet Take 10 mg by mouth once daily.      metformin (GLUCOPHAGE) 500 MG tablet Take 500 mg by mouth 2 (two) times daily with meals.      metoprolol succinate (TOPROL-XL) 50 MG 24 hr tablet TAKE 1 TABLET BY MOUTH ONCE DAILY 90 tablet 3     MULTIVIT-MIN/FA/LUTEIN/ZEAXANT (MACULAR VITAMIN ORAL) Take by mouth.      olmesartan (BENICAR) 40 MG tablet 40 mg.      rosuvastatin (CRESTOR) 10 MG tablet TAKE 1 TABLET BY MOUTH AT  BEDTIME 90 tablet 3    traMADoL (ULTRAM) 50 mg tablet Take 50 mg by mouth every 4 (four) hours as needed.      vitamin D 1000 units Tab Take 1,000 Units by mouth once daily.       No current facility-administered medications for this visit.       Allergies  Review of patient's allergies indicates:   Allergen Reactions    Bacitracin zinc-polymyxin b Rash    Adhesive Rash    Benazepril Other (See Comments)     sleepy    Codeine Nausea And Vomiting    Polysporin [bacitracin-polymyxin b] Rash       Past Medical History  Past Medical History:   Diagnosis Date    Anxiety     AV block     Breast cancer     Cardiac pacemaker in situ     Coronary artery disease     Diabetes mellitus     GERD (gastroesophageal reflux disease)     HLD (hyperlipidemia)     Hypertension     Lung disease     Melanoma     Mitral valve disorder     NEGRITA (obstructive sleep apnea)     Prediabetes     Psoriasis     Spinal stenosis     Squamous cell carcinoma of skin     right anterior scalp    Vitamin D deficiency        Surgical History  Past Surgical History:   Procedure Laterality Date    BREAST CYST ASPIRATION      BREAST LUMPECTOMY      CARDIAC PACEMAKER PLACEMENT  04/05/2012    CORONARY ARTERY BYPASS GRAFT  07/2017    SKIN CANCER EXCISION      UPPER GASTROINTESTINAL ENDOSCOPY         Family History:   Family History   Problem Relation Age of Onset    Cancer Mother         ovarian    Coronary artery disease Father     Stroke Father     Stroke Maternal Grandfather     Breast cancer Paternal Grandmother     Cancer Paternal Grandmother         breast    Stroke Paternal Grandfather     Stroke Brother     Ovarian cancer Neg Hx        Social History:   Social History     Socioeconomic History    Marital status:    Tobacco Use    Smoking status: Every Day      Packs/day: 0.25     Years: 50.00     Pack years: 12.50     Types: Cigarettes     Start date: 1954    Smokeless tobacco: Never    Tobacco comments:     4-6 a day as of 8/16/21--got up to smoking a pack a day   Substance and Sexual Activity    Alcohol use: Yes     Comment: occasional    Drug use: No    Sexual activity: Never       Hospitalization/Major Diagnostic Procedure:     Review of Systems     General/Constitutional:  Chills denies. Fatigue denies. Fever denies. Weight gain denies. Weight loss denies.    Respiratory:  Shortness of breath denies.    Cardiovascular:  Chest pain denies.    Gastrointestinal:  Constipation denies. Diarrhea denies. Nausea denies. Vomiting denies.     Hematology:  Easy bruising denies. Prolonged bleeding denies.     Genitourinary:  Frequent urination denies. Pain in lower back denies. Painful urination denies.     Musculoskeletal:  See HPI for details    Skin:  Rash denies.    Neurologic:  Dizziness denies. Gait abnormalities denies. Seizures denies. Tingling/Numbess denies.    Psychiatric:  Anxiety denies. Depressed mood denies.     Objective:   Vital Signs: There were no vitals filed for this visit.     Physical Exam      General Examination:     Constitutional: The patient is alert and oriented to lace person and time. Mood is pleasant.     Head/Face: Normal facial features normal eyebrows    Eyes: Normal extraocular motion bilaterally    Lungs: Respirations are equal and unlabored    Gait is coordinated.    Cardiovascular: There are no swelling or varicosities present.    Lymphatic: Negative for adenopathy    Skin: Normal    Neurological: Level of consciousness normal. Oriented to place person and time and situation    Psychiatric: Oriented to time place person and situation    Patient is elderly and she appears fatigued today.  lumbar exam: Antalgic gait and antalgic sit to stand. Lumbar range of motion remains diminished but is within functional limitations for her.    XRAY  Report/ Interpretation:  No new studies today      Assessment:       1. Spondylolisthesis of lumbar region    2. Lumbar facet arthropathy    3. Foraminal stenosis of lumbar region    4. Disc degeneration, lumbar        Plan:       Mirta was seen today for pain.    Diagnoses and all orders for this visit:    Spondylolisthesis of lumbar region    Lumbar facet arthropathy    Foraminal stenosis of lumbar region    Disc degeneration, lumbar    Other orders  The following orders have not been finalized:  -     traMADoL (ULTRAM) 50 mg tablet         Follow up if symptoms worsen or fail to improve.  This is to attest that the physician's assistant Reuben Dunbar served in the capacity as a scribe for this patient's encounter.  This is also to verify that I have reviewed the patient's history and helped formulate the treatment plan and discussed it with the physician's assistant.  I have actively participated  in the evaluation and treatment plan for this patient visit.  The treatment plan and medical decision-making is as outlined below:  I refilled her tramadol which she uses twice a day.  She is able to maintain her current functional mobility status and quality of life with taking 2 tramadol a day.  By the letter of the law this is providing chronic pain management.  However this small amount of medication at a very minimal dose allows her to continue to lead her normal life without us having any discussion of surgery.  In our opinion this is the best and most ethical and efficacious treatment for this patient.  However, [facility] and/or the pharmacy board feels like this is not appropriate then we will have to defer future medication refills to her primary care provider or consider referring this 82-year-old woman to a new pain management physician.    Treatment options were discussed with regards to the nature of the medical condition. Conservative pain intervention and surgical options were discussed in detail. The  probability of success of each separate treatment option was discussed. The patient expressed a clear understanding of the treatment options. With regards to surgery, the procedure risk, benefits, complications, and outcomes were discussed. No guarantees were given with regards to surgical outcome.   The risk of complications, morbidity, and mortality of patient management decisions have been made at the time of this visit. These are associated with the patient's problems, diagnostic procedures and treatment options. This includes the possible management options selected and those considered but not selected by the patient after shared medical decision making we discussed with the patient.     This note was created using Dragon voice recognition software that occasionally misinterpreted phrases or words.

## 2023-03-06 NOTE — LETTER
March 6, 2023      [facility]  [address]  [place] [address]  Phone: [phone]  Fax: [phone]       Patient: Mirta Guerin   YOB: 1940  Date of Visit: 03/06/2023    To Whom It May Concern:    Nico Guerin  was at [facility] on 03/06/2023.     We have been seeing Ms. Kirby for several years.  She is well known to us and our office staff.  She has known significant lumbar degenerative disc disease with facet arthropathy and spondylolisthesis causing both foraminal and central stenosis.  We have found that we can manage her pain and improve her quality of life and allow her to continue the majority of her activities of daily life with tramadol 50 mg b.i.d..  However, in [place], physician assistants, nurse practitioners, and medical specialist that are not pain management, family practice, or Rheumatology are not authorized by [facility] to prescribe medications for chronic pain management; regardless of how minimal the medications are or their efficacy for that particular patient.    In our opinion, to tramadol 50 mg a day is a better option for Ms. Kirby then a large thoracolumbar decompression and fusion with instrumentation that road require several months to recover from and regain her preoperative functional mobility status.    We are happy to continue to prescribe the medications for her but some pharmacies have refused to on her honor our prescriptions.    Hopefully, as her primary care provider, maybe you can continue to provide this patient with the medications that allow her to maintain her current quality of life.     If you have any questions or concerns, or if I can be of further assistance, please do not hesitate to contact me.    Sincerely,        Reuben Dunbar PA-C, MPAS

## 2023-03-20 ENCOUNTER — OFFICE VISIT (OUTPATIENT)
Dept: HEMATOLOGY/ONCOLOGY | Facility: CLINIC | Age: 83
End: 2023-03-20
Payer: MEDICARE

## 2023-03-20 VITALS
TEMPERATURE: 97 F | HEART RATE: 87 BPM | SYSTOLIC BLOOD PRESSURE: 155 MMHG | WEIGHT: 141.69 LBS | BODY MASS INDEX: 25.11 KG/M2 | RESPIRATION RATE: 18 BRPM | DIASTOLIC BLOOD PRESSURE: 84 MMHG | HEIGHT: 63 IN

## 2023-03-20 DIAGNOSIS — K62.5 BRBPR (BRIGHT RED BLOOD PER RECTUM): ICD-10-CM

## 2023-03-20 DIAGNOSIS — C50.411 MALIGNANT NEOPLASM OF UPPER-OUTER QUADRANT OF RIGHT BREAST IN FEMALE, ESTROGEN RECEPTOR POSITIVE: Primary | ICD-10-CM

## 2023-03-20 DIAGNOSIS — Z17.0 MALIGNANT NEOPLASM OF UPPER-OUTER QUADRANT OF RIGHT BREAST IN FEMALE, ESTROGEN RECEPTOR POSITIVE: Primary | ICD-10-CM

## 2023-03-20 PROCEDURE — 99214 PR OFFICE/OUTPT VISIT, EST, LEVL IV, 30-39 MIN: ICD-10-PCS | Mod: S$GLB,,, | Performed by: INTERNAL MEDICINE

## 2023-03-20 PROCEDURE — 99214 OFFICE O/P EST MOD 30 MIN: CPT | Mod: S$GLB,,, | Performed by: INTERNAL MEDICINE

## 2023-03-20 NOTE — LETTER
March 23, 2023        James Jung MD  [address] [place] [address]             [facility]  [address] [address]  Phone: [phone]  Fax: [phone]   Patient: Mirta Guerin   MR Number: [id number]   YOB: 1940   Date of Visit: 3/20/2023       Dear Dr. Jung:    Thank you for referring Mirta Guerin to me for evaluation. Below are the relevant portions of my assessment and plan of care.            If you have questions, please do not hesitate to call me. I look forward to following Mirta along with you.    Sincerely,      ZOE Fontenot MD           CC    No Recipients

## 2023-03-24 NOTE — PROGRESS NOTES
[facility] Hematology Oncology In Office Subsequent Encounter note    3/20/23  Subjective:      Patient ID:   Mirta Guerin  82 y.o. female  1940  James Jung M.D.. Barbie Castillo M.D., Tony Goldberg.      Chief Complaint:breast cancer follow-up    HPI:  82 y.o. female with diagnosis of stage I right breast cancer.Diagnosed in 2015. Treated with partial mastectomy and sentinel node biopsy, followed by adjuvant radiation therapy, she started adjuvant Arimidex in 2016.    ERP was positive, PRP was positive, HER-2/asaf was negative.hot flash symptoms are controlled on Cymbalta. Joint pain symptoms have not been a problem for her. She has history of hypertension, diabetes, GERD,hypercholesterolemia, DJD, and sleep apnea syndrome.    She has history of left partial mastectomy and sentinel node biopsy, pacemaker placement, facial surgery, right and left knee replacement, complete hysterectomy. She had CABG surgery in 2016.    She had BRBPR, refer to Dr. Partida for evaluation.    She had a skin cancer removed from her right scalp ,she believes it was a melanoma, per Dr. Lee in 2018, she also saw Dr. Hi for wide excision of the area.    She had stage 0 melanoma in situ, margins were clear on Moh's surgery/    Since her CABG surgery, she complains of continued substernal chest pain. CAT scan in 2016 at [facility] did show some inflammation in the soft tissue in the sternal area. I suspect she has costochrondral joint sx.    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    Her dad had CVA, her mother  of ovarian cancer at age 86, her brother has had 2 or 3 CVA, her sister has had bladder cancer, she has a daughter with hypoglycemia.    She is allergic to codeine benazepril, Polysporin, and triple antibiotics ointment.    Dr. James Jung is her primary care physician.   Dr. Juarez is  her cardiologist.  Dr. Talbot is her GYN.    She has been diagnosed with sleep apnea syndrome.    She is tolerating the Arimadex fine,  With the addition of Cymbalta daily.  HF sx controlled.  She has history of restless leg syndrome and peripheral neuropathy symptoms in the fingers and feet.  She has hx of osteopenia, took Reclast in the past.  Hx LBP, S/P injection x's 9, sx decreased.    She is 5 foot 2 inches tall and weighs 181 pounds    She got 2/2 covid 19 vaccines, she has not had covid 19 infection.  She plans to take booster, also flu and pneumovax.    Appetite is good but she has lost 20# over the last year.    Dr Lee of dermatology has removed several skin cancers.    For evaluation of 20# weight loss, a PET scan was done.  This showed uptake in sternum.  Hx of CABG surgery, c/o pain at sternal area.  Pet suggests infection or post op changes.    Referred back to Dr. Juarez and to Dr. Saravia for eval?  Conclusion is that PET uptake at sternum is post op change and not infection.  Observe for now.    She has sleep apnea, non compliant in using the C pap mask.  DIS mamm negative for cancer.    CAT of abdomin multiple pulmonary nodules, small, likely granulomas, multiple cysts seen.  Re check CAT 2/2023.  RTC 3/2023.    ROS:   GEN: normal without any fever, night sweats or weight loss  HEENT: normal with no HA's, sore throat, stiff neck, changes in vision  CV: see history of present illness.   no CP, SOB, PND, STEPHENS or orthopnea  PULM: see history of present illness.   no SOB, cough, hemoptysis, sputum or pleuritic pain  GI: normal with no abdominal pain, nausea, vomiting, constipation, diarrhea, melanotic stools, BRBPR, or hematemesis  : normal with no hematuria, dysuria  BREAST: see history of present illness  SKIN: normal with no rash, erythema, bruising, or swelling     Past Medical History:   Diagnosis Date    Anxiety     AV block     Breast cancer     Cardiac pacemaker in situ     Coronary  artery disease     Diabetes mellitus     GERD (gastroesophageal reflux disease)     HLD (hyperlipidemia)     Hypertension     Lung disease     Melanoma     Mitral valve disorder     NEGRITA (obstructive sleep apnea)     Prediabetes     Psoriasis     Spinal stenosis     Squamous cell carcinoma of skin     right anterior scalp    Vitamin D deficiency      Past Surgical History:   Procedure Laterality Date    BREAST CYST ASPIRATION      BREAST LUMPECTOMY      CARDIAC PACEMAKER PLACEMENT  04/05/2012    CORONARY ARTERY BYPASS GRAFT  07/2017    SKIN CANCER EXCISION      UPPER GASTROINTESTINAL ENDOSCOPY         Review of patient's allergies indicates:   Allergen Reactions    Benazepril Other (See Comments)     sleepy    Codeine Nausea And Vomiting    Polysporin [bacitracin-polymyxin b] Rash           Current Outpatient Medications:     albuterol (PROVENTIL/VENTOLIN HFA) 90 mcg/actuation inhaler, INHALE 1-2 PUFFS INTO THE LUNGS 2 (TWO) TIMES A DAY., Disp: 18 g, Rfl: 0    amLODIPine (NORVASC) 5 MG tablet, TAKE 1 TABLET BY MOUTH  TWICE DAILY, Disp: 180 tablet, Rfl: 3    anastrozole (ARIMIDEX) 1 mg Tab, , Disp: , Rfl:     aspirin (ECOTRIN) 81 MG EC tablet, Take 81 mg by mouth once daily., Disp: , Rfl:     cloNIDine (CATAPRES) 0.1 MG tablet, TAKE 1 TABLET BY MOUTH EVERY DAY AS NEEDED FOR SYSTOLIC PRESSURE GREATER THAN 170, Disp: , Rfl:     co-enzyme Q-10 50 mg capsule, Take 100 mg by mouth once daily., Disp: , Rfl:     docusate sodium (COLACE) 100 MG capsule, Take 100 mg by mouth 2 (two) times daily., Disp: , Rfl:     fluticasone propionate (FLONASE) 50 mcg/actuation nasal spray, , Disp: , Rfl:     gabapentin (NEURONTIN) 300 MG capsule, TAKE 1 CAPSULE BY MOUTH IN  THE EVENING, Disp: 90 capsule, Rfl: 3    loratadine (CLARITIN) 10 mg tablet, Take 10 mg by mouth once daily., Disp: , Rfl:     metformin (GLUCOPHAGE) 500 MG tablet, Take 500 mg by mouth 2 (two) times daily with meals., Disp: , Rfl:     metoprolol succinate (TOPROL-XL) 50  "MG 24 hr tablet, TAKE 1 TABLET BY MOUTH ONCE DAILY, Disp: 90 tablet, Rfl: 3    MULTIVIT-MIN/FA/LUTEIN/ZEAXANT (MACULAR VITAMIN ORAL), Take by mouth., Disp: , Rfl:     olmesartan (BENICAR) 40 MG tablet, 40 mg., Disp: , Rfl:     rosuvastatin (CRESTOR) 10 MG tablet, TAKE 1 TABLET BY MOUTH AT  BEDTIME, Disp: 90 tablet, Rfl: 3    vitamin D 1000 units Tab, Take 1,000 Units by mouth once daily., Disp: , Rfl:     DULoxetine (CYMBALTA) 20 MG capsule, Take 1 capsule (20 mg total) by mouth once daily., Disp: 90 capsule, Rfl: 4          Objective:   Vitals:  Blood pressure (!) 155/84, pulse 87, temperature 97.4 °F (36.3 °C), resp. rate 18, height 5' 3" (1.6 m), weight 64.3 kg (141 lb 11.2 oz).    Physical Examination:   GEN: no apparent distress, comfortable  HEAD: atraumatic and normocephalic  EYES: no pallor, no icterus.  She has a healing scabbed over surgical site at the right scalp, without satellite lesions or palpable lymphadenopathy at the neck  ENT:  no pharyngeal erythema, external ears WNL; no nasal discharge  NECK: no masses, thyroid normal, trachea midline, no LAD/LN's, supple  CV: RRR with no murmur,  the skin  at the sternal area is healed and closed, nontender without warmth or redness or fluctuance  CHEST: Normal respiratory effort; CTAB; distantbreath sounds; no wheeze or crackles  ABDOM: nontender and nondistended; soft;  no rebound/guarding, L/S NP  MUSC/Skeletal: ROM normal; no crepitus; joints normal  EXTREM: no clubbing, cyanosis, inflammation or swelling  SKIN: no rashes, lesions, ulcers, petechia  : no cvat  NEURO: grossly intact; motor/sensory WNL;  no tremors  PSYCH: normal mood, affect and behavior  LYMPH: normal cervical, supraclavicular, axillary and groin LN's  BREAST:the left breast shows postoperative change, nontender, without palpable mass. The right breast is nontender without palpable mass.      Labs:     Radiology/Diagnostic Studies:    Mammogram 2/2019 showed 1.5 oval well circumscribed " mass at 8 o'clock at L breast.  Recheck site with U/S now is stable at 8 o'clock of L breast.  Likely a lipoma, per radiologist.        Assessment:   (1) 82 y.o. female with diagnosis of stage I right breast cancer treated with right partial mastectomy, adjuvant radiation therapy, and continues on adjuvant Arimidex now. Originally diagnosed in May 2015. No evidence of disease on physical exam.    (2) hot flash symptoms secondary to Arimidex are managed with Cymbalta 20 mg by mouth daily.    (3)sleep apnea syndrome under management of pulmonary,  Dr. Mancera     (4)pulmonary nodules, granulomata?, re check 2/2023.    (5)recent removal of melanoma in situ from right scalp,   Margins clear on Moh's.  Observe for now.  Operative site at R scalp shows post op changes, with out mass or melanotic nodules being seen.    (6)Abnormal PET scan, for cardiology and ID eval?  Negative evaluation.    (7)BRBPR hx refer to Dr. Partida for evaluation.    RTC 2 months, can cancel.  RTC 6 months.

## 2023-04-10 ENCOUNTER — OFFICE VISIT (OUTPATIENT)
Dept: CARDIOLOGY | Facility: CLINIC | Age: 83
End: 2023-04-10
Payer: MEDICARE

## 2023-04-10 VITALS
RESPIRATION RATE: 16 BRPM | HEIGHT: 63 IN | DIASTOLIC BLOOD PRESSURE: 74 MMHG | WEIGHT: 142 LBS | HEART RATE: 81 BPM | SYSTOLIC BLOOD PRESSURE: 126 MMHG | OXYGEN SATURATION: 96 % | BODY MASS INDEX: 25.16 KG/M2

## 2023-04-10 DIAGNOSIS — Z95.0 CARDIAC PACEMAKER IN SITU: ICD-10-CM

## 2023-04-10 DIAGNOSIS — Z95.1 HX OF CABG: ICD-10-CM

## 2023-04-10 DIAGNOSIS — E11.49 TYPE II DIABETES MELLITUS WITH NEUROLOGICAL MANIFESTATIONS: ICD-10-CM

## 2023-04-10 DIAGNOSIS — E78.5 DYSLIPIDEMIA: ICD-10-CM

## 2023-04-10 DIAGNOSIS — I10 ESSENTIAL HYPERTENSION: ICD-10-CM

## 2023-04-10 PROCEDURE — 99214 OFFICE O/P EST MOD 30 MIN: CPT | Mod: S$PBB,,, | Performed by: INTERNAL MEDICINE

## 2023-04-10 PROCEDURE — 99214 PR OFFICE/OUTPT VISIT, EST, LEVL IV, 30-39 MIN: ICD-10-PCS | Mod: S$PBB,,, | Performed by: INTERNAL MEDICINE

## 2023-04-10 PROCEDURE — 99214 OFFICE O/P EST MOD 30 MIN: CPT | Mod: PBBFAC,PN | Performed by: INTERNAL MEDICINE

## 2023-04-10 PROCEDURE — 99999 PR PBB SHADOW E&M-EST. PATIENT-LVL IV: CPT | Mod: PBBFAC,,, | Performed by: INTERNAL MEDICINE

## 2023-04-10 PROCEDURE — 99999 PR PBB SHADOW E&M-EST. PATIENT-LVL IV: ICD-10-PCS | Mod: PBBFAC,,, | Performed by: INTERNAL MEDICINE

## 2023-04-10 RX ORDER — AMLODIPINE BESYLATE 5 MG/1
5 TABLET ORAL 2 TIMES DAILY
Qty: 180 TABLET | Refills: 3 | Status: SHIPPED | OUTPATIENT
Start: 2023-04-10 | End: 2024-04-09

## 2023-04-10 RX ORDER — METOPROLOL SUCCINATE 50 MG/1
50 TABLET, EXTENDED RELEASE ORAL DAILY
Qty: 90 TABLET | Refills: 3 | Status: SHIPPED | OUTPATIENT
Start: 2023-04-10 | End: 2024-04-09

## 2023-04-10 RX ORDER — EZETIMIBE 10 MG/1
10 TABLET ORAL DAILY
Qty: 90 TABLET | Refills: 3 | Status: SHIPPED | OUTPATIENT
Start: 2023-04-10 | End: 2024-04-09

## 2023-04-10 RX ORDER — ROSUVASTATIN CALCIUM 10 MG/1
10 TABLET, COATED ORAL NIGHTLY
Qty: 90 TABLET | Refills: 3 | Status: SHIPPED | OUTPATIENT
Start: 2023-04-10 | End: 2023-07-18

## 2023-04-10 RX ORDER — OLMESARTAN MEDOXOMIL 40 MG/1
40 TABLET ORAL DAILY
Qty: 90 TABLET | Refills: 3 | Status: SHIPPED | OUTPATIENT
Start: 2023-04-10 | End: 2024-04-09

## 2023-04-10 NOTE — PROGRESS NOTES
Subjective:    Patient ID:  Mirta Guerin is a 82 y.o. female patient here for evaluation Follow-up      History of Present Illness:     Patient is 82-year-old here for follow-up evaluation for arterial hypertension dyslipidemia.  She is doing very well with no new symptoms of angina no shortness of breath and no edema noted in the lower extremities.  No heartburn belching burping she has been compliant with medications and she has remained reasonably active        Review of patient's allergies indicates:   Allergen Reactions    Bacitracin zinc-polymyxin b Rash    Adhesive Rash    Benazepril Other (See Comments)     sleepy    Codeine Nausea And Vomiting    Polysporin [bacitracin-polymyxin b] Rash       Past Medical History:   Diagnosis Date    Anxiety     AV block     Breast cancer     Cardiac pacemaker in situ     Coronary artery disease     Diabetes mellitus     GERD (gastroesophageal reflux disease)     HLD (hyperlipidemia)     Hypertension     Lung disease     Melanoma     Mitral valve disorder     NEGRITA (obstructive sleep apnea)     Prediabetes     Psoriasis     Spinal stenosis     Squamous cell carcinoma of skin     right anterior scalp    Vitamin D deficiency      Past Surgical History:   Procedure Laterality Date    BREAST CYST ASPIRATION      BREAST LUMPECTOMY      CARDIAC PACEMAKER PLACEMENT  04/05/2012    CORONARY ARTERY BYPASS GRAFT  07/2017    SKIN CANCER EXCISION      UPPER GASTROINTESTINAL ENDOSCOPY       Social History     Tobacco Use    Smoking status: Every Day     Packs/day: 0.25     Years: 50.00     Pack years: 12.50     Types: Cigarettes     Start date: 1954    Smokeless tobacco: Never    Tobacco comments:     4-6 a day as of 8/16/21--got up to smoking a pack a day   Substance Use Topics    Alcohol use: Yes     Comment: occasional    Drug use: No        Review of Systems:    As noted in HPI in addition      REVIEW OF SYSTEMS  CARDIOVASCULAR: No recent chest pain, palpitations, arm, neck, or  jaw pain  RESPIRATORY: No recent fever, cough chills, SOB or congestion  : No blood in the urine  GI: No Nausea, vomiting, constipation, diarrhea, blood, or reflux.  MUSCULOSKELETAL: No myalgias  NEURO: No lightheadedness or dizziness  EYES: No Double vision, blurry, vision or headache              Objective        Vitals:    04/10/23 1325   BP: 126/74   Pulse: 81   Resp: 16       LIPIDS - LAST 2   Lab Results   Component Value Date    CHOL 156 07/21/2022    HDL 48 07/21/2022    LDLCALC 83.4 07/21/2022    TRIG 123 07/21/2022    CHOLHDL 30.8 07/21/2022       CBC - LAST 2  Lab Results   Component Value Date    WBC 9.27 12/20/2021    WBC 12.37 11/26/2021    RBC 5.06 12/20/2021    RBC 5.03 11/26/2021    HGB 15.4 12/20/2021    HGB 15.4 11/26/2021    HCT 48.2 12/20/2021    HCT 46.0 11/26/2021    MCV 95 12/20/2021    MCV 92 11/26/2021    MCH 30.4 12/20/2021    MCH 30.6 11/26/2021    MCHC 32.0 12/20/2021    MCHC 33.5 11/26/2021    RDW 14.0 12/20/2021    RDW 13.9 11/26/2021     12/20/2021     11/26/2021    MPV 9.3 12/20/2021    MPV 9.5 11/26/2021    GRAN 5.2 12/20/2021    GRAN 56.0 12/20/2021    LYMPH 2.7 12/20/2021    LYMPH 29.2 12/20/2021    MONO 0.9 12/20/2021    MONO 9.6 12/20/2021    BASO 0.08 12/20/2021    BASO 0.07 11/26/2021    NRBC 0 12/20/2021    NRBC 0 11/26/2021       CHEMISTRY & LIVER FUNCTION - LAST 2  Lab Results   Component Value Date     12/20/2021     11/26/2021    K 4.3 12/20/2021    K 4.3 11/26/2021     12/20/2021     11/26/2021    CO2 28 12/20/2021    CO2 22 (L) 11/26/2021    ANIONGAP 7 (L) 12/20/2021    ANIONGAP 14 11/26/2021    BUN 17 12/20/2021    BUN 28 (H) 11/26/2021    CREATININE 1.1 12/20/2021    CREATININE 1.3 11/26/2021    GLU 86 12/20/2021     (H) 11/26/2021    CALCIUM 9.7 12/20/2021    CALCIUM 10.1 11/26/2021    ALBUMIN 3.9 12/20/2021    ALBUMIN 3.7 11/26/2021    PROT 7.2 12/20/2021    PROT 7.3 11/26/2021    ALKPHOS 81 12/20/2021    ALKPHOS 89  11/26/2021    ALT 17 12/20/2021    ALT 14 11/26/2021    AST 21 12/20/2021    AST 22 11/26/2021    BILITOT 0.7 12/20/2021    BILITOT 0.4 11/26/2021        CARDIAC PROFILE - LAST 2  Lab Results   Component Value Date     (H) 11/26/2021    TROPONINI 0.014 11/26/2021        COAGULATION - LAST 2  No results found for: LABPT, INR, APTT    ENDOCRINE & PSA - LAST 2  Lab Results   Component Value Date    HGBA1C 5.8 (H) 07/21/2022        ECHOCARDIOGRAM RESULTS  No results found for this or any previous visit.      CURRENT/PREVIOUS VISIT EKG  Results for orders placed or performed in visit on 09/27/22   IN OFFICE EKG 12-LEAD (to [employer])    Collection Time: 09/27/22 10:52 AM    Narrative    Test Reason : Z95.1,Z95.0,I10,    Vent. Rate : 073 BPM     Atrial Rate : 073 BPM     P-R Int : 196 ms          QRS Dur : 094 ms      QT Int : 406 ms       P-R-T Axes : 042 -49 014 degrees     QTc Int : 447 ms    Atrial-paced rhythm  Left axis deviation  Incomplete right bundle branch block  Moderate voltage criteria for LVH, may be normal variant  Anteroseptal infarct (cited on or before 26-NOV-2021)  Abnormal ECG  When compared with ECG of 26-NOV-2021 21:54,  Electronic atrial pacemaker has replaced Sinus rhythm  Confirmed by Tony Juarez MD ([id number]) on 10/10/2022 12:52:44 AM    Referred By:             Confirmed By:Tony Juarez MD     No valid procedures specified.   Results for orders placed during the hospital encounter of 11/17/21    Nuclear Stress - Cardiology Interpreted    Interpretation Summary    Normal myocardial perfusion scan. There is no evidence of myocardial ischemia or infarction.    The gated perfusion images showed an ejection fraction of 67% post stress. Normal ejection fraction is greater than 53%.    There is normal wall motion post stress.    LV cavity size is  and normal at stress.    The EKG portion of this study is negative for ischemia.    The patient reported chest pain during the stress test.    There  were no arrhythmias during stress.    No valid procedures specified.    PHYSICAL EXAM  CONSTITUTIONAL: Well built, well nourished in no apparent distress  NECK: no carotid bruit, no JVD  LUNGS: CTA  CHEST WALL: no tenderness  HEART: regular rate and rhythm, normal S1 diminished S2 and a grade 2 systolic murmur noted in the past on area   ABDOMEN: soft, non-tender; bowel sounds normal; no masses,  no organomegaly  EXTREMITIES: Extremities normal, no edema, no calf tenderness noted  NEURO: AAO X 3    I HAVE REVIEWED :    The vital signs, nurses notes, and all the pertinent radiology and labs.        Current Outpatient Medications   Medication Instructions    albuterol (PROVENTIL/VENTOLIN HFA) 90 mcg/actuation inhaler INHALE 1-2 PUFFS INTO THE LUNGS 2 (TWO) TIMES A DAY.    amLODIPine (NORVASC) 5 MG tablet TAKE 1 TABLET BY MOUTH  TWICE DAILY    anastrozole (ARIMIDEX) 1 mg Tab No dose, route, or frequency recorded.    aspirin (ECOTRIN) 81 mg, Oral, Daily    cloNIDine (CATAPRES) 0.1 MG tablet TAKE 1 TABLET BY MOUTH EVERY DAY AS NEEDED FOR SYSTOLIC PRESSURE GREATER THAN 170    co-enzyme Q-10 100 mg, Oral, Daily    docusate sodium (COLACE) 100 mg, Oral, 2 times daily    DULoxetine (CYMBALTA) 20 mg, Oral, Daily    fluticasone propionate (FLONASE) 50 mcg/actuation nasal spray No dose, route, or frequency recorded.    gabapentin (NEURONTIN) 300 MG capsule TAKE 1 CAPSULE BY MOUTH IN  THE EVENING    loratadine (CLARITIN) 10 mg, Oral, Daily    metFORMIN (GLUCOPHAGE) 500 mg, Oral, 2 times daily with meals    metoprolol succinate (TOPROL-XL) 50 MG 24 hr tablet TAKE 1 TABLET BY MOUTH ONCE DAILY    MULTIVIT-MIN/FA/LUTEIN/ZEAXANT (MACULAR VITAMIN ORAL) Oral    olmesartan (BENICAR) 40 mg    rosuvastatin (CRESTOR) 10 MG tablet TAKE 1 TABLET BY MOUTH AT  BEDTIME    vitamin D (VITAMIN D3) 1,000 Units, Oral, Daily          Assessment & Plan     No problem-specific Assessment & Plan notes found for this encounter.          No follow-ups on  file.

## 2023-04-10 NOTE — ASSESSMENT & PLAN NOTE
Doing well with adequate function.  Maintain the same regimen including Toprol-XL 50 mg once a day

## 2023-04-10 NOTE — ASSESSMENT & PLAN NOTE
Continue on risk factor modification and secondary prevention.  She is presently on Crestor are 10 mg daily and last lipid levels was 84 maintain the same and maintain low-fat low-cholesterol diet or also maintain on aspirin 81 mg daily and good diabetic control as well as blood pressure control.

## 2023-04-10 NOTE — ASSESSMENT & PLAN NOTE
Her LDL cholesterol remains at 84 like to get it below 70.  In addition to Crestor 10 arm and encouraged her to add Zetia 10 mg daily

## 2023-04-10 NOTE — ASSESSMENT & PLAN NOTE
Her blood pressure is 126/74 mm Hg maintain on amlodipine 5 mg daily she is using clonidine only on p.r.n. basis and continue on Benicar 40 mg a day

## 2023-04-10 NOTE — ASSESSMENT & PLAN NOTE
She is on arm Glucophage 500 mg twice daily last A1c is 5.8 maintain the same regimen and cautious dietary intake

## 2023-04-12 ENCOUNTER — TELEPHONE (OUTPATIENT)
Dept: CARDIOLOGY | Facility: CLINIC | Age: 83
End: 2023-04-12
Payer: MEDICARE

## 2023-04-12 DIAGNOSIS — Z95.0 CARDIAC PACEMAKER IN SITU: Primary | ICD-10-CM

## 2023-04-18 ENCOUNTER — HOSPITAL ENCOUNTER (OUTPATIENT)
Dept: PREADMISSION TESTING | Facility: HOSPITAL | Age: 83
Discharge: HOME | End: 2023-04-18
Attending: INTERNAL MEDICINE
Payer: MEDICARE

## 2023-04-18 VITALS
HEIGHT: 63 IN | OXYGEN SATURATION: 98 % | SYSTOLIC BLOOD PRESSURE: 123 MMHG | DIASTOLIC BLOOD PRESSURE: 68 MMHG | RESPIRATION RATE: 15 BRPM | WEIGHT: 142 LBS | BODY MASS INDEX: 25.16 KG/M2 | TEMPERATURE: 97 F | HEART RATE: 79 BPM

## 2023-04-18 DIAGNOSIS — Z01.818 PREOP TESTING: Primary | ICD-10-CM

## 2023-04-18 PROCEDURE — 93010 EKG 12-LEAD: ICD-10-PCS | Mod: ,,, | Performed by: SPECIALIST

## 2023-04-18 PROCEDURE — 93010 ELECTROCARDIOGRAM REPORT: CPT | Mod: ,,, | Performed by: SPECIALIST

## 2023-04-18 PROCEDURE — 93005 ELECTROCARDIOGRAM TRACING: CPT | Performed by: SPECIALIST

## 2023-04-21 ENCOUNTER — HOSPITAL ENCOUNTER (OUTPATIENT)
Facility: HOSPITAL | Age: 83
Discharge: HOME | End: 2023-04-21
Attending: INTERNAL MEDICINE | Admitting: INTERNAL MEDICINE
Payer: MEDICARE

## 2023-04-21 ENCOUNTER — ANESTHESIA EVENT (OUTPATIENT)
Dept: SURGERY | Facility: HOSPITAL | Age: 83
End: 2023-04-21
Payer: MEDICARE

## 2023-04-21 ENCOUNTER — ANESTHESIA (OUTPATIENT)
Dept: SURGERY | Facility: HOSPITAL | Age: 83
End: 2023-04-21
Payer: MEDICARE

## 2023-04-21 ENCOUNTER — TELEPHONE (OUTPATIENT)
Dept: SURGERY | Facility: CLINIC | Age: 83
End: 2023-04-21
Payer: MEDICARE

## 2023-04-21 VITALS
SYSTOLIC BLOOD PRESSURE: 171 MMHG | OXYGEN SATURATION: 96 % | DIASTOLIC BLOOD PRESSURE: 82 MMHG | RESPIRATION RATE: 16 BRPM | HEART RATE: 78 BPM | TEMPERATURE: 98 F

## 2023-04-21 DIAGNOSIS — K62.5 RECTAL BLEEDING: ICD-10-CM

## 2023-04-21 PROCEDURE — 45380 COLONOSCOPY AND BIOPSY: CPT | Performed by: INTERNAL MEDICINE

## 2023-04-21 PROCEDURE — D9220A PRA ANESTHESIA: Mod: CRNA,,, | Performed by: NURSE ANESTHETIST, CERTIFIED REGISTERED

## 2023-04-21 PROCEDURE — D9220A PRA ANESTHESIA: ICD-10-PCS | Mod: ANES,,, | Performed by: STUDENT IN AN ORGANIZED HEALTH CARE EDUCATION/TRAINING PROGRAM

## 2023-04-21 PROCEDURE — 37000009 HC ANESTHESIA EA ADD 15 MINS: Performed by: INTERNAL MEDICINE

## 2023-04-21 PROCEDURE — 27202438 HC MUCOSAL LIFTING AGENT: Performed by: INTERNAL MEDICINE

## 2023-04-21 PROCEDURE — 27200043 HC FORCEPS, BIOPSY: Performed by: INTERNAL MEDICINE

## 2023-04-21 PROCEDURE — 27201028 HC NEEDLE, SCLERO: Performed by: INTERNAL MEDICINE

## 2023-04-21 PROCEDURE — 63600175 PHARM REV CODE 636 W HCPCS: Performed by: NURSE ANESTHETIST, CERTIFIED REGISTERED

## 2023-04-21 PROCEDURE — 45385 COLONOSCOPY W/LESION REMOVAL: CPT | Performed by: INTERNAL MEDICINE

## 2023-04-21 PROCEDURE — 27201089 HC SNARE, DISP (ANY): Performed by: INTERNAL MEDICINE

## 2023-04-21 PROCEDURE — 88305 TISSUE EXAM BY PATHOLOGIST: CPT | Mod: TC,59

## 2023-04-21 PROCEDURE — 27201114 HC TRAP (ANY): Performed by: INTERNAL MEDICINE

## 2023-04-21 PROCEDURE — D9220A PRA ANESTHESIA: ICD-10-PCS | Mod: CRNA,,, | Performed by: NURSE ANESTHETIST, CERTIFIED REGISTERED

## 2023-04-21 PROCEDURE — 25000003 PHARM REV CODE 250: Performed by: INTERNAL MEDICINE

## 2023-04-21 PROCEDURE — 45381 COLONOSCOPY SUBMUCOUS NJX: CPT | Performed by: INTERNAL MEDICINE

## 2023-04-21 PROCEDURE — D9220A PRA ANESTHESIA: Mod: ANES,,, | Performed by: STUDENT IN AN ORGANIZED HEALTH CARE EDUCATION/TRAINING PROGRAM

## 2023-04-21 PROCEDURE — 37000008 HC ANESTHESIA 1ST 15 MINUTES: Performed by: INTERNAL MEDICINE

## 2023-04-21 RX ORDER — SODIUM CHLORIDE 9 MG/ML
INJECTION, SOLUTION INTRAVENOUS CONTINUOUS
Status: DISCONTINUED | OUTPATIENT
Start: 2023-04-21 | End: 2023-04-21 | Stop reason: HOSPADM

## 2023-04-21 RX ORDER — PROPOFOL 10 MG/ML
VIAL (ML) INTRAVENOUS
Status: DISCONTINUED | OUTPATIENT
Start: 2023-04-21 | End: 2023-04-21

## 2023-04-21 RX ADMIN — PROPOFOL 50 MG: 10 INJECTION, EMULSION INTRAVENOUS at 12:04

## 2023-04-21 RX ADMIN — PROPOFOL 30 MG: 10 INJECTION, EMULSION INTRAVENOUS at 12:04

## 2023-04-21 RX ADMIN — SODIUM CHLORIDE: 0.9 INJECTION, SOLUTION INTRAVENOUS at 12:04

## 2023-04-21 NOTE — ANESTHESIA PREPROCEDURE EVALUATION
04/21/2023  Mirta Guerin is a 82 y.o., female.      Patient Active Problem List   Diagnosis    Personal history of tobacco use, presenting hazards to health    Obstructive sleep apnea syndrome    Abnormal CT scan of lung    Malignant neoplasm of upper-outer quadrant of right breast in female, estrogen receptor positive    Coronary artery disease involving autologous vein coronary bypass graft without angina pectoris    Cough    Type II diabetes mellitus with neurological manifestations    Ingrown nail    Psoriasis    NEGRITA (obstructive sleep apnea)    Disc degeneration, lumbar    Spondylolisthesis of lumbar region    Lumbar facet arthropathy    Osteoarthritis of fingers of hands, bilateral    Allergic rhinitis    Open wound of left heel    Essential hypertension    Dyslipidemia    Hx of CABG    Diabetes mellitus without complication    Cardiac pacemaker in situ    SSS (sick sinus syndrome)    Comprehensive diabetic foot examination, type 2 DM, encounter for    Former smoker    High risk medication use    History of abnormal mammogram    Postmenopausal    Acute upper respiratory infection    Acute bronchitis    Calculus of kidney    Gastroesophageal reflux disease    Hematuria    Low back pain    Malignant neoplasm of breast    Mitral valve insufficiency    Need for immunization against influenza    Osteoarthritis    Osteopenia    Pulmonary congestion    Pulmonary hypertension    Shortness of breath    Sinus tachycardia    Urinary tract infection    Ventricular premature beats    Vitamin D deficiency    Wheezing       Past Surgical History:   Procedure Laterality Date    BREAST CYST ASPIRATION      BREAST LUMPECTOMY      CARDIAC PACEMAKER PLACEMENT  04/05/2012    CORONARY ARTERY BYPASS GRAFT  07/2017    SKIN CANCER EXCISION      UPPER GASTROINTESTINAL  ENDOSCOPY          Tobacco Use:  The patient  reports that she has been smoking cigarettes. She started smoking about 69 years ago. She has a 12.50 pack-year smoking history. She has never used smokeless tobacco.     Results for orders placed or performed during the hospital encounter of 04/18/23   EKG 12-lead    Collection Time: 04/18/23  2:38 PM    Narrative    Test Reason : Z01.818,    Vent. Rate : 081 BPM     Atrial Rate : 081 BPM     P-R Int : 194 ms          QRS Dur : 090 ms      QT Int : 378 ms       P-R-T Axes : 020 -50 035 degrees     QTc Int : 439 ms    Atrial-paced rhythm  Left axis deviation  Moderate voltage criteria for LVH, may be normal variant ( R in aVL ,   [place] product )  Abnormal ECG  When compared with ECG of 27-SEP-2022 10:52,  Criteria for Anteroseptal infarct are no longer Present  Confirmed by Reuben Dempsey MD ([id number]) on 4/20/2023 8:52:54 PM    Referred By:             Confirmed By:Reuben Dempsey MD             Lab Results   Component Value Date    WBC 10.29 04/18/2023    HGB 14.2 04/18/2023    HCT 44.5 04/18/2023    MCV 97 04/18/2023     04/18/2023     BMP  Lab Results   Component Value Date     04/18/2023    K 4.8 04/18/2023     04/18/2023    CO2 25 04/18/2023    BUN 23 04/18/2023    CREATININE 1.0 04/18/2023    CALCIUM 9.7 04/18/2023    ANIONGAP 9 04/18/2023    GLU 95 04/18/2023    GLU 86 12/20/2021     (H) 11/26/2021       No results found for this or any previous visit.            Pre-op Assessment    I have reviewed the Patient Summary Reports.     I have reviewed the Nursing Notes. I have reviewed the NPO Status.   I have reviewed the Medications.     Review of Systems  Anesthesia Hx:  No problems with previous Anesthesia  Denies Family Hx of Anesthesia complications.   Denies Personal Hx of Anesthesia complications.   Social:  Smoker, Alcohol Use    Hematology/Oncology:  Hematology Normal       -- Cancer in past history:  Breast    EENT/Dental:EENT/Dental Normal   Cardiovascular:   Pacemaker Hypertension Valvular problems/Murmurs, MR CAD  CABG/stent Dysrhythmias  hyperlipidemia ECG has been reviewed.    Pulmonary:   COPD Sleep Apnea    Renal/:   renal calculi    Hepatic/GI:   GERD    Musculoskeletal:   Arthritis   Spine Disorders: lumbar    Neurological:  Neurology Normal    Endocrine:   Diabetes    Psych:   anxiety          Physical Exam  General: Well nourished, Cooperative, Alert and Oriented    Airway:  Mallampati: II   Mouth Opening: Normal  TM Distance: Normal  Tongue: Normal  Neck ROM: Normal ROM    Dental:  Intact    Chest/Lungs:  Clear to auscultation    Heart:  Rate: Normal  Rhythm: Regular Rhythm  Sounds: Normal        Anesthesia Plan  Type of Anesthesia, risks & benefits discussed:    Anesthesia Type: Gen Natural Airway  Intra-op Monitoring Plan: Standard ASA Monitors  Informed Consent: Informed consent signed with the Patient and all parties understand the risks and agree with anesthesia plan.  All questions answered.   ASA Score: 3  Anesthesia Plan Notes: POM    Ready For Surgery From Anesthesia Perspective.     .

## 2023-04-21 NOTE — TELEPHONE ENCOUNTER
----- Message from Eduin Neff MD sent at 4/21/2023  1:15 PM CDT -----  Will see her Tues PM.  Grady noland add to schedule    Thanks  ----- Message -----  From: Corey Blake III, MD  Sent: 4/21/2023  12:51 PM CDT  To: Eduin Neff MD, James Mcclure MD, #    18mm appendiceal mass in 83 y/o F. Needs cecectomy vs R hemicolectomy. Can one of you get her in? Biopsies penidng but def precancerous if not cancer.

## 2023-04-21 NOTE — TRANSFER OF CARE
Anesthesia Transfer of Care Note    Patient: Mirta Guerin    Procedure(s) Performed: Procedure(s) (LRB):  COLONOSCOPY (N/A)    Anesthesia PACU Handoff    Last vitals:   Visit Vitals  BP (!) 175/77 (BP Location: Left arm, Patient Position: Lying)   Pulse 77   Temp 36.7 °C (98 °F) (Oral)   Resp 16   SpO2 (!) 94%   Breastfeeding No

## 2023-04-21 NOTE — H&P
GASTROENTEROLOGY PRE-PROCEDURE H&P NOTE  Patient Name: Mirta Guerin  Patient MRN: [id number]  Patient : 1940    Service date: 2023    PCP: James Jung MD    No chief complaint on file.      HPI: Patient is a 82 y.o. female with PMHx as below here for evaluation of rectal bleedign.     Past Medical History:  Past Medical History:   Diagnosis Date    Anxiety     AV block     Breast cancer     Cardiac pacemaker in situ     Coronary artery disease     Diabetes mellitus     GERD (gastroesophageal reflux disease)     HLD (hyperlipidemia)     Hypertension     Lung disease     Melanoma     Mitral valve disorder     NEGRITA (obstructive sleep apnea)     Prediabetes     Psoriasis     Spinal stenosis     Squamous cell carcinoma of skin     right anterior scalp    Vitamin D deficiency         Past Surgical History:  Past Surgical History:   Procedure Laterality Date    BREAST CYST ASPIRATION      BREAST LUMPECTOMY      CARDIAC PACEMAKER PLACEMENT  2012    CORONARY ARTERY BYPASS GRAFT  2017    SKIN CANCER EXCISION      UPPER GASTROINTESTINAL ENDOSCOPY          Home Medications:  Medications Prior to Admission   Medication Sig Dispense Refill Last Dose    amLODIPine (NORVASC) 5 MG tablet Take 1 tablet (5 mg total) by mouth 2 (two) times daily. 180 tablet 3 2023    aspirin (ECOTRIN) 81 MG EC tablet Take 81 mg by mouth once daily.   Past Week    ezetimibe (ZETIA) 10 mg tablet Take 1 tablet (10 mg total) by mouth once daily. 90 tablet 3 2023    metformin (GLUCOPHAGE) 500 MG tablet Take 500 mg by mouth 2 (two) times daily with meals.   2023    olmesartan (BENICAR) 40 MG tablet Take 1 tablet (40 mg total) by mouth once daily. 90 tablet 3 2023    albuterol (PROVENTIL/VENTOLIN HFA) 90 mcg/actuation inhaler INHALE 1-2 PUFFS INTO THE LUNGS 2 (TWO) TIMES A DAY. 18 g 0     anastrozole (ARIMIDEX) 1 mg Tab        cloNIDine (CATAPRES) 0.1 MG tablet TAKE 1 TABLET BY MOUTH EVERY DAY AS NEEDED FOR  SYSTOLIC PRESSURE GREATER THAN 170       co-enzyme Q-10 50 mg capsule Take 100 mg by mouth once daily.       docusate sodium (COLACE) 100 MG capsule Take 100 mg by mouth 2 (two) times daily.       DULoxetine (CYMBALTA) 20 MG capsule Take 1 capsule (20 mg total) by mouth once daily. 90 capsule 4     fluticasone propionate (FLONASE) 50 mcg/actuation nasal spray        gabapentin (NEURONTIN) 300 MG capsule TAKE 1 CAPSULE BY MOUTH IN  THE EVENING 90 capsule 3     loratadine (CLARITIN) 10 mg tablet Take 10 mg by mouth once daily.       metoprolol succinate (TOPROL-XL) 50 MG 24 hr tablet Take 1 tablet (50 mg total) by mouth once daily. 90 tablet 3     MULTIVIT-MIN/FA/LUTEIN/ZEAXANT (MACULAR VITAMIN ORAL) Take by mouth.       rosuvastatin (CRESTOR) 10 MG tablet Take 1 tablet (10 mg total) by mouth every evening. 90 tablet 3     vitamin D 1000 units Tab Take 1,000 Units by mouth once daily.                  Review of patient's allergies indicates:   Allergen Reactions    Bacitracin zinc-polymyxin b Rash    Adhesive Rash    Benazepril Other (See Comments)     sleepy    Codeine Nausea And Vomiting    Polysporin [bacitracin-polymyxin b] Rash       Social History:   Social History     Occupational History    Not on file   Tobacco Use    Smoking status: Every Day     Packs/day: 0.25     Years: 50.00     Pack years: 12.50     Types: Cigarettes     Start date: 1954    Smokeless tobacco: Never    Tobacco comments:     4-6 a day as of 8/16/21--got up to smoking a pack a day   Substance and Sexual Activity    Alcohol use: Yes     Comment: occasional    Drug use: No    Sexual activity: Never       Family History:   Family History   Problem Relation Age of Onset    Cancer Mother         ovarian    Coronary artery disease Father     Stroke Father     Stroke Maternal Grandfather     Breast cancer Paternal Grandmother     Cancer Paternal Grandmother         breast    Stroke Paternal Grandfather     Stroke Brother     Ovarian cancer Neg Hx         Review of Systems:  A 10 point review of systems was performed and was normal, except as mentioned in the HPI, including constitutional, HEENT, heme, lymph, cardiovascular, respiratory, gastrointestinal, genitourinary, neurologic, endocrine, psychiatric and musculoskeletal.      OBJECTIVE:    Physical Exam:  24 Hour Vital Sign Ranges: Temp:  [98 °F (36.7 °C)] 98 °F (36.7 °C)  Pulse:  [77] 77  Resp:  [16] 16  SpO2:  [94 %] 94 %  BP: (175)/(77) 175/77  Most recent vitals: BP (!) 175/77 (BP Location: Left arm, Patient Position: Lying)   Pulse 77   Temp 98 °F (36.7 °C) (Oral)   Resp 16   SpO2 (!) 94% Comment: ra  Breastfeeding No    GEN: well-developed, well-nourished, awake and alert, non-toxic appearing adult  HEENT: PERRL, sclera anicteric, oral mucosa pink and moist without lesion  NECK: trachea midline; Good ROM  CV: regular rate and rhythm, no murmurs or gallops  RESP: clear to auscultation bilaterally, no wheezes, rhonci or rales  ABD: soft, non-tender, non-distended, normal bowel sounds  EXT: no swelling or edema, 2+ pulses distally  SKIN: no rashes or jaundice  PSYCH: normal affect    Labs:   Recent Labs     04/18/23  1537   WBC 10.29   MCV 97        Recent Labs     04/18/23  1537      K 4.8      CO2 25   BUN 23   GLU 95     No results for input(s): ALB in the last 72 hours.    Invalid input(s): ALKP, SGOT, SGPT, TBIL, DBIL, TPRO  No results for input(s): PT, INR, PTT in the last 72 hours.      IMPRESSION / RECOMMENDATIONS:  Colnoscopy  with interventions as warranted.   RIsks, benefits, alternatives discussed in detail regarding upcoming procedures and sedation. Some of the more common endoscopic complications include but not limited to immediate or delayed perforation, bleeding, infections, pain, inadvertent injury to surrounding tissue / organs and possible need for surgical evaluation. Patient expressed understanding, all questions answered and will proceed with procedure as  planned.     Corey Blake III  4/21/2023  12:01 PM

## 2023-04-21 NOTE — TRANSFER OF CARE
Anesthesia Transfer of Care Note    Patient: Mirta Guerin    Procedure(s) Performed: Procedure(s) (LRB):  COLONOSCOPY (N/A)    Patient location: GI    Anesthesia Type: general    Transport from OR: Transported from OR on room air with adequate spontaneous ventilation    Post pain: adequate analgesia    Post assessment: no apparent anesthetic complications    Post vital signs: stable    Level of consciousness: awake and alert    Nausea/Vomiting: no nausea/vomiting    Complications: none    Transfer of care protocol was followed      Last vitals:   Visit Vitals  BP (!) 175/77 (BP Location: Left arm, Patient Position: Lying)   Pulse 77   Temp 36.7 °C (98 °F) (Oral)   Resp 16   SpO2 (!) 94%   Breastfeeding No

## 2023-04-21 NOTE — [employer] PATIENT INSTRUCTIONS
Discharge Summary/Instructions after an Endoscopic Procedure  Patient Name: Mirta Guerin  Patient MRN: [id number]  Patient YOB: 1940 Friday, April 21, 2023  Corey Blake III, MD  RESTRICTIONS:  During your procedure today, you received medications for sedation.  These   medications may affect your judgment, balance and coordination.  Therefore,   for 24 hours, you have the following restrictions:   - DO NOT drive a car, operate machinery, make legal/financial decisions,   sign important papers or drink alcohol.    ACTIVITY:  Today: no heavy lifting, straining or running due to procedural   sedation/anesthesia.  The following day: return to full activity including work.  DIET:  Eat and drink normally unless instructed otherwise.     TREATMENT FOR COMMON SIDE EFFECTS:  - Mild abdominal pain, nausea, belching, bloating or excessive gas:  rest,   eat lightly and use a heating pad.  - Sore Throat: treat with throat lozenges and/or gargle with warm salt   water.  - Because air was used during the procedure, expelling large amounts of air   from your rectum or belching is normal.  - If a bowel prep was taken, you may not have a bowel movement for 1-3 days.    This is normal.  SYMPTOMS TO WATCH FOR AND REPORT TO YOUR PHYSICIAN:  1. Abdominal pain or bloating, other than gas cramps.  2. Chest pain.  3. Back pain.  4. Signs of infection such as: chills or fever occurring within 24 hours   after the procedure.  5. Rectal bleeding, which would show as bright red, maroon, or black stools.   (A tablespoon of blood from the rectum is not serious, especially if   hemorrhoids are present.)  6. Vomiting.  7. Weakness or dizziness.  GO DIRECTLY TO THE NEAREST EMERGENCY ROOM IF YOU HAVE ANY OF THE FOLLOWING:      Difficulty breathing              Chills and/or fever over 101 F   Persistent vomiting and/or vomiting blood   Severe abdominal pain   Severe chest pain   Black, tarry stools   Bleeding- more than one  tablespoon   Any other symptom or condition that you feel may need urgent attention  Your doctor recommends these additional instructions:  If any biopsies were taken, your doctors clinic will contact you in 1 to 2   weeks with any results.  - Patient has a contact number available for emergencies.  The signs and   symptoms of potential delayed complications were discussed with the   patient.  Return to normal activities tomorrow.  Written discharge   instructions were provided to the patient.   - Resume previous diet.   - Continue present medications.   - No repeat colonoscopy due to current age (66 years or older).   - Discharge patient to home (with escort).  For questions, problems or results please call your physician - Corey Blake III, MD at Work:  [phone].  [facility], EMERGENCY ROOM PHONE NUMBER: [phone]  IF A COMPLICATION OR EMERGENCY SITUATION ARISES AND YOU ARE UNABLE TO REACH   YOUR PHYSICIAN - GO DIRECTLY TO THE EMERGENCY ROOM.  Corey Blake III, MD  4/21/2023 12:49:31 PM  This report has been verified and signed electronically.  Dear patient,  As a result of recent federal legislation (The Federal Cures Act), you may   receive lab or pathology results from your procedure in your [facility]   account before your physician is able to contact you. Your physician or   their representative will relay the results to you with their   recommendations at their soonest availability.  Thank you,  [employer]

## 2023-04-21 NOTE — TELEPHONE ENCOUNTER
SHORTY @ 1:54pm to inform patient that I scheduled her to see Dr. Neff on Wednesday, 5/3/23 @ 1:15pm in [place].  Ethan

## 2023-04-21 NOTE — ANESTHESIA POSTPROCEDURE EVALUATION
Anesthesia Post Evaluation    Patient: Mirta Guerin    Procedure(s) Performed: Procedure(s) (LRB):  COLONOSCOPY (N/A)    Final Anesthesia Type: general      Patient location during evaluation: GI PACU  Patient participation: Yes- Able to Participate  Level of consciousness: awake and alert  Post-procedure vital signs: reviewed and stable  Pain management: adequate  Airway patency: patent    PONV status at discharge: No PONV  Anesthetic complications: no      Cardiovascular status: hemodynamically stable  Respiratory status: unassisted, spontaneous ventilation and room air  Hydration status: euvolemic  Follow-up not needed.          Vitals Value Taken Time   /82 04/21/23 1315   Temp 36.7 °C (98 °F) 04/21/23 1315   Pulse 78 04/21/23 1315   Resp 16 04/21/23 1315   SpO2 96 % 04/21/23 1315         Event Time   Out of Recovery 13:22:57         Pain/Estephanie Score: No data recorded

## 2023-04-21 NOTE — TELEPHONE ENCOUNTER
SHORTY @ 1:50pm to inform patients Daughter Jaelyn that I scheduled her to see Dr. Neff on Wednesday, 5/3/23 @ 1:15pm in [place].  Ethan

## 2023-04-21 NOTE — TELEPHONE ENCOUNTER
Dr. Neff called me and said that he can see her on Tuesday, 4/25/23 in [place] if she'd like to be seen sooner.  Not sure if the pathology report will be back.  Ethan

## 2023-04-24 ENCOUNTER — TELEPHONE (OUTPATIENT)
Dept: SURGERY | Facility: CLINIC | Age: 83
End: 2023-04-24
Payer: MEDICARE

## 2023-04-24 NOTE — TELEPHONE ENCOUNTER
I called patient to inform her that Dr. Neff can see her tomorrow @ 1:45pm in [place].  Patient accepted the appointment and I cancelled the Wednesday, 5./3/23 @ 1:15pm in [place].  I gave patient the address and directions to the [place] clinic.  She has a device check @ 1pm @ [facility] on the tomorrow and she'll come after.  Ethan

## 2023-04-24 NOTE — TELEPHONE ENCOUNTER
----- Message from Akilah Chen, Patient Care Assistant sent at 4/24/2023  9:13 AM CDT -----  Contact: self  Pt is calling to speak to a nurse in regards to a surgery date [phone]  thanks

## 2023-04-24 NOTE — TELEPHONE ENCOUNTER
I called patient and rescheduled her from Wednesday, 5/3/23 @ 1:15pm in [place] to Tuesday, 4/25/23 @ 1:45pm in [place].  Ethan

## 2023-04-24 NOTE — TELEPHONE ENCOUNTER
Patient called back to confirm her appointment tomorrow with Dr. Neff @ 1:45pm.  She wanted to make sure of the address and directions, which I gave her that information.  Ethan

## 2023-04-24 NOTE — TELEPHONE ENCOUNTER
LMOR @ 9:10am for patient to call back about an appointment to see Dr. Neff tomorrow in [place] per Dr. Neff.  Ethan

## 2023-04-25 ENCOUNTER — OFFICE VISIT (OUTPATIENT)
Dept: SURGERY | Facility: CLINIC | Age: 83
End: 2023-04-25
Payer: MEDICARE

## 2023-04-25 ENCOUNTER — TELEPHONE (OUTPATIENT)
Dept: CARDIOLOGY | Facility: CLINIC | Age: 83
End: 2023-04-25
Payer: MEDICARE

## 2023-04-25 VITALS
BODY MASS INDEX: 25.43 KG/M2 | SYSTOLIC BLOOD PRESSURE: 136 MMHG | TEMPERATURE: 98 F | HEIGHT: 63 IN | WEIGHT: 143.5 LBS | DIASTOLIC BLOOD PRESSURE: 63 MMHG | HEART RATE: 67 BPM

## 2023-04-25 DIAGNOSIS — K38.8 MASS OF APPENDIX: Primary | ICD-10-CM

## 2023-04-25 PROCEDURE — 99999 PR PBB SHADOW E&M-EST. PATIENT-LVL V: CPT | Mod: PBBFAC,,, | Performed by: SURGERY

## 2023-04-25 PROCEDURE — 99999 PR PBB SHADOW E&M-EST. PATIENT-LVL V: ICD-10-PCS | Mod: PBBFAC,,, | Performed by: SURGERY

## 2023-04-25 PROCEDURE — 99204 PR OFFICE/OUTPT VISIT, NEW, LEVL IV, 45-59 MIN: ICD-10-PCS | Mod: S$PBB,,, | Performed by: SURGERY

## 2023-04-25 PROCEDURE — 99204 OFFICE O/P NEW MOD 45 MIN: CPT | Mod: S$PBB,,, | Performed by: SURGERY

## 2023-04-25 PROCEDURE — 99215 OFFICE O/P EST HI 40 MIN: CPT | Mod: PBBFAC,PN | Performed by: SURGERY

## 2023-04-25 RX ORDER — SODIUM PICOSULFATE, MAGNESIUM OXIDE, AND ANHYDROUS CITRIC ACID 12; 3.5; 1 G/175ML; G/175ML; MG/175ML
LIQUID ORAL
COMMUNITY
Start: 2023-04-07 | End: 2023-04-25

## 2023-04-25 NOTE — TELEPHONE ENCOUNTER
----- Message from Altagracia Fink, Patient Care Assistant sent at 4/25/2023 12:14 PM CDT -----  Regarding: returning call  Contact: pt  Type:  Patient Returning Call    Who Called:  pt     Who Left Message for Patient:  not sure     Does the patient know what this is regarding?: not sure     Best Call Back Number: [phone]    Additional Information:  please call pt to advise. Thanks!

## 2023-04-25 NOTE — PATIENT INSTRUCTIONS
Surgery is scheduled for 5/1/23 arrival time will be given by the the preop nurse.  You may receive two calls from the Preop Nurses one a few days before surgery the second the day before surgery with the arrival time.  The preop nurse will call you from [phone]  Nothing to eat or drink after midnight.  Someone to drive you home if you are same day surgery.    THE PREOP NURSE WILL CALL, SOMETIMES AS LATE AS 4 or 5 PM IN THE AFTERNOON THE DAY BEFORE SURGERY.    Bathe the night before and the morning of your procedure with a Chlorhexidine wash such as Hibiclens, can be purchased at most Pharmacy's no prescription needed.    Special Instruction:     Bowel Prep for Colonoscopy/Surgery  Purchase:  Dulcolax Pills (Laxative) 4 tablets  14 dose bottle of Miralax Powder  64 ounces of Gatorade or Powerade    The day before your procedure no solid foods, clear liquids only to include water, Gatorade,Powerade.  Coffee no creamer  Soft Drinks  Popsicles   Jello   Live Aid  Chicken or beef broth  Apple juice, white grape juice, Tea,   Hard Candy    NO RED OR PURPLE COLORED LIQUIDS, JELLO, POPSCILES.  Try to Avoid these foods 7 days before your Procedure:  beans, peas, corn, nuts, popcorn and tomatoes.  Try not to use Advil or Ibuprofen, Non-Steroidal Anti-inflammatories such as Aleve for 7 days before your colonoscopy, no fish oil for 5 days before the procedure.    Mix the entire 14 dose bottle of Miralax into  64 ounces of Gatorade into a large pitcher, stir and chill until ready to use.  The day before procedure starting at noon take all 4 Dulcolax tablets followed by clear liquids until 2pm.  Keep in mind to drink plenty of fluids this is how the laxatives work, plenty fluids.  After 2pm no later than 4pm start drinkg the Miralax/Gatorade mix 8 ounces at a time over a 2-4 hour period until completing the entire 64 ounces, one glass every 15 to 30 minutes.  Keep sipping clear liquids between each dose  Contact your  Diabetes doctor or Endocrinologist for detailed instruction regarding your insulin or oral diabetes medication.     Contact Dr. Neff if you have any questions, [phone]    Be Sure to Notify Your Doctor if You are on a Prescription Blood Thinner.    Your procedure/surgery is scheduled at the [facility] in [address] [place].     Contact Grady Dang LPN for questions are concerns. [phone]     Contact Dr. Juarez for clearance               .

## 2023-04-25 NOTE — H&P (VIEW-ONLY)
Subjective     Patient ID: Mirta Guerin is a 82 y.o. female.    Chief Complaint: Consult (18mm appendiceal mass)    HPI   this is a pleasant 82-year-old female who was referred to me in consultation from Dr. Blake for evaluation of an appendiceal lesion.  Patient had recent colonoscopy performed and was found to have a 1.5 centimeter adenomatous polyp at the appendiceal orifice.  Attempts endoscopically removing this were unsuccessful.  Pathology did confirm adenomatous tissue.  Discussion with endoscopist suggest the lesion was soft and did not appear to be overtly associated with malignancy.  She is referred for resection.  Patient does have a history of sleep apnea and coronary artery disease.  She has a pacemaker in place.  Denies any significant surgical history outside of a previous hysterectomy.      Review of Systems   Constitutional:  Negative for activity change and appetite change.   Respiratory:  Negative for apnea and shortness of breath.    Cardiovascular:  Negative for chest pain.   Gastrointestinal:  Negative for abdominal distention and abdominal pain.   Hematological:  Negative for adenopathy. Does not bruise/bleed easily.   Psychiatric/Behavioral:  Negative for agitation and decreased concentration.         Objective     Physical Exam  Vitals reviewed.   Cardiovascular:      Rate and Rhythm: Normal rate.      Pulses: Normal pulses.   Pulmonary:      Effort: Pulmonary effort is normal. No respiratory distress.      Breath sounds: No wheezing.   Abdominal:      General: Abdomen is flat. Bowel sounds are normal. There is no distension.      Tenderness: There is no abdominal tenderness.   Neurological:      Mental Status: She is alert.   Psychiatric:         Mood and Affect: Mood normal.     Images from endoscopy report were reviewed and discussed with patient.  Flat polypoid lesion noted at the appendiceal orifice     Pathology:  1. APPENDICEAL MASS, BIOPSY:       --FRAGMENTS OF SERRATED  ADENOMA.      Assessment and Plan adenomatous polyp at the appendiceal orifice         Problem List Items Addressed This Visit    None      Discussion with patient.  Have also reviewed records and discussed with the endoscopist.  Mass at the appendiceal orifice is in position this unable to be fully resected.  It is adenomatous.  It does not appear to be consistent with a definitive malignant polyp.  As such I have discussed with patient the benefits of proceeding with local resection.  Recommend an advocate for a appendectomy with partial cecectomy.  Discussed with patient alternate who which would include potential for extended right hemicolectomy.  This surgery carries much will risks and is associated with potential anastomotic leak and would be a potentially life altering surgery.  Proceeding with an appendectomy and partial cecectomy carries a much lower risk profile in this patient with advanced age and multiple medical problems including but not limited to coronary artery disease and COPD.  She desires to proceed surgical resection.  She understands that if the pathology does confirm malignancy or if there are overt signs of malignancy at surgery would consider proceeding with a right hemicolectomy.  Risks of surgery including but not limited to bleeding, abscess, need for further surgery and possible conversion to an open were discussed in detail.

## 2023-04-26 ENCOUNTER — TELEPHONE (OUTPATIENT)
Dept: SURGERY | Facility: CLINIC | Age: 83
End: 2023-04-26
Payer: MEDICARE

## 2023-04-26 ENCOUNTER — TELEPHONE (OUTPATIENT)
Dept: CARDIOLOGY | Facility: CLINIC | Age: 83
End: 2023-04-26

## 2023-04-26 RX ORDER — SODIUM CHLORIDE 9 MG/ML
INJECTION, SOLUTION INTRAVENOUS CONTINUOUS
Status: CANCELLED | OUTPATIENT
Start: 2023-04-26

## 2023-04-26 RX ORDER — METRONIDAZOLE 500 MG/100ML
500 INJECTION, SOLUTION INTRAVENOUS
Status: CANCELLED | OUTPATIENT
Start: 2023-04-26

## 2023-04-26 NOTE — TELEPHONE ENCOUNTER
I faxed the records and reports to Dr. Juarez's office for clearance for her surgery on 5/1/23 to [phone].  Ethan

## 2023-04-26 NOTE — LETTER
2023    Mirta Guerin  [address] [place] [address]             [facility]  [address], [address]  [facility] [address]  Phone: [phone]  Fax: [phone] Patient: Mirta Guerin  : 1940  Dr Neff  May 1, 2023  Robotic Cecectomy  Current Outpatient Medications   Medication Sig    albuterol (PROVENTIL/VENTOLIN HFA) 90 mcg/actuation inhaler INHALE 1-2 PUFFS INTO THE LUNGS 2 (TWO) TIMES A DAY. (Patient not taking: Reported on 2023)    amLODIPine (NORVASC) 5 MG tablet Take 1 tablet (5 mg total) by mouth 2 (two) times daily.    anastrozole (ARIMIDEX) 1 mg Tab     aspirin (ECOTRIN) 81 MG EC tablet Take 81 mg by mouth once daily.    cloNIDine (CATAPRES) 0.1 MG tablet TAKE 1 TABLET BY MOUTH EVERY DAY AS NEEDED FOR SYSTOLIC PRESSURE GREATER THAN 170    co-enzyme Q-10 50 mg capsule Take 100 mg by mouth once daily.    docusate sodium (COLACE) 100 MG capsule Take 100 mg by mouth 2 (two) times daily.    DULoxetine (CYMBALTA) 20 MG capsule Take 1 capsule (20 mg total) by mouth once daily.    ezetimibe (ZETIA) 10 mg tablet Take 1 tablet (10 mg total) by mouth once daily.    fluticasone propionate (FLONASE) 50 mcg/actuation nasal spray     gabapentin (NEURONTIN) 300 MG capsule TAKE 1 CAPSULE BY MOUTH IN  THE EVENING (Patient not taking: Reported on 2023)    loratadine (CLARITIN) 10 mg tablet Take 10 mg by mouth once daily.    metformin (GLUCOPHAGE) 500 MG tablet Take 500 mg by mouth 2 (two) times daily with meals.    metoprolol succinate (TOPROL-XL) 50 MG 24 hr tablet Take 1 tablet (50 mg total) by mouth once daily.    MULTIVIT-MIN/FA/LUTEIN/ZEAXANT (MACULAR VITAMIN ORAL) Take by mouth.    olmesartan (BENICAR) 40 MG tablet Take 1 tablet (40 mg total) by mouth once daily.    rosuvastatin (CRESTOR) 10 MG tablet Take 1 tablet (10 mg total) by mouth every evening.    vitamin D 1000 units Tab Take 1,000 Units by  mouth once daily.     No current facility-administered medications for this visit.   This patient has been assessed for risk factors for clearance of surgery with the following stipulations:  _x__ Recommendations for antiplatelet/anticoagulant medications: hold aspirin _7__ days.  _x__ Cleared for surgery with moderate risks.  If you have any questions regarding the above, please contact my office at [phone].     Sincerely,    Ruth Dillon NP

## 2023-04-26 NOTE — TELEPHONE ENCOUNTER
----- Message from Grady Dang LPN sent at 4/26/2023 10:07 AM CDT -----  Regarding: Approval to Proceed with Anesthesia and Surgery  Hello,  Pt is scheduled for Monday 5/1/23, Robotic Cecectomy, request Dr Juarez's  approval/recommendation's to proceed. Pt's last visit was 4/10/23 with Dr. Juarez,   Dr. Neff' plan below:    Assessment and Plan    adenomatous polyp at the appendiceal orifice             Problem List Items Addressed This Visit   None        Discussion with patient.  Have also reviewed records and discussed with the endoscopist.  Mass at the appendiceal orifice is in position this unable to be fully resected.  It is adenomatous.  It does not appear to be consistent with a definitive malignant polyp.  As such I have discussed with patient the benefits of proceeding with local resection.  Recommend an advocate for a appendectomy with partial cecectomy.  Discussed with patient alternate who which would include potential for extended right hemicolectomy.  This surgery carries much will risks and is associated with potential anastomotic leak and would be a potentially life altering surgery.  Proceeding with an appendectomy and partial cecectomy carries a much lower risk profile in this patient with advanced age and multiple medical problems including but not limited to coronary artery disease and COPD.  She desires to proceed surgical resection.  She understands that if the pathology does confirm malignancy or if there are overt signs of malignancy at surgery would consider proceeding with a right hemicolectomy.  Risks of surgery including but not limited to bleeding, abscess, need for further surgery and possible conversion to an open were discussed in detail.               Electronically signed by Eduin Neff MD at 4/25/2023  3:58 PM

## 2023-04-27 ENCOUNTER — HOSPITAL ENCOUNTER (OUTPATIENT)
Dept: PREADMISSION TESTING | Facility: HOSPITAL | Age: 83
Discharge: HOME | End: 2023-04-27
Attending: SURGERY
Payer: MEDICARE

## 2023-04-27 ENCOUNTER — TELEPHONE (OUTPATIENT)
Dept: SURGERY | Facility: CLINIC | Age: 83
End: 2023-04-27
Payer: MEDICARE

## 2023-04-27 DIAGNOSIS — K38.8 MASS OF APPENDIX: ICD-10-CM

## 2023-04-27 LAB
ABO + RH BLD: NORMAL
ALBUMIN SERPL BCP-MCNC: 3.6 G/DL (ref 3.5–5.2)
ALP SERPL-CCNC: 75 U/L (ref 55–135)
ALT SERPL W/O P-5'-P-CCNC: 14 U/L (ref 10–44)
ANION GAP SERPL CALC-SCNC: 8 MMOL/L (ref 8–16)
AST SERPL-CCNC: 17 U/L (ref 10–40)
BASOPHILS # BLD AUTO: 0.07 K/UL (ref 0–0.2)
BASOPHILS NFR BLD: 0.7 % (ref 0–1.9)
BILIRUB SERPL-MCNC: 0.3 MG/DL (ref 0.1–1)
BLD GP AB SCN CELLS X3 SERPL QL: NORMAL
BUN SERPL-MCNC: 23 MG/DL (ref 8–23)
CALCIUM SERPL-MCNC: 9.6 MG/DL (ref 8.7–10.5)
CHLORIDE SERPL-SCNC: 109 MMOL/L (ref 95–110)
CO2 SERPL-SCNC: 25 MMOL/L (ref 23–29)
CREAT SERPL-MCNC: 0.9 MG/DL (ref 0.5–1.4)
DIFFERENTIAL METHOD: ABNORMAL
EOSINOPHIL # BLD AUTO: 0.2 K/UL (ref 0–0.5)
EOSINOPHIL NFR BLD: 2.2 % (ref 0–8)
ERYTHROCYTE [DISTWIDTH] IN BLOOD BY AUTOMATED COUNT: 13 % (ref 11.5–14.5)
EST. GFR  (NO RACE VARIABLE): >60 ML/MIN/1.73 M^2
GLUCOSE SERPL-MCNC: 97 MG/DL (ref 70–110)
HCT VFR BLD AUTO: 42 % (ref 37–48.5)
HGB BLD-MCNC: 13.7 G/DL (ref 12–16)
IMM GRANULOCYTES # BLD AUTO: 0.06 K/UL (ref 0–0.04)
IMM GRANULOCYTES NFR BLD AUTO: 0.6 % (ref 0–0.5)
LYMPHOCYTES # BLD AUTO: 3.1 K/UL (ref 1–4.8)
LYMPHOCYTES NFR BLD: 31.6 % (ref 18–48)
MCH RBC QN AUTO: 30.9 PG (ref 27–31)
MCHC RBC AUTO-ENTMCNC: 32.6 G/DL (ref 32–36)
MCV RBC AUTO: 95 FL (ref 82–98)
MONOCYTES # BLD AUTO: 0.8 K/UL (ref 0.3–1)
MONOCYTES NFR BLD: 7.9 % (ref 4–15)
NEUTROPHILS # BLD AUTO: 5.6 K/UL (ref 1.8–7.7)
NEUTROPHILS NFR BLD: 57 % (ref 38–73)
NRBC BLD-RTO: 0 /100 WBC
PLATELET # BLD AUTO: 328 K/UL (ref 150–450)
PMV BLD AUTO: 9.5 FL (ref 9.2–12.9)
POTASSIUM SERPL-SCNC: 3.8 MMOL/L (ref 3.5–5.1)
PROT SERPL-MCNC: 6.9 G/DL (ref 6–8.4)
RBC # BLD AUTO: 4.43 M/UL (ref 4–5.4)
SODIUM SERPL-SCNC: 142 MMOL/L (ref 136–145)
SPECIMEN OUTDATE: NORMAL
WBC # BLD AUTO: 9.78 K/UL (ref 3.9–12.7)

## 2023-04-27 PROCEDURE — 86900 BLOOD TYPING SEROLOGIC ABO: CPT | Performed by: SURGERY

## 2023-04-27 PROCEDURE — 85025 COMPLETE CBC W/AUTO DIFF WBC: CPT | Performed by: SURGERY

## 2023-04-27 PROCEDURE — 36415 COLL VENOUS BLD VENIPUNCTURE: CPT | Performed by: SURGERY

## 2023-04-27 PROCEDURE — 99900103 DSU ONLY-NO CHARGE-INITIAL HR (STAT)

## 2023-04-27 PROCEDURE — 99900104 DSU ONLY-NO CHARGE-EA ADD'L HR (STAT)

## 2023-04-27 PROCEDURE — 80053 COMPREHEN METABOLIC PANEL: CPT | Performed by: SURGERY

## 2023-04-27 NOTE — TELEPHONE ENCOUNTER
Surgery is scheduled for 5/1/23 arrival time will be given by the the preop nurse.  You may receive two calls from the Preop Nurses one a few days before surgery the second the day before surgery with the arrival time.  The preop nurse will call you from [phone]  Nothing to eat or drink after midnight.  Someone to drive you home if you are same day surgery.    THE PREOP NURSE WILL CALL, SOMETIMES AS LATE AS 4 or 5 PM IN THE AFTERNOON THE DAY BEFORE SURGERY.    Bathe the night before and the morning of your procedure with a Chlorhexidine wash such as Hibiclens, can be purchased at most Pharmacy's no prescription needed.    Special Instruction:     Your procedure/surgery is scheduled at the [facility] in [address] [place].

## 2023-04-27 NOTE — DISCHARGE INSTRUCTIONS
To confirm, Your doctor has instructed you that surgery is scheduled for: 5/1/23 WITH DR. SANCHEZ    Please report to [facility], Registration the morning of surgery. You must check-in and receive a wristband before going to your procedure.    Pre-Op will call the afternoon prior to surgery between 1:00 and 6:00 PM with the final arrival time.  Phone number: [phone]    PLEASE NOTE:  The surgery schedule has many variables which may affect the time of your surgery case.  Family members should be available if your surgery time changes.  Plan to be here the day of your procedure between 4-6 hours.    MEDICATIONS:  TAKE ONLY THESE MEDICATIONS WITH A SMALL SIP OF WATER THE MORNING OF YOUR PROCEDURE:  ALBUTEROL, AMLODIPINE, ANASTROZOLE, CLONIDINE IF NEEDED, DULOXETINE, EZETIMIBE, FLONASE, LORATADINE, METOPROLOL    NO OLMESARTAN MORNING OF SURGERY BUT DO NOT STOP DAYS BEFORE.  NO METFORMIN NIGHT BEFORE OR MORNING OF SURGERY.      DO NOT TAKE THESE MEDICATIONS 7 DAYS PRIOR to your procedure or per your surgeon's request:   ASPIRIN, ALEVE, ADVIL, IBUPROFEN, FISH OIL VITAMIN E, HERBALS, CO Q10  (May take Tylenol)    ONLY if you are prescribed any types of blood thinners such as:  Aspirin, Coumadin, Plavix, Pradaxa, Xarelto, Aggrenox, Effient, Eliquis, Savasya, Brilinta, or any other, ask your surgeon whether you should stop taking them and how long before surgery you should stop.  You may also need to verify with the prescribing physician if it is ok to stop your medication.      INSTRUCTIONS IMPORTANT!!  Do not eat or drink anything between midnight and the time of your procedure- this includes gum, mints, and candy.  Do not smoke or drink alcoholic beverages 24 hours prior to your procedure.  Shower the night before AND the morning of your procedure with a Chlorhexidine wash such as Hibiclens or Dial antibacterial soap from the neck down.  Do not get it on your face or in your eyes.  You may use  your own shampoo and face wash. This helps your skin to be as bacteria free as possible.    If you wear contact lenses, dentures, hearing aids or glasses, bring a container to put them in during surgery and give to a family member for safe keeping.  Please leave all jewelry, piercing's and valuables at home.   DO NOT remove hair from the surgery site.  Do not shave the incision site unless you are given specific instructions to do so.    ONLY if you have been diagnosed with sleep apnea please bring your C-PAP machine.  ONLY if you wear home oxygen please bring your portable oxygen tank the day of your procedure.  ONLY if you have a history of OPEN HEART SURGERY you will need a clearance from your Cardiologist per Anesthesia.      ONLY for patients requiring bowel prep, written instructions will be given by your doctor's office.  ONLY if you have a neuro stimulator, please bring the controller with you the morning of surgery  ONLY if a type and screen test is needed before surgery, please return:  If your doctor has scheduled you for an overnight stay, bring a small overnight bag with any personal items you need.  Make arrangements in advance for transportation home by a responsible adult.  It is not safe to drive a vehicle during the 24 hours after anesthesia.      [facility] Visitor Policy    Effective September 26, 2022    [facility] will resume routine visitation for COVID-19 negative patients, including inpatients, outpatients, and procedural areas, in accordance with local campus procedures.    All [facility] facilities and properties are tobacco free.  Smoking is NOT allowed.   If you have any questions about these instructions, call Pre-Op Admit  Nursing at [phone] or the Pre-Op Day Surgery Unit at [phone].

## 2023-04-28 ENCOUNTER — ANESTHESIA EVENT (OUTPATIENT)
Dept: SURGERY | Facility: HOSPITAL | Age: 83
DRG: 330 | End: 2023-04-28
Payer: MEDICARE

## 2023-05-01 ENCOUNTER — HOSPITAL ENCOUNTER (INPATIENT)
Facility: HOSPITAL | Age: 83
LOS: 1 days | Discharge: HOME | DRG: 330 | End: 2023-05-02
Attending: SURGERY | Admitting: SURGERY
Payer: MEDICARE

## 2023-05-01 ENCOUNTER — ANESTHESIA (OUTPATIENT)
Dept: SURGERY | Facility: HOSPITAL | Age: 83
DRG: 330 | End: 2023-05-01
Payer: MEDICARE

## 2023-05-01 DIAGNOSIS — K38.8 MASS OF APPENDIX: ICD-10-CM

## 2023-05-01 PROBLEM — N39.0 URINARY TRACT INFECTION: Status: RESOLVED | Noted: 2022-03-11 | Resolved: 2023-05-01

## 2023-05-01 PROBLEM — L60.0 INGROWN NAIL: Status: RESOLVED | Noted: 2018-09-16 | Resolved: 2023-05-01

## 2023-05-01 PROBLEM — J20.9 ACUTE BRONCHITIS: Status: RESOLVED | Noted: 2022-03-11 | Resolved: 2023-05-01

## 2023-05-01 PROBLEM — R06.02 SHORTNESS OF BREATH: Status: RESOLVED | Noted: 2022-03-11 | Resolved: 2023-05-01

## 2023-05-01 PROBLEM — R09.89 PULMONARY CONGESTION: Status: RESOLVED | Noted: 2022-03-11 | Resolved: 2023-05-01

## 2023-05-01 PROBLEM — R05.9 COUGH: Status: RESOLVED | Noted: 2018-03-22 | Resolved: 2023-05-01

## 2023-05-01 PROBLEM — S91.302A OPEN WOUND OF LEFT HEEL: Status: RESOLVED | Noted: 2019-07-25 | Resolved: 2023-05-01

## 2023-05-01 PROBLEM — Z23 NEED FOR IMMUNIZATION AGAINST INFLUENZA: Status: RESOLVED | Noted: 2022-03-11 | Resolved: 2023-05-01

## 2023-05-01 PROBLEM — J06.9 ACUTE UPPER RESPIRATORY INFECTION: Status: RESOLVED | Noted: 2022-03-11 | Resolved: 2023-05-01

## 2023-05-01 PROBLEM — R31.9 HEMATURIA: Status: RESOLVED | Noted: 2022-03-11 | Resolved: 2023-05-01

## 2023-05-01 PROBLEM — R00.0 SINUS TACHYCARDIA: Status: RESOLVED | Noted: 2022-03-11 | Resolved: 2023-05-01

## 2023-05-01 PROBLEM — R06.2 WHEEZING: Status: RESOLVED | Noted: 2022-03-11 | Resolved: 2023-05-01

## 2023-05-01 LAB
POCT GLUCOSE: 143 MG/DL (ref 70–110)
POCT GLUCOSE: 213 MG/DL (ref 70–110)

## 2023-05-01 PROCEDURE — 94761 N-INVAS EAR/PLS OXIMETRY MLT: CPT

## 2023-05-01 PROCEDURE — 25000003 PHARM REV CODE 250: Performed by: NURSE ANESTHETIST, CERTIFIED REGISTERED

## 2023-05-01 PROCEDURE — 88307 PR  SURG PATH,LEVEL V: ICD-10-PCS | Mod: 26,,, | Performed by: STUDENT IN AN ORGANIZED HEALTH CARE EDUCATION/TRAINING PROGRAM

## 2023-05-01 PROCEDURE — 63600175 PHARM REV CODE 636 W HCPCS: Performed by: SURGERY

## 2023-05-01 PROCEDURE — D9220A PRA ANESTHESIA: Mod: CRNA,,, | Performed by: NURSE ANESTHETIST, CERTIFIED REGISTERED

## 2023-05-01 PROCEDURE — 99900104 DSU ONLY-NO CHARGE-EA ADD'L HR (STAT): Performed by: SURGERY

## 2023-05-01 PROCEDURE — 25000003 PHARM REV CODE 250: Performed by: SURGERY

## 2023-05-01 PROCEDURE — 71000033 HC RECOVERY, INTIAL HOUR: Performed by: SURGERY

## 2023-05-01 PROCEDURE — D9220A PRA ANESTHESIA: ICD-10-PCS | Mod: ANES,,, | Performed by: ANESTHESIOLOGY

## 2023-05-01 PROCEDURE — 27000221 HC OXYGEN, UP TO 24 HOURS

## 2023-05-01 PROCEDURE — 25000003 PHARM REV CODE 250: Performed by: ANESTHESIOLOGY

## 2023-05-01 PROCEDURE — D9220A PRA ANESTHESIA: Mod: ANES,,, | Performed by: ANESTHESIOLOGY

## 2023-05-01 PROCEDURE — 63600175 PHARM REV CODE 636 W HCPCS: Performed by: ANESTHESIOLOGY

## 2023-05-01 PROCEDURE — 63600175 PHARM REV CODE 636 W HCPCS: Performed by: NURSE ANESTHETIST, CERTIFIED REGISTERED

## 2023-05-01 PROCEDURE — 36000710: Performed by: SURGERY

## 2023-05-01 PROCEDURE — 25000003 PHARM REV CODE 250: Performed by: STUDENT IN AN ORGANIZED HEALTH CARE EDUCATION/TRAINING PROGRAM

## 2023-05-01 PROCEDURE — 44970 PR LAP,APPENDECTOMY: ICD-10-PCS | Mod: 22,,, | Performed by: SURGERY

## 2023-05-01 PROCEDURE — 94760 N-INVAS EAR/PLS OXIMETRY 1: CPT

## 2023-05-01 PROCEDURE — 44970 LAPAROSCOPY APPENDECTOMY: CPT | Mod: 22,,, | Performed by: SURGERY

## 2023-05-01 PROCEDURE — 71000039 HC RECOVERY, EACH ADD'L HOUR: Performed by: SURGERY

## 2023-05-01 PROCEDURE — 12000002 HC ACUTE/MED SURGE SEMI-PRIVATE ROOM

## 2023-05-01 PROCEDURE — 37000009 HC ANESTHESIA EA ADD 15 MINS: Performed by: SURGERY

## 2023-05-01 PROCEDURE — 37000008 HC ANESTHESIA 1ST 15 MINUTES: Performed by: SURGERY

## 2023-05-01 PROCEDURE — S0030 INJECTION, METRONIDAZOLE: HCPCS | Performed by: SURGERY

## 2023-05-01 PROCEDURE — 88307 TISSUE EXAM BY PATHOLOGIST: CPT | Mod: 26,,, | Performed by: STUDENT IN AN ORGANIZED HEALTH CARE EDUCATION/TRAINING PROGRAM

## 2023-05-01 PROCEDURE — 63600175 PHARM REV CODE 636 W HCPCS: Performed by: STUDENT IN AN ORGANIZED HEALTH CARE EDUCATION/TRAINING PROGRAM

## 2023-05-01 PROCEDURE — 27201423 OPTIME MED/SURG SUP & DEVICES STERILE SUPPLY: Performed by: SURGERY

## 2023-05-01 PROCEDURE — C9290 INJ, BUPIVACAINE LIPOSOME: HCPCS | Performed by: SURGERY

## 2023-05-01 PROCEDURE — D9220A PRA ANESTHESIA: ICD-10-PCS | Mod: CRNA,,, | Performed by: NURSE ANESTHETIST, CERTIFIED REGISTERED

## 2023-05-01 PROCEDURE — 88307 TISSUE EXAM BY PATHOLOGIST: CPT | Performed by: STUDENT IN AN ORGANIZED HEALTH CARE EDUCATION/TRAINING PROGRAM

## 2023-05-01 PROCEDURE — 36000711: Performed by: SURGERY

## 2023-05-01 PROCEDURE — 99900103 DSU ONLY-NO CHARGE-INITIAL HR (STAT): Performed by: SURGERY

## 2023-05-01 PROCEDURE — 94799 UNLISTED PULMONARY SVC/PX: CPT

## 2023-05-01 RX ORDER — ONDANSETRON 2 MG/ML
4 INJECTION INTRAMUSCULAR; INTRAVENOUS EVERY 6 HOURS PRN
Status: DISCONTINUED | OUTPATIENT
Start: 2023-05-01 | End: 2023-05-02 | Stop reason: HOSPADM

## 2023-05-01 RX ORDER — ENOXAPARIN SODIUM 100 MG/ML
40 INJECTION SUBCUTANEOUS EVERY 24 HOURS
Status: DISCONTINUED | OUTPATIENT
Start: 2023-05-02 | End: 2023-05-02 | Stop reason: HOSPADM

## 2023-05-01 RX ORDER — HYDROCODONE BITARTRATE AND ACETAMINOPHEN 5; 325 MG/1; MG/1
1 TABLET ORAL EVERY 6 HOURS PRN
Status: DISCONTINUED | OUTPATIENT
Start: 2023-05-01 | End: 2023-05-02 | Stop reason: HOSPADM

## 2023-05-01 RX ORDER — ATORVASTATIN CALCIUM 40 MG/1
40 TABLET, FILM COATED ORAL NIGHTLY
Status: DISCONTINUED | OUTPATIENT
Start: 2023-05-01 | End: 2023-05-02 | Stop reason: HOSPADM

## 2023-05-01 RX ORDER — EZETIMIBE 10 MG/1
10 TABLET ORAL DAILY
Status: DISCONTINUED | OUTPATIENT
Start: 2023-05-02 | End: 2023-05-02 | Stop reason: HOSPADM

## 2023-05-01 RX ORDER — HYDROXYZINE HYDROCHLORIDE 25 MG/1
25 TABLET, FILM COATED ORAL 3 TIMES DAILY PRN
Status: DISCONTINUED | OUTPATIENT
Start: 2023-05-01 | End: 2023-05-02 | Stop reason: HOSPADM

## 2023-05-01 RX ORDER — FENTANYL CITRATE 50 UG/ML
25 INJECTION, SOLUTION INTRAMUSCULAR; INTRAVENOUS EVERY 5 MIN PRN
Status: DISCONTINUED | OUTPATIENT
Start: 2023-05-01 | End: 2023-05-01 | Stop reason: HOSPADM

## 2023-05-01 RX ORDER — PHENYLEPHRINE HYDROCHLORIDE 10 MG/ML
INJECTION INTRAVENOUS
Status: DISCONTINUED | OUTPATIENT
Start: 2023-05-01 | End: 2023-05-01

## 2023-05-01 RX ORDER — ROCURONIUM BROMIDE 10 MG/ML
INJECTION, SOLUTION INTRAVENOUS
Status: DISCONTINUED | OUTPATIENT
Start: 2023-05-01 | End: 2023-05-01

## 2023-05-01 RX ORDER — SODIUM CHLORIDE 9 MG/ML
INJECTION, SOLUTION INTRAVENOUS CONTINUOUS
Status: DISCONTINUED | OUTPATIENT
Start: 2023-05-01 | End: 2023-05-01

## 2023-05-01 RX ORDER — SODIUM CHLORIDE 9 MG/ML
INJECTION, SOLUTION INTRAVENOUS CONTINUOUS
Status: DISCONTINUED | OUTPATIENT
Start: 2023-05-01 | End: 2023-05-02 | Stop reason: HOSPADM

## 2023-05-01 RX ORDER — FENTANYL CITRATE 50 UG/ML
25 INJECTION, SOLUTION INTRAMUSCULAR; INTRAVENOUS EVERY 5 MIN PRN
Status: COMPLETED | OUTPATIENT
Start: 2023-05-01 | End: 2023-05-01

## 2023-05-01 RX ORDER — HYDRALAZINE HYDROCHLORIDE 20 MG/ML
10 INJECTION INTRAMUSCULAR; INTRAVENOUS ONCE
Status: COMPLETED | OUTPATIENT
Start: 2023-05-01 | End: 2023-05-01

## 2023-05-01 RX ORDER — NALOXONE HCL 0.4 MG/ML
0.4 VIAL (ML) INJECTION
Status: DISCONTINUED | OUTPATIENT
Start: 2023-05-01 | End: 2023-05-02 | Stop reason: HOSPADM

## 2023-05-01 RX ORDER — SIMETHICONE 80 MG
1 TABLET,CHEWABLE ORAL 3 TIMES DAILY PRN
Status: DISCONTINUED | OUTPATIENT
Start: 2023-05-01 | End: 2023-05-02 | Stop reason: HOSPADM

## 2023-05-01 RX ORDER — LIDOCAINE HYDROCHLORIDE 20 MG/ML
INJECTION INTRAVENOUS
Status: DISCONTINUED | OUTPATIENT
Start: 2023-05-01 | End: 2023-05-01

## 2023-05-01 RX ORDER — DEXAMETHASONE SODIUM PHOSPHATE 4 MG/ML
INJECTION, SOLUTION INTRA-ARTICULAR; INTRALESIONAL; INTRAMUSCULAR; INTRAVENOUS; SOFT TISSUE
Status: DISCONTINUED | OUTPATIENT
Start: 2023-05-01 | End: 2023-05-01

## 2023-05-01 RX ORDER — ACETAMINOPHEN 500 MG
1000 TABLET ORAL EVERY 8 HOURS PRN
Status: DISCONTINUED | OUTPATIENT
Start: 2023-05-01 | End: 2023-05-02 | Stop reason: HOSPADM

## 2023-05-01 RX ORDER — METOCLOPRAMIDE HYDROCHLORIDE 5 MG/ML
10 INJECTION INTRAMUSCULAR; INTRAVENOUS EVERY 10 MIN PRN
Status: COMPLETED | OUTPATIENT
Start: 2023-05-01 | End: 2023-05-01

## 2023-05-01 RX ORDER — HYDROCODONE BITARTRATE AND ACETAMINOPHEN 5; 325 MG/1; MG/1
1 TABLET ORAL EVERY 6 HOURS PRN
Qty: 20 TABLET | Refills: 0 | Status: SHIPPED | OUTPATIENT
Start: 2023-05-01 | End: 2024-02-21

## 2023-05-01 RX ORDER — MUPIROCIN 20 MG/G
OINTMENT TOPICAL 2 TIMES DAILY
Status: DISCONTINUED | OUTPATIENT
Start: 2023-05-01 | End: 2023-05-02 | Stop reason: HOSPADM

## 2023-05-01 RX ORDER — ONDANSETRON HYDROCHLORIDE 2 MG/ML
INJECTION, SOLUTION INTRAMUSCULAR; INTRAVENOUS
Status: DISCONTINUED | OUTPATIENT
Start: 2023-05-01 | End: 2023-05-01

## 2023-05-01 RX ORDER — ONDANSETRON 2 MG/ML
4 INJECTION INTRAMUSCULAR; INTRAVENOUS EVERY 12 HOURS PRN
Status: DISCONTINUED | OUTPATIENT
Start: 2023-05-01 | End: 2023-05-02 | Stop reason: HOSPADM

## 2023-05-01 RX ORDER — METRONIDAZOLE 500 MG/100ML
500 INJECTION, SOLUTION INTRAVENOUS
Status: COMPLETED | OUTPATIENT
Start: 2023-05-01 | End: 2023-05-01

## 2023-05-01 RX ORDER — FENTANYL CITRATE 50 UG/ML
INJECTION, SOLUTION INTRAMUSCULAR; INTRAVENOUS
Status: DISCONTINUED | OUTPATIENT
Start: 2023-05-01 | End: 2023-05-01

## 2023-05-01 RX ORDER — GABAPENTIN 300 MG/1
300 CAPSULE ORAL NIGHTLY
Status: DISCONTINUED | OUTPATIENT
Start: 2023-05-01 | End: 2023-05-02 | Stop reason: HOSPADM

## 2023-05-01 RX ORDER — IBUPROFEN 200 MG
24 TABLET ORAL
Status: DISCONTINUED | OUTPATIENT
Start: 2023-05-01 | End: 2023-05-02 | Stop reason: HOSPADM

## 2023-05-01 RX ORDER — PROPOFOL 10 MG/ML
VIAL (ML) INTRAVENOUS
Status: DISCONTINUED | OUTPATIENT
Start: 2023-05-01 | End: 2023-05-01

## 2023-05-01 RX ORDER — LOSARTAN POTASSIUM 100 MG/1
100 TABLET ORAL DAILY
Status: DISCONTINUED | OUTPATIENT
Start: 2023-05-02 | End: 2023-05-02 | Stop reason: HOSPADM

## 2023-05-01 RX ORDER — IBUPROFEN 200 MG
16 TABLET ORAL
Status: DISCONTINUED | OUTPATIENT
Start: 2023-05-01 | End: 2023-05-02 | Stop reason: HOSPADM

## 2023-05-01 RX ORDER — GLUCAGON 1 MG
1 KIT INJECTION
Status: DISCONTINUED | OUTPATIENT
Start: 2023-05-01 | End: 2023-05-02 | Stop reason: HOSPADM

## 2023-05-01 RX ORDER — LIDOCAINE HYDROCHLORIDE 10 MG/ML
1 INJECTION, SOLUTION EPIDURAL; INFILTRATION; INTRACAUDAL; PERINEURAL ONCE
Status: DISCONTINUED | OUTPATIENT
Start: 2023-05-01 | End: 2023-05-01 | Stop reason: HOSPADM

## 2023-05-01 RX ORDER — HYDROCODONE BITARTRATE AND ACETAMINOPHEN 5; 325 MG/1; MG/1
1 TABLET ORAL EVERY 4 HOURS PRN
Status: DISCONTINUED | OUTPATIENT
Start: 2023-05-01 | End: 2023-05-01 | Stop reason: ALTCHOICE

## 2023-05-01 RX ORDER — NAPROXEN SODIUM 220 MG/1
81 TABLET, FILM COATED ORAL DAILY
Status: DISCONTINUED | OUTPATIENT
Start: 2023-05-02 | End: 2023-05-02 | Stop reason: HOSPADM

## 2023-05-01 RX ORDER — ACETAMINOPHEN 10 MG/ML
INJECTION, SOLUTION INTRAVENOUS
Status: DISCONTINUED | OUTPATIENT
Start: 2023-05-01 | End: 2023-05-01

## 2023-05-01 RX ORDER — METOPROLOL SUCCINATE 50 MG/1
50 TABLET, EXTENDED RELEASE ORAL DAILY
Status: DISCONTINUED | OUTPATIENT
Start: 2023-05-02 | End: 2023-05-02 | Stop reason: HOSPADM

## 2023-05-01 RX ORDER — DULOXETIN HYDROCHLORIDE 20 MG/1
20 CAPSULE, DELAYED RELEASE ORAL 2 TIMES DAILY
Status: DISCONTINUED | OUTPATIENT
Start: 2023-05-01 | End: 2023-05-02 | Stop reason: HOSPADM

## 2023-05-01 RX ORDER — MIDAZOLAM HYDROCHLORIDE 1 MG/ML
INJECTION, SOLUTION INTRAMUSCULAR; INTRAVENOUS
Status: DISCONTINUED | OUTPATIENT
Start: 2023-05-01 | End: 2023-05-01

## 2023-05-01 RX ORDER — SODIUM CHLORIDE, SODIUM LACTATE, POTASSIUM CHLORIDE, CALCIUM CHLORIDE 600; 310; 30; 20 MG/100ML; MG/100ML; MG/100ML; MG/100ML
INJECTION, SOLUTION INTRAVENOUS CONTINUOUS
Status: DISCONTINUED | OUTPATIENT
Start: 2023-05-01 | End: 2023-05-01

## 2023-05-01 RX ORDER — INSULIN ASPART 100 [IU]/ML
1-10 INJECTION, SOLUTION INTRAVENOUS; SUBCUTANEOUS
Status: DISCONTINUED | OUTPATIENT
Start: 2023-05-01 | End: 2023-05-02 | Stop reason: HOSPADM

## 2023-05-01 RX ORDER — OXYCODONE HYDROCHLORIDE 5 MG/1
5 TABLET ORAL ONCE AS NEEDED
Status: COMPLETED | OUTPATIENT
Start: 2023-05-01 | End: 2023-05-01

## 2023-05-01 RX ORDER — AMLODIPINE BESYLATE 5 MG/1
5 TABLET ORAL 2 TIMES DAILY
Status: DISCONTINUED | OUTPATIENT
Start: 2023-05-01 | End: 2023-05-02 | Stop reason: HOSPADM

## 2023-05-01 RX ORDER — DOCUSATE SODIUM 100 MG/1
100 CAPSULE, LIQUID FILLED ORAL 2 TIMES DAILY
Status: DISCONTINUED | OUTPATIENT
Start: 2023-05-01 | End: 2023-05-02 | Stop reason: HOSPADM

## 2023-05-01 RX ORDER — MORPHINE SULFATE 2 MG/ML
4 INJECTION, SOLUTION INTRAMUSCULAR; INTRAVENOUS EVERY 4 HOURS PRN
Status: DISCONTINUED | OUTPATIENT
Start: 2023-05-01 | End: 2023-05-02 | Stop reason: HOSPADM

## 2023-05-01 RX ORDER — BUPIVACAINE HYDROCHLORIDE AND EPINEPHRINE 5; 5 MG/ML; UG/ML
INJECTION, SOLUTION EPIDURAL; INTRACAUDAL; PERINEURAL
Status: DISCONTINUED | OUTPATIENT
Start: 2023-05-01 | End: 2023-05-01 | Stop reason: HOSPADM

## 2023-05-01 RX ORDER — CHOLECALCIFEROL (VITAMIN D3) 25 MCG
1000 TABLET ORAL DAILY
Status: DISCONTINUED | OUTPATIENT
Start: 2023-05-02 | End: 2023-05-02 | Stop reason: HOSPADM

## 2023-05-01 RX ADMIN — DULOXETINE HYDROCHLORIDE 20 MG: 20 CAPSULE, DELAYED RELEASE ORAL at 08:05

## 2023-05-01 RX ADMIN — FENTANYL CITRATE 25 MCG: 50 INJECTION, SOLUTION INTRAMUSCULAR; INTRAVENOUS at 02:05

## 2023-05-01 RX ADMIN — METOCLOPRAMIDE 10 MG: 5 INJECTION, SOLUTION INTRAMUSCULAR; INTRAVENOUS at 02:05

## 2023-05-01 RX ADMIN — METRONIDAZOLE 500 MG: 500 INJECTION, SOLUTION INTRAVENOUS at 12:05

## 2023-05-01 RX ADMIN — FENTANYL CITRATE 50 MCG: 50 INJECTION, SOLUTION INTRAMUSCULAR; INTRAVENOUS at 01:05

## 2023-05-01 RX ADMIN — ONDANSETRON 4 MG: 2 INJECTION INTRAMUSCULAR; INTRAVENOUS at 12:05

## 2023-05-01 RX ADMIN — SODIUM CHLORIDE: 9 INJECTION, SOLUTION INTRAVENOUS at 05:05

## 2023-05-01 RX ADMIN — SODIUM CHLORIDE, SODIUM GLUCONATE, SODIUM ACETATE, POTASSIUM CHLORIDE AND MAGNESIUM CHLORIDE: 526; 502; 368; 37; 30 INJECTION, SOLUTION INTRAVENOUS at 09:05

## 2023-05-01 RX ADMIN — DOCUSATE SODIUM 100 MG: 100 CAPSULE, LIQUID FILLED ORAL at 08:05

## 2023-05-01 RX ADMIN — CEFTRIAXONE 2 G: 2 INJECTION, POWDER, FOR SOLUTION INTRAMUSCULAR; INTRAVENOUS at 12:05

## 2023-05-01 RX ADMIN — PROPOFOL 100 MG: 10 INJECTION, EMULSION INTRAVENOUS at 12:05

## 2023-05-01 RX ADMIN — AMLODIPINE BESYLATE 5 MG: 5 TABLET ORAL at 08:05

## 2023-05-01 RX ADMIN — SODIUM CHLORIDE, SODIUM GLUCONATE, SODIUM ACETATE, POTASSIUM CHLORIDE AND MAGNESIUM CHLORIDE: 526; 502; 368; 37; 30 INJECTION, SOLUTION INTRAVENOUS at 01:05

## 2023-05-01 RX ADMIN — GLYCOPYRROLATE 0.1 MG: 0.2 INJECTION, SOLUTION INTRAMUSCULAR; INTRAVITREAL at 12:05

## 2023-05-01 RX ADMIN — LIDOCAINE HYDROCHLORIDE 50 MG: 20 INJECTION, SOLUTION INTRAVENOUS at 12:05

## 2023-05-01 RX ADMIN — FENTANYL CITRATE 50 MCG: 50 INJECTION, SOLUTION INTRAMUSCULAR; INTRAVENOUS at 12:05

## 2023-05-01 RX ADMIN — ROCURONIUM BROMIDE 40 MG: 10 INJECTION, SOLUTION INTRAVENOUS at 12:05

## 2023-05-01 RX ADMIN — INSULIN ASPART 2 UNITS: 100 INJECTION, SOLUTION INTRAVENOUS; SUBCUTANEOUS at 08:05

## 2023-05-01 RX ADMIN — FENTANYL CITRATE 25 MCG: 50 INJECTION INTRAMUSCULAR; INTRAVENOUS at 03:05

## 2023-05-01 RX ADMIN — MORPHINE SULFATE 4 MG: 2 INJECTION, SOLUTION INTRAMUSCULAR; INTRAVENOUS at 07:05

## 2023-05-01 RX ADMIN — SIMETHICONE 80 MG: 80 TABLET, CHEWABLE ORAL at 05:05

## 2023-05-01 RX ADMIN — PHENYLEPHRINE HYDROCHLORIDE 200 MCG: 10 INJECTION INTRAVENOUS at 12:05

## 2023-05-01 RX ADMIN — HYDROCODONE BITARTRATE AND ACETAMINOPHEN 1 TABLET: 5; 325 TABLET ORAL at 08:05

## 2023-05-01 RX ADMIN — FENTANYL CITRATE 25 MCG: 50 INJECTION INTRAMUSCULAR; INTRAVENOUS at 04:05

## 2023-05-01 RX ADMIN — MIDAZOLAM 1 MG: 1 INJECTION INTRAMUSCULAR; INTRAVENOUS at 12:05

## 2023-05-01 RX ADMIN — DEXAMETHASONE SODIUM PHOSPHATE 8 MG: 4 INJECTION, SOLUTION INTRA-ARTICULAR; INTRALESIONAL; INTRAMUSCULAR; INTRAVENOUS; SOFT TISSUE at 12:05

## 2023-05-01 RX ADMIN — ONDANSETRON 4 MG: 2 INJECTION INTRAMUSCULAR; INTRAVENOUS at 05:05

## 2023-05-01 RX ADMIN — OXYCODONE HYDROCHLORIDE 5 MG: 5 TABLET ORAL at 02:05

## 2023-05-01 RX ADMIN — GABAPENTIN 300 MG: 300 CAPSULE ORAL at 08:05

## 2023-05-01 RX ADMIN — MUPIROCIN: 20 OINTMENT TOPICAL at 08:05

## 2023-05-01 RX ADMIN — HYDRALAZINE HYDROCHLORIDE 10 MG: 20 INJECTION INTRAMUSCULAR; INTRAVENOUS at 03:05

## 2023-05-01 RX ADMIN — ATORVASTATIN CALCIUM 40 MG: 40 TABLET, FILM COATED ORAL at 08:05

## 2023-05-01 RX ADMIN — SUGAMMADEX 200 MG: 100 INJECTION, SOLUTION INTRAVENOUS at 01:05

## 2023-05-01 RX ADMIN — ACETAMINOPHEN 1000 MG: 10 INJECTION, SOLUTION INTRAVENOUS at 01:05

## 2023-05-01 NOTE — PLAN OF CARE
Pt prepped for surgery. Consents at bedside. Incentive spirometry taught by respiratory. Pt belongings given to Beverley

## 2023-05-01 NOTE — ANESTHESIA PROCEDURE NOTES
Intubation    Date/Time: 5/1/2023 12:46 PM  Performed by: Leeroy Godoy CRNA  Authorized by: James Valenzuela MD     Intubation:     Induction:  Intravenous    Intubated:  Postinduction    Mask Ventilation:  Easy with oral airway    Attempts:  1    Attempted By:  CRNA    Method of Intubation:  Video laryngoscopy    Blade:  Harden 3    Laryngeal View Grade: Grade I - full view of cords      Difficult Airway Encountered?: No      Complications:  None    Airway Device:  Oral endotracheal tube    Airway Device Size:  7.0    Style/Cuff Inflation:  Cuffed (inflated to minimal occlusive pressure)    Tube secured:  22    Secured at:  The lips    Placement Verified By:  Capnometry    Complicating Factors:  None    Findings Post-Intubation:  BS equal bilateral and atraumatic/condition of teeth unchanged

## 2023-05-01 NOTE — ANESTHESIA POSTPROCEDURE EVALUATION
Anesthesia Post Evaluation    Patient: Mirta Guerin    Procedure(s) Performed: Procedure(s) (LRB):  EXCISION, CECUM, LAPAROSCOPIC (N/A)    Final Anesthesia Type: general      Patient location during evaluation: PACU  Patient participation: Yes- Able to Participate  Level of consciousness: awake and alert  Post-procedure vital signs: reviewed and stable  Pain management: adequate  Airway patency: patent    PONV status at discharge: No PONV  Anesthetic complications: no      Cardiovascular status: hemodynamically stable  Respiratory status: unassisted and room air  Hydration status: euvolemic  Follow-up not needed.          Vitals Value Taken Time   /61 05/01/23 1500   Temp 36.6 °C (97.9 °F) 05/01/23 1420   Pulse 84 05/01/23 1502   Resp 11 05/01/23 1502   SpO2 92 % 05/01/23 1502   Vitals shown include unvalidated device data.      No case tracking events are documented in the log.      Pain/Estephanie Score: Pain Rating Prior to Med Admin: 6 (5/1/2023  2:40 PM)  Estephanie Score: 10 (5/1/2023  2:50 PM)

## 2023-05-01 NOTE — BRIEF OP NOTE
[facility]  Brief Operative Note    Surgery Date: 5/1/2023     Surgeon(s) and Role:     * Suhas Neff MD - Primary    Assisting Surgeon: Noemi MORALES    Pre-op Diagnosis:  Mass of appendix [K38.8]    Post-op Diagnosis:  Post-Op Diagnosis Codes:     * Mass of appendix [K38.8]    Procedure(s) (LRB):  EXCISION, CECUM, LAPAROSCOPIC (N/A)    Anesthesia: General    Operative Findings: Mass of the appendix as it enters the cecum.      Estimated Blood Loss: 5 mL         Specimens:   Specimen (24h ago, onward)       Start     Ordered    05/01/23 1355  Specimen to Pathology, Surgery General Surgery  Once        Comments: Pre-op Diagnosis: Mass of appendix [K38.8]Procedure(s):EXCISION, CECUM, LAPAROSCOPIC Number of specimens: 1Name of specimens: CECUM AND APPENDIX     References:    Click here for ordering Quick Tip   Question Answer Comment   Procedure Type: General Surgery    Which provider would you like to cc? SUHAS NEFF    Release to patient Immediate        05/01/23 1355                      Discharge Note    OUTCOME: Patient tolerated treatment/procedure well without complication and is now ready for discharge.    DISPOSITION: Home or Self Care    FINAL DIAGNOSIS:  Mass of appendix    FOLLOWUP: In clinic    DISCHARGE INSTRUCTIONS:    Discharge Procedure Orders   Diet general     Call MD for:  extreme fatigue     Call MD for:  persistent dizziness or light-headedness     Call MD for:  hives     Call MD for:  redness, tenderness, or signs of infection (pain, swelling, redness, odor or green/yellow discharge around incision site)     Call MD for:  difficulty breathing, headache or visual disturbances     Call MD for:  severe uncontrolled pain     Call MD for:  persistent nausea and vomiting     Call MD for:  temperature >100.4     Remove dressing in 48 hours     Activity as tolerated

## 2023-05-01 NOTE — ASSESSMENT & PLAN NOTE
Patient's FSGs are controlled on current medication regimen.  Last A1c reviewed-   Lab Results   Component Value Date    HGBA1C 5.8 (H) 07/21/2022     Most recent fingerstick glucose reviewed- No results for input(s): POCTGLUCOSE in the last 24 hours.  Current correctional scale  Medium  Maintain anti-hyperglycemic dose as follows-   Antihyperglycemics (From admission, onward)    None        Hold Oral hypoglycemics while patient is in the hospital.

## 2023-05-01 NOTE — ASSESSMENT & PLAN NOTE
-S/p removal per Dr. Neff  -Monitor overnight  -Incentive spirometry  -IV fluids  -PRN analgesics and antiemetics

## 2023-05-01 NOTE — SUBJECTIVE & OBJECTIVE
Past Medical History:   Diagnosis Date    Anxiety     AV block     Breast cancer     Cardiac pacemaker in situ     Coronary artery disease     Diabetes mellitus     GERD (gastroesophageal reflux disease)     HLD (hyperlipidemia)     Hypertension     Lung disease     Melanoma     Mitral valve disorder     NEGRITA (obstructive sleep apnea)     Prediabetes     Psoriasis     Spinal stenosis     Squamous cell carcinoma of skin     right anterior scalp    Vitamin D deficiency        Past Surgical History:   Procedure Laterality Date    BREAST CYST ASPIRATION      BREAST LUMPECTOMY      CARDIAC PACEMAKER PLACEMENT  04/05/2012    COLONOSCOPY N/A 4/21/2023    Procedure: COLONOSCOPY;  Surgeon: Corey Blake III, MD;  Location: [facility];  Service: Endoscopy;  Laterality: N/A;    CORONARY ARTERY BYPASS GRAFT  07/2017    SKIN CANCER EXCISION      UPPER GASTROINTESTINAL ENDOSCOPY         Review of patient's allergies indicates:   Allergen Reactions    Bacitracin zinc-polymyxin b Rash    Adhesive Rash    Benazepril Other (See Comments)     sleepy    Codeine Nausea And Vomiting    Polysporin [bacitracin-polymyxin b] Rash       No current facility-administered medications on file prior to encounter.     Current Outpatient Medications on File Prior to Encounter   Medication Sig    amLODIPine (NORVASC) 5 MG tablet Take 1 tablet (5 mg total) by mouth 2 (two) times daily.    cloNIDine (CATAPRES) 0.1 MG tablet TAKE 1 TABLET BY MOUTH EVERY DAY AS NEEDED FOR SYSTOLIC PRESSURE GREATER THAN 170    metformin (GLUCOPHAGE) 500 MG tablet Take 500 mg by mouth 2 (two) times daily with meals.    albuterol (PROVENTIL/VENTOLIN HFA) 90 mcg/actuation inhaler INHALE 1-2 PUFFS INTO THE LUNGS 2 (TWO) TIMES A DAY. (Patient not taking: Reported on 4/25/2023)    anastrozole (ARIMIDEX) 1 mg Tab     aspirin (ECOTRIN) 81 MG EC tablet Take 81 mg by mouth once daily.    co-enzyme Q-10 50 mg capsule Take 100 mg by mouth once daily.    docusate sodium (COLACE)  100 MG capsule Take 100 mg by mouth 2 (two) times daily.    DULoxetine (CYMBALTA) 20 MG capsule Take 1 capsule (20 mg total) by mouth once daily.    ezetimibe (ZETIA) 10 mg tablet Take 1 tablet (10 mg total) by mouth once daily.    fluticasone propionate (FLONASE) 50 mcg/actuation nasal spray     gabapentin (NEURONTIN) 300 MG capsule TAKE 1 CAPSULE BY MOUTH IN  THE EVENING (Patient not taking: Reported on 4/25/2023)    loratadine (CLARITIN) 10 mg tablet Take 10 mg by mouth once daily.    metoprolol succinate (TOPROL-XL) 50 MG 24 hr tablet Take 1 tablet (50 mg total) by mouth once daily.    MULTIVIT-MIN/FA/LUTEIN/ZEAXANT (MACULAR VITAMIN ORAL) Take by mouth.    olmesartan (BENICAR) 40 MG tablet Take 1 tablet (40 mg total) by mouth once daily.    rosuvastatin (CRESTOR) 10 MG tablet Take 1 tablet (10 mg total) by mouth every evening.    vitamin D 1000 units Tab Take 1,000 Units by mouth once daily.     Family History       Problem Relation (Age of Onset)    Breast cancer Paternal Grandmother    Cancer Mother, Paternal Grandmother    Coronary artery disease Father    Stroke Father, Maternal Grandfather, Paternal Grandfather, Brother          Tobacco Use    Smoking status: Every Day     Packs/day: 0.25     Years: 50.00     Pack years: 12.50     Types: Cigarettes     Start date: 1956    Smokeless tobacco: Never    Tobacco comments:     4-6 a day as of 8/16/21--got up to smoking a pack a day   Substance and Sexual Activity    Alcohol use: Yes     Comment: occasional    Drug use: No    Sexual activity: Never     Review of Systems   Constitutional:  Positive for unexpected weight change.   Gastrointestinal:  Positive for blood in stool.   Objective:     Vital Signs (Most Recent):  Temp: 97.7 °F (36.5 °C) (05/01/23 1505)  Pulse: 94 (05/01/23 1600)  Resp: 15 (05/01/23 1605)  BP: (!) 179/74 (05/01/23 1600)  SpO2: (!) 94 % (05/01/23 1600)   Vital Signs (24h Range):  Temp:  [97.5 °F (36.4 °C)-97.9 °F (36.6 °C)] 97.7 °F (36.5  °C)  Pulse:  [] 94  Resp:  [10-27] 15  SpO2:  [91 %-99 %] 94 %  BP: (158-210)/(58-93) 179/74     Weight: 64.9 kg (143 lb)  Body mass index is 25.33 kg/m².    Physical Exam  Vitals and nursing note reviewed.   Constitutional:       General: She is not in acute distress.  HENT:      Head: Normocephalic and atraumatic.      Right Ear: External ear normal.      Left Ear: External ear normal.      Nose: Nose normal.      Mouth/Throat:      Mouth: Mucous membranes are moist.      Pharynx: Oropharynx is clear.   Eyes:      Extraocular Movements: Extraocular movements intact.      Conjunctiva/sclera: Conjunctivae normal.   Cardiovascular:      Rate and Rhythm: Normal rate and regular rhythm.      Heart sounds: Normal heart sounds.   Pulmonary:      Effort: Pulmonary effort is normal.      Breath sounds: Normal breath sounds.   Abdominal:      General: Bowel sounds are normal. There is no distension.      Palpations: Abdomen is soft.      Tenderness: There is no abdominal tenderness.   Musculoskeletal:         General: Normal range of motion.      Cervical back: Normal range of motion and neck supple.      Right lower leg: No edema.      Left lower leg: No edema.   Skin:     General: Skin is warm and dry.   Neurological:      Mental Status: She is alert and oriented to person, place, and time. Mental status is at baseline.   Psychiatric:         Mood and Affect: Mood normal.         Behavior: Behavior normal.           Significant Labs: All pertinent labs within the past 24 hours have been reviewed.    Significant Imaging: I have reviewed all pertinent imaging results/findings within the past 24 hours.

## 2023-05-01 NOTE — ANESTHESIA PREPROCEDURE EVALUATION
05/01/2023  Mirta Guerin is a 82 y.o., female.      Pre-op Assessment    I have reviewed the Patient Summary Reports.     I have reviewed the Nursing Notes. I have reviewed the NPO Status.   I have reviewed the Medications.     Review of Systems  Anesthesia Hx:  No problems with previous Anesthesia    Social:  Smoker    Hematology/Oncology:         -- Cancer in past history:   EENT/Dental:   chronic allergic rhinitis   Cardiovascular:   Pacemaker Hypertension CAD  CABG/stent Dysrhythmias  hyperlipidemia    Pulmonary:   Shortness of breath Sleep Apnea    Renal/:   Chronic Renal Disease    Hepatic/GI:   GERD Mass of appendix    Musculoskeletal:   Arthritis     Endocrine:   Diabetes, type 2        Physical Exam  General: Well nourished, Cooperative, Alert and Oriented    Airway:  Mallampati: II   Mouth Opening: Normal  TM Distance: Normal  Tongue: Normal  Neck ROM: Normal ROM    Dental:  Intact        Anesthesia Plan  Type of Anesthesia, risks & benefits discussed:    Anesthesia Type: Gen ETT  Intra-op Monitoring Plan: Standard ASA Monitors  Post Op Pain Control Plan: multimodal analgesia and IV/PO Opioids PRN  Induction:  IV  Airway Plan: Direct and Video, Post-Induction  Informed Consent: Informed consent signed with the Patient and all parties understand the risks and agree with anesthesia plan.  All questions answered.   ASA Score: 3  Day of Surgery Review of History & Physical: H&P Update referred to the surgeon/provider.    Ready For Surgery From Anesthesia Perspective.     .

## 2023-05-01 NOTE — HPI
Mirta Guerin is an 82 year old female with a past medical history of HTN, HLD, DM, NEGRITA, and pacemaker who presents for elective surgical removal of appendiceal polyp via robotic appendectomy and cecetomy per Dr. Neff. She was seen pre-operatively. She has no complaints at this time. She suffered no intraoperative complications during surgery. She will be monitored overnight.   











Akin Musa MD Mar 17, 2018 15:09

## 2023-05-01 NOTE — H&P
[facility] Medicine  History & Physical    Patient Name: Mirta Guerin  MRN: [id number]  Patient Class: IP- Surgery Admit  Admission Date: 5/1/2023  Attending Physician: Omero Minaya MD  Primary Care Provider: James Jung MD         Patient information was obtained from patient, relative(s) and past medical records.     Subjective:     Principal Problem:Mass of appendix    Chief Complaint: No chief complaint on file.       HPI: Mirta Guerin is an 82 year old female with a past medical history of HTN, HLD, DM, NEGRITA, and pacemaker who presents for elective surgical removal of appendiceal polyp via robotic appendectomy and cecetomy per Dr. Neff. She was seen pre-operatively. She has no complaints at this time. She suffered no intraoperative complications during surgery. She will be monitored overnight.      Past Medical History:   Diagnosis Date    Anxiety     AV block     Breast cancer     Cardiac pacemaker in situ     Coronary artery disease     Diabetes mellitus     GERD (gastroesophageal reflux disease)     HLD (hyperlipidemia)     Hypertension     Lung disease     Melanoma     Mitral valve disorder     NEGRITA (obstructive sleep apnea)     Prediabetes     Psoriasis     Spinal stenosis     Squamous cell carcinoma of skin     right anterior scalp    Vitamin D deficiency        Past Surgical History:   Procedure Laterality Date    BREAST CYST ASPIRATION      BREAST LUMPECTOMY      CARDIAC PACEMAKER PLACEMENT  04/05/2012    COLONOSCOPY N/A 4/21/2023    Procedure: COLONOSCOPY;  Surgeon: Corey Blake III, MD;  Location: [facility];  Service: Endoscopy;  Laterality: N/A;    CORONARY ARTERY BYPASS GRAFT  07/2017    SKIN CANCER EXCISION      UPPER GASTROINTESTINAL ENDOSCOPY         Review of patient's allergies indicates:   Allergen Reactions    Bacitracin zinc-polymyxin b Rash    Adhesive Rash    Benazepril Other (See Comments)     sleepy    Codeine  Nausea And Vomiting    Polysporin [bacitracin-polymyxin b] Rash       No current facility-administered medications on file prior to encounter.     Current Outpatient Medications on File Prior to Encounter   Medication Sig    amLODIPine (NORVASC) 5 MG tablet Take 1 tablet (5 mg total) by mouth 2 (two) times daily.    cloNIDine (CATAPRES) 0.1 MG tablet TAKE 1 TABLET BY MOUTH EVERY DAY AS NEEDED FOR SYSTOLIC PRESSURE GREATER THAN 170    metformin (GLUCOPHAGE) 500 MG tablet Take 500 mg by mouth 2 (two) times daily with meals.    albuterol (PROVENTIL/VENTOLIN HFA) 90 mcg/actuation inhaler INHALE 1-2 PUFFS INTO THE LUNGS 2 (TWO) TIMES A DAY. (Patient not taking: Reported on 4/25/2023)    anastrozole (ARIMIDEX) 1 mg Tab     aspirin (ECOTRIN) 81 MG EC tablet Take 81 mg by mouth once daily.    co-enzyme Q-10 50 mg capsule Take 100 mg by mouth once daily.    docusate sodium (COLACE) 100 MG capsule Take 100 mg by mouth 2 (two) times daily.    DULoxetine (CYMBALTA) 20 MG capsule Take 1 capsule (20 mg total) by mouth once daily.    ezetimibe (ZETIA) 10 mg tablet Take 1 tablet (10 mg total) by mouth once daily.    fluticasone propionate (FLONASE) 50 mcg/actuation nasal spray     gabapentin (NEURONTIN) 300 MG capsule TAKE 1 CAPSULE BY MOUTH IN  THE EVENING (Patient not taking: Reported on 4/25/2023)    loratadine (CLARITIN) 10 mg tablet Take 10 mg by mouth once daily.    metoprolol succinate (TOPROL-XL) 50 MG 24 hr tablet Take 1 tablet (50 mg total) by mouth once daily.    MULTIVIT-MIN/FA/LUTEIN/ZEAXANT (MACULAR VITAMIN ORAL) Take by mouth.    olmesartan (BENICAR) 40 MG tablet Take 1 tablet (40 mg total) by mouth once daily.    rosuvastatin (CRESTOR) 10 MG tablet Take 1 tablet (10 mg total) by mouth every evening.    vitamin D 1000 units Tab Take 1,000 Units by mouth once daily.     Family History       Problem Relation (Age of Onset)    Breast cancer Paternal Grandmother    Cancer Mother, Paternal Grandmother     Coronary artery disease Father    Stroke Father, Maternal Grandfather, Paternal Grandfather, Brother          Tobacco Use    Smoking status: Every Day     Packs/day: 0.25     Years: 50.00     Pack years: 12.50     Types: Cigarettes     Start date: 1956    Smokeless tobacco: Never    Tobacco comments:     4-6 a day as of 8/16/21--got up to smoking a pack a day   Substance and Sexual Activity    Alcohol use: Yes     Comment: occasional    Drug use: No    Sexual activity: Never     Review of Systems   Constitutional:  Positive for unexpected weight change.   Gastrointestinal:  Positive for blood in stool.   Objective:     Vital Signs (Most Recent):  Temp: 97.7 °F (36.5 °C) (05/01/23 1505)  Pulse: 94 (05/01/23 1600)  Resp: 15 (05/01/23 1605)  BP: (!) 179/74 (05/01/23 1600)  SpO2: (!) 94 % (05/01/23 1600)   Vital Signs (24h Range):  Temp:  [97.5 °F (36.4 °C)-97.9 °F (36.6 °C)] 97.7 °F (36.5 °C)  Pulse:  [] 94  Resp:  [10-27] 15  SpO2:  [91 %-99 %] 94 %  BP: (158-210)/(58-93) 179/74     Weight: 64.9 kg (143 lb)  Body mass index is 25.33 kg/m².    Physical Exam  Vitals and nursing note reviewed.   Constitutional:       General: She is not in acute distress.  HENT:      Head: Normocephalic and atraumatic.      Right Ear: External ear normal.      Left Ear: External ear normal.      Nose: Nose normal.      Mouth/Throat:      Mouth: Mucous membranes are moist.      Pharynx: Oropharynx is clear.   Eyes:      Extraocular Movements: Extraocular movements intact.      Conjunctiva/sclera: Conjunctivae normal.   Cardiovascular:      Rate and Rhythm: Normal rate and regular rhythm.      Heart sounds: Normal heart sounds.   Pulmonary:      Effort: Pulmonary effort is normal.      Breath sounds: Normal breath sounds.   Abdominal:      General: Bowel sounds are normal. There is no distension.      Palpations: Abdomen is soft.      Tenderness: There is no abdominal tenderness.   Musculoskeletal:         General: Normal  range of motion.      Cervical back: Normal range of motion and neck supple.      Right lower leg: No edema.      Left lower leg: No edema.   Skin:     General: Skin is warm and dry.   Neurological:      Mental Status: She is alert and oriented to person, place, and time. Mental status is at baseline.   Psychiatric:         Mood and Affect: Mood normal.         Behavior: Behavior normal.           Significant Labs: All pertinent labs within the past 24 hours have been reviewed.    Significant Imaging: I have reviewed all pertinent imaging results/findings within the past 24 hours.    Assessment/Plan:     * Mass of appendix  -S/p removal per Dr. Neff  -Monitor overnight  -Incentive spirometry  -IV fluids  -PRN analgesics and antiemetics      Vitamin D deficiency  -Continue home repletion      Cardiac pacemaker in situ  Stable.      Dyslipidemia  -Continue ASA, statin and fibrate      Essential hypertension  -Continue home amlodipine, losartan and metoprolol      Type II diabetes mellitus with neurological manifestations  Patient's FSGs are controlled on current medication regimen.  Last A1c reviewed-   Lab Results   Component Value Date    HGBA1C 5.8 (H) 07/21/2022     Most recent fingerstick glucose reviewed- No results for input(s): POCTGLUCOSE in the last 24 hours.  Current correctional scale  Medium  Maintain anti-hyperglycemic dose as follows-   Antihyperglycemics (From admission, onward)    None        Hold Oral hypoglycemics while patient is in the hospital.    Coronary artery disease involving autologous vein coronary bypass graft without angina pectoris  -Continue ASA and statin      Obstructive sleep apnea syndrome  -Does not use CPAP      Personal history of tobacco use, presenting hazards to health  -Counseled on cessation        VTE Risk Mitigation (From admission, onward)         Ordered     enoxaparin injection 40 mg  Daily         05/01/23 1611     IP VTE HIGH RISK PATIENT  Once         05/01/23 8395      Place sequential compression device  Until discontinued         05/01/23 0849     Place LOBITO hose  Until discontinued         05/01/23 0849                           Omero Minaya MD  Department of Hospital Medicine  [facility]

## 2023-05-01 NOTE — OP NOTE
Date of procedure:  May 1, 2023     Staff surgeon:  Dr. Eduin Neff     Assistant: Noemi Purdy RNFA    Preoperative diagnosis:  Appendiceal mass     Postoperative diagnosis:  The same    Procedure:  Robotic cecectomy     Anesthesia:  General endotracheal anesthesia     Indication for procedure:  82-year-old female found to have a adenomatous polyp at the appendiceal orifice which was unable to be removed endoscopically.  Patient was referred to me for surgical resection.  No signs of cancer noted on the pathology and per discussion with endoscopist the mass did not appear obviously malignant.      Description of procedure:  Following signing informed consent she was taken the operating room placed supine position.  General endotracheal anesthesia was administered.  Hicks catheter was inserted.  Abdomen was prepped and draped in standard fashion and appropriate time-out procedure was performed.  I make a small transverse incision at the level of the umbilicus to the left of midline.  Dissect down to the fascia and entered the abdominal cavity with a Veress needle.  Pneumoperitoneum to 15 mmHg was established.  I entered the abdominal cavity with an 8 mm robotic trocar under direct visualization.  I placed a 12 mm trocar in the epigastric region and another 8 mm in the suprapubic region.  A 5 mm assistant trocar was placed in the left upper quadrant under direct visualization.  The robot was docked and the procedure was commenced.  There were minimal adhesions.  The appendix seen in his usual anatomic location coming off the tip of the cecum.  I grasped the appendix hold upright.  I use a robotic vessel sealer to ligate the mesoappendix.  The fat is cleared from the tip of the cecum and the base of the appendix.  I used a robotic stapler to staple across the cecum proximal to where the terminal ileum and the ileocecal valve are.  I remove the entire redundant portion of the cecum.  It was removed from the  abdominal cavity.  The staple line is oversewn with a barbed suture.  I then evaluate the appendix.  There is an obvious clear margin.  Uncertain if this lesion is benign or malignant.  Decision made against further resection at the present time.  The robot was undocked and all trocars were removed.  The 12 mm trocar was closed with a laparoscopic assisted closure device.  Having closed the fascia with trans fascial sutures the skin incisions closed with 4-0 Monocryl.  The patient was extubated and taken recovery room stable condition.  There were no immediate complications.  Blood loss was 20 cc's

## 2023-05-01 NOTE — TRANSFER OF CARE
"Anesthesia Transfer of Care Note    Patient: Mirta Guerin    Procedure(s) Performed: Procedure(s) (LRB):  EXCISION, CECUM, LAPAROSCOPIC (N/A)    Patient location: PACU    Anesthesia Type: general    Transport from OR: Transported from OR on 2-3 L/min O2 by NC with adequate spontaneous ventilation    Post pain: adequate analgesia    Post assessment: no apparent anesthetic complications and tolerated procedure well    Post vital signs: stable    Level of consciousness: sedated and responds to stimulation    Nausea/Vomiting: no nausea/vomiting    Complications: none    Transfer of care protocol was followed      Last vitals:   Visit Vitals  BP (!) 165/76 (BP Location: Right arm, Patient Position: Lying)   Pulse 79   Temp 36.4 °C (97.5 °F) (Oral)   Resp 18   Ht 5' 3" (1.6 m)   Wt 64.9 kg (143 lb)   SpO2 97%   Breastfeeding No   BMI 25.33 kg/m²     "

## 2023-05-02 VITALS
HEART RATE: 88 BPM | WEIGHT: 143 LBS | BODY MASS INDEX: 25.34 KG/M2 | RESPIRATION RATE: 16 BRPM | HEIGHT: 63 IN | TEMPERATURE: 98 F | SYSTOLIC BLOOD PRESSURE: 137 MMHG | OXYGEN SATURATION: 94 % | DIASTOLIC BLOOD PRESSURE: 69 MMHG

## 2023-05-02 LAB
ANION GAP SERPL CALC-SCNC: 11 MMOL/L (ref 8–16)
BASOPHILS # BLD AUTO: 0.02 K/UL (ref 0–0.2)
BASOPHILS NFR BLD: 0.1 % (ref 0–1.9)
BUN SERPL-MCNC: 16 MG/DL (ref 8–23)
CALCIUM SERPL-MCNC: 8.8 MG/DL (ref 8.7–10.5)
CHLORIDE SERPL-SCNC: 106 MMOL/L (ref 95–110)
CO2 SERPL-SCNC: 22 MMOL/L (ref 23–29)
CREAT SERPL-MCNC: 0.9 MG/DL (ref 0.5–1.4)
DIFFERENTIAL METHOD: ABNORMAL
EOSINOPHIL # BLD AUTO: 0 K/UL (ref 0–0.5)
EOSINOPHIL NFR BLD: 0 % (ref 0–8)
ERYTHROCYTE [DISTWIDTH] IN BLOOD BY AUTOMATED COUNT: 13.3 % (ref 11.5–14.5)
EST. GFR  (NO RACE VARIABLE): >60 ML/MIN/1.73 M^2
GLUCOSE SERPL-MCNC: 123 MG/DL (ref 70–110)
HCT VFR BLD AUTO: 37.4 % (ref 37–48.5)
HGB BLD-MCNC: 12.2 G/DL (ref 12–16)
IMM GRANULOCYTES # BLD AUTO: 0.06 K/UL (ref 0–0.04)
IMM GRANULOCYTES NFR BLD AUTO: 0.4 % (ref 0–0.5)
LYMPHOCYTES # BLD AUTO: 2 K/UL (ref 1–4.8)
LYMPHOCYTES NFR BLD: 13.3 % (ref 18–48)
MCH RBC QN AUTO: 31.1 PG (ref 27–31)
MCHC RBC AUTO-ENTMCNC: 32.6 G/DL (ref 32–36)
MCV RBC AUTO: 95 FL (ref 82–98)
MONOCYTES # BLD AUTO: 1 K/UL (ref 0.3–1)
MONOCYTES NFR BLD: 6.5 % (ref 4–15)
NEUTROPHILS # BLD AUTO: 11.8 K/UL (ref 1.8–7.7)
NEUTROPHILS NFR BLD: 79.7 % (ref 38–73)
NRBC BLD-RTO: 0 /100 WBC
PLATELET # BLD AUTO: 277 K/UL (ref 150–450)
PMV BLD AUTO: 9.7 FL (ref 9.2–12.9)
POCT GLUCOSE: 105 MG/DL (ref 70–110)
POCT GLUCOSE: 139 MG/DL (ref 70–110)
POTASSIUM SERPL-SCNC: 4 MMOL/L (ref 3.5–5.1)
RBC # BLD AUTO: 3.92 M/UL (ref 4–5.4)
SODIUM SERPL-SCNC: 139 MMOL/L (ref 136–145)
WBC # BLD AUTO: 14.78 K/UL (ref 3.9–12.7)

## 2023-05-02 PROCEDURE — 99406 BEHAV CHNG SMOKING 3-10 MIN: CPT

## 2023-05-02 PROCEDURE — 85025 COMPLETE CBC W/AUTO DIFF WBC: CPT | Performed by: STUDENT IN AN ORGANIZED HEALTH CARE EDUCATION/TRAINING PROGRAM

## 2023-05-02 PROCEDURE — 80048 BASIC METABOLIC PNL TOTAL CA: CPT | Performed by: STUDENT IN AN ORGANIZED HEALTH CARE EDUCATION/TRAINING PROGRAM

## 2023-05-02 PROCEDURE — 94761 N-INVAS EAR/PLS OXIMETRY MLT: CPT

## 2023-05-02 PROCEDURE — 25000003 PHARM REV CODE 250: Performed by: STUDENT IN AN ORGANIZED HEALTH CARE EDUCATION/TRAINING PROGRAM

## 2023-05-02 PROCEDURE — 36415 COLL VENOUS BLD VENIPUNCTURE: CPT | Performed by: STUDENT IN AN ORGANIZED HEALTH CARE EDUCATION/TRAINING PROGRAM

## 2023-05-02 PROCEDURE — 27000221 HC OXYGEN, UP TO 24 HOURS

## 2023-05-02 PROCEDURE — 94799 UNLISTED PULMONARY SVC/PX: CPT

## 2023-05-02 PROCEDURE — 99900035 HC TECH TIME PER 15 MIN (STAT)

## 2023-05-02 RX ADMIN — HYDROCODONE BITARTRATE AND ACETAMINOPHEN 1 TABLET: 5; 325 TABLET ORAL at 03:05

## 2023-05-02 RX ADMIN — THERA TABS 1 TABLET: TAB at 08:05

## 2023-05-02 RX ADMIN — DULOXETINE HYDROCHLORIDE 20 MG: 20 CAPSULE, DELAYED RELEASE ORAL at 08:05

## 2023-05-02 RX ADMIN — EZETIMIBE 10 MG: 10 TABLET ORAL at 08:05

## 2023-05-02 RX ADMIN — MUPIROCIN: 20 OINTMENT TOPICAL at 08:05

## 2023-05-02 RX ADMIN — ASPIRIN 81 MG CHEWABLE TABLET 81 MG: 81 TABLET CHEWABLE at 08:05

## 2023-05-02 RX ADMIN — METOPROLOL SUCCINATE 50 MG: 50 TABLET, EXTENDED RELEASE ORAL at 08:05

## 2023-05-02 RX ADMIN — Medication 1000 UNITS: at 08:05

## 2023-05-02 RX ADMIN — HYDROXYZINE HYDROCHLORIDE 25 MG: 25 TABLET ORAL at 03:05

## 2023-05-02 RX ADMIN — AMLODIPINE BESYLATE 5 MG: 5 TABLET ORAL at 08:05

## 2023-05-02 RX ADMIN — DOCUSATE SODIUM 100 MG: 100 CAPSULE, LIQUID FILLED ORAL at 08:05

## 2023-05-02 RX ADMIN — LOSARTAN POTASSIUM 100 MG: 100 TABLET, FILM COATED ORAL at 08:05

## 2023-05-02 NOTE — NURSING
Discharge instructions given to patient. Patient verbalized understanding. Peripheral IV discontinued. Patient left floor via wheelchair. Son providing transportation home.

## 2023-05-02 NOTE — PLAN OF CARE
CM attempted to complete discharge planning assessment however the pt was sleeping. CM will return to follow up .    05/02/23 1025   Discharge Assessment   Assessment Type Discharge Planning Assessment

## 2023-05-02 NOTE — CARE UPDATE
05/02/23 0904   Tobacco Cessation Intervention   Do you use any type of tobacco product? Yes   Are you interested in quitting use of tobacco products? Thinking about quitting   Are you interested in Nicotine Replacement for symptom relief? No   $ Smoking Cessation Charges Smoking Cessation - Intermediate (CTTS)     Trying to quit on her own, no Nicotine patch needed.

## 2023-05-02 NOTE — PLAN OF CARE
The pt is cleared for discharge home once seen by hospital medicine.    05/02/23 1128   Final Note   Assessment Type Final Discharge Note   Anticipated Discharge Disposition Home   What phone number can be called within the next 1-3 days to see how you are doing after discharge? [phone]

## 2023-05-02 NOTE — RESPIRATORY THERAPY
05/02/23 0938   Patient Assessment/Suction   Level of Consciousness (AVPU) alert  (sleeping responds immediately)   Respiratory Effort Normal;Unlabored;Mouth breathing  (mouth breathing while sleeping)   Expansion/Accessory Muscles/Retractions expansion symmetric;no retractions;no use of accessory muscles   Rhythm/Pattern, Respiratory depth regular;pattern regular;unlabored   Cough Frequency no cough   Skin Integrity   $ Wound Care Tech Time 15 min   Area Observed Left;Right;Behind ear;Cheek;Upper lip   Skin Appearance without discoloration   PRE-TX-O2   Device (Oxygen Therapy) nasal cannula   $ Is the patient on Low Flow Oxygen? Yes   Flow (L/min) 1  (weaned from 2L pt does not wear O2 at home)   SpO2 96 %   Pulse Oximetry Type Intermittent   $ Pulse Oximetry - Multiple Charge Pulse Oximetry - Multiple   Pulse 82   Resp 16   Incentive Spirometer   $ Incentive Spirometer Charges done with encouragement   Incentive Spirometer Predicted Level (mL) 760   Administration (IS) self-administered   Number of Repetitions (IS) 10   Level Incentive Spirometer (mL) 1500   Patient Tolerance (IS) no adverse signs/symptoms present;good

## 2023-05-02 NOTE — DISCHARGE SUMMARY
[facility]  Discharge Summary      Patient Name: Mirta Guerin  MRN: [id number]  MAURIZIO: [id number]  Patient Class: IP- Inpatient  Admission Date: 5/1/2023  Hospital Length of Stay: 1 days  Discharge Date and Time: No discharge date for patient encounter.  Attending Physician: Eduin Neff MD   Discharging Provider: Omero Minaya MD  Primary Care Provider: James Jung MD    Primary Care Team: Networked reference to record PCT     HPI:   Mirta Guerin is an 82 year old female with a past medical history of HTN, HLD, DM, NEGRITA, and pacemaker who presents for elective surgical removal of appendiceal polyp via robotic appendectomy and cecetomy per Dr. Neff. She was seen pre-operatively. She has no complaints at this time. She suffered no intraoperative complications during surgery. She will be monitored overnight.      Procedure(s) (LRB):  EXCISION, CECUM, LAPAROSCOPIC (N/A)      Hospital Course:   Mirta Guerin is an 82 year old female with a past medical history of HTN, HLD, DM, NEGRITA, and pacemaker who presents for elective surgical removal of appendiceal polyp via robotic appendectomy and cecetomy per Dr. Neff. She tolerated the procedure well and there were no intraoperative complications. Her pain was well controlled. She was monitored overnight and discharged 5/2/2023. She will follow up with Dr. Neff in the outpatient setting.       Goals of Care Treatment Preferences:  Code Status: Full Code      Consults:     Cardiac/Vascular  Cardiac pacemaker in situ  Stable.      Dyslipidemia  -Continue ASA, statin and fibrate      Essential hypertension  -Continue home amlodipine, losartan and metoprolol      Coronary artery disease involving autologous vein coronary bypass graft without angina pectoris  -Continue ASA and statin      Endocrine  Vitamin D deficiency  -Continue home repletion      Type II diabetes mellitus with neurological manifestations  Patient's FSGs are  controlled on current medication regimen.  Last A1c reviewed-   Lab Results   Component Value Date    HGBA1C 5.8 (H) 07/21/2022     Most recent fingerstick glucose reviewed-   Recent Labs   Lab 05/01/23  1717 05/01/23  2049 05/02/23  0733   POCTGLUCOSE 143* 213* 105     Current correctional scale  Medium  Maintain anti-hyperglycemic dose as follows-   Antihyperglycemics (From admission, onward)    Start     Stop Route Frequency Ordered    05/01/23 1719  insulin aspart U-100 pen 1-10 Units         -- SubQ Before meals & nightly PRN 05/01/23 1620        Hold Oral hypoglycemics while patient is in the hospital.    GI  * Mass of appendix  -S/p removal per Dr. Neff  -Monitor overnight  -Incentive spirometry  -IV fluids  -PRN analgesics and antiemetics      Other  Obstructive sleep apnea syndrome  -Does not use CPAP      Personal history of tobacco use, presenting hazards to health  -Counseled on cessation        Final Active Diagnoses:    Diagnosis Date Noted POA    PRINCIPAL PROBLEM:  Mass of appendix [K38.8] 05/01/2023 Yes    Vitamin D deficiency [E55.9] 03/11/2022 Yes    Cardiac pacemaker in situ [Z95.0] 01/06/2022 Yes    Essential hypertension [I10] 05/24/2021 Yes    Dyslipidemia [E78.5] 05/24/2021 Yes    Type II diabetes mellitus with neurological manifestations [E11.49] 09/16/2018 Yes    Coronary artery disease involving autologous vein coronary bypass graft without angina pectoris [I25.810] 08/14/2017 Yes    Personal history of tobacco use, presenting hazards to health [Z87.891] 08/02/2017 Not Applicable    Obstructive sleep apnea syndrome [G47.33] 08/02/2017 Yes      Problems Resolved During this Admission:       Discharged Condition: stable    Disposition: Home or Self Care    Follow Up:   Follow-up Information     Eduin Neff MD Follow up in 2 week(s).    Specialties: General Surgery, Colon and Rectal Surgery, Surgery  Contact information:  [facility]  [place] [address] [phone]                        Patient Instructions:      Diet general     Call MD for:  extreme fatigue     Call MD for:  persistent dizziness or light-headedness     Call MD for:  hives     Call MD for:  redness, tenderness, or signs of infection (pain, swelling, redness, odor or green/yellow discharge around incision site)     Call MD for:  difficulty breathing, headache or visual disturbances     Call MD for:  severe uncontrolled pain     Call MD for:  persistent nausea and vomiting     Call MD for:  temperature >100.4     Remove dressing in 48 hours     Activity as tolerated       Significant Diagnostic Studies: Labs: All labs within the past 24 hours have been reviewed    Pending Diagnostic Studies:     Procedure Component Value Units Date/Time    Specimen to Pathology, Surgery General Surgery [[id number]] Collected: 05/01/23 1358    Order Status: Sent Lab Status: In process Updated: 05/01/23 1432    Specimen: Tissue          Medications:  Reconciled Home Medications:      Medication List      START taking these medications    albuterol 90 mcg/actuation inhaler  Commonly known as: PROVENTIL/VENTOLIN HFA  INHALE 1-2 PUFFS INTO THE LUNGS 2 (TWO) TIMES A DAY.     gabapentin 300 MG capsule  Commonly known as: NEURONTIN  TAKE 1 CAPSULE BY MOUTH IN  THE EVENING     HYDROcodone-acetaminophen 5-325 mg per tablet  Commonly known as: NORCO  Take 1 tablet by mouth every 6 (six) hours as needed for Pain.        CONTINUE taking these medications    amLODIPine 5 MG tablet  Commonly known as: NORVASC  Take 1 tablet (5 mg total) by mouth 2 (two) times daily.     anastrozole 1 mg Tab  Commonly known as: ARIMIDEX     aspirin 81 MG EC tablet  Commonly known as: ECOTRIN  Take 81 mg by mouth once daily.     cloNIDine 0.1 MG tablet  Commonly known as: CATAPRES  TAKE 1 TABLET BY MOUTH EVERY DAY AS NEEDED FOR SYSTOLIC PRESSURE GREATER THAN 170     co-enzyme Q-10 50 mg capsule  Take 100 mg by mouth once daily.     docusate sodium 100 MG  capsule  Commonly known as: COLACE  Take 100 mg by mouth 2 (two) times daily.     DULoxetine 20 MG capsule  Commonly known as: CYMBALTA  Take 1 capsule (20 mg total) by mouth once daily.     ezetimibe 10 mg tablet  Commonly known as: ZETIA  Take 1 tablet (10 mg total) by mouth once daily.     fluticasone propionate 50 mcg/actuation nasal spray  Commonly known as: FLONASE     loratadine 10 mg tablet  Commonly known as: CLARITIN  Take 10 mg by mouth once daily.     MACULAR VITAMIN ORAL  Take by mouth.     metFORMIN 500 MG tablet  Commonly known as: GLUCOPHAGE  Take 500 mg by mouth 2 (two) times daily with meals.     metoprolol succinate 50 MG 24 hr tablet  Commonly known as: TOPROL-XL  Take 1 tablet (50 mg total) by mouth once daily.     olmesartan 40 MG tablet  Commonly known as: BENICAR  Take 1 tablet (40 mg total) by mouth once daily.     rosuvastatin 10 MG tablet  Commonly known as: CRESTOR  Take 1 tablet (10 mg total) by mouth every evening.     vitamin D 1000 units Tab  Commonly known as: VITAMIN D3  Take 1,000 Units by mouth once daily.            Indwelling Lines/Drains at time of discharge:   Lines/Drains/Airways     None                 Time spent on the discharge of patient: 35 minutes         Omero Minaya MD  Department of Hospital Medicine  [facility]

## 2023-05-02 NOTE — HOSPITAL COURSE
Mirta Guerin is an 82 year old female with a past medical history of HTN, HLD, DM, NEGRITA, and pacemaker who presents for elective surgical removal of appendiceal polyp via robotic appendectomy and cecetomy per Dr. Neff. She tolerated the procedure well and there were no intraoperative complications. Her pain was well controlled. She was monitored overnight and discharged 5/2/2023. She will follow up with Dr. Neff in the outpatient setting.

## 2023-05-02 NOTE — ASSESSMENT & PLAN NOTE
Patient's FSGs are controlled on current medication regimen.  Last A1c reviewed-   Lab Results   Component Value Date    HGBA1C 5.8 (H) 07/21/2022     Most recent fingerstick glucose reviewed-   Recent Labs   Lab 05/01/23  1717 05/01/23  2049 05/02/23  0733   POCTGLUCOSE 143* 213* 105     Current correctional scale  Medium  Maintain anti-hyperglycemic dose as follows-   Antihyperglycemics (From admission, onward)    Start     Stop Route Frequency Ordered    05/01/23 1719  insulin aspart U-100 pen 1-10 Units         -- SubQ Before meals & nightly PRN 05/01/23 1620        Hold Oral hypoglycemics while patient is in the hospital.

## 2023-05-03 ENCOUNTER — PATIENT OUTREACH (OUTPATIENT)
Dept: ADMINISTRATIVE | Facility: CLINIC | Age: 83
End: 2023-05-03
Payer: MEDICARE

## 2023-05-03 ENCOUNTER — PATIENT MESSAGE (OUTPATIENT)
Dept: ADMINISTRATIVE | Facility: CLINIC | Age: 83
End: 2023-05-03
Payer: MEDICARE

## 2023-05-03 NOTE — PROGRESS NOTES
C3 nurse attempted to contact Mirta Guerin for a TCC post hospital discharge follow up call. No answer. The patient does not have a scheduled HOSFU appointment, and the pt does not have an [facility] PCP.

## 2023-05-04 NOTE — PROGRESS NOTES
C3 nurse 2nd attempt to contact Mirta Guerin for a TCC post hospital discharge follow up call. No answer. The patient does not have a scheduled HOSFU appointment, and the pt does not have an [facility] PCP.

## 2023-05-05 NOTE — PROGRESS NOTES
C3 nurse spoke with Mirta Guerin for a TCC post hospital discharge follow up call. The patient reports does not have a scheduled HOSFU appointment. C3 nurse was unable to schedule [facility]. Patient advised to contact their PCP to schedule a HOSPFU within 5-7 days.

## 2023-05-08 ENCOUNTER — TELEPHONE (OUTPATIENT)
Dept: SURGERY | Facility: CLINIC | Age: 83
End: 2023-05-08
Payer: MEDICARE

## 2023-05-08 LAB
FINAL PATHOLOGIC DIAGNOSIS: NORMAL
GROSS: NORMAL
Lab: NORMAL
MICROSCOPIC EXAM: NORMAL

## 2023-05-08 NOTE — TELEPHONE ENCOUNTER
I called patient to inform her that Dr. Neff wants to see her this week instead of 5/23/23 since she had robotic cecectomy on 5/1/23.  Patient will come in on Wednesday, 5/10/23 @ 1pm in [place].  Ethan

## 2023-05-10 ENCOUNTER — TELEPHONE (OUTPATIENT)
Dept: SURGERY | Facility: CLINIC | Age: 83
End: 2023-05-10
Payer: MEDICARE

## 2023-05-10 NOTE — TELEPHONE ENCOUNTER
I called patient and she cannot make it today.  I rescheduled her on Tuesday, 5/16/23 @ 1pm per Dr. Neff.  Ethan

## 2023-05-16 ENCOUNTER — OFFICE VISIT (OUTPATIENT)
Dept: SURGERY | Facility: CLINIC | Age: 83
End: 2023-05-16
Payer: MEDICARE

## 2023-05-16 VITALS
SYSTOLIC BLOOD PRESSURE: 176 MMHG | TEMPERATURE: 98 F | HEART RATE: 87 BPM | HEIGHT: 63 IN | BODY MASS INDEX: 24.8 KG/M2 | WEIGHT: 140 LBS | DIASTOLIC BLOOD PRESSURE: 77 MMHG

## 2023-05-16 DIAGNOSIS — Z09 POSTOP CHECK: Primary | ICD-10-CM

## 2023-05-16 PROCEDURE — 99024 PR POST-OP FOLLOW-UP VISIT: ICD-10-PCS | Mod: POP,,, | Performed by: SURGERY

## 2023-05-16 PROCEDURE — 99214 OFFICE O/P EST MOD 30 MIN: CPT | Mod: PBBFAC,PN | Performed by: SURGERY

## 2023-05-16 PROCEDURE — 99999 PR PBB SHADOW E&M-EST. PATIENT-LVL IV: ICD-10-PCS | Mod: PBBFAC,,, | Performed by: SURGERY

## 2023-05-16 PROCEDURE — 99024 POSTOP FOLLOW-UP VISIT: CPT | Mod: POP,,, | Performed by: SURGERY

## 2023-05-16 PROCEDURE — 99999 PR PBB SHADOW E&M-EST. PATIENT-LVL IV: CPT | Mod: PBBFAC,,, | Performed by: SURGERY

## 2023-05-16 NOTE — PROGRESS NOTES
Cc: post op    HPI: 82 y.o.  female  2 weeks s/p robotic partial cectomy.  .   Pt doing well.  Notes some weakness but slowly improving.     PE: AFVSS    AAOx3  CTA  Soft/NT/nd  Inc: c/d/i        Path:   Cecum and appendix, cecectomy:   - Tubulovillous adenoma   - Resection margins uninvolved by dysplasia   - Benign appendix   - No lymph nodes identified   - Negative for invasive malignancy     A/P:   Pt doing well post surgery.   F/U with me prn

## 2023-05-17 ENCOUNTER — TELEPHONE (OUTPATIENT)
Dept: CARDIOLOGY | Facility: CLINIC | Age: 83
End: 2023-05-17
Payer: MEDICARE

## 2023-05-17 DIAGNOSIS — I49.5 SSS (SICK SINUS SYNDROME): Primary | ICD-10-CM

## 2023-05-17 DIAGNOSIS — Z95.1 HX OF CABG: ICD-10-CM

## 2023-05-18 ENCOUNTER — OFFICE VISIT (OUTPATIENT)
Dept: PODIATRY | Facility: CLINIC | Age: 83
End: 2023-05-18
Payer: MEDICARE

## 2023-05-18 VITALS
SYSTOLIC BLOOD PRESSURE: 150 MMHG | DIASTOLIC BLOOD PRESSURE: 90 MMHG | HEIGHT: 63 IN | BODY MASS INDEX: 24.8 KG/M2 | HEART RATE: 91 BPM | WEIGHT: 140 LBS

## 2023-05-18 DIAGNOSIS — L40.9 PSORIASIS: ICD-10-CM

## 2023-05-18 DIAGNOSIS — M19.90 OSTEOARTHRITIS, UNSPECIFIED OSTEOARTHRITIS TYPE, UNSPECIFIED SITE: ICD-10-CM

## 2023-05-18 DIAGNOSIS — E11.49 TYPE II DIABETES MELLITUS WITH NEUROLOGICAL MANIFESTATIONS: Primary | ICD-10-CM

## 2023-05-18 DIAGNOSIS — E11.9 COMPREHENSIVE DIABETIC FOOT EXAMINATION, TYPE 2 DM, ENCOUNTER FOR: ICD-10-CM

## 2023-05-18 PROCEDURE — 99999 PR PBB SHADOW E&M-EST. PATIENT-LVL IV: CPT | Mod: PBBFAC,,, | Performed by: PODIATRIST

## 2023-05-18 PROCEDURE — 99213 OFFICE O/P EST LOW 20 MIN: CPT | Mod: S$PBB,,, | Performed by: PODIATRIST

## 2023-05-18 PROCEDURE — 99999 PR PBB SHADOW E&M-EST. PATIENT-LVL IV: ICD-10-PCS | Mod: PBBFAC,,, | Performed by: PODIATRIST

## 2023-05-18 PROCEDURE — 99214 OFFICE O/P EST MOD 30 MIN: CPT | Mod: PBBFAC,PN | Performed by: PODIATRIST

## 2023-05-18 PROCEDURE — 99213 PR OFFICE/OUTPT VISIT, EST, LEVL III, 20-29 MIN: ICD-10-PCS | Mod: S$PBB,,, | Performed by: PODIATRIST

## 2023-05-18 NOTE — PROGRESS NOTES
Subjective:       Patient ID: Mirta Guerin is a 82 y.o. female.    Chief Complaint: No chief complaint on file.    Patient presents today for followup she is a history of chronically ingrown toenails with signs of infection especially on her left hallux.     Follow-up  Associated symptoms include arthralgias, joint swelling and a rash.   Ingrown Toenail  Associated symptoms include arthralgias, joint swelling and a rash.   Nail Problem  Associated symptoms include arthralgias, joint swelling and a rash.      Review of Systems   Musculoskeletal:  Positive for arthralgias and joint swelling.   Skin:  Positive for color change, rash and wound.   All other systems reviewed and are negative.    Objective:      Physical Exam  Constitutional:       Appearance: She is well-developed.   Cardiovascular:      Pulses:           Dorsalis pedis pulses are 1+ on the right side and 1+ on the left side.        Posterior tibial pulses are 1+ on the right side and 1+ on the left side.   Pulmonary:      Effort: Pulmonary effort is normal.   Musculoskeletal:         General: Deformity present. Normal range of motion.   Feet:      Right foot:      Protective Sensation: 4 sites tested.   1 site sensed.     Skin integrity: Dry skin present.      Left foot:      Protective Sensation: 4 sites tested.   1 site sensed.     Skin integrity: Dry skin present.   Skin:     General: Skin is warm.      Capillary Refill: Capillary refill takes more than 3 seconds.   Neurological:      Deep Tendon Reflexes: Reflexes abnormal.   Psychiatric:         Behavior: Behavior normal.         Thought Content: Thought content normal.         Judgment: Judgment normal.     Patient is noted to have positive erythema positive edema at the medial lateral aspect of the patient's left hallux upon debridement of the lateral border left hallux there is purulent drainage noted as well as obvious signs of infection and pain to palpation. Patient is noted to have a  well-defined area of hyperkeratotic tissue on the dorsal lateral aspect of the fifth digit left there is positive pain noted upon palpation of this area no sign of infection is noted. patient's previously noted psoriasis is more well-controlled on today's visit that ever seen it since it started seeing the patient there are mild erythematous plaques or areas of skin breakdown especially on the heel.        Assessment:       1. Type II diabetes mellitus with neurological manifestations    2. Comprehensive diabetic foot examination, type 2 DM, encounter for    3. Osteoarthritis, unspecified osteoarthritis type, unspecified site    4. Psoriasis        Plan:        Following examination I aggressively debrided both the medial and lateral border of the patient's left hallux I did advise the patient is obvious signs of infection with purulent drainage noted at the lateral border left hallux. Sterile saline was used to flush and irrigated the lateral border left hallux bacitracin ointment and a well-padded dry dressing applied patient advised to keep antibiotic ointment on the area for the next 3 days if this does not completely resolve if it continues to be red and painful we need to see her back for followup sooner than the regularly scheduled 12 week appointment. I debrided the hyperkeratotic tissue from the fifth digit left patient stated that this felt much better I advised her this is likely due to a shoe rubbing and irritation of this area recommended followup 12 weeks. .Patient states her psoriasis is doing as good as it's been in many years and is very well controlled especially on her feet.  Patient relates she has been under lot of stress with family issues of this definitely aggravates her psoriasis.Complete diabetic evaluation was performed today. Pt developed an area of psoriatic breakdown on her left heel this needs to be monitored closely. Calmoseptine ointment disp.   Patient states this ointment has been  wonderful and see only thing that has really helped the psoriasis on her heels.  Patient relates today that the gabapentin continues to work very well.  Patient did have a severely incurvated ingrown toenail on the lateral border of the left hallux.  After removing the nail the patient indicated felt much better bacitracin ointment a light dressing applied patient will monitor this area closely.  Patient states that she is recently started using and all natural native American ointment on the areas where she has psoriasis and is made a dramatic improvement and while she knows the psoriasis is never going to go away it definitely keeps it better controlled.  Patient states her psoriasis is being well controlled with an all natural organic compound cream that she has been applying.  This note was created using [employer] voice recognition software that occasionally misinterpreted phrases or words.

## 2023-07-06 ENCOUNTER — HOSPITAL ENCOUNTER (OUTPATIENT)
Dept: RADIOLOGY | Facility: HOSPITAL | Age: 83
Discharge: HOME | End: 2023-07-06
Attending: NURSE PRACTITIONER
Payer: MEDICARE

## 2023-07-06 DIAGNOSIS — N20.0 KIDNEY STONES: ICD-10-CM

## 2023-07-06 PROCEDURE — 76770 US RETROPERITONEAL COMPLETE: ICD-10-PCS | Mod: 26,,, | Performed by: RADIOLOGY

## 2023-07-06 PROCEDURE — 76770 US EXAM ABDO BACK WALL COMP: CPT | Mod: TC

## 2023-07-06 PROCEDURE — 76770 US EXAM ABDO BACK WALL COMP: CPT | Mod: 26,,, | Performed by: RADIOLOGY

## 2023-07-18 ENCOUNTER — OFFICE VISIT (OUTPATIENT)
Dept: UROLOGY | Facility: CLINIC | Age: 83
End: 2023-07-18
Payer: MEDICARE

## 2023-07-18 VITALS — HEIGHT: 63 IN | WEIGHT: 130 LBS | BODY MASS INDEX: 23.04 KG/M2

## 2023-07-18 DIAGNOSIS — N20.0 KIDNEY STONES: Primary | ICD-10-CM

## 2023-07-18 PROCEDURE — 99999 PR PBB SHADOW E&M-EST. PATIENT-LVL IV: CPT | Mod: PBBFAC,,, | Performed by: NURSE PRACTITIONER

## 2023-07-18 PROCEDURE — 99214 OFFICE O/P EST MOD 30 MIN: CPT | Mod: S$PBB,,, | Performed by: NURSE PRACTITIONER

## 2023-07-18 PROCEDURE — 99999 PR PBB SHADOW E&M-EST. PATIENT-LVL IV: ICD-10-PCS | Mod: PBBFAC,,, | Performed by: NURSE PRACTITIONER

## 2023-07-18 PROCEDURE — 99214 OFFICE O/P EST MOD 30 MIN: CPT | Mod: PBBFAC | Performed by: NURSE PRACTITIONER

## 2023-07-18 PROCEDURE — 99214 PR OFFICE/OUTPT VISIT, EST, LEVL IV, 30-39 MIN: ICD-10-PCS | Mod: S$PBB,,, | Performed by: NURSE PRACTITIONER

## 2023-07-18 RX ORDER — ATORVASTATIN CALCIUM 20 MG/1
20 TABLET, FILM COATED ORAL NIGHTLY
Qty: 90 TABLET | Refills: 0 | Status: SHIPPED | OUTPATIENT
Start: 2023-07-18 | End: 2023-11-21

## 2023-07-18 NOTE — PROGRESS NOTES
CHIEF COMPLAINT:    Mrs Guerin is a 83 y.o. female presenting for f/u kidney stones.  PRESENTING ILLNESS:    Mirta Guerin is a 83 y.o. female who presents for f/u kidney stones. Last clinic visit was 1/11/23.      HX:  On 11/26/21 pt was evaluated at [facility] ED with c/o RIGHT flank pain that initial onset was 7 hrs prior.  Pain went to right flank area and radiates into groin.  She has never had kidney stones in the past, but has had UTIs in the past.   UA in ER showed 3+ Blood, UA Micro showed >100 RBCs, 1 WBC, occasional bacteria   CT RSS on 11/27/21 showed:  1. Mild right hydroureteronephrosis secondary to an obstructive 4 mm stone within the distal right ureter at the ureterovesicular junction.  Prominent nonspecific right perinephric and periureteral fat stranding, with superimposed infection not excluded.  Additional nonobstructive right renal stones.  2. Cholelithiasis without evidence of acute cholecystitis.  3. Diverticulosis coli without evidence of diverticulitis.  4. Additional findings, as above.  The preliminary and final reports are concordant.   OV 12/21:  Pt remains with right sided lower back pains.  She is unsure at this time whether she passed the 4 mm stone from ED visit.  She is no longer seeing blood in her urine.  No problems with urination noted at this time.   6/23/22:  Pt overall doing well with no complaints voiced at this time.  No recent flank pain, dysuria or difficulty urinating.  She has not had anymore stone episodes since last ov with me.  No UA done in office today.   KUB imaging performed on 6/6/22 for recheck of stone burden showed the following:  FINDINGS:  Gas and stool in nondistended colon and small amount of gas in nondistended small bowel with just very mild nonspecific, nonobstructive generalized increase in amount of bowel gas.  Vascular calcifications.  postoperative changes near the chest along with cardiac pacing leads.   Osseous degenerative changes.  SI  joint sclerosis.  Some small calcifications projected over the kidneys could be renal stones.  Typical opaque ureteral stone is not identified although the stool does partially obscure the urinary tracts.  If indicated clinically renal stone protocol CT could be obtained   Impression:  Nonspecific nonobstructive generalized increase in amount of bowel gas.  Multiple vascular calcifications.  Small calcifications projecting over the kidneys question also small bilateral renal stones    1/11/23  Patient presents to clinic today with me for f/u kidney stones. Pt denies dysuria, flank pain, gross hematuria, fever, chills, nausea or vomiting. She has been doing well since last clinic visit. No further stone episodes or UTI's since last clinic visit. She did not want to have KUB done today since doing well but agrees to have KOURTNEY prior to next visit.  She does not drink as much water as recommended. Does not drink tea or cola. Moderate salt diet. Low red meat. Unsure how much oxalate she intakes.     7/18/23  Patient presents to clinic today for f/u kidney stones. She is accompanied by her son to the appt.  No complaints today in clinic  No issues voiding  Denies dysuria, gross hematuria, flank pain, fever, chills, nausea or vomiting  No UTI's since last visit  Does not drink much water    7/6/23 KOURTNEY   Small right renal calculi noted on previous CT are not clearly identified on the current or prior ultrasound.  Right renal cysts.  No hydronephrosis.    5/2/23 creatinine 0.9 / GFR>60    Urine cultures:   No results found for: LABURIN      REVIEW OF SYSTEMS:    Review of Systems    Constitutional: Negative for fever and chills.   HENT: Negative for hearing loss.   Eyes: Negative for visual disturbance.   Respiratory: Negative for shortness of breath.   Cardiovascular: Negative for chest pain.   Gastrointestinal: Negative for nausea, vomiting   Genitourinary: See above  Neurological: Negative for dizziness.   Hematological:  Does not bruise/bleed easily.   Psychiatric/Behavioral: Negative for confusion.     PATIENT HISTORY:    Past Medical History:   Diagnosis Date    Anxiety     AV block     Breast cancer     Cardiac pacemaker in situ     Coronary artery disease     Diabetes mellitus     GERD (gastroesophageal reflux disease)     HLD (hyperlipidemia)     Hypertension     Lung disease     Melanoma     Mitral valve disorder     NEGRITA (obstructive sleep apnea)     Prediabetes     Psoriasis     Spinal stenosis     Squamous cell carcinoma of skin     right anterior scalp    Vitamin D deficiency        Past Surgical History:   Procedure Laterality Date    BREAST CYST ASPIRATION      BREAST LUMPECTOMY      CARDIAC PACEMAKER PLACEMENT  04/05/2012    COLONOSCOPY N/A 4/21/2023    Procedure: COLONOSCOPY;  Surgeon: Corey Blake III, MD;  Location: [facility] ENDO;  Service: Endoscopy;  Laterality: N/A;    CORONARY ARTERY BYPASS GRAFT  07/2017    LAPAROSCOPIC SURGICAL REMOVAL OF CECUM N/A 5/1/2023    Procedure: EXCISION, CECUM, LAPAROSCOPIC;  Surgeon: Eduin Neff MD;  Location: [facility] OR;  Service: General;  Laterality: N/A;  ROBOTIC PARTIAL CECECTOMY    SKIN CANCER EXCISION      UPPER GASTROINTESTINAL ENDOSCOPY         Family History   Problem Relation Age of Onset    Cancer Mother         ovarian    Coronary artery disease Father     Stroke Father     Stroke Maternal Grandfather     Breast cancer Paternal Grandmother     Cancer Paternal Grandmother         breast    Stroke Paternal Grandfather     Stroke Brother     Ovarian cancer Neg Hx        Social History     Socioeconomic History    Marital status:    Tobacco Use    Smoking status: Every Day     Packs/day: 0.25     Years: 50.00     Pack years: 12.50     Types: Cigarettes     Start date: 1954    Smokeless tobacco: Never    Tobacco comments:     4-6 a day as of 8/16/21--got up to smoking a pack a day   Substance and Sexual Activity    Alcohol use: Yes     Comment: occasional    Drug  use: No    Sexual activity: Never       Allergies:  Bacitracin zinc-polymyxin b, Adhesive, Benazepril, Codeine, and Polysporin [bacitracin-polymyxin b]    Medications:    Current Outpatient Medications:     albuterol (PROVENTIL/VENTOLIN HFA) 90 mcg/actuation inhaler, INHALE 1-2 PUFFS INTO THE LUNGS 2 (TWO) TIMES A DAY., Disp: 18 g, Rfl: 0    amLODIPine (NORVASC) 5 MG tablet, Take 1 tablet (5 mg total) by mouth 2 (two) times daily., Disp: 180 tablet, Rfl: 3    anastrozole (ARIMIDEX) 1 mg Tab, , Disp: , Rfl:     aspirin (ECOTRIN) 81 MG EC tablet, Take 81 mg by mouth once daily., Disp: , Rfl:     cloNIDine (CATAPRES) 0.1 MG tablet, TAKE 1 TABLET BY MOUTH EVERY DAY AS NEEDED FOR SYSTOLIC PRESSURE GREATER THAN 170, Disp: , Rfl:     co-enzyme Q-10 50 mg capsule, Take 100 mg by mouth once daily., Disp: , Rfl:     docusate sodium (COLACE) 100 MG capsule, Take 100 mg by mouth 2 (two) times daily., Disp: , Rfl:     ezetimibe (ZETIA) 10 mg tablet, Take 1 tablet (10 mg total) by mouth once daily., Disp: 90 tablet, Rfl: 3    fluticasone propionate (FLONASE) 50 mcg/actuation nasal spray, , Disp: , Rfl:     gabapentin (NEURONTIN) 300 MG capsule, TAKE 1 CAPSULE BY MOUTH IN  THE EVENING, Disp: 90 capsule, Rfl: 3    HYDROcodone-acetaminophen (NORCO) 5-325 mg per tablet, Take 1 tablet by mouth every 6 (six) hours as needed for Pain., Disp: 20 tablet, Rfl: 0    loratadine (CLARITIN) 10 mg tablet, Take 10 mg by mouth once daily., Disp: , Rfl:     metformin (GLUCOPHAGE) 500 MG tablet, Take 500 mg by mouth 2 (two) times daily with meals., Disp: , Rfl:     metoprolol succinate (TOPROL-XL) 50 MG 24 hr tablet, Take 1 tablet (50 mg total) by mouth once daily., Disp: 90 tablet, Rfl: 3    MULTIVIT-MIN/FA/LUTEIN/ZEAXANT (MACULAR VITAMIN ORAL), Take by mouth., Disp: , Rfl:     olmesartan (BENICAR) 40 MG tablet, Take 1 tablet (40 mg total) by mouth once daily., Disp: 90 tablet, Rfl: 3    rosuvastatin (CRESTOR) 10 MG tablet, Take 1 tablet (10 mg  total) by mouth every evening., Disp: 90 tablet, Rfl: 3    vitamin D 1000 units Tab, Take 1,000 Units by mouth once daily., Disp: , Rfl:     DULoxetine (CYMBALTA) 20 MG capsule, Take 1 capsule (20 mg total) by mouth once daily., Disp: 90 capsule, Rfl: 4    PHYSICAL EXAMINATION:    Constitutional: She is oriented to person, place, and time. She appears well-developed and well-nourished.  She is in no apparent distress.    Neck: Normal ROM.     Cardiovascular: Normal rate.      Pulmonary/Chest: Effort normal. No respiratory distress.     Abdominal:  She exhibits no distension.  There is no CVA tenderness.     Neurological: She is alert and oriented to person, place, and time.     Skin: Skin is warm and dry.     Psych: Cooperative with normal affect.      Physical Exam      LABS:    Lab Results   Component Value Date    CREATININE 0.9 05/02/2023       IMPRESSION:    Encounter Diagnoses   Name Primary?    Kidney stones Yes       PLAN:  -CT RSS in 1 year since patient doing well     -General risk factors for kidney stones and the conservative measures to prevent kidney stones in the future were discussed with the patient in detail.  The patient was encouraged to drink at least 2 liters of water a day, limit iced tea and risa as well as foods high in oxalate.  They were cautioned to try to limit salt and red meat intake. Low oxalate diet (limit spinach, rhubarb, nuts, beets, potatoes, chocolate).  No vitamin C supplements. We also discussed adding citrate to the diet with the addition of ana or lemon juice to their water or alternatively with crystal light.    Information regarding oxalate foods given to patient in clinic  -Get prompt medical attention if any of the following occur:  Severe pain that returns and not relieved by pain medicines  Repeated vomiting or unable to keep down fluids  Weakness, dizziness or fainting  Fever of 100.4ºF (38ºC) or higher, or as directed by your healthcare provider  Blood clots in  urine  Foul smelling or cloudy urine  Unable to pass urine for 8 hours or increasing bladder pressure     -RTC 1 year, recall placed    I encouraged her or any of her family members to call or email me with questions and/or concerns.      30 minutes of total time spent on the encounter, which includes face to face time and non-face to face time preparing to see the patient (eg, review of tests), Obtaining and/or reviewing separately obtained history, Documenting clinical information in the electronic or other health record, Independently interpreting results (not separately reported) and communicating results to the patient/family/caregiver, or Care coordination (not separately reported).

## 2023-07-18 NOTE — PATIENT INSTRUCTIONS
-The patient was encouraged to drink at least 2 liters of water a day, limit iced tea and risa as well as foods high in oxalate.  They were cautioned to try to limit salt and red meat intake. Low oxalate diet (limit spinach, rhubarb, nuts, beets, potatoes, chocolate).  No vitamin C supplements. We also discussed adding citrate to the diet with the addition of ana or lemon juice to their water or alternatively with crystal light.

## 2023-08-01 ENCOUNTER — TELEPHONE (OUTPATIENT)
Dept: ORTHOPEDICS | Facility: CLINIC | Age: 83
End: 2023-08-01

## 2023-08-01 NOTE — TELEPHONE ENCOUNTER
Attempted to call patient to advise she needs appointment. Pain medication will not be sent. Her VM was full

## 2023-08-01 NOTE — TELEPHONE ENCOUNTER
----- Message from Crystal Ortiz sent at 8/1/2023 12:34 PM CDT -----  Contact: Patient  Type:  Needs Medical Advice    Who Called: Patient     Symptoms (please be specific): back pain      How long has patient had these symptoms:  5 days     Pharmacy name and phone #:    [id number] - [place], [place] - [address]  [address] [place] [address]  Phone: [phone] Fax: [phone]      Would the patient rather a call back or a response via [facility]? Call    Best Call Back Number: [phone] (home)     Additional Information: Patient is requesting to get a pain med called in ( not tramadol)

## 2023-08-14 ENCOUNTER — TELEPHONE (OUTPATIENT)
Dept: SPINE | Facility: CLINIC | Age: 83
End: 2023-08-14

## 2023-08-14 ENCOUNTER — OFFICE VISIT (OUTPATIENT)
Dept: SPINE | Facility: CLINIC | Age: 83
End: 2023-08-14
Payer: MEDICARE

## 2023-08-14 ENCOUNTER — HOSPITAL ENCOUNTER (OUTPATIENT)
Dept: RADIOLOGY | Facility: HOSPITAL | Age: 83
Discharge: HOME | End: 2023-08-14
Attending: PHYSICAL MEDICINE & REHABILITATION
Payer: MEDICARE

## 2023-08-14 ENCOUNTER — TELEPHONE (OUTPATIENT)
Dept: PAIN MEDICINE | Facility: CLINIC | Age: 83
End: 2023-08-14
Payer: MEDICARE

## 2023-08-14 VITALS — BODY MASS INDEX: 23.04 KG/M2 | HEIGHT: 63 IN | WEIGHT: 130.06 LBS

## 2023-08-14 DIAGNOSIS — M47.816 LUMBAR SPONDYLOSIS: Primary | ICD-10-CM

## 2023-08-14 DIAGNOSIS — G89.29 CHRONIC BILATERAL LOW BACK PAIN WITH RIGHT-SIDED SCIATICA: Primary | ICD-10-CM

## 2023-08-14 DIAGNOSIS — M54.41 CHRONIC BILATERAL LOW BACK PAIN WITH RIGHT-SIDED SCIATICA: Primary | ICD-10-CM

## 2023-08-14 DIAGNOSIS — G89.29 CHRONIC BILATERAL LOW BACK PAIN WITH RIGHT-SIDED SCIATICA: ICD-10-CM

## 2023-08-14 DIAGNOSIS — M54.41 CHRONIC BILATERAL LOW BACK PAIN WITH RIGHT-SIDED SCIATICA: ICD-10-CM

## 2023-08-14 PROCEDURE — 72114 XR LUMBAR SPINE 5 VIEW WITH FLEX AND EXT: ICD-10-PCS | Mod: 26,,, | Performed by: RADIOLOGY

## 2023-08-14 PROCEDURE — 72114 X-RAY EXAM L-S SPINE BENDING: CPT | Mod: 26,,, | Performed by: RADIOLOGY

## 2023-08-14 PROCEDURE — 99204 PR OFFICE/OUTPT VISIT, NEW, LEVL IV, 45-59 MIN: ICD-10-PCS | Mod: S$GLB,,, | Performed by: PHYSICAL MEDICINE & REHABILITATION

## 2023-08-14 PROCEDURE — 99204 OFFICE O/P NEW MOD 45 MIN: CPT | Mod: S$GLB,,, | Performed by: PHYSICAL MEDICINE & REHABILITATION

## 2023-08-14 PROCEDURE — 72114 X-RAY EXAM L-S SPINE BENDING: CPT | Mod: TC

## 2023-08-14 RX ORDER — HYDROCODONE BITARTRATE AND ACETAMINOPHEN 5; 325 MG/1; MG/1
1 TABLET ORAL EVERY 6 HOURS PRN
Qty: 28 TABLET | Refills: 0 | Status: SHIPPED | OUTPATIENT
Start: 2023-08-14 | End: 2023-10-18

## 2023-08-14 NOTE — TELEPHONE ENCOUNTER
Left VM to let pt know xray results per Dr. Solorio Her x-rays show degenerative changes of the lumbar spine very similar to the film we reviewed in clinic today.  There is no fracture

## 2023-08-14 NOTE — PROGRESS NOTES
SUBJECTIVE:    Patient ID: Mirta Guerin is a 83 y.o. female.    Chief Complaint: Low-back Pain    This is an 83-year-old woman who sees Dr. Jung for her primary care.  History of breast cancer hypertension and coronary artery disease status post coronary artery bypass graft.  She has a pacemaker.  Has a long history of low back pain.  She has been followed by Dr. Little, orthopedic spine surgeon.  She is not felt to be a good surgical candidate.  She has been maintained on tramadol up until recently.  She has had success with radiofrequency ablation L4-5 and L5-S1 in the remote past.  I do not have the details.  She says tramadol no longer helps.  She was given some hydrocodone 5 mg recently which does seem to help.  She is complaining of low back pain at the lumbosacral junction and over the sacrum that radiates down the right leg into the calf.  Denies bowel or bladder dysfunction fever chills sweats or unexpected weight loss.  X-rays show expected degenerative changes.  Pain level is 3/10 she says she fell a few months ago and her pain has increased since then.  I do not have any imaging on her since she fell          Past Medical History:   Diagnosis Date    Anxiety     AV block     Breast cancer     Cardiac pacemaker in situ     Coronary artery disease     Diabetes mellitus     GERD (gastroesophageal reflux disease)     HLD (hyperlipidemia)     Hypertension     Lung disease     Melanoma     Mitral valve disorder     NEGRITA (obstructive sleep apnea)     Prediabetes     Psoriasis     Spinal stenosis     Squamous cell carcinoma of skin     right anterior scalp    Vitamin D deficiency      Social History     Socioeconomic History    Marital status:    Tobacco Use    Smoking status: Every Day     Current packs/day: 0.25     Average packs/day: 0.3 packs/day for 69.6 years (17.4 ttl pk-yrs)     Types: Cigarettes     Start date: 1956    Smokeless tobacco: Never    Tobacco comments:     4-6 a day as of  "8/16/21--got up to smoking a pack a day   Substance and Sexual Activity    Alcohol use: Yes     Comment: occasional    Drug use: No    Sexual activity: Never     Past Surgical History:   Procedure Laterality Date    BREAST CYST ASPIRATION      BREAST LUMPECTOMY      CARDIAC PACEMAKER PLACEMENT  04/05/2012    COLONOSCOPY N/A 4/21/2023    Procedure: COLONOSCOPY;  Surgeon: Corey Blake III, MD;  Location: [facility];  Service: Endoscopy;  Laterality: N/A;    CORONARY ARTERY BYPASS GRAFT  07/2017    LAPAROSCOPIC SURGICAL REMOVAL OF CECUM N/A 5/1/2023    Procedure: EXCISION, CECUM, LAPAROSCOPIC;  Surgeon: Eduin Neff MD;  Location: [facility];  Service: General;  Laterality: N/A;  ROBOTIC PARTIAL CECECTOMY    SKIN CANCER EXCISION      UPPER GASTROINTESTINAL ENDOSCOPY       Family History   Problem Relation Age of Onset    Cancer Mother         ovarian    Coronary artery disease Father     Stroke Father     Stroke Maternal Grandfather     Breast cancer Paternal Grandmother     Cancer Paternal Grandmother         breast    Stroke Paternal Grandfather     Stroke Brother     Ovarian cancer Neg Hx      Vitals:    08/14/23 1038   Weight: 59 kg (130 lb 1.1 oz)   Height: 5' 3" (1.6 m)       Review of Systems   Constitutional:  Negative for chills, diaphoresis, fatigue, fever and unexpected weight change.   HENT:  Negative for trouble swallowing.    Eyes:  Negative for visual disturbance.   Respiratory:  Negative for shortness of breath.    Cardiovascular:  Negative for chest pain.   Gastrointestinal:  Negative for abdominal pain, constipation, nausea and vomiting.   Genitourinary:  Negative for difficulty urinating.   Musculoskeletal:  Negative for arthralgias, back pain, gait problem, joint swelling, myalgias, neck pain and neck stiffness.   Neurological:  Negative for dizziness, speech difficulty, weakness, light-headedness, numbness and headaches.          Objective:      Physical Exam  Neurological:      Mental " Status: She is alert and oriented to person, place, and time.      Comments: She is awake and in no acute distress  Mild tenderness palpation lumbar paraspinous musculature at the lumbosacral junction with no palpable masses  Extension of the lumbar spine causes mild pain at the lumbosacral junction  Reflexes- +1-+2 reflexes at the following:   C5-Biceps   C6-Brachioradialis   C7-Triceps   L3/4-Patellar   S1-Achilles   Vanessa sign negative bilaterally  Strength testing- 5/5 strength in the following muscle groups:  C5-Elbow flexion  C6-Wrist extension  C7-Elbow extension  C8-Finger flexion  T1-Finger abduction  L2-Hip flexion  L3-Knee extension  L4-Ankle dorsiflexion  L5-Great toe extension  S1-Ankle plantar flexion                    Assessment:       1. Chronic bilateral low back pain with right-sided sciatica           Plan:     She has a nonfocal neurological examination and no historical red flags.  I suspect she has chronic low back pain on basis of degenerative disc disease and facet arthropathy.  She has pain with facet loading.  Historically favorable response to radiofrequency ablation L4-5 and L5-S1.  We will get her set up to repeat that procedure.  Consider interlaminar injection at L5-S1.  Get some updated x-rays in light of the recent fall.  Refilled hydrocodone.  I advise her against long-term use of this medication      Chronic bilateral low back pain with right-sided sciatica  -     X-Ray Lumbar Complete Including Flex And Ext; Future; Expected date: 08/14/2023  -     HYDROcodone-acetaminophen (NORCO) 5-325 mg per tablet; Take 1 tablet by mouth every 6 (six) hours as needed for Pain.  Dispense: 28 tablet; Refill: 0

## 2023-08-14 NOTE — TELEPHONE ENCOUNTER
----- Message from Reuben Solorio MD sent at 8/14/2023 11:00 AM CDT -----  Please schedule for medial branch blocks and subsequent radiofrequency ablation as indicated of the L4-5 and L5-S1 facet joints bilaterally

## 2023-08-17 ENCOUNTER — OFFICE VISIT (OUTPATIENT)
Dept: PODIATRY | Facility: CLINIC | Age: 83
End: 2023-08-17
Payer: MEDICARE

## 2023-08-17 VITALS
SYSTOLIC BLOOD PRESSURE: 160 MMHG | HEIGHT: 63 IN | DIASTOLIC BLOOD PRESSURE: 74 MMHG | WEIGHT: 130 LBS | HEART RATE: 66 BPM | BODY MASS INDEX: 23.04 KG/M2

## 2023-08-17 DIAGNOSIS — L60.0 INGROWN NAIL: Primary | ICD-10-CM

## 2023-08-17 DIAGNOSIS — E11.9 COMPREHENSIVE DIABETIC FOOT EXAMINATION, TYPE 2 DM, ENCOUNTER FOR: ICD-10-CM

## 2023-08-17 DIAGNOSIS — E11.49 TYPE II DIABETES MELLITUS WITH NEUROLOGICAL MANIFESTATIONS: ICD-10-CM

## 2023-08-17 DIAGNOSIS — L40.9 PSORIASIS: ICD-10-CM

## 2023-08-17 PROCEDURE — 99213 PR OFFICE/OUTPT VISIT, EST, LEVL III, 20-29 MIN: ICD-10-PCS | Mod: S$PBB,,, | Performed by: PODIATRIST

## 2023-08-17 PROCEDURE — 99999 PR PBB SHADOW E&M-EST. PATIENT-LVL V: CPT | Mod: PBBFAC,,, | Performed by: PODIATRIST

## 2023-08-17 PROCEDURE — 99999 PR PBB SHADOW E&M-EST. PATIENT-LVL V: ICD-10-PCS | Mod: PBBFAC,,, | Performed by: PODIATRIST

## 2023-08-17 PROCEDURE — 99213 OFFICE O/P EST LOW 20 MIN: CPT | Mod: S$PBB,,, | Performed by: PODIATRIST

## 2023-08-17 PROCEDURE — 99215 OFFICE O/P EST HI 40 MIN: CPT | Mod: PBBFAC,PN | Performed by: PODIATRIST

## 2023-08-20 NOTE — PROGRESS NOTES
Subjective:       Patient ID: Mirta Guerin is a 83 y.o. female.    Chief Complaint: Diabetic Foot Exam    Patient presents today for followup she is a history of chronically ingrown toenails with signs of infection especially on her left hallux.     Follow-up  Associated symptoms include arthralgias, joint swelling and a rash.   Ingrown Toenail  Associated symptoms include arthralgias, joint swelling and a rash.   Nail Problem  Associated symptoms include arthralgias, joint swelling and a rash.        Review of Systems   Musculoskeletal:  Positive for arthralgias and joint swelling.   Skin:  Positive for color change, rash and wound.   All other systems reviewed and are negative.      Objective:      Physical Exam  Constitutional:       Appearance: She is well-developed.   Cardiovascular:      Pulses:           Dorsalis pedis pulses are 1+ on the right side and 1+ on the left side.        Posterior tibial pulses are 1+ on the right side and 1+ on the left side.   Pulmonary:      Effort: Pulmonary effort is normal.   Musculoskeletal:         General: Deformity present. Normal range of motion.   Feet:      Right foot:      Protective Sensation: 4 sites tested.   1 site sensed.     Skin integrity: Dry skin present.      Left foot:      Protective Sensation: 4 sites tested.   1 site sensed.     Skin integrity: Dry skin present.   Skin:     General: Skin is warm.      Capillary Refill: Capillary refill takes more than 3 seconds.   Neurological:      Deep Tendon Reflexes: Reflexes abnormal.   Psychiatric:         Behavior: Behavior normal.         Thought Content: Thought content normal.         Judgment: Judgment normal.       Patient is noted to have positive erythema positive edema at the medial lateral aspect of the patient's left hallux upon debridement of the lateral border left hallux there is purulent drainage noted as well as obvious signs of infection and pain to palpation. Patient is noted to have a  well-defined area of hyperkeratotic tissue on the dorsal lateral aspect of the fifth digit left there is positive pain noted upon palpation of this area no sign of infection is noted. patient's previously noted psoriasis is more well-controlled on today's visit that ever seen it since it started seeing the patient there are mild erythematous plaques or areas of skin breakdown especially on the heel.                      Assessment:       1. Ingrown nail    2. Psoriasis    3. Type II diabetes mellitus with neurological manifestations    4. Comprehensive diabetic foot examination, type 2 DM, encounter for        Plan:        Following examination I aggressively debrided both the medial and lateral border of the patient's left hallux I did advise the patient is obvious signs of infection with purulent drainage noted at the lateral border left hallux. Sterile saline was used to flush and irrigated the lateral border left hallux bacitracin ointment and a well-padded dry dressing applied patient advised to keep antibiotic ointment on the area for the next 3 days if this does not completely resolve if it continues to be red and painful we need to see her back for followup sooner than the regularly scheduled 12 week appointment. I debrided the hyperkeratotic tissue from the fifth digit left patient stated that this felt much better I advised her this is likely due to a shoe rubbing and irritation of this area recommended followup 12 weeks. .Patient states her psoriasis is doing as good as it's been in many years and is very well controlled especially on her feet.  Patient relates she has been under lot of stress with family issues of this definitely aggravates her psoriasis.Complete diabetic evaluation was performed today. Pt developed an area of psoriatic breakdown on her left heel this needs to be monitored closely. Calmoseptine ointment disp.   Patient states this ointment has been wonderful and see only thing that has  really helped the psoriasis on her heels.  Patient relates today that the gabapentin continues to work very well.  Patient did have a severely incurvated ingrown toenail on the lateral border of the left hallux.  After removing the nail the patient indicated felt much better bacitracin ointment a light dressing applied patient will monitor this area closely.  Patient states that she is recently started using and all natural native American ointment on the areas where she has psoriasis and is made a dramatic improvement and while she knows the psoriasis is never going to go away it definitely keeps it better controlled.  Patient states her psoriasis is being well controlled with an all natural organic compound cream that she has been applying.  Patient states she is been having absolutely miserable back pain for the past 3 weeks she can barely stand to sit or stand for any period of time she was clearly uncomfortable today.  This note was created using M*[employer]pan voice recognition software that occasionally misinterpreted phrases or words.

## 2023-08-28 ENCOUNTER — TELEPHONE (OUTPATIENT)
Dept: PAIN MEDICINE | Facility: CLINIC | Age: 83
End: 2023-08-28
Payer: MEDICARE

## 2023-08-28 NOTE — TELEPHONE ENCOUNTER
----- Message from Jodi Pretty sent at 8/28/2023  8:32 AM CDT -----  Type: Needs Medical Advice  Who Called:  pt     Best Call Back Number: [phone]    Additional Information: pt  requesting call back in regards to procedure please advise

## 2023-08-28 NOTE — TELEPHONE ENCOUNTER
Spoke with pt she wishes to move procedure from 09/14 top 09/27 changed her over and informed surgery scheduler.

## 2023-09-20 ENCOUNTER — OFFICE VISIT (OUTPATIENT)
Dept: HEMATOLOGY/ONCOLOGY | Facility: CLINIC | Age: 83
End: 2023-09-20
Payer: MEDICARE

## 2023-09-20 VITALS
HEART RATE: 88 BPM | DIASTOLIC BLOOD PRESSURE: 73 MMHG | TEMPERATURE: 98 F | BODY MASS INDEX: 23.72 KG/M2 | RESPIRATION RATE: 20 BRPM | WEIGHT: 133.88 LBS | SYSTOLIC BLOOD PRESSURE: 154 MMHG | HEIGHT: 63 IN

## 2023-09-20 DIAGNOSIS — Z17.0 MALIGNANT NEOPLASM OF UPPER-OUTER QUADRANT OF RIGHT BREAST IN FEMALE, ESTROGEN RECEPTOR POSITIVE: Primary | ICD-10-CM

## 2023-09-20 DIAGNOSIS — C50.411 MALIGNANT NEOPLASM OF UPPER-OUTER QUADRANT OF RIGHT BREAST IN FEMALE, ESTROGEN RECEPTOR POSITIVE: Primary | ICD-10-CM

## 2023-09-20 PROCEDURE — 99214 PR OFFICE/OUTPT VISIT, EST, LEVL IV, 30-39 MIN: ICD-10-PCS | Mod: S$GLB,,, | Performed by: INTERNAL MEDICINE

## 2023-09-20 PROCEDURE — 99214 OFFICE O/P EST MOD 30 MIN: CPT | Mod: S$GLB,,, | Performed by: INTERNAL MEDICINE

## 2023-09-20 NOTE — PROGRESS NOTES
[facility] Hematology Oncology In Office Subsequent Encounter note    23  Subjective:      Patient ID:   Mirta Guerin  83 y.o. female  1940  James Jung M.D.. Barbie Castillo M.D., Tony Goldberg.      Chief Complaint:breast cancer follow-up    HPI:  83 y.o. female with diagnosis of stage I right breast cancer.Diagnosed in 2015. Treated with partial mastectomy and sentinel node biopsy, followed by adjuvant radiation therapy, she started adjuvant Arimidex in 2016.    ERP was positive, PRP was positive, HER-2/asaf was negative.hot flash symptoms are controlled on Cymbalta. Joint pain symptoms have not been a problem for her. She has history of hypertension, diabetes, GERD,hypercholesterolemia, DJD, and sleep apnea syndrome.    She has history of left partial mastectomy and sentinel node biopsy, pacemaker placement, facial surgery, right and left knee replacement, complete hysterectomy. She had CABG surgery in 2016.    She had BRBPR, refer to Dr. Partida for evaluation.    She had a skin cancer removed from her right scalp ,she believes it was a melanoma, per Dr. Lee in 2018, she also saw Dr. Hi for wide excision of the area.    She had stage 0 melanoma in situ, margins were clear on Moh's surgery/    Since her CABG surgery, she complains of continued substernal chest pain. CAT scan in 2016 at [facility] did show some inflammation in the soft tissue in the sternal area. I suspect she has costochrondral joint sx.    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    Her dad had CVA, her mother  of ovarian cancer at age 86, her brother has had 2 or 3 CVA, her sister has had bladder cancer, she has a daughter with hypoglycemia.    She is allergic to codeine benazepril, Polysporin, and triple antibiotics ointment.    Dr. James Jung is her primary care physician.   Dr. Juarez is  her cardiologist.  Dr. Talbot is her GYN.    She has been diagnosed with sleep apnea syndrome.    She is tolerating the Arimadex fine,  With the addition of Cymbalta daily.  HF sx controlled.  She has history of restless leg syndrome and peripheral neuropathy symptoms in the fingers and feet.  She has hx of osteopenia, took Reclast in the past.  Hx LBP, S/P injection x's 9, sx decreased.    She is 5 foot 2 inches tall and weighs 181 pounds    She got 2/2 covid 19 vaccines, she has not had covid 19 infection.  She plans to take booster, also flu and pneumovax.    Appetite is good but she has lost 20# over the last year.    Dr Lee of dermatology has removed several skin cancers.    For evaluation of 20# weight loss, a PET scan was done.  This showed uptake in sternum.  Hx of CABG surgery, c/o pain at sternal area.  Pet suggests infection or post op changes.    Referred back to Dr. Juarez and to Dr. Saravia for eval?  Conclusion is that PET uptake at sternum is post op change and not infection.  Observe for now.    She has sleep apnea, non compliant in using the C pap mask.  DIS mamm negative for cancer.    CAT of abdomin multiple pulmonary nodules, small, likely granulomas, multiple cysts seen.    It approximates 8 years out since the time of her breast cancer diagnosis.  She has completed her Arimidex adjuvant treatment.    Recently she had bright red blood per rectum, she was found to have a large polyp in the cecal area.  This could not be removed endoscopically per Dr. Blake.   She had surgery per Dr. 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  This was earlier around May 2023.    Follow-up mammogram at [facility] will be done in October 2023 and she is to follow up with myself in 9 months in June 2024.    ROS:   GEN: normal without any fever, night sweats or weight loss  HEENT: normal with no HA's, sore throat, stiff neck, changes in vision  CV: see history of  present illness.   no CP, SOB, PND, STEPHENS or orthopnea  PULM: see history of present illness.   no SOB, cough, hemoptysis, sputum or pleuritic pain  GI: normal with no abdominal pain, nausea, vomiting, constipation, diarrhea, melanotic stools, BRBPR, or hematemesis  : normal with no hematuria, dysuria  BREAST: see history of present illness  SKIN: normal with no rash, erythema, bruising, or swelling     Past Medical History:   Diagnosis Date    Anxiety     AV block     Breast cancer     Cardiac pacemaker in situ     Coronary artery disease     Diabetes mellitus     GERD (gastroesophageal reflux disease)     HLD (hyperlipidemia)     Hypertension     Lung disease     Melanoma     Mitral valve disorder     NEGRITA (obstructive sleep apnea)     Prediabetes     Psoriasis     Spinal stenosis     Squamous cell carcinoma of skin     right anterior scalp    Vitamin D deficiency      Past Surgical History:   Procedure Laterality Date    BREAST CYST ASPIRATION      BREAST LUMPECTOMY      CARDIAC PACEMAKER PLACEMENT  04/05/2012    COLONOSCOPY N/A 4/21/2023    Procedure: COLONOSCOPY;  Surgeon: Corey Blake III, MD;  Location: [facility];  Service: Endoscopy;  Laterality: N/A;    CORONARY ARTERY BYPASS GRAFT  07/2017    LAPAROSCOPIC SURGICAL REMOVAL OF CECUM N/A 5/1/2023    Procedure: EXCISION, CECUM, LAPAROSCOPIC;  Surgeon: Eduin Neff MD;  Location: [facility];  Service: General;  Laterality: N/A;  ROBOTIC PARTIAL CECECTOMY    SKIN CANCER EXCISION      UPPER GASTROINTESTINAL ENDOSCOPY         Review of patient's allergies indicates:   Allergen Reactions    Benazepril Other (See Comments)     sleepy    Codeine Nausea And Vomiting    Polysporin [bacitracin-polymyxin b] Rash           Current Outpatient Medications:     albuterol (PROVENTIL/VENTOLIN HFA) 90 mcg/actuation inhaler, INHALE 1-2 PUFFS INTO THE LUNGS 2 (TWO) TIMES A DAY., Disp: 18 g, Rfl: 0    amLODIPine (NORVASC) 5 MG tablet, Take 1 tablet (5 mg total) by mouth 2  (two) times daily., Disp: 180 tablet, Rfl: 3    anastrozole (ARIMIDEX) 1 mg Tab, , Disp: , Rfl:     aspirin (ECOTRIN) 81 MG EC tablet, Take 81 mg by mouth once daily., Disp: , Rfl:     atorvastatin (LIPITOR) 20 MG tablet, Take 1 tablet (20 mg total) by mouth every evening., Disp: 90 tablet, Rfl: 0    cloNIDine (CATAPRES) 0.1 MG tablet, TAKE 1 TABLET BY MOUTH EVERY DAY AS NEEDED FOR SYSTOLIC PRESSURE GREATER THAN 170, Disp: , Rfl:     co-enzyme Q-10 50 mg capsule, Take 100 mg by mouth once daily., Disp: , Rfl:     docusate sodium (COLACE) 100 MG capsule, Take 100 mg by mouth 2 (two) times daily., Disp: , Rfl:     ezetimibe (ZETIA) 10 mg tablet, Take 1 tablet (10 mg total) by mouth once daily., Disp: 90 tablet, Rfl: 3    fluticasone propionate (FLONASE) 50 mcg/actuation nasal spray, , Disp: , Rfl:     gabapentin (NEURONTIN) 300 MG capsule, TAKE 1 CAPSULE BY MOUTH IN  THE EVENING, Disp: 90 capsule, Rfl: 3    HYDROcodone-acetaminophen (NORCO) 5-325 mg per tablet, Take 1 tablet by mouth every 6 (six) hours as needed for Pain., Disp: 20 tablet, Rfl: 0    HYDROcodone-acetaminophen (NORCO) 5-325 mg per tablet, Take 1 tablet by mouth every 6 (six) hours as needed for Pain., Disp: 28 tablet, Rfl: 0    loratadine (CLARITIN) 10 mg tablet, Take 10 mg by mouth once daily., Disp: , Rfl:     metformin (GLUCOPHAGE) 500 MG tablet, Take 500 mg by mouth 2 (two) times daily with meals., Disp: , Rfl:     metoprolol succinate (TOPROL-XL) 50 MG 24 hr tablet, Take 1 tablet (50 mg total) by mouth once daily., Disp: 90 tablet, Rfl: 3    MULTIVIT-MIN/FA/LUTEIN/ZEAXANT (MACULAR VITAMIN ORAL), Take by mouth., Disp: , Rfl:     olmesartan (BENICAR) 40 MG tablet, Take 1 tablet (40 mg total) by mouth once daily., Disp: 90 tablet, Rfl: 3    vitamin D 1000 units Tab, Take 1,000 Units by mouth once daily., Disp: , Rfl:     DULoxetine (CYMBALTA) 20 MG capsule, Take 1 capsule (20 mg total) by mouth once daily., Disp: 90 capsule, Rfl:  "4          Objective:   Vitals:  Blood pressure (!) 154/73, pulse 88, temperature 97.5 °F (36.4 °C), resp. rate 20, height 5' 3" (1.6 m), weight 60.7 kg (133 lb 14.4 oz).    Physical Examination:   GEN: no apparent distress, comfortable  HEAD: atraumatic and normocephalic  EYES: no pallor, no icterus.  She has a healing scabbed over surgical site at the right scalp, without satellite lesions or palpable lymphadenopathy at the neck  ENT:  no pharyngeal erythema, external ears WNL; no nasal discharge  NECK: no masses, thyroid normal, trachea midline, no LAD/LN's, supple  CV: RRR with no murmur,  the skin  at the sternal area is healed and closed, nontender without warmth or redness or fluctuance  CHEST: Normal respiratory effort; CTAB; distantbreath sounds; no wheeze or crackles  ABDOM: nontender and nondistended; soft;  no rebound/guarding, L/S NP  MUSC/Skeletal: ROM normal; no crepitus; joints normal  EXTREM: no clubbing, cyanosis, inflammation or swelling  SKIN: no rashes, lesions, ulcers, petechia  : no cvat  NEURO: grossly intact; motor/sensory WNL;  no tremors  PSYCH: normal mood, affect and behavior  LYMPH: normal cervical, supraclavicular, axillary and groin LN's  BREAST:the left breast shows postoperative change, nontender, without palpable mass. The right breast is nontender without palpable mass.      Labs:     Radiology/Diagnostic Studies:    Mammogram 2/2019 showed 1.5 oval well circumscribed mass at 8 o'clock at L breast.  Recheck site with U/S now is stable at 8 o'clock of L breast.  Likely a lipoma, per radiologist.        Assessment:   (1) 83 y.o. female with diagnosis of stage I right breast cancer treated with right partial mastectomy, adjuvant radiation therapy, and continues on adjuvant Arimidex now. Originally diagnosed in May 2015. No evidence of disease on physical exam.    (2) hot flash symptoms secondary to Arimidex are managed with Cymbalta 20 mg by mouth daily.    (3)sleep apnea syndrome " under management of pulmonary,  Dr. Mancera     (4)pulmonary nodules, granulomata?, re check 2/2023.    (5)recent removal of melanoma in situ from right scalp,   Margins clear on Moh's.  Observe for now.  Operative site at R scalp shows post op changes, with out mass or melanotic nodules being seen.    (6)Abnormal PET scan, for cardiology and ID eval?  Negative evaluation.    (7)BRBPR hx, tubulovillous adenoma removed per Dr. Neff, malignancy was not identified, May 2023    Next mammogram will be done at [facility] in October 2023.  And she will follow up with myself in the office in June 2024.

## 2023-09-20 NOTE — LETTER
September 20, 2023        James Jung MD  [address] [place] [address]             [facility] - Hematology Oncology  [address] [address]  Phone: [phone]  Fax: [phone]   Patient: Mirta Guerin   MR Number: [id number]   YOB: 1940   Date of Visit: 9/20/2023       Dear Dr. Jung:    Thank you for referring Mirta Guerin to me for evaluation. Below are the relevant portions of my assessment and plan of care.            If you have questions, please do not hesitate to call me. I look forward to following Mirta along with you.    Sincerely,      ZOE Fontenot MD           CC    No Recipients

## 2023-09-21 ENCOUNTER — TELEPHONE (OUTPATIENT)
Dept: UROLOGY | Facility: CLINIC | Age: 83
End: 2023-09-21
Payer: MEDICARE

## 2023-09-21 NOTE — TELEPHONE ENCOUNTER
----- Message from Karlos Zamora sent at 9/21/2023  8:10 AM CDT -----  Type:  Sooner Appointment Request    Caller is requesting a sooner appointment.  Caller declined first available appointment listed below.  Caller will not accept being placed on the waitlist and is requesting a message be sent to doctor.    Name of Caller:  pt  When is the first available appointment?  N/a  Symptoms:  blood in urine  Best Call Back Number:  [phone]   Additional Information:  Thanks

## 2023-09-21 NOTE — TELEPHONE ENCOUNTER
Spoke with patient, stated she has UTI and requested abx. Appt for next day 8 am offered and scheduled

## 2023-09-22 ENCOUNTER — TELEPHONE (OUTPATIENT)
Dept: PAIN MEDICINE | Facility: CLINIC | Age: 83
End: 2023-09-22
Payer: MEDICARE

## 2023-09-22 ENCOUNTER — TELEPHONE (OUTPATIENT)
Dept: SPINE | Facility: CLINIC | Age: 83
End: 2023-09-22
Payer: MEDICARE

## 2023-09-22 NOTE — TELEPHONE ENCOUNTER
Please check with this lady and see if she  has done or is willing to do physical therapy and fill out a of disability scale

## 2023-09-22 NOTE — TELEPHONE ENCOUNTER
Both of the below criteria is missing from pts chart to get authorized , medicare does not give p2p option please add a disability scale and find out about PT, home exercise or order accordingly. Pt is on 09/27 will need to be RS please inform pt.

## 2023-09-22 NOTE — TELEPHONE ENCOUNTER
----- Message from Nacho Campbell sent at 9/22/2023  9:59 AM CDT -----  Regarding: Prior auth Denial  Good morning,    RE: VIKKI MURPHY [[id number]]    Medicare has denied this patient's upcoming OP procedure. Confirmed over the phone with a representative, the reasons indicated were:    - Predominantly axial moderate/sever chronic neck or low back pain, that causes functional deficit measured on pain or disability scale     - Failure to respond to noninvasive conservative care management.    Please let me know how you intend to proceed.  Thank you,    Nacho Campbell,  [facility]

## 2023-09-22 NOTE — TELEPHONE ENCOUNTER
"Pt was advised that procedure was cancelled due to her needing conservative treatment. She declined PT and stated "the shots do not help enough to go through that first."  "

## 2023-09-22 NOTE — TELEPHONE ENCOUNTER
----- Message from Deanne Freed sent at 9/22/2023 12:58 PM CDT -----  Contact: pt son  Type:  Needs Medical Advice    Who Called: pt and son  Would the patient rather a call back or a response via [facility]? call  Best Call Back Number: [phone]  Additional Information: States that they need a callback as soon as possible. States that they have questions about an appt that they are trying to figure out if pt still have. Also the appt was supposed to be on 9/27. Please advise thank you

## 2023-09-25 ENCOUNTER — OFFICE VISIT (OUTPATIENT)
Dept: UROLOGY | Facility: CLINIC | Age: 83
End: 2023-09-25
Payer: MEDICARE

## 2023-09-25 ENCOUNTER — TELEPHONE (OUTPATIENT)
Dept: PAIN MEDICINE | Facility: CLINIC | Age: 83
End: 2023-09-25
Payer: MEDICARE

## 2023-09-25 VITALS — HEIGHT: 63 IN | BODY MASS INDEX: 23.55 KG/M2 | WEIGHT: 132.94 LBS | RESPIRATION RATE: 15 BRPM

## 2023-09-25 DIAGNOSIS — N20.0 KIDNEY STONES: Primary | ICD-10-CM

## 2023-09-25 PROCEDURE — 99999 PR PBB SHADOW E&M-EST. PATIENT-LVL IV: CPT | Mod: PBBFAC,,, | Performed by: NURSE PRACTITIONER

## 2023-09-25 PROCEDURE — 99214 PR OFFICE/OUTPT VISIT, EST, LEVL IV, 30-39 MIN: ICD-10-PCS | Mod: S$PBB,,, | Performed by: NURSE PRACTITIONER

## 2023-09-25 PROCEDURE — 99999 PR PBB SHADOW E&M-EST. PATIENT-LVL IV: ICD-10-PCS | Mod: PBBFAC,,, | Performed by: NURSE PRACTITIONER

## 2023-09-25 PROCEDURE — 99214 OFFICE O/P EST MOD 30 MIN: CPT | Mod: S$PBB,,, | Performed by: NURSE PRACTITIONER

## 2023-09-25 PROCEDURE — 99214 OFFICE O/P EST MOD 30 MIN: CPT | Mod: PBBFAC,PO | Performed by: NURSE PRACTITIONER

## 2023-09-25 RX ORDER — SULFAMETHOXAZOLE AND TRIMETHOPRIM 400; 80 MG/1; MG/1
1 TABLET ORAL 2 TIMES DAILY
COMMUNITY
End: 2023-10-18

## 2023-09-25 NOTE — TELEPHONE ENCOUNTER
Dr Solorio pt pt needs PT to be approved for procedure and declined on 09/22. Please call her and see if she changed her mind.

## 2023-09-25 NOTE — PROGRESS NOTES
CHIEF COMPLAINT:    Mrs Guerin is a 83 y.o. female presenting for f/u UTI.  PRESENTING ILLNESS:    Mirta Guerin is a 83 y.o. female who presents for f/u UTI. Last clinic visit was 7/18/23.      HX:  On 11/26/21 pt was evaluated at [facility] ED with c/o RIGHT flank pain that initial onset was 7 hrs prior.  Pain went to right flank area and radiates into groin.  She has never had kidney stones in the past, but has had UTIs in the past.   UA in ER showed 3+ Blood, UA Micro showed >100 RBCs, 1 WBC, occasional bacteria   CT RSS on 11/27/21 showed:  1. Mild right hydroureteronephrosis secondary to an obstructive 4 mm stone within the distal right ureter at the ureterovesicular junction.  Prominent nonspecific right perinephric and periureteral fat stranding, with superimposed infection not excluded.  Additional nonobstructive right renal stones.  2. Cholelithiasis without evidence of acute cholecystitis.  3. Diverticulosis coli without evidence of diverticulitis.  4. Additional findings, as above.  The preliminary and final reports are concordant.   OV 12/21:  Pt remains with right sided lower back pains.  She is unsure at this time whether she passed the 4 mm stone from ED visit.  She is no longer seeing blood in her urine.  No problems with urination noted at this time.   6/23/22:  Pt overall doing well with no complaints voiced at this time.  No recent flank pain, dysuria or difficulty urinating.  She has not had anymore stone episodes since last ov with me.  No UA done in office today.   KUB imaging performed on 6/6/22 for recheck of stone burden showed the following:  FINDINGS:  Gas and stool in nondistended colon and small amount of gas in nondistended small bowel with just very mild nonspecific, nonobstructive generalized increase in amount of bowel gas.  Vascular calcifications.  postoperative changes near the chest along with cardiac pacing leads.   Osseous degenerative changes.  SI joint sclerosis.   This is a recent snapshot of the patient's [facility] medical record.  For current drug dose and complete information and questions, call [phone] or In Basket pool, [facility] home infusion ([id number])  CSN Number:  [id number]       "Some small calcifications projected over the kidneys could be renal stones.  Typical opaque ureteral stone is not identified although the stool does partially obscure the urinary tracts.  If indicated clinically renal stone protocol CT could be obtained   Impression:  Nonspecific nonobstructive generalized increase in amount of bowel gas.  Multiple vascular calcifications.  Small calcifications projecting over the kidneys question also small bilateral renal stones    1/11/23  Patient presents to clinic today with me for f/u kidney stones. Pt denies dysuria, flank pain, gross hematuria, fever, chills, nausea or vomiting. She has been doing well since last clinic visit. No further stone episodes or UTI's since last clinic visit. She did not want to have KUB done today since doing well but agrees to have KOURTNEY prior to next visit.  She does not drink as much water as recommended. Does not drink tea or cola. Moderate salt diet. Low red meat. Unsure how much oxalate she intakes.     7/18/23  Patient presents to clinic today for f/u kidney stones. She is accompanied by her son to the appt.  No complaints today in clinic  No issues voiding  Denies dysuria, gross hematuria, flank pain, fever, chills, nausea or vomiting  No UTI's since last visit  Does not drink much water    7/6/23 KOURTNEY   Small right renal calculi noted on previous CT are not clearly identified on the current or prior ultrasound.  Right renal cysts.  No hydronephrosis.    5/2/23 creatinine 0.9 / GFR>60    9/25/23  Pt presents to clinic today accompanied by her son for f/u UTI  She was seen at urgent care 9/22/23 for low back pain. She was treated with bactrim for UTI. She was notified by [facility] urgent care that culture was negative for UTI.  Pt is unsure if pain is kidney stone related.   Denies dysuria, gross hematuria, flank pain, fever, chills, nausea or vomiting.    Urine cultures:   No results found for: "LABURIN"      REVIEW OF SYSTEMS:    Review of " Systems    Constitutional: Negative for fever and chills.   HENT: Negative for hearing loss.   Eyes: Negative for visual disturbance.   Respiratory: Negative for shortness of breath.   Cardiovascular: Negative for chest pain.   Gastrointestinal: Negative for nausea, vomiting   Genitourinary: See above  Neurological: Negative for dizziness.   Hematological: Does not bruise/bleed easily.   Psychiatric/Behavioral: Negative for confusion.     PATIENT HISTORY:    Past Medical History:   Diagnosis Date    Anxiety     AV block     Breast cancer     Cardiac pacemaker in situ     Coronary artery disease     Diabetes mellitus     GERD (gastroesophageal reflux disease)     HLD (hyperlipidemia)     Hypertension     Lung disease     Melanoma     Mitral valve disorder     NEGRITA (obstructive sleep apnea)     Prediabetes     Psoriasis     Spinal stenosis     Squamous cell carcinoma of skin     right anterior scalp    Vitamin D deficiency        Past Surgical History:   Procedure Laterality Date    BREAST CYST ASPIRATION      BREAST LUMPECTOMY      CARDIAC PACEMAKER PLACEMENT  04/05/2012    COLONOSCOPY N/A 4/21/2023    Procedure: COLONOSCOPY;  Surgeon: Corey Blake III, MD;  Location: [facility];  Service: Endoscopy;  Laterality: N/A;    CORONARY ARTERY BYPASS GRAFT  07/2017    LAPAROSCOPIC SURGICAL REMOVAL OF CECUM N/A 5/1/2023    Procedure: EXCISION, CECUM, LAPAROSCOPIC;  Surgeon: Eduin Neff MD;  [facility];  Service: General;  Laterality: N/A;  ROBOTIC PARTIAL CECECTOMY    SKIN CANCER EXCISION      UPPER GASTROINTESTINAL ENDOSCOPY         Family History   Problem Relation Age of Onset    Cancer Mother         ovarian    Coronary artery disease Father     Stroke Father     Stroke Maternal Grandfather     Breast cancer Paternal Grandmother     Cancer Paternal Grandmother         breast    Stroke Paternal Grandfather     Stroke Brother     Ovarian cancer Neg Hx        Social History     Socioeconomic History     Marital status:    Tobacco Use    Smoking status: Every Day     Current packs/day: 0.25     Average packs/day: 0.3 packs/day for 69.7 years (17.4 ttl pk-yrs)     Types: Cigarettes     Start date: 1954    Smokeless tobacco: Never    Tobacco comments:     4-6 a day as of 8/16/21--got up to smoking a pack a day   Substance and Sexual Activity    Alcohol use: Yes     Comment: occasional    Drug use: No    Sexual activity: Never       Allergies:  Bacitracin zinc-polymyxin b, Adhesive, Benazepril, Codeine, and Polysporin [bacitracin-polymyxin b]    Medications:    Current Outpatient Medications:     albuterol (PROVENTIL/VENTOLIN HFA) 90 mcg/actuation inhaler, INHALE 1-2 PUFFS INTO THE LUNGS 2 (TWO) TIMES A DAY., Disp: 18 g, Rfl: 0    amLODIPine (NORVASC) 5 MG tablet, Take 1 tablet (5 mg total) by mouth 2 (two) times daily., Disp: 180 tablet, Rfl: 3    anastrozole (ARIMIDEX) 1 mg Tab, , Disp: , Rfl:     aspirin (ECOTRIN) 81 MG EC tablet, Take 81 mg by mouth once daily., Disp: , Rfl:     atorvastatin (LIPITOR) 20 MG tablet, Take 1 tablet (20 mg total) by mouth every evening., Disp: 90 tablet, Rfl: 0    cloNIDine (CATAPRES) 0.1 MG tablet, TAKE 1 TABLET BY MOUTH EVERY DAY AS NEEDED FOR SYSTOLIC PRESSURE GREATER THAN 170, Disp: , Rfl:     co-enzyme Q-10 50 mg capsule, Take 100 mg by mouth once daily., Disp: , Rfl:     docusate sodium (COLACE) 100 MG capsule, Take 100 mg by mouth 2 (two) times daily., Disp: , Rfl:     ezetimibe (ZETIA) 10 mg tablet, Take 1 tablet (10 mg total) by mouth once daily., Disp: 90 tablet, Rfl: 3    fluticasone propionate (FLONASE) 50 mcg/actuation nasal spray, , Disp: , Rfl:     gabapentin (NEURONTIN) 300 MG capsule, TAKE 1 CAPSULE BY MOUTH IN  THE EVENING, Disp: 90 capsule, Rfl: 3    HYDROcodone-acetaminophen (NORCO) 5-325 mg per tablet, Take 1 tablet by mouth every 6 (six) hours as needed for Pain., Disp: 20 tablet, Rfl: 0    HYDROcodone-acetaminophen (NORCO) 5-325 mg per tablet, Take 1  tablet by mouth every 6 (six) hours as needed for Pain., Disp: 28 tablet, Rfl: 0    loratadine (CLARITIN) 10 mg tablet, Take 10 mg by mouth once daily., Disp: , Rfl:     metformin (GLUCOPHAGE) 500 MG tablet, Take 500 mg by mouth 2 (two) times daily with meals., Disp: , Rfl:     metoprolol succinate (TOPROL-XL) 50 MG 24 hr tablet, Take 1 tablet (50 mg total) by mouth once daily., Disp: 90 tablet, Rfl: 3    MULTIVIT-MIN/FA/LUTEIN/ZEAXANT (MACULAR VITAMIN ORAL), Take by mouth., Disp: , Rfl:     olmesartan (BENICAR) 40 MG tablet, Take 1 tablet (40 mg total) by mouth once daily., Disp: 90 tablet, Rfl: 3    sulfamethoxazole-trimethoprim 400-80mg (BACTRIM,SEPTRA) 400-80 mg per tablet, Take 1 tablet by mouth 2 (two) times daily., Disp: , Rfl:     vitamin D 1000 units Tab, Take 1,000 Units by mouth once daily., Disp: , Rfl:     DULoxetine (CYMBALTA) 20 MG capsule, Take 1 capsule (20 mg total) by mouth once daily., Disp: 90 capsule, Rfl: 4    PHYSICAL EXAMINATION:    Constitutional: She is oriented to person, place, and time. She appears well-developed and well-nourished.  She is in no apparent distress.    Neck: Normal ROM.     Cardiovascular: Normal rate.      Pulmonary/Chest: Effort normal. No respiratory distress.     Abdominal:  She exhibits no distension.  There is no CVA tenderness.     Neurological: She is alert and oriented to person, place, and time.     Skin: Skin is warm and dry.     Psych: Cooperative with normal affect.      Physical Exam      LABS:    Lab Results   Component Value Date    CREATININE 0.9 05/02/2023       IMPRESSION:    Encounter Diagnoses   Name Primary?    Kidney stones Yes         PLAN:  -CT RSS ordered and scheduled. Will call with results    -Continue stone prevention as discussed     -RTC based on CT results    I encouraged her or any of her family members to call or email me with questions and/or concerns.      30 minutes of total time spent on the encounter, which includes face to face  time and non-face to face time preparing to see the patient (eg, review of tests), Obtaining and/or reviewing separately obtained history, Documenting clinical information in the electronic or other health record, Independently interpreting results (not separately reported) and communicating results to the patient/family/caregiver, or Care coordination (not separately reported).

## 2023-09-25 NOTE — TELEPHONE ENCOUNTER
----- Message from Emiliana Lee sent at 9/25/2023  2:40 PM CDT -----  Contact: Self  Type: Needs Medical Advice  Who Called:  Pt     Best Call Back Number: [phone]    Additional Information: Needs to talk to someone regarding her nerve block. Please call.

## 2023-09-26 ENCOUNTER — HOSPITAL ENCOUNTER (OUTPATIENT)
Dept: RADIOLOGY | Facility: HOSPITAL | Age: 83
Discharge: HOME | End: 2023-09-26
Attending: NURSE PRACTITIONER
Payer: MEDICARE

## 2023-09-26 DIAGNOSIS — N20.0 KIDNEY STONES: ICD-10-CM

## 2023-09-26 PROCEDURE — 74176 CT ABD & PELVIS W/O CONTRAST: CPT | Mod: TC

## 2023-09-26 PROCEDURE — 74176 CT RENAL STONE STUDY ABD PELVIS WO: ICD-10-PCS | Mod: 26,,, | Performed by: RADIOLOGY

## 2023-09-26 PROCEDURE — 74176 CT ABD & PELVIS W/O CONTRAST: CPT | Mod: 26,,, | Performed by: RADIOLOGY

## 2023-09-27 ENCOUNTER — TELEPHONE (OUTPATIENT)
Dept: UROLOGY | Facility: CLINIC | Age: 83
End: 2023-09-27
Payer: MEDICARE

## 2023-09-27 NOTE — TELEPHONE ENCOUNTER
Called pt regarding CT results  No answer  Left voicemail to return call to clinic    Will send results via portal

## 2023-10-11 ENCOUNTER — HOSPITAL ENCOUNTER (OUTPATIENT)
Dept: RADIOLOGY | Facility: HOSPITAL | Age: 83
Discharge: HOME | End: 2023-10-11
Attending: INTERNAL MEDICINE
Payer: MEDICARE

## 2023-10-11 DIAGNOSIS — C50.411 MALIGNANT NEOPLASM OF UPPER-OUTER QUADRANT OF RIGHT BREAST IN FEMALE, ESTROGEN RECEPTOR POSITIVE: ICD-10-CM

## 2023-10-11 DIAGNOSIS — Z17.0 MALIGNANT NEOPLASM OF UPPER-OUTER QUADRANT OF RIGHT BREAST IN FEMALE, ESTROGEN RECEPTOR POSITIVE: ICD-10-CM

## 2023-10-11 PROCEDURE — 77062 MAMMO DIGITAL DIAGNOSTIC BILAT WITH TOMO: ICD-10-PCS | Mod: 26,,, | Performed by: RADIOLOGY

## 2023-10-11 PROCEDURE — 77066 MAMMO DIGITAL DIAGNOSTIC BILAT WITH TOMO: ICD-10-PCS | Mod: 26,,, | Performed by: RADIOLOGY

## 2023-10-11 PROCEDURE — 77066 DX MAMMO INCL CAD BI: CPT | Mod: 26,,, | Performed by: RADIOLOGY

## 2023-10-11 PROCEDURE — 77062 BREAST TOMOSYNTHESIS BI: CPT | Mod: 26,,, | Performed by: RADIOLOGY

## 2023-10-11 PROCEDURE — 77066 DX MAMMO INCL CAD BI: CPT | Mod: TC

## 2023-10-18 ENCOUNTER — OFFICE VISIT (OUTPATIENT)
Dept: FAMILY MEDICINE | Facility: CLINIC | Age: 83
End: 2023-10-18
Payer: MEDICARE

## 2023-10-18 VITALS
SYSTOLIC BLOOD PRESSURE: 126 MMHG | RESPIRATION RATE: 16 BRPM | HEIGHT: 63 IN | BODY MASS INDEX: 24.01 KG/M2 | HEART RATE: 70 BPM | DIASTOLIC BLOOD PRESSURE: 60 MMHG | OXYGEN SATURATION: 95 % | WEIGHT: 135.5 LBS

## 2023-10-18 DIAGNOSIS — I49.5 SSS (SICK SINUS SYNDROME): ICD-10-CM

## 2023-10-18 DIAGNOSIS — E11.49 TYPE II DIABETES MELLITUS WITH NEUROLOGICAL MANIFESTATIONS: ICD-10-CM

## 2023-10-18 DIAGNOSIS — I25.810 CORONARY ARTERY DISEASE INVOLVING AUTOLOGOUS VEIN CORONARY BYPASS GRAFT WITHOUT ANGINA PECTORIS: ICD-10-CM

## 2023-10-18 DIAGNOSIS — I27.20 PULMONARY HYPERTENSION: ICD-10-CM

## 2023-10-18 DIAGNOSIS — I70.0 AORTIC ATHEROSCLEROSIS: ICD-10-CM

## 2023-10-18 DIAGNOSIS — R41.3 MEMORY DEFICIT: ICD-10-CM

## 2023-10-18 DIAGNOSIS — Z78.0 POSTMENOPAUSAL: ICD-10-CM

## 2023-10-18 DIAGNOSIS — E78.5 DYSLIPIDEMIA: ICD-10-CM

## 2023-10-18 DIAGNOSIS — K21.9 GASTROESOPHAGEAL REFLUX DISEASE, UNSPECIFIED WHETHER ESOPHAGITIS PRESENT: ICD-10-CM

## 2023-10-18 DIAGNOSIS — N20.0 NEPHROLITHIASIS: ICD-10-CM

## 2023-10-18 DIAGNOSIS — R91.8 PULMONARY NODULES: ICD-10-CM

## 2023-10-18 DIAGNOSIS — M85.80 OSTEOPENIA, UNSPECIFIED LOCATION: ICD-10-CM

## 2023-10-18 DIAGNOSIS — M51.36 DISC DEGENERATION, LUMBAR: Primary | ICD-10-CM

## 2023-10-18 DIAGNOSIS — E55.9 VITAMIN D DEFICIENCY: ICD-10-CM

## 2023-10-18 DIAGNOSIS — M15.9 PRIMARY OSTEOARTHRITIS INVOLVING MULTIPLE JOINTS: ICD-10-CM

## 2023-10-18 DIAGNOSIS — R73.03 PREDIABETES: ICD-10-CM

## 2023-10-18 DIAGNOSIS — G47.33 OBSTRUCTIVE SLEEP APNEA SYNDROME: ICD-10-CM

## 2023-10-18 DIAGNOSIS — Z85.3 HISTORY OF BREAST CANCER: ICD-10-CM

## 2023-10-18 DIAGNOSIS — J44.9 CHRONIC OBSTRUCTIVE PULMONARY DISEASE, UNSPECIFIED COPD TYPE: ICD-10-CM

## 2023-10-18 DIAGNOSIS — I10 ESSENTIAL HYPERTENSION: ICD-10-CM

## 2023-10-18 PROBLEM — K38.8 MASS OF APPENDIX: Status: RESOLVED | Noted: 2023-05-01 | Resolved: 2023-10-18

## 2023-10-18 PROBLEM — M19.041 OSTEOARTHRITIS OF FINGERS OF HANDS, BILATERAL: Status: RESOLVED | Noted: 2019-02-18 | Resolved: 2023-10-18

## 2023-10-18 PROBLEM — Z87.898 HISTORY OF ABNORMAL MAMMOGRAM: Status: RESOLVED | Noted: 2022-03-11 | Resolved: 2023-10-18

## 2023-10-18 PROBLEM — M19.042 OSTEOARTHRITIS OF FINGERS OF HANDS, BILATERAL: Status: RESOLVED | Noted: 2019-02-18 | Resolved: 2023-10-18

## 2023-10-18 PROCEDURE — 99215 OFFICE O/P EST HI 40 MIN: CPT | Mod: PBBFAC,PN | Performed by: STUDENT IN AN ORGANIZED HEALTH CARE EDUCATION/TRAINING PROGRAM

## 2023-10-18 PROCEDURE — 99214 OFFICE O/P EST MOD 30 MIN: CPT | Mod: S$PBB,,, | Performed by: STUDENT IN AN ORGANIZED HEALTH CARE EDUCATION/TRAINING PROGRAM

## 2023-10-18 PROCEDURE — 99999 PR PBB SHADOW E&M-EST. PATIENT-LVL V: ICD-10-PCS | Mod: PBBFAC,,, | Performed by: STUDENT IN AN ORGANIZED HEALTH CARE EDUCATION/TRAINING PROGRAM

## 2023-10-18 PROCEDURE — 99214 PR OFFICE/OUTPT VISIT, EST, LEVL IV, 30-39 MIN: ICD-10-PCS | Mod: S$PBB,,, | Performed by: STUDENT IN AN ORGANIZED HEALTH CARE EDUCATION/TRAINING PROGRAM

## 2023-10-18 PROCEDURE — 99999 PR PBB SHADOW E&M-EST. PATIENT-LVL V: CPT | Mod: PBBFAC,,, | Performed by: STUDENT IN AN ORGANIZED HEALTH CARE EDUCATION/TRAINING PROGRAM

## 2023-10-18 NOTE — ASSESSMENT & PLAN NOTE
Lab Results   Component Value Date    HGBA1C 5.8 (H) 07/21/2022     Stable. Continue current medications and regular followup.  Diabetes Medications             metformin (GLUCOPHAGE) 500 MG tablet Take 500 mg by mouth 2 (two) times daily with meals.

## 2023-10-18 NOTE — PROGRESS NOTES
Subjective:       Patient ID: Mirta Guerin is a 83 y.o. female.    Chief Complaint: Establish Care and Results (Ct results)    Patient Active Problem List:     Personal history of tobacco use, presenting hazards to health     Obstructive sleep apnea syndrome     History of breast cancer     Coronary artery disease involving autologous vein coronary bypass graft without angina pectoris     Type II diabetes mellitus with neurological manifestations     Psoriasis     Disc degeneration, lumbar     Spondylolisthesis of lumbar region     Lumbar facet arthropathy     Allergic rhinitis     Essential hypertension     Dyslipidemia     Hx of CABG     Cardiac pacemaker in situ     SSS (sick sinus syndrome)     Comprehensive diabetic foot examination, type 2 DM, encounter for     Former smoker     High risk medication use     Postmenopausal     Nephrolithiasis     Gastroesophageal reflux disease     Mitral valve insufficiency     Osteoarthritis     Osteopenia     Pulmonary hypertension     Ventricular premature beats     Vitamin D deficiency     Pulmonary nodules     Memory deficit     Aortic atherosclerosis     Chronic obstructive pulmonary disease, unspecified COPD type    Current Outpatient Medications:  albuterol (PROVENTIL/VENTOLIN HFA) 90 mcg/actuation inhaler, INHALE 1-2 PUFFS INTO THE LUNGS 2 (TWO) TIMES A DAY.  amLODIPine (NORVASC) 5 MG tablet, Take 1 tablet (5 mg total) by mouth 2 (two) times daily.  anastrozole (ARIMIDEX) 1 mg Tab,   aspirin (ECOTRIN) 81 MG EC tablet, Take 81 mg by mouth once daily.  atorvastatin (LIPITOR) 20 MG tablet, Take 1 tablet (20 mg total) by mouth every evening.  cloNIDine (CATAPRES) 0.1 MG tablet, TAKE 1 TABLET BY MOUTH EVERY DAY AS NEEDED FOR SYSTOLIC PRESSURE GREATER THAN 170  co-enzyme Q-10 50 mg capsule, Take 100 mg by mouth once daily.  docusate sodium (COLACE) 100 MG capsule, Take 100 mg by mouth 2 (two) times daily.  ezetimibe (ZETIA) 10 mg tablet, Take 1 tablet (10 mg total)  by mouth once daily.  fluticasone propionate (FLONASE) 50 mcg/actuation nasal spray,   gabapentin (NEURONTIN) 300 MG capsule, TAKE 1 CAPSULE BY MOUTH IN  THE EVENING  HYDROcodone-acetaminophen (NORCO) 5-325 mg per tablet, Take 1 tablet by mouth every 6 (six) hours as needed for Pain.  loratadine (CLARITIN) 10 mg tablet, Take 10 mg by mouth once daily.  metformin (GLUCOPHAGE) 500 MG tablet, Take 500 mg by mouth 2 (two) times daily with meals.  metoprolol succinate (TOPROL-XL) 50 MG 24 hr tablet, Take 1 tablet (50 mg total) by mouth once daily.  MULTIVIT-MIN/FA/LUTEIN/ZEAXANT (MACULAR VITAMIN ORAL), Take by mouth.  olmesartan (BENICAR) 40 MG tablet, Take 1 tablet (40 mg total) by mouth once daily.  vitamin D 1000 units Tab, Take 1,000 Units by mouth once daily.  DULoxetine (CYMBALTA) 20 MG capsule, Take 1 capsule (20 mg total) by mouth once daily.    No current facility-administered medications for this visit.          Review of Systems   Constitutional:  Negative for activity change and appetite change.   Respiratory:  Negative for shortness of breath.    Cardiovascular:  Negative for chest pain.   Gastrointestinal:  Negative for abdominal pain and anal bleeding.   Genitourinary:  Negative for dysuria.   Integumentary:  Negative for rash.   Neurological:  Positive for memory loss.   Psychiatric/Behavioral:  Negative for dysphoric mood and sleep disturbance. The patient is not nervous/anxious.          Objective:      Physical Exam  Constitutional:       General: She is not in acute distress.     Appearance: Normal appearance. She is not ill-appearing.   Eyes:      Conjunctiva/sclera: Conjunctivae normal.   Cardiovascular:      Rate and Rhythm: Normal rate and regular rhythm.      Heart sounds: Normal heart sounds. No murmur heard.  Pulmonary:      Effort: Pulmonary effort is normal. No respiratory distress.      Breath sounds: Normal breath sounds.   Musculoskeletal:      Right lower leg: No edema.      Left lower  leg: No edema.   Skin:     General: Skin is warm and dry.   Neurological:      Mental Status: She is alert. Mental status is at baseline.      Gait: Gait normal.   Psychiatric:         Mood and Affect: Mood normal.         Behavior: Behavior normal.         Thought Content: Thought content normal.         Judgment: Judgment normal.         Assessment:       1. Disc degeneration, lumbar    2. Coronary artery disease involving autologous vein coronary bypass graft without angina pectoris    3. Essential hypertension    4. Dyslipidemia    5. Pulmonary hypertension    6. History of breast cancer    7. Type II diabetes mellitus with neurological manifestations    8. Gastroesophageal reflux disease, unspecified whether esophagitis present    9. Primary osteoarthritis involving multiple joints    10. Osteopenia, unspecified location    11. Obstructive sleep apnea syndrome    12. Pulmonary nodules    13. Prediabetes    14. Memory deficit    15. SSS (sick sinus syndrome)    16. Aortic atherosclerosis    17. Chronic obstructive pulmonary disease, unspecified COPD type    18. Nephrolithiasis    19. Vitamin D deficiency    20. Postmenopausal        Plan:       Problem List Items Addressed This Visit          Neuro    Disc degeneration, lumbar - Primary     Seeing pain management         Memory deficit     Worsening over a year.  Considering medication            Pulmonary    Pulmonary nodules     Repeat CT chest to follow         Relevant Orders    CT Chest Without Contrast    Chronic obstructive pulmonary disease, unspecified COPD type     Stable. Seeing pulmonary            Cardiac/Vascular    Coronary artery disease involving autologous vein coronary bypass graft without angina pectoris     Stable and sees cardiology         Essential hypertension     Stable. Continue current medications and regular followup.           Relevant Orders    CBC Auto Differential    Comprehensive Metabolic Panel    Dyslipidemia     Stable.  Continue current medications and regular followup.           Relevant Orders    Lipid Panel    SSS (sick sinus syndrome)     Stable after pacer. Sees cardiology         Pulmonary hypertension     Seeing cardiology and pulm. stable         Aortic atherosclerosis     Stable. On risk factor management            Renal/    Postmenopausal    Relevant Orders    DXA Bone Density Axial Skeleton 1 or more sites    Nephrolithiasis     Stable. Seeing urology            Oncology    History of breast cancer     Stable seeing oncology            Endocrine    Type II diabetes mellitus with neurological manifestations     Lab Results   Component Value Date    HGBA1C 5.8 (H) 07/21/2022   Stable. Continue current medications and regular followup.  Diabetes Medications               metformin (GLUCOPHAGE) 500 MG tablet Take 500 mg by mouth 2 (two) times daily with meals.            Relevant Orders    Microalbumin/Creatinine Ratio, Urine    Hemoglobin A1C    TSH    Vitamin D deficiency     Taking vitamin D         RESOLVED: Prediabetes       GI    Gastroesophageal reflux disease     Stable. Continue current medications and regular followup.              Orthopedic    Osteoarthritis     Stable seeing ortho         Osteopenia     Repeat dexa  Not on therapy other than vitamin D         Relevant Orders    Vitamin D    DXA Bone Density Axial Skeleton 1 or more sites       Other    Obstructive sleep apnea syndrome     Not wearing her cpap            Seeing pain management, spine surgery  Seeing urology for kidney stones  Seeing Orthopedics- Sidney Mendes  Seeing Dr. Juarez with Cardiology  Surgery- Dr. Neff  Oncology- Dr. Po Johnson- Pulmonary  Dr. Lee- Dermatology

## 2023-10-18 NOTE — PATIENT INSTRUCTIONS

## 2023-10-19 ENCOUNTER — HOSPITAL ENCOUNTER (OUTPATIENT)
Dept: RADIOLOGY | Facility: HOSPITAL | Age: 83
Discharge: HOME | End: 2023-10-19
Attending: STUDENT IN AN ORGANIZED HEALTH CARE EDUCATION/TRAINING PROGRAM
Payer: MEDICARE

## 2023-10-19 DIAGNOSIS — R91.8 PULMONARY NODULES: ICD-10-CM

## 2023-10-19 DIAGNOSIS — M85.80 OSTEOPENIA, UNSPECIFIED LOCATION: ICD-10-CM

## 2023-10-19 DIAGNOSIS — Z78.0 POSTMENOPAUSAL: ICD-10-CM

## 2023-10-19 PROCEDURE — 77080 DXA BONE DENSITY AXIAL: CPT | Mod: TC

## 2023-10-19 PROCEDURE — 71250 CT THORAX DX C-: CPT | Mod: TC

## 2023-10-19 PROCEDURE — 71250 CT THORAX DX C-: CPT | Mod: 26,,, | Performed by: RADIOLOGY

## 2023-10-19 PROCEDURE — 77080 DXA BONE DENSITY AXIAL SKELETON 1 OR MORE SITES: ICD-10-PCS | Mod: 26,,, | Performed by: RADIOLOGY

## 2023-10-19 PROCEDURE — 77080 DXA BONE DENSITY AXIAL: CPT | Mod: 26,,, | Performed by: RADIOLOGY

## 2023-10-19 PROCEDURE — 71250 CT CHEST WITHOUT CONTRAST: ICD-10-PCS | Mod: 26,,, | Performed by: RADIOLOGY

## 2023-11-16 ENCOUNTER — OFFICE VISIT (OUTPATIENT)
Dept: PODIATRY | Facility: CLINIC | Age: 83
End: 2023-11-16
Payer: MEDICARE

## 2023-11-16 VITALS
HEIGHT: 63 IN | WEIGHT: 135 LBS | SYSTOLIC BLOOD PRESSURE: 136 MMHG | BODY MASS INDEX: 23.92 KG/M2 | DIASTOLIC BLOOD PRESSURE: 77 MMHG | HEART RATE: 79 BPM

## 2023-11-16 DIAGNOSIS — L60.0 INGROWN NAIL: ICD-10-CM

## 2023-11-16 DIAGNOSIS — E11.9 COMPREHENSIVE DIABETIC FOOT EXAMINATION, TYPE 2 DM, ENCOUNTER FOR: ICD-10-CM

## 2023-11-16 DIAGNOSIS — E11.49 TYPE II DIABETES MELLITUS WITH NEUROLOGICAL MANIFESTATIONS: Primary | ICD-10-CM

## 2023-11-16 DIAGNOSIS — L40.9 PSORIASIS: ICD-10-CM

## 2023-11-16 PROCEDURE — 99213 OFFICE O/P EST LOW 20 MIN: CPT | Mod: S$PBB,,, | Performed by: PODIATRIST

## 2023-11-16 PROCEDURE — 99213 PR OFFICE/OUTPT VISIT, EST, LEVL III, 20-29 MIN: ICD-10-PCS | Mod: S$PBB,,, | Performed by: PODIATRIST

## 2023-11-16 PROCEDURE — 99215 OFFICE O/P EST HI 40 MIN: CPT | Mod: PBBFAC,PN | Performed by: PODIATRIST

## 2023-11-16 PROCEDURE — 99999 PR PBB SHADOW E&M-EST. PATIENT-LVL V: ICD-10-PCS | Mod: PBBFAC,,, | Performed by: PODIATRIST

## 2023-11-16 PROCEDURE — 99999 PR PBB SHADOW E&M-EST. PATIENT-LVL V: CPT | Mod: PBBFAC,,, | Performed by: PODIATRIST

## 2023-11-17 PROBLEM — L60.0 INGROWN NAIL: Status: ACTIVE | Noted: 2023-11-17

## 2023-11-17 NOTE — PROGRESS NOTES
Subjective:       Patient ID: Mirta Guerin is a 83 y.o. female.    Chief Complaint: Diabetic Foot Exam and Nail Problem    Patient presents today for followup she is a history of chronically ingrown toenails with signs of infection especially on her left hallux.     Follow-up  Associated symptoms include arthralgias, joint swelling and a rash.   Ingrown Toenail  Associated symptoms include arthralgias, joint swelling and a rash.   Nail Problem  Associated symptoms include arthralgias, joint swelling and a rash.        Review of Systems   Musculoskeletal:  Positive for arthralgias and joint swelling.   Skin:  Positive for color change, rash and wound.   All other systems reviewed and are negative.      Objective:      Physical Exam  Constitutional:       Appearance: She is well-developed.   Cardiovascular:      Pulses:           Dorsalis pedis pulses are 1+ on the right side and 1+ on the left side.        Posterior tibial pulses are 1+ on the right side and 1+ on the left side.   Pulmonary:      Effort: Pulmonary effort is normal.   Musculoskeletal:         General: Deformity present. Normal range of motion.   Feet:      Right foot:      Protective Sensation: 4 sites tested.   1 site sensed.     Skin integrity: Dry skin present.      Left foot:      Protective Sensation: 4 sites tested.   1 site sensed.     Skin integrity: Dry skin present.   Skin:     General: Skin is warm.      Capillary Refill: Capillary refill takes more than 3 seconds.   Neurological:      Deep Tendon Reflexes: Reflexes abnormal.   Psychiatric:         Behavior: Behavior normal.         Thought Content: Thought content normal.         Judgment: Judgment normal.       Patient is noted to have positive erythema positive edema at the medial lateral aspect of the patient's left hallux upon debridement of the lateral border left hallux there is purulent drainage noted as well as obvious signs of infection and pain to palpation. Patient is  noted to have a well-defined area of hyperkeratotic tissue on the dorsal lateral aspect of the fifth digit left there is positive pain noted upon palpation of this area no sign of infection is noted. patient's previously noted psoriasis is more well-controlled on today's visit that ever seen it since it started seeing the patient there are mild erythematous plaques or areas of skin breakdown especially on the heel.                                    Assessment:       1. Type II diabetes mellitus with neurological manifestations    2. Comprehensive diabetic foot examination, type 2 DM, encounter for    3. Psoriasis    4. Ingrown nail        Plan:        Following examination I aggressively debrided both the medial and lateral border of the patient's left hallux I did advise the patient is obvious signs of infection with purulent drainage noted at the lateral border left hallux. Sterile saline was used to flush and irrigated the lateral border left hallux bacitracin ointment and a well-padded dry dressing applied patient advised to keep antibiotic ointment on the area for the next 3 days if this does not completely resolve if it continues to be red and painful we need to see her back for followup sooner than the regularly scheduled 12 week appointment. I debrided the hyperkeratotic tissue from the fifth digit left patient stated that this felt much better I advised her this is likely due to a shoe rubbing and irritation of this area recommended followup 12 weeks. .Patient states her psoriasis is doing as good as it's been in many years and is very well controlled especially on her feet.  Patient relates she has been under lot of stress with family issues of this definitely aggravates her psoriasis.Complete diabetic evaluation was performed today. Pt developed an area of psoriatic breakdown on her left heel this needs to be monitored closely.      Patient relates today that the gabapentin continues to work very well.   Patient did have a severely incurvated ingrown toenail on the lateral border of the left hallux.  After removing the nail the patient indicated felt much better bacitracin ointment a light dressing applied patient will monitor this area closely.  Patient states that she is recently started using and all natural native American ointment on the areas where she has psoriasis and is made a dramatic improvement and while she knows the psoriasis is never going to go away it definitely keeps it better controlled.  Patient continues to have considerable back problems with associated back pain.  This note was created using M*Modal voice recognition software that occasionally misinterpreted phrases or words.

## 2023-11-21 ENCOUNTER — TELEPHONE (OUTPATIENT)
Dept: CARDIOLOGY | Facility: CLINIC | Age: 83
End: 2023-11-21
Payer: MEDICARE

## 2023-11-21 DIAGNOSIS — Z95.0 CARDIAC PACEMAKER IN SITU: Primary | ICD-10-CM

## 2023-11-21 RX ORDER — ATORVASTATIN CALCIUM 20 MG/1
20 TABLET, FILM COATED ORAL NIGHTLY
Qty: 90 TABLET | Refills: 3 | Status: SHIPPED | OUTPATIENT
Start: 2023-11-21
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
## 2023-12-09 DIAGNOSIS — C50.411 MALIGNANT NEOPLASM OF UPPER-OUTER QUADRANT OF RIGHT BREAST IN FEMALE, ESTROGEN RECEPTOR POSITIVE: ICD-10-CM

## 2023-12-09 DIAGNOSIS — Z17.0 MALIGNANT NEOPLASM OF UPPER-OUTER QUADRANT OF RIGHT BREAST IN FEMALE, ESTROGEN RECEPTOR POSITIVE: ICD-10-CM

## 2023-12-11 DIAGNOSIS — Z17.0 MALIGNANT NEOPLASM OF UPPER-OUTER QUADRANT OF RIGHT BREAST IN FEMALE, ESTROGEN RECEPTOR POSITIVE: ICD-10-CM

## 2023-12-11 DIAGNOSIS — C50.411 MALIGNANT NEOPLASM OF UPPER-OUTER QUADRANT OF RIGHT BREAST IN FEMALE, ESTROGEN RECEPTOR POSITIVE: ICD-10-CM

## 2023-12-11 RX ORDER — ANASTROZOLE 1 MG/1
1 TABLET ORAL DAILY
Qty: 90 TABLET | Refills: 3 | Status: SHIPPED | OUTPATIENT
Start: 2023-12-11 | End: 2024-03-10

## 2023-12-11 RX ORDER — DULOXETIN HYDROCHLORIDE 20 MG/1
20 CAPSULE, DELAYED RELEASE ORAL
Qty: 90 CAPSULE | Refills: 3 | Status: SHIPPED | OUTPATIENT
Start: 2023-12-11

## 2024-02-21 ENCOUNTER — OFFICE VISIT (OUTPATIENT)
Dept: FAMILY MEDICINE | Facility: CLINIC | Age: 84
End: 2024-02-21
Payer: MEDICARE

## 2024-02-21 VITALS
RESPIRATION RATE: 16 BRPM | BODY MASS INDEX: 24.4 KG/M2 | HEART RATE: 88 BPM | HEIGHT: 63 IN | OXYGEN SATURATION: 97 % | DIASTOLIC BLOOD PRESSURE: 79 MMHG | WEIGHT: 137.69 LBS | SYSTOLIC BLOOD PRESSURE: 129 MMHG

## 2024-02-21 DIAGNOSIS — E78.5 DYSLIPIDEMIA: ICD-10-CM

## 2024-02-21 DIAGNOSIS — F51.01 PRIMARY INSOMNIA: ICD-10-CM

## 2024-02-21 DIAGNOSIS — Z17.0 MALIGNANT NEOPLASM OF UPPER-OUTER QUADRANT OF RIGHT BREAST IN FEMALE, ESTROGEN RECEPTOR POSITIVE: ICD-10-CM

## 2024-02-21 DIAGNOSIS — M51.36 DISC DEGENERATION, LUMBAR: ICD-10-CM

## 2024-02-21 DIAGNOSIS — J44.9 CHRONIC OBSTRUCTIVE PULMONARY DISEASE, UNSPECIFIED COPD TYPE: ICD-10-CM

## 2024-02-21 DIAGNOSIS — I49.5 SSS (SICK SINUS SYNDROME): ICD-10-CM

## 2024-02-21 DIAGNOSIS — I10 ESSENTIAL HYPERTENSION: ICD-10-CM

## 2024-02-21 DIAGNOSIS — E11.49 TYPE II DIABETES MELLITUS WITH NEUROLOGICAL MANIFESTATIONS: ICD-10-CM

## 2024-02-21 DIAGNOSIS — C50.411 MALIGNANT NEOPLASM OF UPPER-OUTER QUADRANT OF RIGHT BREAST IN FEMALE, ESTROGEN RECEPTOR POSITIVE: ICD-10-CM

## 2024-02-21 DIAGNOSIS — I25.810 CORONARY ARTERY DISEASE INVOLVING AUTOLOGOUS VEIN CORONARY BYPASS GRAFT WITHOUT ANGINA PECTORIS: ICD-10-CM

## 2024-02-21 DIAGNOSIS — I27.20 PULMONARY HYPERTENSION: ICD-10-CM

## 2024-02-21 DIAGNOSIS — R41.3 MEMORY DEFICIT: Primary | ICD-10-CM

## 2024-02-21 DIAGNOSIS — I70.0 AORTIC ATHEROSCLEROSIS: ICD-10-CM

## 2024-02-21 PROBLEM — L60.0 INGROWN NAIL: Status: RESOLVED | Noted: 2023-11-17 | Resolved: 2024-02-21

## 2024-02-21 PROCEDURE — 99214 OFFICE O/P EST MOD 30 MIN: CPT | Mod: S$PBB,,, | Performed by: STUDENT IN AN ORGANIZED HEALTH CARE EDUCATION/TRAINING PROGRAM

## 2024-02-21 PROCEDURE — 99999 PR PBB SHADOW E&M-EST. PATIENT-LVL IV: CPT | Mod: PBBFAC,,, | Performed by: STUDENT IN AN ORGANIZED HEALTH CARE EDUCATION/TRAINING PROGRAM

## 2024-02-21 PROCEDURE — 99214 OFFICE O/P EST MOD 30 MIN: CPT | Mod: PBBFAC,PN | Performed by: STUDENT IN AN ORGANIZED HEALTH CARE EDUCATION/TRAINING PROGRAM

## 2024-02-21 RX ORDER — TRAZODONE HYDROCHLORIDE 50 MG/1
50 TABLET ORAL NIGHTLY
Qty: 30 TABLET | Refills: 11 | Status: SHIPPED | OUTPATIENT
Start: 2024-02-21 | End: 2025-02-20

## 2024-02-21 NOTE — ASSESSMENT & PLAN NOTE
Stable. Continue current medications and regular followup.  Hyperlipidemia Medications               atorvastatin (LIPITOR) 20 MG tablet TAKE 1 TABLET BY MOUTH EVERY DAY IN THE EVENING    ezetimibe (ZETIA) 10 mg tablet Take 1 tablet (10 mg total) by mouth once daily.

## 2024-02-21 NOTE — ASSESSMENT & PLAN NOTE
Lab Results   Component Value Date    HGBA1C 5.7 (H) 10/19/2023     Stable. Continue current medications and regular followup.  Diabetes Medications               metformin (GLUCOPHAGE) 500 MG tablet Take 500 mg by mouth 2 (two) times daily with meals.

## 2024-02-21 NOTE — PROGRESS NOTES
Subjective:       Patient ID: Mirta Guerin is a 83 y.o. female.    Chief Complaint: Follow-up (6 month )    Patient Active Problem List:     Personal history of tobacco use, presenting hazards to health     Obstructive sleep apnea syndrome     History of breast cancer     Coronary artery disease involving autologous vein coronary bypass graft without angina pectoris     Type II diabetes mellitus with neurological manifestations     Psoriasis     Disc degeneration, lumbar     Spondylolisthesis of lumbar region     Lumbar facet arthropathy     Allergic rhinitis     Essential hypertension     Dyslipidemia     Hx of CABG     Cardiac pacemaker in situ     SSS (sick sinus syndrome)     Comprehensive diabetic foot examination, type 2 DM, encounter for     Former smoker     High risk medication use     Postmenopausal     Nephrolithiasis     Gastroesophageal reflux disease     Mitral valve insufficiency     Osteoarthritis     Osteopenia     Pulmonary hypertension     Ventricular premature beats     Vitamin D deficiency     Pulmonary nodules     Memory deficit     Aortic atherosclerosis     Chronic obstructive pulmonary disease, unspecified COPD type     Primary insomnia     Malignant neoplasm of upper-outer quadrant of right breast in female, estrogen receptor positive    Current Outpatient Medications:  albuterol (PROVENTIL/VENTOLIN HFA) 90 mcg/actuation inhaler, INHALE 1-2 PUFFS INTO THE LUNGS 2 (TWO) TIMES A DAY.  amLODIPine (NORVASC) 5 MG tablet, Take 1 tablet (5 mg total) by mouth 2 (two) times daily.  anastrozole (ARIMIDEX) 1 mg Tab, Take 1 tablet (1 mg total) by mouth once daily.  aspirin (ECOTRIN) 81 MG EC tablet, Take 81 mg by mouth once daily.  atorvastatin (LIPITOR) 20 MG tablet, TAKE 1 TABLET BY MOUTH EVERY DAY IN THE EVENING  cloNIDine (CATAPRES) 0.1 MG tablet, TAKE 1 TABLET BY MOUTH EVERY DAY AS NEEDED FOR SYSTOLIC PRESSURE GREATER THAN 170  co-enzyme Q-10 50 mg capsule, Take 100 mg by mouth once  daily.  docusate sodium (COLACE) 100 MG capsule, Take 100 mg by mouth 2 (two) times daily.  DULoxetine (CYMBALTA) 20 MG capsule, TAKE 1 CAPSULE BY MOUTH  ONCE DAILY  ezetimibe (ZETIA) 10 mg tablet, Take 1 tablet (10 mg total) by mouth once daily.  fluticasone propionate (FLONASE) 50 mcg/actuation nasal spray,   gabapentin (NEURONTIN) 300 MG capsule, TAKE 1 CAPSULE BY MOUTH IN  THE EVENING  loratadine (CLARITIN) 10 mg tablet, Take 10 mg by mouth once daily.  metformin (GLUCOPHAGE) 500 MG tablet, Take 500 mg by mouth 2 (two) times daily with meals.  metoprolol succinate (TOPROL-XL) 50 MG 24 hr tablet, Take 1 tablet (50 mg total) by mouth once daily.  MULTIVIT-MIN/FA/LUTEIN/ZEAXANT (MACULAR VITAMIN ORAL), Take by mouth.  mupirocin (BACTROBAN) 2 % ointment, Apply to chin once daily  olmesartan (BENICAR) 40 MG tablet, Take 1 tablet (40 mg total) by mouth once daily.  triamcinolone acetonide 0.1% (KENALOG) 0.1 % ointment, Apply to rash on chin once daily  vitamin D 1000 units Tab, Take 1,000 Units by mouth once daily.    No current facility-administered medications for this visit.          Review of Systems   Constitutional:  Negative for activity change and appetite change.   Respiratory:  Negative for shortness of breath.    Cardiovascular:  Negative for chest pain.   Gastrointestinal:  Negative for anal bleeding.   Genitourinary:  Negative for dysuria.   Integumentary:  Negative for rash.   Psychiatric/Behavioral:  Negative for sleep disturbance.          Objective:      Physical Exam  Constitutional:       General: She is not in acute distress.     Appearance: Normal appearance. She is not ill-appearing.   Eyes:      Conjunctiva/sclera: Conjunctivae normal.   Cardiovascular:      Rate and Rhythm: Normal rate and regular rhythm.      Heart sounds: Normal heart sounds. No murmur heard.  Pulmonary:      Effort: Pulmonary effort is normal. No respiratory distress.      Breath sounds: Normal breath sounds.    Musculoskeletal:      Right lower leg: No edema.      Left lower leg: No edema.   Skin:     General: Skin is warm and dry.   Neurological:      Mental Status: She is alert. Mental status is at baseline.      Gait: Gait normal.   Psychiatric:         Mood and Affect: Mood normal.         Behavior: Behavior normal.         Thought Content: Thought content normal.         Judgment: Judgment normal.         Assessment:       1. Memory deficit    2. Primary insomnia    3. Malignant neoplasm of upper-outer quadrant of right breast in female, estrogen receptor positive    4. Chronic obstructive pulmonary disease, unspecified COPD type    5. Type II diabetes mellitus with neurological manifestations    6. Pulmonary hypertension    7. SSS (sick sinus syndrome)    8. Aortic atherosclerosis    9. Disc degeneration, lumbar    10. Coronary artery disease involving autologous vein coronary bypass graft without angina pectoris    11. Essential hypertension    12. Dyslipidemia        Plan:       Problem List Items Addressed This Visit          Neuro    Disc degeneration, lumbar     Stable. Continue current medications and regular followup.           Memory deficit - Primary     Stable. Continue current medications and regular followup.  No issues with adls or executive function.  Would like evaluation           Relevant Orders    Ambulatory referral/consult to Neurology       Pulmonary    Chronic obstructive pulmonary disease, unspecified COPD type     Stable. Continue current medications and regular followup.              Cardiac/Vascular    Coronary artery disease involving autologous vein coronary bypass graft without angina pectoris     Stable. Continue current medications and regular followup.  Continue risk factor management           Essential hypertension     Stable. Continue current medications and regular followup.  Hypertension Medications               amLODIPine (NORVASC) 5 MG tablet Take 1 tablet (5 mg total) by  mouth 2 (two) times daily.    cloNIDine (CATAPRES) 0.1 MG tablet TAKE 1 TABLET BY MOUTH EVERY DAY AS NEEDED FOR SYSTOLIC PRESSURE GREATER THAN 170    metoprolol succinate (TOPROL-XL) 50 MG 24 hr tablet Take 1 tablet (50 mg total) by mouth once daily.    olmesartan (BENICAR) 40 MG tablet Take 1 tablet (40 mg total) by mouth once daily.                 Dyslipidemia     Stable. Continue current medications and regular followup.  Hyperlipidemia Medications               atorvastatin (LIPITOR) 20 MG tablet TAKE 1 TABLET BY MOUTH EVERY DAY IN THE EVENING    ezetimibe (ZETIA) 10 mg tablet Take 1 tablet (10 mg total) by mouth once daily.                 SSS (sick sinus syndrome)     Stable and seeing cardiology         Pulmonary hypertension     Stable. Continue current medications and regular followup.           Aortic atherosclerosis     Stable and doing well.  Seeing cardiology and on risk factor management            Oncology    Malignant neoplasm of upper-outer quadrant of right breast in female, estrogen receptor positive     Stable on arimidex            Endocrine    Type II diabetes mellitus with neurological manifestations     Lab Results   Component Value Date    HGBA1C 5.7 (H) 10/19/2023   Stable. Continue current medications and regular followup.  Diabetes Medications               metformin (GLUCOPHAGE) 500 MG tablet Take 500 mg by mouth 2 (two) times daily with meals.                    Other    Primary insomnia     Trial of trazodone         Relevant Medications    traZODone (DESYREL) 50 MG tablet

## 2024-02-21 NOTE — ASSESSMENT & PLAN NOTE
Stable. Continue current medications and regular followup.  No issues with adls or executive function.  Would like evaluation

## 2024-02-21 NOTE — ASSESSMENT & PLAN NOTE
Pharmacy Comments     Patient is stating she is sometimes using 2 tabs (50mg) at bedtime, and insurance is rejecting for refill too soon. Can you please either follow-up with patient about once daily dosing, or send a new Rx to reflect her current use. Thanks.    Stable. Continue current medications and regular followup.

## 2024-02-21 NOTE — ASSESSMENT & PLAN NOTE
Stable. Continue current medications and regular followup.  Hypertension Medications               amLODIPine (NORVASC) 5 MG tablet Take 1 tablet (5 mg total) by mouth 2 (two) times daily.    cloNIDine (CATAPRES) 0.1 MG tablet TAKE 1 TABLET BY MOUTH EVERY DAY AS NEEDED FOR SYSTOLIC PRESSURE GREATER THAN 170    metoprolol succinate (TOPROL-XL) 50 MG 24 hr tablet Take 1 tablet (50 mg total) by mouth once daily.    olmesartan (BENICAR) 40 MG tablet Take 1 tablet (40 mg total) by mouth once daily.

## 2024-03-07 ENCOUNTER — OFFICE VISIT (OUTPATIENT)
Dept: PODIATRY | Facility: CLINIC | Age: 84
End: 2024-03-07
Payer: MEDICARE

## 2024-03-07 VITALS
DIASTOLIC BLOOD PRESSURE: 72 MMHG | HEART RATE: 68 BPM | HEIGHT: 63 IN | SYSTOLIC BLOOD PRESSURE: 118 MMHG | WEIGHT: 139 LBS | BODY MASS INDEX: 24.63 KG/M2

## 2024-03-07 DIAGNOSIS — E11.9 COMPREHENSIVE DIABETIC FOOT EXAMINATION, TYPE 2 DM, ENCOUNTER FOR: ICD-10-CM

## 2024-03-07 DIAGNOSIS — L40.9 PSORIASIS: ICD-10-CM

## 2024-03-07 DIAGNOSIS — L60.0 INGROWN NAIL: Primary | ICD-10-CM

## 2024-03-07 DIAGNOSIS — E11.49 TYPE II DIABETES MELLITUS WITH NEUROLOGICAL MANIFESTATIONS: ICD-10-CM

## 2024-03-07 PROCEDURE — 99213 OFFICE O/P EST LOW 20 MIN: CPT | Mod: S$PBB,,, | Performed by: PODIATRIST

## 2024-03-07 PROCEDURE — 99215 OFFICE O/P EST HI 40 MIN: CPT | Mod: PBBFAC,PN | Performed by: PODIATRIST

## 2024-03-07 PROCEDURE — 99999 PR PBB SHADOW E&M-EST. PATIENT-LVL V: CPT | Mod: PBBFAC,,, | Performed by: PODIATRIST

## 2024-03-08 NOTE — PROGRESS NOTES
Subjective:       Patient ID: Mirta Guerin is a 83 y.o. female.    Chief Complaint: Diabetic Foot Exam and Nail Care    Patient presents today for followup she is a history of chronically ingrown toenails with signs of infection especially on her left hallux.     Follow-up  Associated symptoms include arthralgias, joint swelling and a rash.   Ingrown Toenail  Associated symptoms include arthralgias, joint swelling and a rash.   Nail Problem  Associated symptoms include arthralgias, joint swelling and a rash.        Review of Systems   Musculoskeletal:  Positive for arthralgias and joint swelling.   Skin:  Positive for color change, rash and wound.   All other systems reviewed and are negative.      Objective:      Physical Exam  Constitutional:       Appearance: She is well-developed.   Cardiovascular:      Pulses:           Dorsalis pedis pulses are 1+ on the right side and 1+ on the left side.        Posterior tibial pulses are 1+ on the right side and 1+ on the left side.   Pulmonary:      Effort: Pulmonary effort is normal.   Musculoskeletal:         General: Deformity present. Normal range of motion.   Feet:      Right foot:      Protective Sensation: 4 sites tested.   1 site sensed.     Skin integrity: Dry skin present.      Left foot:      Protective Sensation: 4 sites tested.   1 site sensed.     Skin integrity: Dry skin present.   Skin:     General: Skin is warm.      Capillary Refill: Capillary refill takes more than 3 seconds.   Neurological:      Deep Tendon Reflexes: Reflexes abnormal.   Psychiatric:         Behavior: Behavior normal.         Thought Content: Thought content normal.         Judgment: Judgment normal.       Patient is noted to have positive erythema positive edema at the medial lateral aspect of the patient's left hallux upon debridement of the lateral border left hallux there is purulent drainage noted as well as obvious signs of infection and pain to palpation. Patient is noted  to have a well-defined area of hyperkeratotic tissue on the dorsal lateral aspect of the fifth digit left there is positive pain noted upon palpation of this area no sign of infection is noted. patient's previously noted psoriasis is more well-controlled on today's visit that ever seen it since it started seeing the patient there are mild erythematous plaques or areas of skin breakdown especially on the heel.                                                  Assessment:       1. Ingrown nail    2. Psoriasis    3. Type II diabetes mellitus with neurological manifestations    4. Comprehensive diabetic foot examination, type 2 DM, encounter for        Plan:        Following examination I aggressively debrided both the medial and lateral border of the patient's left hallux I did advise the patient is obvious signs of infection with purulent drainage noted at the lateral border left hallux. Sterile saline was used to flush and irrigated the lateral border left hallux bacitracin ointment and a well-padded dry dressing applied patient advised to keep antibiotic ointment on the area for the next 3 days if this does not completely resolve if it continues to be red and painful we need to see her back for followup sooner than the regularly scheduled 12 week appointment. I debrided the hyperkeratotic tissue from the fifth digit left patient stated that this felt much better I advised her this is likely due to a shoe rubbing and irritation of this area recommended followup 12 weeks. .Patient states her psoriasis is doing as good as it's been in many years and is very well controlled especially on her feet.  Patient relates she has been under lot of stress with family issues of this definitely aggravates her psoriasis.Complete diabetic evaluation was performed today. Pt developed an area of psoriatic breakdown on her left heel this needs to be monitored closely.      Patient relates today that the gabapentin continues to work very  well.  Patient did have a severely incurvated ingrown toenail on the lateral border of the left hallux.  After removing the nail the patient indicated felt much better bacitracin ointment a light dressing applied patient will monitor this area closely.    Patient continues to have considerable back problems with associated back pain.  Comprehensive diabetic evaluation performed.  This note was created using [id number] voice recognition software that occasionally misinterpreted phrases or words.

## 2024-04-04 ENCOUNTER — OFFICE VISIT (OUTPATIENT)
Dept: FAMILY MEDICINE | Facility: CLINIC | Age: 84
End: 2024-04-04
Payer: MEDICARE

## 2024-04-04 VITALS
BODY MASS INDEX: 25.1 KG/M2 | HEART RATE: 64 BPM | WEIGHT: 141.69 LBS | SYSTOLIC BLOOD PRESSURE: 134 MMHG | OXYGEN SATURATION: 97 % | DIASTOLIC BLOOD PRESSURE: 74 MMHG

## 2024-04-04 DIAGNOSIS — R41.3 MEMORY DEFICIT: Primary | ICD-10-CM

## 2024-04-04 DIAGNOSIS — R73.03 PREDIABETES: ICD-10-CM

## 2024-04-04 DIAGNOSIS — Z95.0 CARDIAC PACEMAKER IN SITU: ICD-10-CM

## 2024-04-04 DIAGNOSIS — F51.01 PRIMARY INSOMNIA: ICD-10-CM

## 2024-04-04 DIAGNOSIS — I10 ESSENTIAL HYPERTENSION: ICD-10-CM

## 2024-04-04 PROCEDURE — 99214 OFFICE O/P EST MOD 30 MIN: CPT | Mod: S$PBB,,, | Performed by: NURSE PRACTITIONER

## 2024-04-04 PROCEDURE — 99214 OFFICE O/P EST MOD 30 MIN: CPT | Mod: PBBFAC,PN | Performed by: NURSE PRACTITIONER

## 2024-04-04 PROCEDURE — 99999 PR PBB SHADOW E&M-EST. PATIENT-LVL IV: CPT | Mod: PBBFAC,,, | Performed by: NURSE PRACTITIONER

## 2024-04-04 NOTE — PATIENT INSTRUCTIONS
Trazodone 50 mg tablet:  Take this medication at bedtime as needed for sleep  Prescription was sent to [facility] 2/21/2024

## 2024-04-04 NOTE — PROGRESS NOTES
Subjective:       Patient ID: Mirta Guerin is a 83 y.o. female.    Chief Complaint: Follow-up and Memory Loss    Mirta Guerin presents to the clinic today for follow up  Established within the clinic, new patient to myself  Under the care of the following:  PCP: Dr. Larios- previously Dr. James Jung  Cardiology: Dr. Juarez  Hematology/Oncology: Dr. Moseley  Podiatry: Dr. Mendes  Dermatology: Dr. Lee  Gastroenterology: Dr. Blake  Opthalmology: Aarti Chu  Orthopedics: Dr. Little  Pulmonology: Dr. Johnson  Urology: Grace Lloyd NP  Patient Active Problem List  Diagnosis  · Personal history of tobacco use, presenting hazards to health  · Obstructive sleep apnea syndrome  · History of breast cancer  · Coronary artery disease involving autologous vein coronary bypass graft without angina pectoris  · Type II diabetes mellitus with neurological manifestations  · Psoriasis  · Disc degeneration, lumbar  · Spondylolisthesis of lumbar region  · Lumbar facet arthropathy  · Allergic rhinitis  · Essential hypertension  · Dyslipidemia  · Hx of CABG  · Cardiac pacemaker in situ  · SSS (sick sinus syndrome)  · Comprehensive diabetic foot examination, type 2 DM, encounter for  · Former smoker  · High risk medication use  · Postmenopausal  · Nephrolithiasis  · Gastroesophageal reflux disease  · Mitral valve insufficiency  · Osteoarthritis  · Osteopenia  · Pulmonary hypertension  · Ventricular premature beats  · Vitamin D deficiency  · Pulmonary nodules  · Memory deficit  · Aortic atherosclerosis  · Chronic obstructive pulmonary disease, unspecified COPD type  · Primary insomnia  · Malignant neoplasm of upper-outer quadrant of right breast in female, estrogen receptor positive    Was last seen for memory issues  Was referred to Dr. [facility] In [place]- attempted to reach patient, but was unsuccessful. Reports she did not follow up on this referral  Last visit was started on Trazodone to assist with  "sleep- reports she has not started the medication/picked up from the Pharmacy  Lives with her ex-. Has 3 children who live locally   Reports she used reminders and calendars states "I am 83 I am supposed to forget things"  Reports that her short term recall is not causing issues with her day to day at the moment and will "revisit this" at her next appointment.       Review of Systems    Patient Active Problem List   Diagnosis    Personal history of tobacco use, presenting hazards to health    Obstructive sleep apnea syndrome    History of breast cancer    Coronary artery disease involving autologous vein coronary bypass graft without angina pectoris    Type II diabetes mellitus with neurological manifestations    Psoriasis    Disc degeneration, lumbar    Spondylolisthesis of lumbar region    Lumbar facet arthropathy    Allergic rhinitis    Essential hypertension    Dyslipidemia    Hx of CABG    Cardiac pacemaker in situ    SSS (sick sinus syndrome)    Comprehensive diabetic foot examination, type 2 DM, encounter for    Former smoker    High risk medication use    Postmenopausal    Nephrolithiasis    Gastroesophageal reflux disease    Mitral valve insufficiency    Osteoarthritis    Osteopenia    Pulmonary hypertension    Ventricular premature beats    Vitamin D deficiency    Pulmonary nodules    Memory deficit    Aortic atherosclerosis    Chronic obstructive pulmonary disease, unspecified COPD type    Primary insomnia    Malignant neoplasm of upper-outer quadrant of right breast in female, estrogen receptor positive       Objective:      Physical Exam  Constitutional:       General: She is not in acute distress.     Appearance: Normal appearance. She is not ill-appearing.   Eyes:      General: Lids are normal.      Extraocular Movements: Extraocular movements intact.      Conjunctiva/sclera: Conjunctivae normal.      Comments: Corrective lenses   Cardiovascular:      Rate and Rhythm: Normal rate and regular " rhythm.      Heart sounds: Normal heart sounds. No murmur heard.  Pulmonary:      Effort: Pulmonary effort is normal. No respiratory distress.      Breath sounds: Normal breath sounds. No wheezing.   Abdominal:      General: Bowel sounds are normal.   Musculoskeletal:      Right lower leg: No edema.      Left lower leg: No edema.   Skin:     General: Skin is warm and dry.   Neurological:      Mental Status: She is alert. Mental status is at baseline.      Gait: Gait normal.   Psychiatric:         Attention and Perception: Attention normal.         Mood and Affect: Mood normal.         Behavior: Behavior normal.         Thought Content: Thought content normal.         Cognition and Memory: She exhibits impaired recent memory.         Judgment: Judgment normal.         Lab Results   Component Value Date    WBC 10.20 10/19/2023    HGB 14.7 10/19/2023    HCT 45.7 10/19/2023     10/19/2023    CHOL 152 10/19/2023    TRIG 93 10/19/2023    HDL 59 10/19/2023    ALT 24 10/19/2023    AST 25 10/19/2023     10/19/2023    K 3.9 10/19/2023     10/19/2023    CREATININE 1.1 10/19/2023    BUN 24 (H) 10/19/2023    CO2 22 (L) 10/19/2023    TSH 1.494 10/19/2023    HGBA1C 5.7 (H) 10/19/2023     The ASCVD Risk score (Vick DK, et al., 2019) failed to calculate for the following reasons:    The 2019 ASCVD risk score is only valid for ages 40 to 79  Visit Vitals  /74 (BP Location: Left arm, Patient Position: Sitting, BP Method: Medium (Manual))   Pulse 64   Wt 64.3 kg (141 lb 11.2 oz)   SpO2 97%   BMI 25.10 kg/m²      Assessment:       1. Memory deficit    2. Primary insomnia    3. Prediabetes    4. Essential hypertension    5. Cardiac pacemaker in situ        Plan:       1. Memory deficit  Provided contact information for neurology to schedule appointment    2. Primary insomnia  Trazodone is still at the pharmacy- has not tried. Reports issues are intermittent   3. Prediabetes  -     Hemoglobin A1C; Future;  Expected date: 04/04/2024  Obtain A1c for monitoring  ow fat/ low cholesterol diet discussed   portion sizes    Follow a Low Starch Diet (simple carbohydrate)&nbsp     Decrease   1. Sugars   2. White Flour   3. White Potatoes   4. White Rice   Exercise 20 mins 3 x week work up to30 mins 4x week    monitor feet daily- if unable to see ask a friend/family member to examine feet or use a mirror. Let PCP know of any skin changes ASAP  GoldBond/moisturizing lotion to skin daily  maintain hydration    4. Essential hypertension  The patient was counseled on HTN education, management and recommendations. The need for weight reduction was reinforced and a BMI goal of 19 to 25 was set.  Patient was encouraged to adhere to a low sodium diet and a DASH diet was recommended. Patient was also encouraged to engage in routine exercise such as walking most days of the week greater than 30 minutes. Patient education materials were provided to the patient for home review and further reinforcement of topics discussed.     Please monitor your blood pressure twice a day.  Make sure you have not had any caffeine or tobacco with in 45 minutes of checking your blood pressure.       5. Cardiac pacemaker in situ  Followed by cardiology- keep scheduled follow up appointment.     Follow up in about 6 months (around 10/4/2024).      Future Appointments       Date Provider Specialty Appt Notes    5/21/2024  Cardiology [facility]    6/13/2024 Angelo Mendes DPM Podiatry 3 month f/u    6/25/2024 Tony Juarez MD Cardiology f/u

## 2024-05-02 NOTE — PROGRESS NOTES
I have reviewed the notes, assessments, and/or procedures performed by Taylor Garcia NP, I concur with her documentation of Mirta Guerin

## 2024-05-21 ENCOUNTER — HOSPITAL ENCOUNTER (OUTPATIENT)
Dept: CARDIOLOGY | Facility: CLINIC | Age: 84
Discharge: HOME | End: 2024-05-21
Attending: INTERNAL MEDICINE
Payer: MEDICARE

## 2024-05-21 DIAGNOSIS — Z95.0 CARDIAC PACEMAKER IN SITU: ICD-10-CM

## 2024-05-21 LAB
BATTERY VOLTAGE (V): 2.65 V
IMPEDANCE RA LEAD (NATIVE): 400 OHMS
IMPEDANCE RA LEAD: 813 OHMS
P/R-WAVE RA LEAD (NATIVE): NORMAL MV
P/R-WAVE RA LEAD: NORMAL MV
THRESHOLD MS RA LEAD (NATIVE): 0.4 MS
THRESHOLD MS RA LEAD: 0.4 MS
THRESHOLD V RA LEAD (NATIVE): 0.75 V
THRESHOLD V RA LEAD: 0.88 V

## 2024-05-21 PROCEDURE — 93288 INTERROG EVL PM/LDLS PM IP: CPT | Mod: 26,,, | Performed by: INTERNAL MEDICINE

## 2024-06-13 ENCOUNTER — OFFICE VISIT (OUTPATIENT)
Dept: PODIATRY | Facility: CLINIC | Age: 84
End: 2024-06-13
Payer: MEDICARE

## 2024-06-13 VITALS — WEIGHT: 141.75 LBS | HEIGHT: 63 IN | BODY MASS INDEX: 25.12 KG/M2

## 2024-06-13 DIAGNOSIS — L60.0 INGROWN NAIL: Primary | ICD-10-CM

## 2024-06-13 DIAGNOSIS — E11.49 TYPE II DIABETES MELLITUS WITH NEUROLOGICAL MANIFESTATIONS: ICD-10-CM

## 2024-06-13 DIAGNOSIS — E11.9 COMPREHENSIVE DIABETIC FOOT EXAMINATION, TYPE 2 DM, ENCOUNTER FOR: ICD-10-CM

## 2024-06-13 PROCEDURE — 99212 OFFICE O/P EST SF 10 MIN: CPT | Mod: PBBFAC,PN | Performed by: PODIATRIST

## 2024-06-13 PROCEDURE — 99999 PR PBB SHADOW E&M-EST. PATIENT-LVL II: CPT | Mod: PBBFAC,,, | Performed by: PODIATRIST

## 2024-06-13 PROCEDURE — 99213 OFFICE O/P EST LOW 20 MIN: CPT | Mod: S$PBB,,, | Performed by: PODIATRIST

## 2024-06-13 NOTE — PROGRESS NOTES
Subjective:       Patient ID: Mirta Guerin is a 83 y.o. female.    Chief Complaint: Diabetic Foot Exam and Nail Care    Patient presents today for followup she is a history of chronically ingrown toenails with signs of infection especially on her left hallux.     Follow-up  Associated symptoms include arthralgias, joint swelling and a rash.   Ingrown Toenail  Associated symptoms include arthralgias, joint swelling and a rash.   Nail Problem  Associated symptoms include arthralgias, joint swelling and a rash.        Review of Systems   Musculoskeletal:  Positive for arthralgias and joint swelling.   Skin:  Positive for color change, rash and wound.   All other systems reviewed and are negative.      Objective:      Physical Exam  Constitutional:       Appearance: She is well-developed.   Cardiovascular:      Pulses:           Dorsalis pedis pulses are 1+ on the right side and 1+ on the left side.        Posterior tibial pulses are 1+ on the right side and 1+ on the left side.   Pulmonary:      Effort: Pulmonary effort is normal.   Musculoskeletal:         General: Deformity present. Normal range of motion.   Feet:      Right foot:      Protective Sensation: 4 sites tested.   1 site sensed.     Skin integrity: Dry skin present.      Left foot:      Protective Sensation: 4 sites tested.   1 site sensed.     Skin integrity: Dry skin present.   Skin:     General: Skin is warm.      Capillary Refill: Capillary refill takes more than 3 seconds.   Neurological:      Deep Tendon Reflexes: Reflexes abnormal.   Psychiatric:         Behavior: Behavior normal.         Thought Content: Thought content normal.         Judgment: Judgment normal.       Patient is noted to have positive erythema positive edema at the medial lateral aspect of the patient's left hallux upon debridement of the lateral border left hallux there is purulent drainage noted as well as obvious signs of infection and pain to palpation. Patient is noted  to have a well-defined area of hyperkeratotic tissue on the dorsal lateral aspect of the fifth digit left there is positive pain noted upon palpation of this area no sign of infection is noted. patient's previously noted psoriasis is more well-controlled on today's visit that ever seen it since it started seeing the patient there are mild erythematous plaques or areas of skin breakdown especially on the heel.              Assessment:       1. Ingrown nail    2. Type II diabetes mellitus with neurological manifestations    3. Comprehensive diabetic foot examination, type 2 DM, encounter for        Plan:        Following examination I aggressively debrided both the medial and lateral border of the patient's left hallux I did advise the patient is obvious signs of infection with purulent drainage noted at the lateral border left hallux. Sterile saline was used to flush and irrigated the lateral border left hallux bacitracin ointment and a well-padded dry dressing applied patient advised to keep antibiotic ointment on the area for the next 3 days if this does not completely resolve if it continues to be red and painful we need to see her back for followup sooner than the regularly scheduled 12 week appointment. I debrided the hyperkeratotic tissue from the fifth digit left patient stated that this felt much better I advised her this is likely due to a shoe rubbing and irritation of this area recommended followup 12 weeks. .Patient states her psoriasis is doing as good as it's been in many years and is very well controlled especially on her feet.  Patient relates she has been under lot of stress with family issues of this definitely aggravates her psoriasis.Complete diabetic evaluation was performed today. Pt developed an area of psoriatic breakdown on her left heel this needs to be monitored closely.      Patient relates today that the gabapentin continues to work very well.  Patient did have a severely incurvated  ingrown toenail on the lateral border of the left hallux.  After removing the nail the patient indicated felt much better bacitracin ointment a light dressing applied patient will monitor this area closely.    Patient continues to have considerable back problems with associated back pain.  Comprehensive diabetic evaluation performed.  Patient states she recently took a fall about 3 days ago she states she is very sore but she is doing okay she did not hit her head she did not seek medical attention.  Patient does have some abrasions and bruising on the left lower extremity no open ulcerations drainage or signs of infection present.  This note was created using [employer] voice recognition software that occasionally misinterpreted phrases or words.

## 2024-06-25 ENCOUNTER — OFFICE VISIT (OUTPATIENT)
Dept: CARDIOLOGY | Facility: CLINIC | Age: 84
End: 2024-06-25
Payer: MEDICARE

## 2024-06-25 VITALS
BODY MASS INDEX: 25.52 KG/M2 | DIASTOLIC BLOOD PRESSURE: 74 MMHG | HEIGHT: 63 IN | RESPIRATION RATE: 16 BRPM | SYSTOLIC BLOOD PRESSURE: 122 MMHG | OXYGEN SATURATION: 94 % | HEART RATE: 66 BPM | WEIGHT: 144 LBS

## 2024-06-25 DIAGNOSIS — I27.20 PULMONARY HYPERTENSION: ICD-10-CM

## 2024-06-25 DIAGNOSIS — I34.0 NONRHEUMATIC MITRAL VALVE REGURGITATION: ICD-10-CM

## 2024-06-25 DIAGNOSIS — I49.5 SSS (SICK SINUS SYNDROME): ICD-10-CM

## 2024-06-25 DIAGNOSIS — K21.9 GASTROESOPHAGEAL REFLUX DISEASE WITHOUT ESOPHAGITIS: ICD-10-CM

## 2024-06-25 DIAGNOSIS — G47.33 OBSTRUCTIVE SLEEP APNEA SYNDROME: ICD-10-CM

## 2024-06-25 DIAGNOSIS — I25.810 CORONARY ARTERY DISEASE INVOLVING AUTOLOGOUS VEIN CORONARY BYPASS GRAFT WITHOUT ANGINA PECTORIS: Primary | ICD-10-CM

## 2024-06-25 DIAGNOSIS — E78.5 DYSLIPIDEMIA: ICD-10-CM

## 2024-06-25 DIAGNOSIS — I10 ESSENTIAL HYPERTENSION: ICD-10-CM

## 2024-06-25 PROCEDURE — 99999 PR PBB SHADOW E&M-EST. PATIENT-LVL IV: CPT | Mod: PBBFAC,,, | Performed by: INTERNAL MEDICINE

## 2024-06-25 PROCEDURE — 99214 OFFICE O/P EST MOD 30 MIN: CPT | Mod: S$PBB,,, | Performed by: INTERNAL MEDICINE

## 2024-06-25 PROCEDURE — 99214 OFFICE O/P EST MOD 30 MIN: CPT | Mod: PBBFAC,PN | Performed by: INTERNAL MEDICINE

## 2024-06-25 NOTE — ASSESSMENT & PLAN NOTE
Blood pressure has been fairly well controlled.  And pulmonary hypertension has been stable clinically

## 2024-06-25 NOTE — ASSESSMENT & PLAN NOTE
Coronary artery disease status post bypass surgery doing continue on aggressive risk factor modification, maintain on antiplatelet therapy with aspirin, continue on lipid lowering agents including Lipitor 20 mg statin and Zetia 10 mg a day, adequate blood pressure control and diabetic control with risk factor modification.  Continue cautious diet and increased physical activity

## 2024-06-25 NOTE — ASSESSMENT & PLAN NOTE
Blood pressure is stable at 120 2/74 mm Hg continue on amlodipine 5 mg a day, Toprol-XL 50 mg daily, Benicar 40 mg daily maintain on low-salt low-fat diet

## 2024-06-25 NOTE — PROGRESS NOTES
Subjective:    Patient ID:  Mirta Guerin is a 83 y.o. female patient here for evaluation Follow-up      History of Present Illness:     Patient was 83-year-old here for follow-up evaluation.  She had a freak accident her heel got stuck in his concrete mass she fell bruised her knee and right thumb.  She was doing better aches and pains have gotten resolved at this time denies having any chest discomfort no shortness of breath palpitations dizziness lightheadedness or weakness.  She does have some generalized fatigue which she attributes to age.  She was probably related to sleep all her life she does have some disturbed sleep        Review of patient's allergies indicates:   Allergen Reactions    Bacitracin zinc-polymyxin b Rash    Adhesive Rash    Benazepril Other (See Comments)     sleepy    Codeine Nausea And Vomiting    Polysporin [bacitracin-polymyxin b] Rash       Past Medical History:   Diagnosis Date    Anxiety     AV block     Breast cancer     Cardiac pacemaker in situ     Coronary artery disease     Diabetes mellitus     GERD (gastroesophageal reflux disease)     HLD (hyperlipidemia)     Hypertension     Lung disease     Mass of appendix 5/1/2023    Melanoma     Mitral valve disorder     NEGRITA (obstructive sleep apnea)     Prediabetes     Psoriasis     Spinal stenosis     Squamous cell carcinoma of skin     right anterior scalp    Vitamin D deficiency      Past Surgical History:   Procedure Laterality Date    BREAST CYST ASPIRATION      BREAST LUMPECTOMY      CARDIAC PACEMAKER PLACEMENT  04/05/2012    COLONOSCOPY N/A 04/21/2023    Procedure: COLONOSCOPY;  Surgeon: Corey Blake III, MD;  Location: [facility];  Service: Endoscopy;  Laterality: N/A;    CORONARY ARTERY BYPASS GRAFT  07/2017    HYSTERECTOMY      LAPAROSCOPIC SURGICAL REMOVAL OF CECUM N/A 05/01/2023    Procedure: EXCISION, CECUM, LAPAROSCOPIC;  Surgeon: Eduin Neff MD;  Location: [facility];  Service: General;  Laterality: N/A;   ROBOTIC PARTIAL CECECTOMY    SKIN CANCER EXCISION      UPPER GASTROINTESTINAL ENDOSCOPY       Social History     Tobacco Use    Smoking status: Every Day     Current packs/day: 0.25     Average packs/day: 0.3 packs/day for 70.5 years (17.6 ttl pk-yrs)     Types: Cigarettes     Start date: 1954    Smokeless tobacco: Never    Tobacco comments:     4-6 a day as of 8/16/21--got up to smoking a pack a day     About 3  day - 11/16/2023   Substance Use Topics    Alcohol use: Yes     Comment: occasional    Drug use: No        Review of Systems:    As noted in HPI in addition      REVIEW OF SYSTEMS  CARDIOVASCULAR: No recent chest pain, palpitations, arm, neck, or jaw pain  RESPIRATORY: No recent fever, cough chills, SOB or congestion  : No blood in the urine  GI: No Nausea, vomiting, constipation, diarrhea, blood, or reflux.  MUSCULOSKELETAL: No myalgias  NEURO: No lightheadedness or dizziness  EYES: No Double vision, blurry, vision or headache   Some insomnia is reported.           Objective        Vitals:    06/25/24 1023   BP: 122/74   Pulse: 66   Resp: 16       LIPIDS - LAST 2   Lab Results   Component Value Date    CHOL 152 10/19/2023    CHOL 156 07/21/2022    HDL 59 10/19/2023    HDL 48 07/21/2022    LDLCALC 74.4 10/19/2023    LDLCALC 83.4 07/21/2022    TRIG 93 10/19/2023    TRIG 123 07/21/2022    CHOLHDL 38.8 10/19/2023    CHOLHDL 30.8 07/21/2022       CBC - LAST 2  Lab Results   Component Value Date    WBC 10.20 10/19/2023    WBC 14.78 (H) 05/02/2023    RBC 4.87 10/19/2023    RBC 3.92 (L) 05/02/2023    HGB 14.7 10/19/2023    HGB 12.2 05/02/2023    HCT 45.7 10/19/2023    HCT 37.4 05/02/2023    MCV 94 10/19/2023    MCV 95 05/02/2023    MCH 30.2 10/19/2023    MCH 31.1 (H) 05/02/2023    MCHC 32.2 10/19/2023    MCHC 32.6 05/02/2023    RDW 14.9 (H) 10/19/2023    RDW 13.3 05/02/2023     10/19/2023     05/02/2023    MPV 9.2 10/19/2023    MPV 9.7 05/02/2023    GRAN 4.1 10/19/2023    GRAN 39.8 10/19/2023     "LYMPH 5.0 (H) 10/19/2023    LYMPH 48.6 (H) 10/19/2023    MONO 0.8 10/19/2023    MONO 8.2 10/19/2023    BASO 0.06 10/19/2023    BASO 0.02 05/02/2023    NRBC 0 10/19/2023    NRBC 0 05/02/2023       CHEMISTRY & LIVER FUNCTION - LAST 2  Lab Results   Component Value Date     10/19/2023     05/02/2023    K 3.9 10/19/2023    K 4.0 05/02/2023     10/19/2023     05/02/2023    CO2 22 (L) 10/19/2023    CO2 22 (L) 05/02/2023    ANIONGAP 14 10/19/2023    ANIONGAP 11 05/02/2023    BUN 24 (H) 10/19/2023    BUN 16 05/02/2023    CREATININE 1.1 10/19/2023    CREATININE 0.9 05/02/2023    GLU 86 10/19/2023     (H) 05/02/2023    CALCIUM 9.4 10/19/2023    CALCIUM 8.8 05/02/2023    ALBUMIN 3.8 10/19/2023    ALBUMIN 3.6 04/27/2023    PROT 7.2 10/19/2023    PROT 6.9 04/27/2023    ALKPHOS 71 10/19/2023    ALKPHOS 75 04/27/2023    ALT 24 10/19/2023    ALT 14 04/27/2023    AST 25 10/19/2023    AST 17 04/27/2023    BILITOT 0.5 10/19/2023    BILITOT 0.3 04/27/2023        CARDIAC PROFILE - LAST 2  Lab Results   Component Value Date     (H) 11/26/2021    TROPONINI 0.014 11/26/2021        COAGULATION - LAST 2  No results found for: "LABPT", "INR", "APTT"    ENDOCRINE & PSA - LAST 2  Lab Results   Component Value Date    HGBA1C 5.7 (H) 10/19/2023    HGBA1C 5.8 (H) 07/21/2022    TSH 1.494 10/19/2023        ECHOCARDIOGRAM RESULTS  No results found for this or any previous visit.      CURRENT/PREVIOUS VISIT EKG  Results for orders placed or performed during the hospital encounter of 04/18/23   EKG 12-lead    Collection Time: 04/18/23  2:38 PM    Narrative    Test Reason : Z01.818,    Vent. Rate : 081 BPM     Atrial Rate : 081 BPM     P-R Int : 194 ms          QRS Dur : 090 ms      QT Int : 378 ms       P-R-T Axes : 020 -50 035 degrees     QTc Int : 439 ms    Atrial-paced rhythm  Left axis deviation  Moderate voltage criteria for LVH, may be normal variant ( R in aVL ,   [place] product )  Abnormal ECG  When " compared with ECG of 27-SEP-2022 10:52,  Criteria for Anteroseptal infarct are no longer Present  Confirmed by Reuben Dempsey MD ([id number]) on 4/20/2023 8:52:54 PM    Referred By:             Confirmed By:Reuben Dempsey MD     No valid procedures specified.   Results for orders placed during the hospital encounter of 11/17/21    Nuclear Stress - Cardiology Interpreted    Interpretation Summary    Normal myocardial perfusion scan. There is no evidence of myocardial ischemia or infarction.    The gated perfusion images showed an ejection fraction of 67% post stress. Normal ejection fraction is greater than 53%.    There is normal wall motion post stress.    LV cavity size is  and normal at stress.    The EKG portion of this study is negative for ischemia.    The patient reported chest pain during the stress test.    There were no arrhythmias during stress.    No valid procedures specified.    PHYSICAL EXAM  CONSTITUTIONAL: Well built, well nourished in no apparent distress  NECK: no carotid bruit, no JVD  LUNGS: CTA  CHEST WALL: no tenderness  HEART: regular rate and rhythm, S1, S2 normal, no murmur, click, rub or gallop   ABDOMEN: soft, non-tender; bowel sounds normal; no masses,  no organomegaly  EXTREMITIES: Extremities normal, no edema, no calf tenderness noted  NEURO: AAO X 3    I HAVE REVIEWED :    The vital signs, nurses notes, and all the pertinent radiology and labs.        Current Outpatient Medications   Medication Instructions    albuterol (PROVENTIL/VENTOLIN HFA) 90 mcg/actuation inhaler INHALE 1-2 PUFFS INTO THE LUNGS 2 (TWO) TIMES A DAY.    amLODIPine (NORVASC) 5 mg, Oral, 2 times daily    anastrozole (ARIMIDEX) 1 mg, Oral, Daily    aspirin (ECOTRIN) 81 mg, Oral, Daily    atorvastatin (LIPITOR) 20 mg, Oral, Nightly    cloNIDine (CATAPRES) 0.1 MG tablet TAKE 1 TABLET BY MOUTH EVERY DAY AS NEEDED FOR SYSTOLIC PRESSURE GREATER THAN 170    co-enzyme Q-10 100 mg, Oral, Daily    docusate sodium (COLACE) 100  mg, Oral, 2 times daily    DULoxetine (CYMBALTA) 20 mg, Oral    ezetimibe (ZETIA) 10 mg, Oral, Daily    fluticasone propionate (FLONASE) 50 mcg/actuation nasal spray No dose, route, or frequency recorded.    gabapentin (NEURONTIN) 300 MG capsule TAKE 1 CAPSULE BY MOUTH IN  THE EVENING    loratadine (CLARITIN) 10 mg, Oral, Daily    metFORMIN (GLUCOPHAGE) 500 mg, Oral, 2 times daily with meals    metoprolol succinate (TOPROL-XL) 50 mg, Oral, Daily    MULTIVIT-MIN/FA/LUTEIN/ZEAXANT (MACULAR VITAMIN ORAL) Oral    mupirocin (BACTROBAN) 2 % ointment APPLY TO CHIN ONCE DAILY    olmesartan (BENICAR) 40 mg, Oral, Daily    traZODone (DESYREL) 50 mg, Oral, Nightly    vitamin D (VITAMIN D3) 1,000 Units, Oral, Daily          Assessment & Plan     1. Coronary artery disease involving autologous vein coronary bypass graft without angina pectoris  Assessment & Plan:  Coronary artery disease status post bypass surgery doing continue on aggressive risk factor modification, maintain on antiplatelet therapy with aspirin, continue on lipid lowering agents including Lipitor 20 mg statin and Zetia 10 mg a day, adequate blood pressure control and diabetic control with risk factor modification.  Continue cautious diet and increased physical activity      2. Essential hypertension  Assessment & Plan:  Blood pressure is stable at 120 2/74 mm Hg continue on amlodipine 5 mg a day, Toprol-XL 50 mg daily, Benicar 40 mg daily maintain on low-salt low-fat diet      3. Nonrheumatic mitral valve regurgitation  Assessment & Plan:  Mitral insufficiency is controlled with beta-blockers and fluid reducing agents with Benicar 40 mg maintain the      4. Pulmonary hypertension  Assessment & Plan:  Blood pressure has been fairly well controlled.  And pulmonary hypertension has been stable clinically      5. SSS (sick sinus syndrome)  Assessment & Plan:  Has a functioning pacemaker in Situ stable      6. Dyslipidemia  Assessment & Plan:  Continue on Lipitor  "20 mg daily and Zetia 10 mg a day maintain low-salt low-fat diet      7. Gastroesophageal reflux disease without esophagitis  Assessment & Plan:  Maintain on PPI agents as needed      8. Obstructive sleep apnea syndrome  Overview:  Told she has "severe" disease and has been intolerant of treatment    Assessment & Plan:  Clinically stable she was unable to get used to the CPAP machine            Follow up in about 6 months (around 12/25/2024).       "

## 2024-06-25 NOTE — ASSESSMENT & PLAN NOTE
Mitral insufficiency is controlled with beta-blockers and fluid reducing agents with Benicar 40 mg maintain the

## 2024-07-08 ENCOUNTER — OFFICE VISIT (OUTPATIENT)
Dept: FAMILY MEDICINE | Facility: CLINIC | Age: 84
End: 2024-07-08
Payer: MEDICARE

## 2024-07-08 VITALS
HEIGHT: 63 IN | HEART RATE: 69 BPM | DIASTOLIC BLOOD PRESSURE: 78 MMHG | WEIGHT: 147.38 LBS | OXYGEN SATURATION: 94 % | RESPIRATION RATE: 16 BRPM | SYSTOLIC BLOOD PRESSURE: 122 MMHG | BODY MASS INDEX: 26.11 KG/M2

## 2024-07-08 DIAGNOSIS — I25.810 CORONARY ARTERY DISEASE INVOLVING AUTOLOGOUS VEIN CORONARY BYPASS GRAFT WITHOUT ANGINA PECTORIS: ICD-10-CM

## 2024-07-08 DIAGNOSIS — R41.3 MEMORY DEFICIT: ICD-10-CM

## 2024-07-08 DIAGNOSIS — E55.9 VITAMIN D DEFICIENCY: ICD-10-CM

## 2024-07-08 DIAGNOSIS — C50.411 MALIGNANT NEOPLASM OF UPPER-OUTER QUADRANT OF RIGHT BREAST IN FEMALE, ESTROGEN RECEPTOR POSITIVE: ICD-10-CM

## 2024-07-08 DIAGNOSIS — Z17.0 MALIGNANT NEOPLASM OF UPPER-OUTER QUADRANT OF RIGHT BREAST IN FEMALE, ESTROGEN RECEPTOR POSITIVE: ICD-10-CM

## 2024-07-08 DIAGNOSIS — E11.49 TYPE II DIABETES MELLITUS WITH NEUROLOGICAL MANIFESTATIONS: ICD-10-CM

## 2024-07-08 DIAGNOSIS — I10 ESSENTIAL HYPERTENSION: ICD-10-CM

## 2024-07-08 DIAGNOSIS — J44.9 CHRONIC OBSTRUCTIVE PULMONARY DISEASE, UNSPECIFIED COPD TYPE: ICD-10-CM

## 2024-07-08 DIAGNOSIS — E78.5 DYSLIPIDEMIA: ICD-10-CM

## 2024-07-08 DIAGNOSIS — B37.2 CANDIDAL INTERTRIGO: Primary | ICD-10-CM

## 2024-07-08 PROCEDURE — 99999 PR PBB SHADOW E&M-EST. PATIENT-LVL IV: CPT | Mod: PBBFAC,,, | Performed by: STUDENT IN AN ORGANIZED HEALTH CARE EDUCATION/TRAINING PROGRAM

## 2024-07-08 PROCEDURE — 99214 OFFICE O/P EST MOD 30 MIN: CPT | Mod: PBBFAC,PN | Performed by: STUDENT IN AN ORGANIZED HEALTH CARE EDUCATION/TRAINING PROGRAM

## 2024-07-08 RX ORDER — NYSTATIN 100000 [USP'U]/G
POWDER TOPICAL 3 TIMES DAILY
Qty: 60 G | Refills: 11 | Status: SHIPPED | OUTPATIENT
Start: 2024-07-08

## 2024-07-08 RX ORDER — KETOCONAZOLE 20 MG/G
CREAM TOPICAL DAILY
Qty: 60 G | Refills: 3 | Status: SHIPPED | OUTPATIENT
Start: 2024-07-08

## 2024-07-08 RX ORDER — FLUCONAZOLE 150 MG/1
150 TABLET ORAL DAILY
Qty: 3 TABLET | Refills: 0 | Status: SHIPPED | OUTPATIENT
Start: 2024-07-08 | End: 2024-07-11

## 2024-07-08 NOTE — PROGRESS NOTES
Subjective:       Patient ID: Mirta Guerin is a 83 y.o. female.    Chief Complaint: Rash (On and off several years now but getting worse, itchy, and painful)    Rash on lower abdomen where her abdomen touches legs  She is prone to yeast infection  Happens intermittently for years  No known trigger  Itchy  No new products        .  Patient Active Problem List   Diagnosis    Personal history of tobacco use, presenting hazards to health    Obstructive sleep apnea syndrome    History of breast cancer    Coronary artery disease involving autologous vein coronary bypass graft without angina pectoris    Type II diabetes mellitus with neurological manifestations    Psoriasis    Disc degeneration, lumbar    Spondylolisthesis of lumbar region    Lumbar facet arthropathy    Allergic rhinitis    Essential hypertension    Dyslipidemia    Hx of CABG    Cardiac pacemaker in situ    SSS (sick sinus syndrome)    Comprehensive diabetic foot examination, type 2 DM, encounter for    Former smoker    High risk medication use    Postmenopausal    Nephrolithiasis    Gastroesophageal reflux disease    Mitral valve insufficiency    Osteoarthritis    Osteopenia    Pulmonary hypertension    Ventricular premature beats    Vitamin D deficiency    Pulmonary nodules    Memory deficit    Aortic atherosclerosis    Chronic obstructive pulmonary disease, unspecified COPD type    Primary insomnia    Malignant neoplasm of upper-outer quadrant of right breast in female, estrogen receptor positive     Mirta has a current medication list which includes the following prescription(s): albuterol, amlodipine, anastrozole, aspirin, atorvastatin, clonidine, co-enzyme q-10, docusate sodium, duloxetine, ezetimibe, fluticasone propionate, gabapentin, loratadine, metformin, metoprolol succinate, multivit-min/fa/lutein/zeaxant, mupirocin, olmesartan, trazodone, vitamin d, fluconazole, ketoconazole, and nystatin.    Review of Systems   Constitutional:   Negative for activity change and appetite change.   Respiratory:  Negative for shortness of breath.    Cardiovascular:  Negative for chest pain.   Gastrointestinal:  Negative for abdominal pain.   Genitourinary:  Negative for dysuria.   Integumentary:  Positive for rash.   Psychiatric/Behavioral:  Negative for dysphoric mood and sleep disturbance. The patient is not nervous/anxious.          Health Maintenance Due   Topic Date Due    RSV Vaccine (Age 60+ and Pregnant patients) (1 - 1-dose 60+ series) Never done    Shingles Vaccine (2 of 2) 10/19/2020    Eye Exam  06/24/2023    COVID-19 Vaccine (4 - 2023-24 season) 09/01/2023    Hemoglobin A1c  04/19/2024        Objective:      Physical Exam  Constitutional:       General: She is not in acute distress.     Appearance: Normal appearance. She is not ill-appearing.   Eyes:      Conjunctiva/sclera: Conjunctivae normal.   Cardiovascular:      Rate and Rhythm: Normal rate and regular rhythm.      Heart sounds: Normal heart sounds. No murmur heard.  Pulmonary:      Effort: Pulmonary effort is normal. No respiratory distress.      Breath sounds: Normal breath sounds.   Musculoskeletal:      Right lower leg: No edema.      Left lower leg: No edema.   Skin:     General: Skin is warm and dry.      Findings: Rash present.          Neurological:      Mental Status: She is alert. Mental status is at baseline.      Gait: Gait normal.   Psychiatric:         Mood and Affect: Mood normal.         Behavior: Behavior normal.         Judgment: Judgment normal.         Assessment:       1. Candidal intertrigo    2. Memory deficit    3. Chronic obstructive pulmonary disease, unspecified COPD type    4. Coronary artery disease involving autologous vein coronary bypass graft without angina pectoris    5. Essential hypertension    6. Dyslipidemia    7. Malignant neoplasm of upper-outer quadrant of right breast in female, estrogen receptor positive    8. Type II diabetes mellitus with  neurological manifestations    9. Vitamin D deficiency        Plan:       1. Candidal intertrigo  -     ketoconazole (NIZORAL) 2 % cream; Apply topically once daily.  Dispense: 60 g; Refill: 3  -     fluconazole (DIFLUCAN) 150 MG Tab; Take 1 tablet (150 mg total) by mouth once daily. for 3 days  Dispense: 3 tablet; Refill: 0  -     nystatin (MYCOSTATIN) powder; Apply topically 3 (three) times daily.  Dispense: 60 g; Refill: 11    2. Memory deficit  Assessment & Plan:  Stable. Continue current medications and regular followup.        3. Chronic obstructive pulmonary disease, unspecified COPD type  Assessment & Plan:  Stable. Continue current medications and regular followup.        4. Coronary artery disease involving autologous vein coronary bypass graft without angina pectoris  Assessment & Plan:  Stable. Continue current medications and regular followup.  Continue risk factor management        5. Essential hypertension  Assessment & Plan:  Stable. Continue current medications and regular followup.  Hypertension Medications               amLODIPine (NORVASC) 5 MG tablet Take 1 tablet (5 mg total) by mouth 2 (two) times daily.    cloNIDine (CATAPRES) 0.1 MG tablet TAKE 1 TABLET BY MOUTH EVERY DAY AS NEEDED FOR SYSTOLIC PRESSURE GREATER THAN 170    metoprolol succinate (TOPROL-XL) 50 MG 24 hr tablet Take 1 tablet (50 mg total) by mouth once daily.    olmesartan (BENICAR) 40 MG tablet Take 1 tablet (40 mg total) by mouth once daily.              Orders:  -     CBC Auto Differential; Future; Expected date: 07/08/2024  -     Comprehensive Metabolic Panel; Future; Expected date: 07/08/2024    6. Dyslipidemia  Assessment & Plan:  Stable. Continue current medications and regular followup.  Hyperlipidemia Medications               atorvastatin (LIPITOR) 20 MG tablet TAKE 1 TABLET BY MOUTH EVERY DAY IN THE EVENING    ezetimibe (ZETIA) 10 mg tablet Take 1 tablet (10 mg total) by mouth once daily.              Orders:  -      Lipid Panel; Future; Expected date: 07/08/2024    7. Malignant neoplasm of upper-outer quadrant of right breast in female, estrogen receptor positive  Assessment & Plan:  Stable on arimidex      8. Type II diabetes mellitus with neurological manifestations  Assessment & Plan:  Lab Results   Component Value Date    HGBA1C 5.7 (H) 10/19/2023     Stable. Continue current medications and regular followup.  Diabetes Medications               metformin (GLUCOPHAGE) 500 MG tablet Take 500 mg by mouth 2 (two) times daily with meals.              Orders:  -     Microalbumin/Creatinine Ratio, Urine; Future; Expected date: 07/08/2024  -     Hemoglobin A1C; Future; Expected date: 07/08/2024  -     TSH; Future; Expected date: 07/08/2024    9. Vitamin D deficiency  -     Vitamin D; Future; Expected date: 07/08/2024

## 2024-08-12 RX ORDER — AMLODIPINE BESYLATE 5 MG/1
5 TABLET ORAL 2 TIMES DAILY
Qty: 180 TABLET | Refills: 3 | Status: SHIPPED | OUTPATIENT
Start: 2024-08-12

## 2024-08-14 ENCOUNTER — OFFICE VISIT (OUTPATIENT)
Dept: URGENT CARE | Facility: CLINIC | Age: 84
End: 2024-08-14
Payer: MEDICARE

## 2024-08-14 VITALS
OXYGEN SATURATION: 97 % | DIASTOLIC BLOOD PRESSURE: 80 MMHG | SYSTOLIC BLOOD PRESSURE: 140 MMHG | HEART RATE: 66 BPM | TEMPERATURE: 98 F | WEIGHT: 147.5 LBS | RESPIRATION RATE: 18 BRPM | BODY MASS INDEX: 25.18 KG/M2 | HEIGHT: 64 IN

## 2024-08-14 DIAGNOSIS — R21 RASH: Primary | ICD-10-CM

## 2024-08-14 PROCEDURE — 99213 OFFICE O/P EST LOW 20 MIN: CPT | Mod: S$GLB,,, | Performed by: NURSE PRACTITIONER

## 2024-08-14 RX ORDER — TRIAMCINOLONE ACETONIDE 1 MG/G
CREAM TOPICAL 2 TIMES DAILY
Qty: 28.4 G | Refills: 0 | Status: SHIPPED | OUTPATIENT
Start: 2024-08-14

## 2024-08-14 NOTE — PROGRESS NOTES
"Subjective:       Patient ID: Mirta Guerin is a 84 y.o. female.    Vitals:  height is 5' 4" (1.626 m) and weight is 66.9 kg (147 lb 7.8 oz). Her oral temperature is 97.5 °F (36.4 °C). Her blood pressure is 140/80 (abnormal) and her pulse is 66. Her respiration is 18 and oxygen saturation is 97%.     Chief Complaint: Rash    This is a 84 y.o. female who presents today with a chief complaint of rash on face, chin and neck for the last few weeks.  States it's red and itchy.  Patient presents with:  Rash         Rash  This is a new problem. The current episode started 1 to 4 weeks ago. The problem is unchanged. The affected locations include the face. She was exposed to nothing. Past treatments include nothing. The treatment provided no relief.       Constitution: Negative.   Cardiovascular: Negative.    Respiratory: Negative.     Gastrointestinal: Negative.    Skin:  Positive for rash and erythema.           Objective:      Physical Exam   Constitutional: No distress.   HENT:   Head: Normocephalic.   Pulmonary/Chest: Effort normal.   Abdominal: Normal appearance.   Musculoskeletal: Normal range of motion.         General: Normal range of motion.   Neurological: She is alert.   Skin: Skin is warm, dry, intact and rash. erythema        Psychiatric: Her behavior is normal.         Past medical history and current medications reviewed.       Assessment:   Ms. Guerin presents for rash to her chin that extends to her neck, that has been present for several weeks. Has not applied any otc med. States I did not know what to use. Associated symptom: itches. Denies any known contact of allergen.         1. Rash              Plan:         Rash  -     triamcinolone acetonide 0.1% (KENALOG) 0.1 % cream; Apply topically 2 (two) times daily.  Dispense: 28.4 g; Refill: 0             Patient Instructions   Use an unscented cream or lotion to keep your skin moist.  Drink plenty of fluids to keep your body hydrated.  Bathe with " cool or warm water. Do not use hot water. Pat yourself dry with a clean, thick, soft towel. Use mild and unscented soap, moisturizers, and deodorants.  At-home care to help with scratching:  Wear gloves to protect skin on your hands. Try wearing cotton gloves under plastic gloves. Remove both sets of gloves from time to time to prevent sweating.  Keep nails short and clean.  If you scratch in your sleep, wear white cotton gloves to bed.  Try using cool compresses on the skin. They may help with swelling and itching. Dip a cloth in cold water and put it right on your itchy skin.

## 2024-08-15 ENCOUNTER — TELEPHONE (OUTPATIENT)
Dept: CARDIOLOGY | Facility: CLINIC | Age: 84
End: 2024-08-15
Payer: MEDICARE

## 2024-08-15 NOTE — TELEPHONE ENCOUNTER
----- Message from Altagracia Fink, Patient Care Assistant sent at 8/15/2024  8:13 AM CDT -----  Regarding: appointment  Contact: Jaelyn pinto's daughter  Type: Needs Medical Advice    Who Called:  Jaelyn pinto's daughter    Best Call Back Number:  or [phone] (home)     Additional Information: Jaelyn pinto's daughter states she would like a callback regarding rescheduling her appointment on 8/20/24. Please call to advise. Thanks!

## 2024-09-04 ENCOUNTER — TELEPHONE (OUTPATIENT)
Dept: CARDIOLOGY | Facility: CLINIC | Age: 84
End: 2024-09-04
Payer: MEDICARE

## 2024-09-04 NOTE — TELEPHONE ENCOUNTER
----- Message from Jaun Miller sent at 9/4/2024  2:21 PM CDT -----  Regarding: reschedule  Contact: patient  Type:  Patient Returning Call    Who Called:patient  Who Left Message for Patient:office  Does the patient know what this is regarding?:rescheduling Device check appointment  Would the patient rather a call back or a response via [facility]?   Best Call Back Number:[phone]  Additional Information:

## 2024-09-10 ENCOUNTER — PATIENT MESSAGE (OUTPATIENT)
Dept: FAMILY MEDICINE | Facility: CLINIC | Age: 84
End: 2024-09-10
Payer: MEDICARE

## 2024-10-01 ENCOUNTER — OFFICE VISIT (OUTPATIENT)
Dept: CARDIOLOGY | Facility: CLINIC | Age: 84
End: 2024-10-01
Payer: MEDICARE

## 2024-10-01 ENCOUNTER — HOSPITAL ENCOUNTER (OUTPATIENT)
Dept: CARDIOLOGY | Facility: CLINIC | Age: 84
Discharge: HOME | End: 2024-10-01
Attending: INTERNAL MEDICINE
Payer: MEDICARE

## 2024-10-01 VITALS
DIASTOLIC BLOOD PRESSURE: 82 MMHG | BODY MASS INDEX: 25.32 KG/M2 | HEART RATE: 72 BPM | HEIGHT: 64 IN | SYSTOLIC BLOOD PRESSURE: 122 MMHG | OXYGEN SATURATION: 96 %

## 2024-10-01 DIAGNOSIS — I49.5 SSS (SICK SINUS SYNDROME): ICD-10-CM

## 2024-10-01 DIAGNOSIS — Z45.010 PACEMAKER AT END OF BATTERY LIFE: Primary | ICD-10-CM

## 2024-10-01 DIAGNOSIS — K21.9 GASTROESOPHAGEAL REFLUX DISEASE WITHOUT ESOPHAGITIS: ICD-10-CM

## 2024-10-01 DIAGNOSIS — E78.5 DYSLIPIDEMIA: ICD-10-CM

## 2024-10-01 DIAGNOSIS — Z95.0 CARDIAC PACEMAKER IN SITU: ICD-10-CM

## 2024-10-01 DIAGNOSIS — Z85.3 HISTORY OF BREAST CANCER: ICD-10-CM

## 2024-10-01 DIAGNOSIS — I25.810 CORONARY ARTERY DISEASE INVOLVING AUTOLOGOUS VEIN CORONARY BYPASS GRAFT WITHOUT ANGINA PECTORIS: ICD-10-CM

## 2024-10-01 DIAGNOSIS — I70.0 AORTIC ATHEROSCLEROSIS: ICD-10-CM

## 2024-10-01 DIAGNOSIS — M85.80 OSTEOPENIA, UNSPECIFIED LOCATION: ICD-10-CM

## 2024-10-01 DIAGNOSIS — J44.9 CHRONIC OBSTRUCTIVE PULMONARY DISEASE, UNSPECIFIED COPD TYPE: ICD-10-CM

## 2024-10-01 DIAGNOSIS — I27.20 PULMONARY HYPERTENSION: ICD-10-CM

## 2024-10-01 DIAGNOSIS — I10 ESSENTIAL HYPERTENSION: ICD-10-CM

## 2024-10-01 DIAGNOSIS — I34.0 NONRHEUMATIC MITRAL VALVE REGURGITATION: ICD-10-CM

## 2024-10-01 DIAGNOSIS — I34.0 NONRHEUMATIC MITRAL VALVE REGURGITATION: Primary | ICD-10-CM

## 2024-10-01 DIAGNOSIS — Z95.1 HX OF CABG: ICD-10-CM

## 2024-10-01 DIAGNOSIS — E11.49 TYPE II DIABETES MELLITUS WITH NEUROLOGICAL MANIFESTATIONS: ICD-10-CM

## 2024-10-01 DIAGNOSIS — E78.2 MIXED HYPERLIPIDEMIA: ICD-10-CM

## 2024-10-01 DIAGNOSIS — I25.810 CORONARY ARTERY DISEASE INVOLVING CORONARY BYPASS GRAFT OF NATIVE HEART WITHOUT ANGINA PECTORIS: ICD-10-CM

## 2024-10-01 DIAGNOSIS — F41.9 ANXIETY: ICD-10-CM

## 2024-10-01 DIAGNOSIS — I10 PRIMARY HYPERTENSION: ICD-10-CM

## 2024-10-01 DIAGNOSIS — M54.50 CHRONIC MIDLINE LOW BACK PAIN, UNSPECIFIED WHETHER SCIATICA PRESENT: ICD-10-CM

## 2024-10-01 DIAGNOSIS — G89.29 CHRONIC MIDLINE LOW BACK PAIN, UNSPECIFIED WHETHER SCIATICA PRESENT: ICD-10-CM

## 2024-10-01 DIAGNOSIS — M15.9 OSTEOARTHRITIS OF MULTIPLE JOINTS, UNSPECIFIED OSTEOARTHRITIS TYPE: ICD-10-CM

## 2024-10-01 DIAGNOSIS — G47.33 OSA ON CPAP: ICD-10-CM

## 2024-10-01 PROCEDURE — 93288 INTERROG EVL PM/LDLS PM IP: CPT | Mod: PBBFAC,PN

## 2024-10-01 PROCEDURE — 99205 OFFICE O/P NEW HI 60 MIN: CPT | Mod: S$PBB,,, | Performed by: STUDENT IN AN ORGANIZED HEALTH CARE EDUCATION/TRAINING PROGRAM

## 2024-10-01 PROCEDURE — 99999 PR PBB SHADOW E&M-EST. PATIENT-LVL IV: CPT | Mod: PBBFAC,,, | Performed by: NURSE PRACTITIONER

## 2024-10-01 PROCEDURE — 93288 INTERROG EVL PM/LDLS PM IP: CPT | Mod: 26,S$PBB,, | Performed by: STUDENT IN AN ORGANIZED HEALTH CARE EDUCATION/TRAINING PROGRAM

## 2024-10-01 PROCEDURE — 99214 OFFICE O/P EST MOD 30 MIN: CPT | Mod: PBBFAC,PN,25 | Performed by: NURSE PRACTITIONER

## 2024-10-01 PROCEDURE — 99213 OFFICE O/P EST LOW 20 MIN: CPT | Mod: S$PBB,,, | Performed by: NURSE PRACTITIONER

## 2024-10-01 NOTE — ASSESSMENT & PLAN NOTE
Patient had device check done today.  She was at [facility] (formerly [facility] Recommended generator change; Device implanted in 2012    Dr. Diaz thoroughly discussed replacement of generator and possible lead change as well with the patient and her son. Dr Diaz wants to do this under anesthesia given her advanced age and this has been discussed as well     This may be done at [facility] versus [facility].  Scheduling  will call the patient to arrange date and location. Pt and son understand this. NPO after midnight instructions given.

## 2024-10-01 NOTE — Clinical Note
Shireen Urbina,   This is a patient who will need a generator change in [place]. I just need you to place the case request, and Michelle can do the rest.   Thank you!!

## 2024-10-01 NOTE — ASSESSMENT & PLAN NOTE
Pressure remains well controlled, today in the office 122/82 mm Hg    There is some confusion regarding amlodipine frequency.  Our list raised that she was on amlodipine 5 mg b.i.d..  She thinks she was taking it only once daily.  She confirmed taking olmesartan 40 mg daily and Toprol-XL 50 mg daily.    Her son is going to check her medications at home and let us know how often she is in fact taking the amlodipine

## 2024-10-01 NOTE — PROGRESS NOTES
Electrophysiology Clinic Note    Reason for new patient visit: Establish care in the [facility] with routine device surveillance of a Medtronic dual-chamber pacemaker, implanted in 2012 in the setting of sick sinus syndrome.     PRESENTING HISTORY:     History of Present Illness:  Ms. Mirta Guerin is a kristine 84-year-old woman who presents today to establish care in the [facility] with routine device surveillance of a Medtronic dual-chamber pacemaker, implanted in 2012 in the setting of sick sinus syndrome. She has a past medical history significant for coronary artery disease s/p prior CABG, sick sinus syndrome s/p implantation of a Medtronic dual-chamber pacemaker on 4/5/2012, mitral regurgitation, hypertension, hyperlipidemia, type II diabetes mellitus, COPD, a history of right breast cancer, GERD, anxiety, osteoarthritis, chronic low back pain, osteopenia, aortic atherosclerosis, and NEGRITA - maintained on CPAP therapy.     She is followed in general cardiology with Dr. Juarez and JONNIE Quigley and was seen in clinic earlier this morning where her device was interrogated. At interrogation, her pacemaker was noted to have reached ROSETTA in 8/2024. She is not device dependent and is presently in normal sinus rhythm with a narrow QRSd of 90ms. Her most recent pharmacologic nuclear cardiac stress test reveals preserved systolic function with LVEF 67%. She denies any alerts or alarms from her device.     In discussion with Ms. Guerin today, she tells me that she is feeling overall quite well. She presents to clinic today with her adult son, who is very involved in her care. She denies any episodes of dizziness, lightheadedness, syncope/presyncope, chest pain or chest discomfort, palpitations, nausea or vomiting, orthopnea, or PND. She reports baseline mild shortness of breath and dyspnea with exertion that she feels has remained stable. She can climb one flight of stairs prior to needing to take a break,  but is limited by knee, hip, and back pain. She denies limitation with getting her groceries or doing her household chores. She continues to attempt to increase her level of physical activity. She would ideally undergo her generator change here in [place] due to transportation issues.     Review of Systems:  Review of Systems   Constitutional:  Negative for activity change.   HENT:  Negative for nasal congestion, nosebleeds, postnasal drip, rhinorrhea, sinus pressure/congestion, sneezing and sore throat.    Respiratory:  Positive for shortness of breath. Negative for apnea, cough, chest tightness and wheezing.    Cardiovascular:  Negative for chest pain, palpitations and leg swelling.   Gastrointestinal:  Negative for abdominal distention, abdominal pain, blood in stool, change in bowel habit, constipation, diarrhea, nausea and vomiting.   Genitourinary:  Negative for dysuria and hematuria.   Musculoskeletal:  Positive for arthralgias and back pain. Negative for gait problem.   Neurological:  Negative for dizziness, seizures, syncope, weakness, light-headedness, headaches and coordination difficulties.        PAST HISTORY:     Past Medical History:   Diagnosis Date    Anxiety     AV block     Breast cancer     Cardiac pacemaker in situ     Coronary artery disease     Diabetes mellitus     GERD (gastroesophageal reflux disease)     HLD (hyperlipidemia)     Hypertension     Lung disease     Mass of appendix 5/1/2023    Melanoma     Mitral valve disorder     NEGRITA (obstructive sleep apnea)     Prediabetes     Psoriasis     Spinal stenosis     Squamous cell carcinoma of skin     right anterior scalp    Vitamin D deficiency        Past Surgical History:   Procedure Laterality Date    BREAST CYST ASPIRATION      BREAST LUMPECTOMY      CARDIAC PACEMAKER PLACEMENT  04/05/2012    COLONOSCOPY N/A 04/21/2023    Procedure: COLONOSCOPY;  Surgeon: Corey Blake III, MD;  Location: [facility];  Service: Endoscopy;  Laterality:  N/A;    CORONARY ARTERY BYPASS GRAFT  07/2017    HYSTERECTOMY      LAPAROSCOPIC SURGICAL REMOVAL OF CECUM N/A 05/01/2023    Procedure: EXCISION, CECUM, LAPAROSCOPIC;  Surgeon: Eduin Neff MD;  Location: [facility];  Service: General;  Laterality: N/A;  ROBOTIC PARTIAL CECECTOMY    SKIN CANCER EXCISION      UPPER GASTROINTESTINAL ENDOSCOPY         Family History:  Family History   Problem Relation Name Age of Onset    Cancer Mother          ovarian    Coronary artery disease Father      Stroke Father      Stroke Maternal Grandfather      Breast cancer Paternal Grandmother      Cancer Paternal Grandmother          breast    Stroke Paternal Grandfather      Stroke Brother      Ovarian cancer Neg Hx         Social History:  She  reports that she has been smoking cigarettes. She started smoking about 70 years ago. She has a 17.7 pack-year smoking history. She has been exposed to tobacco smoke. She has never used smokeless tobacco. She reports current alcohol use. She reports that she does not use drugs.      MEDICATIONS & ALLERGIES:     Review of patient's allergies indicates:   Allergen Reactions    Bacitracin zinc-polymyxin b Rash    Adhesive Rash    Benazepril Other (See Comments)     sleepy    Codeine Nausea And Vomiting    Polysporin [bacitracin-polymyxin b] Rash       Current Outpatient Medications on File Prior to Visit   Medication Sig Dispense Refill    albuterol (PROVENTIL/VENTOLIN HFA) 90 mcg/actuation inhaler INHALE 1-2 PUFFS INTO THE LUNGS 2 (TWO) TIMES A DAY. 18 g 0    amLODIPine (NORVASC) 5 MG tablet TAKE 1 TABLET BY MOUTH TWICE A  tablet 3    anastrozole (ARIMIDEX) 1 mg Tab Take 1 tablet (1 mg total) by mouth once daily. 90 tablet 3    aspirin (ECOTRIN) 81 MG EC tablet Take 81 mg by mouth once daily.      atorvastatin (LIPITOR) 20 MG tablet TAKE 1 TABLET BY MOUTH EVERY DAY IN THE EVENING 90 tablet 3    cloNIDine (CATAPRES) 0.1 MG tablet TAKE 1 TABLET BY MOUTH EVERY DAY AS NEEDED FOR SYSTOLIC  "PRESSURE GREATER THAN 170      co-enzyme Q-10 50 mg capsule Take 100 mg by mouth once daily.      docusate sodium (COLACE) 100 MG capsule Take 100 mg by mouth 2 (two) times daily.      DULoxetine (CYMBALTA) 20 MG capsule TAKE 1 CAPSULE BY MOUTH  ONCE DAILY 90 capsule 3    ezetimibe (ZETIA) 10 mg tablet Take 1 tablet (10 mg total) by mouth once daily. 90 tablet 3    fluticasone propionate (FLONASE) 50 mcg/actuation nasal spray       gabapentin (NEURONTIN) 300 MG capsule TAKE 1 CAPSULE BY MOUTH IN  THE EVENING 90 capsule 3    ketoconazole (NIZORAL) 2 % cream Apply topically once daily. 60 g 3    loratadine (CLARITIN) 10 mg tablet Take 10 mg by mouth once daily.      metformin (GLUCOPHAGE) 500 MG tablet Take 500 mg by mouth 2 (two) times daily with meals.      metoprolol succinate (TOPROL-XL) 50 MG 24 hr tablet Take 1 tablet (50 mg total) by mouth once daily. 90 tablet 3    MULTIVIT-MIN/FA/LUTEIN/ZEAXANT (MACULAR VITAMIN ORAL) Take by mouth.      mupirocin (BACTROBAN) 2 % ointment APPLY TO CHIN ONCE DAILY 22 g 1    nystatin (MYCOSTATIN) powder Apply topically 3 (three) times daily. 60 g 11    olmesartan (BENICAR) 40 MG tablet Take 1 tablet (40 mg total) by mouth once daily. 90 tablet 3    traZODone (DESYREL) 50 MG tablet Take 1 tablet (50 mg total) by mouth every evening. 30 tablet 11    triamcinolone acetonide 0.1% (KENALOG) 0.1 % cream Apply topically 2 (two) times daily. 28.4 g 0    triamcinolone acetonide 0.1% (KENALOG) 0.1 % ointment use sparingly, only to affected areas BID, avoid normal skin, do not continue beyond three weeks 30 g 0    vitamin D 1000 units Tab Take 1,000 Units by mouth once daily.       No current facility-administered medications on file prior to visit.        OBJECTIVE:     Vital Signs:  /82  Pulse 72  Ht 5' 4" (1.626 m)  SpO2 96%  BMI 25.32 kg/m²  BSA 1.74 m²  Pain Sc 0-No pain    Physical Exam:  Physical Exam  Constitutional:       General: She is not in acute distress.     " Appearance: Normal appearance. She is not ill-appearing or diaphoretic.      Comments: Well-appearing elderly woman in NAD, presenting with a rolling walker.    HENT:      Head: Normocephalic and atraumatic.      Nose: Nose normal.      Mouth/Throat:      Mouth: Mucous membranes are moist.      Pharynx: Oropharynx is clear.   Eyes:      Pupils: Pupils are equal, round, and reactive to light.   Cardiovascular:      Rate and Rhythm: Normal rate and regular rhythm.      Pulses: Normal pulses.      Heart sounds: Murmur heard.      No friction rub. No gallop.      Comments: Left anterior chest wall incision site is in excellent repair with the device freely mobile within the pocket, with no evidence of device adhesion or erosion. II/VI CELESTE best appreciated at the RUSB.  Pulmonary:      Effort: Pulmonary effort is normal. No respiratory distress.      Breath sounds: Normal breath sounds. No wheezing, rhonchi or rales.   Chest:      Chest wall: No tenderness.   Abdominal:      General: There is no distension.      Palpations: Abdomen is soft.      Tenderness: There is no abdominal tenderness.   Musculoskeletal:         General: No swelling or tenderness.      Cervical back: Normal range of motion.      Right lower leg: No edema.      Left lower leg: No edema.   Skin:     General: Skin is warm and dry.      Findings: No erythema, lesion or rash.   Neurological:      General: No focal deficit present.      Mental Status: She is alert and oriented to person, place, and time. Mental status is at baseline.      Motor: No weakness.      Gait: Gait normal.   Psychiatric:         Mood and Affect: Mood normal.         Behavior: Behavior normal.          Laboratory Data:  Lab Results   Component Value Date    WBC 10.20 10/19/2023    HGB 14.7 10/19/2023    HCT 45.7 10/19/2023    MCV 94 10/19/2023     10/19/2023     Lab Results   Component Value Date    GLU 86 10/19/2023     10/19/2023    K 3.9 10/19/2023      "10/19/2023    CO2 22 (L) 10/19/2023    BUN 24 (H) 10/19/2023    CREATININE 1.1 10/19/2023    CALCIUM 9.4 10/19/2023     No results found for: "INR", "PROTIME"    Pertinent Cardiac Data:  Personal interpretation of today's ECG: Normal sinus rhythm with rate of 81 bpm,  ms, QRS 90 ms, QT/QTc 378/439 ms, LAD, LVH.     Pharmacologic Nuclear Cardiac Stress Test - SPECT - 11/17/2021:    Normal myocardial perfusion scan. There is no evidence of myocardial ischemia or infarction.    The gated perfusion images showed an ejection fraction of 67% post stress. Normal ejection fraction is greater than 53%.    There is normal wall motion post stress.    LV cavity size is  and normal at stress.    The EKG portion of this study is negative for ischemia.    The patient reported chest pain during the stress test.    There were no arrhythmias during stress.    Personal interpretation of today's Device Interrogation: Personal review of her device interrogation report (Medtronic Adapta, implanted 4/5/2012 with Dr. Calderon Burger) reveals declining battery longevity, reaching RRT on 8/11/2024. Her leads were interrogated with acceptable and stable lead parameters. She is currently AS-VS with underlying normal sinus rhythm at a rate of 74 bpm. She is programmed VVI at 65 bpm, and is RV paced 0.5%. There are no concerning AHR or VHR episodes noted. She has not had any recorded episodes of AF/AT, with an overall burden of 0%.        ASSESSMENT & PLAN:   Ms. Mirta Guerin is a kristine 84-year-old woman who presents today to establish care in the [facility] with routine device surveillance of a Medtronic dual-chamber pacemaker, implanted in 2012 in the setting of sick sinus syndrome. She has a past medical history significant for coronary artery disease s/p prior CABG, sick sinus syndrome s/p implantation of a Medtronic dual-chamber pacemaker on 4/5/2012, mitral regurgitation, hypertension, hyperlipidemia, type II diabetes " mellitus, COPD, a history of right breast cancer, GERD, anxiety, osteoarthritis, chronic low back pain, osteopenia, aortic atherosclerosis, and NEGRITA - maintained on CPAP therapy.     - She has a normally functioning dual-chamber pacemaker on device interrogation today. She is presently AS-VS with underlying normal sinus rhythm at a rate of 74 bpm. She is programmed VVI at 65 bpm, and is RV paced 0.5% is not device dependent. There are no concerning AHR or VHR episodes noted. She has not had any recorded episodes of AF/AT, with an overall burden of 0%. Her intrinsic QRSd remains narrow at 90ms. Her systolic function remains preserved with LVEF approximately 65%. She has no current indication for device upgrade. She has not captured any sustained or concerning atrial or ventricular arrhythmias since her prior counter clearance.   - Her pacing programming reverted to VVI with a lower rate limit of 65 bpm following reaching ROSETTA. We will plan to restore atrial synchrony once a new generator can be implanted.   - She will continue to send remote transmissions, and will be provided with a new remote monitor following her anticipated generator change for ongoing surveillance.   - She will continue to follow with her general cardiologist and her PCP for ongoing management of her comorbid conditions.     This patient will present to the EP laboratory to undergo a generator change. All questions and concerns were addressed at this encounter. Total time spent in this patient encounter: 62 minutes.     Signing Physician:       JARAD Ashley MD  Electrophysiology Attending

## 2024-10-01 NOTE — PROGRESS NOTES
Subjective:    Patient ID:  Mirta Guerin is a 84 y.o. female patient here for evaluation PACEMAKER battery change and Coronary Artery Disease    History of Present Illness:     Patient is 84-year-old female in clinic today for Medtronic device check  Device rep informed us that patient needs to schedule a generator change  Collaboration with Dr. Diaz for this to be done at [facility] versus hospitals [facility]    Patient states she is feeling well today.  She denies any anginal symptoms.  Her blood pressure is well controlled.  She does not need any medication refills today.  Patient does smoke approximately 5-6 cigarettes daily          Most Recent Echocardiogram Results  No results found for this or any previous visit.      Most Recent Nuclear Stress Test Results  Results for orders placed during the hospital encounter of 11/17/21    Nuclear Stress - Cardiology Interpreted    Interpretation Summary    Normal myocardial perfusion scan. There is no evidence of myocardial ischemia or infarction.    The gated perfusion images showed an ejection fraction of 67% post stress. Normal ejection fraction is greater than 53%.    There is normal wall motion post stress.    LV cavity size is  and normal at stress.    The EKG portion of this study is negative for ischemia.    The patient reported chest pain during the stress test.    There were no arrhythmias during stress.      Most Recent Cardiac PET Stress Test Results  No results found for this or any previous visit.      Most Recent Cardiovascular Angiogram results  No results found for this or any previous visit.      Other Most Recent Cardiology Results  Results for orders placed during the hospital encounter of 05/21/24    Cardiac device check - In Clinic & Hospital    Interpretation Summary    Medtronic in clinic pacemaker check.    Normal device function.    Normal lead function.    No episodes.    Return 3 months.    See full report in  media.      Verified      REVIEW OF SYSTEMS: As noted in HPI       Past Medical History:   Diagnosis Date    Anxiety     AV block     Breast cancer     Cardiac pacemaker in situ     Coronary artery disease     Diabetes mellitus     GERD (gastroesophageal reflux disease)     HLD (hyperlipidemia)     Hypertension     Lung disease     Mass of appendix 5/1/2023    Melanoma     Mitral valve disorder     NEGRITA (obstructive sleep apnea)     Prediabetes     Psoriasis     Spinal stenosis     Squamous cell carcinoma of skin     right anterior scalp    Vitamin D deficiency      Past Surgical History:   Procedure Laterality Date    BREAST CYST ASPIRATION      BREAST LUMPECTOMY      CARDIAC PACEMAKER PLACEMENT  04/05/2012    COLONOSCOPY N/A 04/21/2023    Procedure: COLONOSCOPY;  Surgeon: Corey Blake III, MD;  Location: [facility] ENDO;  Service: Endoscopy;  Laterality: N/A;    CORONARY ARTERY BYPASS GRAFT  07/2017    HYSTERECTOMY      LAPAROSCOPIC SURGICAL REMOVAL OF CECUM N/A 05/01/2023    Procedure: EXCISION, CECUM, LAPAROSCOPIC;  Surgeon: Eduin Neff MD;  Location: [facility] OR;  Service: General;  Laterality: N/A;  ROBOTIC PARTIAL CECECTOMY    SKIN CANCER EXCISION      UPPER GASTROINTESTINAL ENDOSCOPY       Social History     Tobacco Use    Smoking status: Every Day     Current packs/day: 0.25     Average packs/day: 0.3 packs/day for 70.7 years (17.7 ttl pk-yrs)     Types: Cigarettes     Start date: 1954     Passive exposure: Current    Smokeless tobacco: Never    Tobacco comments:     4-6 a day as of 8/16/21--got up to smoking a pack a day     About 3  day - 11/16/2023   Substance Use Topics    Alcohol use: Yes     Comment: occasional    Drug use: No         Objective      Vitals:    10/01/24 1030   BP: 122/82   Pulse: 72       LAST EKG  Results for orders placed or performed during the hospital encounter of 04/18/23   EKG 12-lead    Collection Time: 04/18/23  2:38 PM    Narrative    Test Reason : Z01.818,    Vent. Rate :  "081 BPM     Atrial Rate : 081 BPM     P-R Int : 194 ms          QRS Dur : 090 ms      QT Int : 378 ms       P-R-T Axes : 020 -50 035 degrees     QTc Int : 439 ms    Atrial-paced rhythm  Left axis deviation  Moderate voltage criteria for LVH, may be normal variant ( R in aVL ,   [employer] product )  Abnormal ECG  When compared with ECG of 27-SEP-2022 10:52,  Criteria for Anteroseptal infarct are no longer Present  Confirmed by Ke MEADE, Reuben VEGA ([id number]) on 4/20/2023 8:52:54 PM    Referred By:             Confirmed By:Reuben Dempsey MD     LIPIDS - LAST 2   Lab Results   Component Value Date    CHOL 152 10/19/2023    CHOL 156 07/21/2022    HDL 59 10/19/2023    HDL 48 07/21/2022    LDLCALC 74.4 10/19/2023    LDLCALC 83.4 07/21/2022    TRIG 93 10/19/2023    TRIG 123 07/21/2022    CHOLHDL 38.8 10/19/2023    CHOLHDL 30.8 07/21/2022     CARDIAC PROFILE - LAST 2  Lab Results   Component Value Date     (H) 11/26/2021    TROPONINI 0.014 11/26/2021      CBC - LAST 2  Lab Results   Component Value Date    WBC 10.20 10/19/2023    WBC 14.78 (H) 05/02/2023    RBC 4.87 10/19/2023    RBC 3.92 (L) 05/02/2023    HGB 14.7 10/19/2023    HGB 12.2 05/02/2023    HCT 45.7 10/19/2023    HCT 37.4 05/02/2023     10/19/2023     05/02/2023     No results found for: "LABPT", "INR", "APTT"  CHEMISTRY - LAST 2  Lab Results   Component Value Date     10/19/2023     05/02/2023    K 3.9 10/19/2023    K 4.0 05/02/2023     10/19/2023     05/02/2023    CO2 22 (L) 10/19/2023    CO2 22 (L) 05/02/2023    ANIONGAP 14 10/19/2023    ANIONGAP 11 05/02/2023    BUN 24 (H) 10/19/2023    BUN 16 05/02/2023    CREATININE 1.1 10/19/2023    CREATININE 0.9 05/02/2023    GLU 86 10/19/2023     (H) 05/02/2023    CALCIUM 9.4 10/19/2023    CALCIUM 8.8 05/02/2023    ALBUMIN 3.8 10/19/2023    ALBUMIN 3.6 04/27/2023    PROT 7.2 10/19/2023    PROT 6.9 04/27/2023    ALKPHOS 71 10/19/2023    ALKPHOS 75 04/27/2023    ALT 24 " 10/19/2023    ALT 14 04/27/2023    AST 25 10/19/2023    AST 17 04/27/2023    BILITOT 0.5 10/19/2023    BILITOT 0.3 04/27/2023      ENDOCRINE - LAST 2  Lab Results   Component Value Date    HGBA1C 5.7 (H) 10/19/2023    HGBA1C 5.8 (H) 07/21/2022    TSH 1.494 10/19/2023        PHYSICAL EXAM  CONSTITUTIONAL:  Calm pleasant elderly female breathing comfortably on room air in no apparent distress  NECK: no carotid bruit, no JVD  LUNGS:  Clear, no coughing  CHEST WALL:  Left chest wall device palpable, scar noted  HEART: regular rate and rhythm, S1, S2 normal, + systolic murmur  ABDOMEN: soft, non-tender, obese; bowel sounds normal; no masses  EXTREMITIES: Extremities normal, no edema, no calf tenderness noted  NEURO: AAO X 3, speech clear, memory clear  Gait slightly unstable, she has back pain issues  I HAVE REVIEWED :    The vital signs, most recent cardiac testing, and most recent pertinent non-cardiology provider notes.    Current Outpatient Medications   Medication Instructions    albuterol (PROVENTIL/VENTOLIN HFA) 90 mcg/actuation inhaler INHALE 1-2 PUFFS INTO THE LUNGS 2 (TWO) TIMES A DAY.    amLODIPine (NORVASC) 5 mg, Oral, 2 times daily    anastrozole (ARIMIDEX) 1 mg, Oral, Daily    aspirin (ECOTRIN) 81 mg, Daily    atorvastatin (LIPITOR) 20 mg, Oral, Nightly    cloNIDine (CATAPRES) 0.1 MG tablet TAKE 1 TABLET BY MOUTH EVERY DAY AS NEEDED FOR SYSTOLIC PRESSURE GREATER THAN 170    co-enzyme Q-10 100 mg, Daily    docusate sodium (COLACE) 100 mg, 2 times daily    DULoxetine (CYMBALTA) 20 mg, Oral    ezetimibe (ZETIA) 10 mg, Oral, Daily    fluticasone propionate (FLONASE) 50 mcg/actuation nasal spray No dose, route, or frequency recorded.    gabapentin (NEURONTIN) 300 MG capsule TAKE 1 CAPSULE BY MOUTH IN  THE EVENING    ketoconazole (NIZORAL) 2 % cream Topical (Top), Daily    loratadine (CLARITIN) 10 mg, Daily    metFORMIN (GLUCOPHAGE) 500 mg, 2 times daily with meals    metoprolol succinate (TOPROL-XL) 50 mg, Oral,  Daily    MULTIVIT-MIN/FA/LUTEIN/ZEAXANT (MACULAR VITAMIN ORAL) Take by mouth.    mupirocin (BACTROBAN) 2 % ointment APPLY TO CHIN ONCE DAILY    nystatin (MYCOSTATIN) powder Topical (Top), 3 times daily    olmesartan (BENICAR) 40 mg, Oral, Daily    traZODone (DESYREL) 50 mg, Oral, Nightly    triamcinolone acetonide 0.1% (KENALOG) 0.1 % cream Topical (Top), 2 times daily    triamcinolone acetonide 0.1% (KENALOG) 0.1 % ointment use sparingly, only to affected areas BID, avoid normal skin, do not continue beyond three weeks    vitamin D (VITAMIN D3) 1,000 Units, Daily      Assessment & Plan     No problem-specific Assessment & Plan notes found for this encounter.          No follow-ups on file.              LUCIO MartinezC

## 2024-10-01 NOTE — ASSESSMENT & PLAN NOTE
No anginal symptoms, blood pressure is well controlled, she is on aspirin, Zetia and statin regimen.

## 2024-10-01 NOTE — ASSESSMENT & PLAN NOTE
She is on Zetia and Lipitor   Latest Reference Range & Units Most Recent   LDL Cholesterol 63.0 - 159.0 mg/dL 74.4  10/19/23 08:15

## 2024-10-02 LAB
BATTERY VOLTAGE (V): 2.58 V
IMPEDANCE RA LEAD: 413 OHMS
P/R-WAVE RA LEAD: 22 MV
THRESHOLD MS RA LEAD: 0.4 MS
THRESHOLD V RA LEAD: 0.5 V

## 2024-10-03 DIAGNOSIS — R94.5 ABNORMAL RESULTS OF LIVER FUNCTION STUDIES: ICD-10-CM

## 2024-10-03 DIAGNOSIS — T82.9XXA DISORDER OF CARDIAC PACEMAKER SYSTEM, INITIAL ENCOUNTER: ICD-10-CM

## 2024-10-03 DIAGNOSIS — R79.1 ABNORMAL COAGULATION PROFILE: ICD-10-CM

## 2024-10-03 DIAGNOSIS — Z45.010 PACEMAKER GENERATOR END OF LIFE: Primary | ICD-10-CM

## 2024-10-03 PROBLEM — E78.2 MIXED HYPERLIPIDEMIA: Status: ACTIVE | Noted: 2024-10-03

## 2024-10-03 PROBLEM — F41.9 ANXIETY: Status: ACTIVE | Noted: 2024-10-03

## 2024-10-04 ENCOUNTER — TELEPHONE (OUTPATIENT)
Dept: CARDIOLOGY | Facility: CLINIC | Age: 84
End: 2024-10-04
Payer: MEDICARE

## 2024-10-04 NOTE — TELEPHONE ENCOUNTER
Patient is scheduled for Gen change on 10/15 at 830am with Dr. Ashley. Pt has preop labs 10/11 at 2pm arrive for 130pm. Pt was informed and understood. Pt did request I call her son Chandana to let him know what is going on. Pt was notified and understood. Will send [employer] message on instructions. Pt son Chandana understood. My direct line was given to both in case they need something.

## 2024-10-07 ENCOUNTER — PATIENT MESSAGE (OUTPATIENT)
Dept: ADMINISTRATIVE | Facility: HOSPITAL | Age: 84
End: 2024-10-07
Payer: MEDICARE

## 2024-10-07 ENCOUNTER — HOSPITAL ENCOUNTER (OUTPATIENT)
Dept: CARDIOLOGY | Facility: HOSPITAL | Age: 84
Discharge: HOME | End: 2024-10-07
Attending: NURSE PRACTITIONER
Payer: MEDICARE

## 2024-10-07 VITALS — BODY MASS INDEX: 25.1 KG/M2 | WEIGHT: 147 LBS | HEIGHT: 64 IN

## 2024-10-07 DIAGNOSIS — I34.0 NONRHEUMATIC MITRAL VALVE REGURGITATION: ICD-10-CM

## 2024-10-07 LAB
AORTIC ROOT ANNULUS: 2.7 CM
AORTIC VALVE CUSP SEPERATION: 1.26 CM
APICAL FOUR CHAMBER EJECTION FRACTION: 84 %
ASCENDING AORTA: 1.92 CM
AV INDEX (PROSTH): 0.5
AV MEAN GRADIENT: 11.3 MMHG
AV PEAK GRADIENT: 19.4 MMHG
AV VALVE AREA BY VELOCITY RATIO: 1.3 CM²
AV VALVE AREA: 1.3 CM²
AV VELOCITY RATIO: 0.5
BSA FOR ECHO PROCEDURE: 1.74 M2
CV ECHO LV RWT: 0.88 CM
DOP CALC AO PEAK VEL: 2.2 M/S
DOP CALC AO VTI: 48.8 CM
DOP CALC LVOT AREA: 2.5 CM2
DOP CALC LVOT DIAMETER: 1.8 CM
DOP CALC LVOT PEAK VEL: 1.1 M/S
DOP CALC LVOT STROKE VOLUME: 62.6 CM3
DOP CALC MV VTI: 34.8 CM
DOP CALCLVOT PEAK VEL VTI: 24.6 CM
E WAVE DECELERATION TIME: 324.99 MSEC
E/A RATIO: 0.71
E/E' RATIO: 18.75 M/S
ECHO LV POSTERIOR WALL: 1.5 CM (ref 0.6–1.1)
EJECTION FRACTION: 65 %
FRACTIONAL SHORTENING: 35.3 % (ref 28–44)
INTERVENTRICULAR SEPTUM: 1.9 CM (ref 0.6–1.1)
LA MAJOR: 3.72 CM
LA MINOR: 2.44 CM
LEFT ATRIUM SIZE: 3.79 CM
LEFT INTERNAL DIMENSION IN SYSTOLE: 2.2 CM (ref 2.1–4)
LEFT VENTRICLE DIASTOLIC VOLUME INDEX: 27.27 ML/M2
LEFT VENTRICLE DIASTOLIC VOLUME: 46.91 ML
LEFT VENTRICLE END DIASTOLIC VOLUME APICAL 4 CHAMBER: 45.16 ML
LEFT VENTRICLE MASS INDEX: 133.4 G/M2
LEFT VENTRICLE SYSTOLIC VOLUME INDEX: 9.5 ML/M2
LEFT VENTRICLE SYSTOLIC VOLUME: 16.34 ML
LEFT VENTRICULAR INTERNAL DIMENSION IN DIASTOLE: 3.4 CM (ref 3.5–6)
LEFT VENTRICULAR MASS: 229.5 G
LV LATERAL E/E' RATIO: 18.75 M/S
LV SEPTAL E/E' RATIO: 18.75 M/S
LVED V (TEICH): 46.91 ML
LVES V (TEICH): 16.34 ML
LVOT MG: 2.98 MMHG
LVOT MV: 0.81 CM/S
MV MEAN GRADIENT: 2 MMHG
MV PEAK A VEL: 1.06 M/S
MV PEAK E VEL: 0.75 M/S
MV PEAK GRADIENT: 5 MMHG
MV STENOSIS PRESSURE HALF TIME: 94.25 MS
MV VALVE AREA BY CONTINUITY EQUATION: 1.8 CM2
MV VALVE AREA P 1/2 METHOD: 2.33 CM2
OHS CV RV/LV RATIO: 0.41 CM
PISA MRMAX VEL: 2.06 M/S
PISA TR MAX VEL: 2.24 M/S
PV MV: 0.46 M/S
PV PEAK GRADIENT: 1 MMHG
PV PEAK VELOCITY: 0.61 M/S
RA MAJOR: 3.22 CM
RA PRESSURE ESTIMATED: 3 MMHG
RA WIDTH: 1.99 CM
RIGHT VENTRICLE DIASTOLIC BASEL DIMENSION: 1.4 CM
RIGHT VENTRICLE DIASTOLIC LENGTH: 3.9 CM
RIGHT VENTRICLE DIASTOLIC MID DIMENSION: 1.8 CM
RIGHT VENTRICULAR END-DIASTOLIC DIMENSION: 2.72 CM
RIGHT VENTRICULAR LENGTH IN DIASTOLE (APICAL 4-CHAMBER VIEW): 3.93 CM
RV MID DIAMA: 1.77 CM
RV TB RVSP: 5 MMHG
SINUS: 2.37 CM
STJ: 1.98 CM
TDI LATERAL: 0.04 M/S
TDI SEPTAL: 0.04 M/S
TDI: 0.04 M/S
TR MAX PG: 20 MMHG
TRICUSPID ANNULAR PLANE SYSTOLIC EXCURSION: 2.1 CM
TV REST PULMONARY ARTERY PRESSURE: 23 MMHG
Z-SCORE OF LEFT VENTRICULAR DIMENSION IN END DIASTOLE: -3.39
Z-SCORE OF LEFT VENTRICULAR DIMENSION IN END SYSTOLE: -2.33

## 2024-10-07 PROCEDURE — 93306 TTE W/DOPPLER COMPLETE: CPT | Mod: 26,,, | Performed by: INTERNAL MEDICINE

## 2024-10-07 PROCEDURE — 93306 TTE W/DOPPLER COMPLETE: CPT

## 2024-10-11 ENCOUNTER — HOSPITAL ENCOUNTER (OUTPATIENT)
Dept: PREADMISSION TESTING | Facility: HOSPITAL | Age: 84
Discharge: HOME | End: 2024-10-11
Attending: STUDENT IN AN ORGANIZED HEALTH CARE EDUCATION/TRAINING PROGRAM
Payer: MEDICARE

## 2024-10-11 VITALS
TEMPERATURE: 98 F | OXYGEN SATURATION: 96 % | DIASTOLIC BLOOD PRESSURE: 77 MMHG | RESPIRATION RATE: 18 BRPM | HEART RATE: 74 BPM | HEIGHT: 64 IN | BODY MASS INDEX: 23.86 KG/M2 | WEIGHT: 139.75 LBS | SYSTOLIC BLOOD PRESSURE: 120 MMHG

## 2024-10-11 DIAGNOSIS — Z45.010 PACEMAKER GENERATOR END OF LIFE: ICD-10-CM

## 2024-10-11 DIAGNOSIS — Z01.818 PRE-OP TESTING: Primary | ICD-10-CM

## 2024-10-11 DIAGNOSIS — R79.1 ABNORMAL COAGULATION PROFILE: ICD-10-CM

## 2024-10-11 DIAGNOSIS — R94.5 ABNORMAL RESULTS OF LIVER FUNCTION STUDIES: ICD-10-CM

## 2024-10-11 DIAGNOSIS — T82.9XXA DISORDER OF CARDIAC PACEMAKER SYSTEM, INITIAL ENCOUNTER: ICD-10-CM

## 2024-10-11 LAB
ANION GAP SERPL CALC-SCNC: 6 MMOL/L (ref 8–16)
APTT PPP: 27.8 SEC (ref 21–32)
BASOPHILS # BLD AUTO: 0.07 K/UL (ref 0–0.2)
BASOPHILS NFR BLD: 0.8 % (ref 0–1.9)
BUN SERPL-MCNC: 33 MG/DL (ref 8–23)
CALCIUM SERPL-MCNC: 9.8 MG/DL (ref 8.7–10.5)
CHLORIDE SERPL-SCNC: 110 MMOL/L (ref 95–110)
CO2 SERPL-SCNC: 28 MMOL/L (ref 23–29)
CREAT SERPL-MCNC: 1.5 MG/DL (ref 0.5–1.4)
DIFFERENTIAL METHOD BLD: ABNORMAL
EOSINOPHIL # BLD AUTO: 0.2 K/UL (ref 0–0.5)
EOSINOPHIL NFR BLD: 1.8 % (ref 0–8)
ERYTHROCYTE [DISTWIDTH] IN BLOOD BY AUTOMATED COUNT: 13.7 % (ref 11.5–14.5)
EST. GFR  (NO RACE VARIABLE): 33.7 ML/MIN/1.73 M^2
GLUCOSE SERPL-MCNC: 98 MG/DL (ref 70–110)
HCT VFR BLD AUTO: 49.3 % (ref 37–48.5)
HGB BLD-MCNC: 15.9 G/DL (ref 12–16)
IMM GRANULOCYTES # BLD AUTO: 0.03 K/UL (ref 0–0.04)
IMM GRANULOCYTES NFR BLD AUTO: 0.4 % (ref 0–0.5)
INR PPP: 0.9 (ref 0.8–1.2)
LYMPHOCYTES # BLD AUTO: 2.5 K/UL (ref 1–4.8)
LYMPHOCYTES NFR BLD: 30.3 % (ref 18–48)
MAGNESIUM SERPL-MCNC: 2.3 MG/DL (ref 1.6–2.6)
MCH RBC QN AUTO: 30.5 PG (ref 27–31)
MCHC RBC AUTO-ENTMCNC: 32.3 G/DL (ref 32–36)
MCV RBC AUTO: 95 FL (ref 82–98)
MONOCYTES # BLD AUTO: 0.6 K/UL (ref 0.3–1)
MONOCYTES NFR BLD: 7.3 % (ref 4–15)
MRSA SCREEN BY PCR: NEGATIVE
NEUTROPHILS # BLD AUTO: 4.9 K/UL (ref 1.8–7.7)
NEUTROPHILS NFR BLD: 59.4 % (ref 38–73)
NRBC BLD-RTO: 0 /100 WBC
OHS QRS DURATION: 108 MS
OHS QTC CALCULATION: 458 MS
PLATELET # BLD AUTO: 247 K/UL (ref 150–450)
PMV BLD AUTO: 9.6 FL (ref 9.2–12.9)
POTASSIUM SERPL-SCNC: 4.8 MMOL/L (ref 3.5–5.1)
PROTHROMBIN TIME: 10.6 SEC (ref 9–12.5)
RBC # BLD AUTO: 5.21 M/UL (ref 4–5.4)
SODIUM SERPL-SCNC: 144 MMOL/L (ref 136–145)
WBC # BLD AUTO: 8.32 K/UL (ref 3.9–12.7)

## 2024-10-11 PROCEDURE — 83735 ASSAY OF MAGNESIUM: CPT | Performed by: STUDENT IN AN ORGANIZED HEALTH CARE EDUCATION/TRAINING PROGRAM

## 2024-10-11 PROCEDURE — 85730 THROMBOPLASTIN TIME PARTIAL: CPT | Performed by: STUDENT IN AN ORGANIZED HEALTH CARE EDUCATION/TRAINING PROGRAM

## 2024-10-11 PROCEDURE — 87641 MR-STAPH DNA AMP PROBE: CPT | Performed by: STUDENT IN AN ORGANIZED HEALTH CARE EDUCATION/TRAINING PROGRAM

## 2024-10-11 PROCEDURE — 80048 BASIC METABOLIC PNL TOTAL CA: CPT | Performed by: STUDENT IN AN ORGANIZED HEALTH CARE EDUCATION/TRAINING PROGRAM

## 2024-10-11 PROCEDURE — 85610 PROTHROMBIN TIME: CPT | Performed by: STUDENT IN AN ORGANIZED HEALTH CARE EDUCATION/TRAINING PROGRAM

## 2024-10-11 PROCEDURE — 85025 COMPLETE CBC W/AUTO DIFF WBC: CPT | Performed by: STUDENT IN AN ORGANIZED HEALTH CARE EDUCATION/TRAINING PROGRAM

## 2024-10-11 NOTE — PRE-PROCEDURE INSTRUCTIONS
Preadmit assessment complete. Review of pt health history and home medications.  Preop education per AVS. Pt voiced understanding      States has not had aspirin in 2-3 days  Advised not to take any meds am of procedure but to bring meds in pharmacy bottle  son present

## 2024-10-11 NOTE — DISCHARGE INSTRUCTIONS
Your procedure is scheduled for:Oct 15             [facility] at: 6:30  am       Nothing to eat or drink after midnight the night before your procedure.  Do not take any medications the morning of your procedure  Bring all your medications with you in the original pill bottles from pharmacy.  If you take blood thinners, ask your doctor when you should stop taking them.  Do your Hibiclens wash the night before and morning of your procedure.  Make arrangements for someone you know to drive you home after your procedure. Taxi and Uber are not acceptable.            Any questions call [facility] at [phone]

## 2024-10-12 ENCOUNTER — PATIENT MESSAGE (OUTPATIENT)
Dept: CARDIOLOGY | Facility: CLINIC | Age: 84
End: 2024-10-12
Payer: MEDICARE

## 2024-10-12 DIAGNOSIS — F51.01 PRIMARY INSOMNIA: ICD-10-CM

## 2024-10-12 NOTE — TELEPHONE ENCOUNTER
No care due was identified.  [facility] Embedded Care Due Messages. Reference number: [id number].   10/12/2024 6:14:38 PM CDT

## 2024-10-14 RX ORDER — ATORVASTATIN CALCIUM 20 MG/1
20 TABLET, FILM COATED ORAL NIGHTLY
Qty: 90 TABLET | Refills: 3 | Status: SHIPPED | OUTPATIENT
Start: 2024-10-14

## 2024-10-14 RX ORDER — METOPROLOL SUCCINATE 50 MG/1
50 TABLET, EXTENDED RELEASE ORAL NIGHTLY
Qty: 90 TABLET | Refills: 3 | Status: SHIPPED | OUTPATIENT
Start: 2024-10-14 | End: 2025-10-14

## 2024-10-14 RX ORDER — TRAZODONE HYDROCHLORIDE 50 MG/1
50 TABLET ORAL NIGHTLY
Qty: 90 TABLET | Refills: 2 | Status: SHIPPED | OUTPATIENT
Start: 2024-10-14 | End: 2025-10-14

## 2024-10-14 RX ORDER — OLMESARTAN MEDOXOMIL 40 MG/1
40 TABLET ORAL NIGHTLY
Qty: 90 TABLET | Refills: 3 | Status: SHIPPED | OUTPATIENT
Start: 2024-10-14 | End: 2025-10-14

## 2024-10-14 NOTE — TELEPHONE ENCOUNTER
Refill Decision Note   Mirta Guerin  is requesting a refill authorization.  Brief Assessment and Rationale for Refill:  Approve     Medication Therapy Plan:         Comments:     Note composed:1:36 PM 10/14/2024

## 2024-10-15 ENCOUNTER — ANESTHESIA (OUTPATIENT)
Dept: CARDIOLOGY | Facility: HOSPITAL | Age: 84
End: 2024-10-15
Payer: MEDICARE

## 2024-10-15 ENCOUNTER — HOSPITAL ENCOUNTER (OUTPATIENT)
Facility: HOSPITAL | Age: 84
Discharge: HOME | End: 2024-10-15
Attending: STUDENT IN AN ORGANIZED HEALTH CARE EDUCATION/TRAINING PROGRAM | Admitting: STUDENT IN AN ORGANIZED HEALTH CARE EDUCATION/TRAINING PROGRAM
Payer: MEDICARE

## 2024-10-15 ENCOUNTER — ANESTHESIA EVENT (OUTPATIENT)
Dept: CARDIOLOGY | Facility: HOSPITAL | Age: 84
End: 2024-10-15
Payer: MEDICARE

## 2024-10-15 VITALS
WEIGHT: 139.75 LBS | DIASTOLIC BLOOD PRESSURE: 64 MMHG | BODY MASS INDEX: 23.86 KG/M2 | TEMPERATURE: 98 F | SYSTOLIC BLOOD PRESSURE: 139 MMHG | RESPIRATION RATE: 22 BRPM | HEIGHT: 64 IN | OXYGEN SATURATION: 91 % | HEART RATE: 64 BPM

## 2024-10-15 DIAGNOSIS — Z45.010 PACEMAKER GENERATOR END OF LIFE: ICD-10-CM

## 2024-10-15 DIAGNOSIS — I49.5 SSS (SICK SINUS SYNDROME): Primary | ICD-10-CM

## 2024-10-15 PROCEDURE — 25000003 PHARM REV CODE 250: Performed by: NURSE ANESTHETIST, CERTIFIED REGISTERED

## 2024-10-15 PROCEDURE — 63600175 PHARM REV CODE 636 W HCPCS: Performed by: NURSE ANESTHETIST, CERTIFIED REGISTERED

## 2024-10-15 PROCEDURE — 25000003 PHARM REV CODE 250: Performed by: STUDENT IN AN ORGANIZED HEALTH CARE EDUCATION/TRAINING PROGRAM

## 2024-10-15 PROCEDURE — 63600175 PHARM REV CODE 636 W HCPCS: Performed by: STUDENT IN AN ORGANIZED HEALTH CARE EDUCATION/TRAINING PROGRAM

## 2024-10-15 PROCEDURE — 33228 REMV&REPLC PM GEN DUAL LEAD: CPT | Performed by: STUDENT IN AN ORGANIZED HEALTH CARE EDUCATION/TRAINING PROGRAM

## 2024-10-15 PROCEDURE — 33228 REMV&REPLC PM GEN DUAL LEAD: CPT | Mod: ,,, | Performed by: STUDENT IN AN ORGANIZED HEALTH CARE EDUCATION/TRAINING PROGRAM

## 2024-10-15 RX ORDER — DOXYCYCLINE 100 MG/1
100 CAPSULE ORAL EVERY 12 HOURS
Qty: 10 CAPSULE | Refills: 0 | Status: SHIPPED | OUTPATIENT
Start: 2024-10-15 | End: 2024-10-20

## 2024-10-15 RX ORDER — LIDOCAINE HYDROCHLORIDE 10 MG/ML
INJECTION, SOLUTION INFILTRATION; PERINEURAL
Status: DISCONTINUED | OUTPATIENT
Start: 2024-10-15 | End: 2024-10-15 | Stop reason: HOSPADM

## 2024-10-15 RX ORDER — LIDOCAINE HYDROCHLORIDE 20 MG/ML
INJECTION INTRAVENOUS
Status: DISCONTINUED | OUTPATIENT
Start: 2024-10-15 | End: 2024-10-15

## 2024-10-15 RX ORDER — FAMOTIDINE 10 MG/ML
INJECTION INTRAVENOUS
Status: DISCONTINUED | OUTPATIENT
Start: 2024-10-15 | End: 2024-10-15

## 2024-10-15 RX ORDER — OXYCODONE HYDROCHLORIDE 5 MG/1
5 TABLET ORAL
Status: DISCONTINUED | OUTPATIENT
Start: 2024-10-15 | End: 2024-10-15 | Stop reason: HOSPADM

## 2024-10-15 RX ORDER — ONDANSETRON HYDROCHLORIDE 2 MG/ML
INJECTION, SOLUTION INTRAVENOUS
Status: DISCONTINUED | OUTPATIENT
Start: 2024-10-15 | End: 2024-10-15

## 2024-10-15 RX ORDER — ONDANSETRON HYDROCHLORIDE 2 MG/ML
4 INJECTION, SOLUTION INTRAVENOUS DAILY PRN
Status: DISCONTINUED | OUTPATIENT
Start: 2024-10-15 | End: 2024-10-15 | Stop reason: HOSPADM

## 2024-10-15 RX ORDER — DOXYCYCLINE 100 MG/1
100 CAPSULE ORAL EVERY 12 HOURS
Status: DISCONTINUED | OUTPATIENT
Start: 2024-10-15 | End: 2024-10-15 | Stop reason: HOSPADM

## 2024-10-15 RX ORDER — PROPOFOL 10 MG/ML
VIAL (ML) INTRAVENOUS
Status: DISCONTINUED | OUTPATIENT
Start: 2024-10-15 | End: 2024-10-15

## 2024-10-15 RX ORDER — CEFAZOLIN SODIUM 2 G/50ML
2 SOLUTION INTRAVENOUS ONCE
Status: COMPLETED | OUTPATIENT
Start: 2024-10-15 | End: 2024-10-15

## 2024-10-15 RX ORDER — GLUCAGON 1 MG
1 KIT INJECTION
Status: DISCONTINUED | OUTPATIENT
Start: 2024-10-15 | End: 2024-10-15 | Stop reason: HOSPADM

## 2024-10-15 RX ORDER — PROPOFOL 10 MG/ML
VIAL (ML) INTRAVENOUS CONTINUOUS PRN
Status: DISCONTINUED | OUTPATIENT
Start: 2024-10-15 | End: 2024-10-15

## 2024-10-15 RX ORDER — DIPHENHYDRAMINE HYDROCHLORIDE 50 MG/ML
12.5 INJECTION INTRAMUSCULAR; INTRAVENOUS ONCE AS NEEDED
Status: DISCONTINUED | OUTPATIENT
Start: 2024-10-15 | End: 2024-10-15 | Stop reason: HOSPADM

## 2024-10-15 RX ORDER — FENTANYL CITRATE 50 UG/ML
25 INJECTION, SOLUTION INTRAMUSCULAR; INTRAVENOUS EVERY 5 MIN PRN
Status: DISCONTINUED | OUTPATIENT
Start: 2024-10-15 | End: 2024-10-15 | Stop reason: HOSPADM

## 2024-10-15 RX ADMIN — PHENYLEPHRINE HYDROCHLORIDE 0.1 MCG/KG/MIN: 10 INJECTION INTRAVENOUS at 09:10

## 2024-10-15 RX ADMIN — ONDANSETRON 4 MG: 2 INJECTION INTRAMUSCULAR; INTRAVENOUS at 09:10

## 2024-10-15 RX ADMIN — PROPOFOL 150 MCG/KG/MIN: 10 INJECTION, EMULSION INTRAVENOUS at 09:10

## 2024-10-15 RX ADMIN — CEFAZOLIN SODIUM 2 G: 2 SOLUTION INTRAVENOUS at 09:10

## 2024-10-15 RX ADMIN — LIDOCAINE HYDROCHLORIDE 20 MG: 20 INJECTION, SOLUTION INTRAVENOUS at 09:10

## 2024-10-15 RX ADMIN — SODIUM CHLORIDE: 900 INJECTION, SOLUTION INTRAVENOUS at 09:10

## 2024-10-15 RX ADMIN — PROPOFOL 30 MG: 10 INJECTION, EMULSION INTRAVENOUS at 09:10

## 2024-10-15 RX ADMIN — FAMOTIDINE 20 MG: 10 INJECTION, SOLUTION INTRAVENOUS at 09:10

## 2024-10-15 NOTE — PLAN OF CARE
Ambulated around room without difficulty. No complaints of pain or distress noted. VSS. Dressing to left upper chest wall remains CDI. Proceed with discharge as ordered. Verbalizes understanding of discharge instructions, PM generator change site care, and follow up care. Belongings gathered and dressed in personal clothing. Left via wheelchair to private auto accompanied by son.

## 2024-10-15 NOTE — PLAN OF CARE
Ambulated onto unit without difficulty. No complaints of pain or distress noted. Undressed of personal clothing and gown on. Pre-op. Resting in bed with call light in reach. Son at bedside.

## 2024-10-15 NOTE — TRANSFER OF CARE
"Anesthesia Transfer of Care Note    Patient: Mirta Guerin    Procedure(s) Performed: Procedure(s) (LRB):  REPLACEMENT, PACEMAKER GENERATOR SINGLE CHAMBER (Left)    Patient location: Telemetry/Step Down Unit    Anesthesia Type: general    Transport from OR: Transported from OR on 2-3 L/min O2 by NC with adequate spontaneous ventilation    Post pain: adequate analgesia    Post assessment: no apparent anesthetic complications    Post vital signs: stable    Level of consciousness: awake    Nausea/Vomiting: no nausea/vomiting    Complications: none    Transfer of care protocol was followed    Last vitals: Visit Vitals  BP (!) 152/101   Pulse 65   Temp 36.6 °C (97.9 °F) (Oral)   Resp 11   Ht 5' 4" (1.626 m)   Wt 63.4 kg (139 lb 12.4 oz)   SpO2 (!) 94%   Breastfeeding No   BMI 23.99 kg/m²     "

## 2024-10-15 NOTE — PLAN OF CARE
Bedside hand off report given to RUTHANN Mejia .  Chart given. Left to cath lab via stretcher for scheduled procedure. Son returned to waiting area.

## 2024-10-15 NOTE — ANESTHESIA PREPROCEDURE EVALUATION
10/15/2024  Mirta Guerin is a 84 y.o., female.      Pre-op Assessment    I have reviewed the Patient Summary Reports.     I have reviewed the Nursing Notes. I have reviewed the NPO Status.   I have reviewed the Medications.     Review of Systems  Anesthesia Hx:             Denies Family Hx of Anesthesia complications.    Denies Personal Hx of Anesthesia complications.                    Social:  Smoker, No Alcohol Use       Hematology/Oncology:                        --  Cancer in past history (Breast and melanoma):       Breast              Cardiovascular:    Pacemaker    CAD   CABG/stent (CABG) Dysrhythmias (history of sick sinus syndrome)       hyperlipidemia                               Pulmonary:   COPD (no oxygen and inhalers only as needed, last time quite awhile ago), mild    Denies Shortness of breath.  Sleep Apnea (noncompliant with CPAP) Bilateral pulmonary nodules               Renal/:   renal calculi               Hepatic/GI:     GERD (only occasionally and none today)                Musculoskeletal:         Spine Disorders: (occasional pain medication for neck and back pain)             Neurological:           Recent memory issues per son                            Endocrine:  Diabetes (pre diabetes, no meds)           Psych:   anxiety                 Physical Exam  General: Well nourished, Cooperative, Alert and Oriented    Airway:  Mallampati: III / II  Mouth Opening: Normal  TM Distance: > 6 cm  Tongue: Normal  Neck ROM: Extension Painful    Dental:  Intact    Chest/Lungs:  Clear to auscultation, Normal Respiratory Rate    Heart:  Rate: Normal  Rhythm: Regular Rhythm  Sounds: Normal        Anesthesia Plan  Type of Anesthesia, risks & benefits discussed:    Anesthesia Type: Gen Natural Airway  Intra-op Monitoring Plan: Standard ASA Monitors  Informed Consent: Informed consent signed  with the Patient and all parties understand the risks and agree with anesthesia plan.  All questions answered.   ASA Score: 3    Ready For Surgery From Anesthesia Perspective.     .

## 2024-10-15 NOTE — PLAN OF CARE
Dr. Ashley at bedside and pressure dressing removed. Site remains CDI. MD orders to discharge home 3hrs post procedure. Site and follow up care reviewed with patient and son.

## 2024-10-15 NOTE — PLAN OF CARE
Report received from RUTHANN Mejia. Arrived back from cath lab via stretcher. No complaints of pain or distress noted. Dressing to upper left chest wall CDI. Post PM generator change instructions given. Resting in bed with call light in reach.

## 2024-10-15 NOTE — ANESTHESIA POSTPROCEDURE EVALUATION
Anesthesia Post Evaluation    Patient: Mirta Guerin    Procedure(s) Performed: Procedure(s) (LRB):  REPLACEMENT, PACEMAKER GENERATOR SINGLE CHAMBER (Left)    Final Anesthesia Type: general      [facility] location: [facility].  Patient participation: Yes- Able to Participate  Level of consciousness: awake and alert  Post-procedure vital signs: reviewed and stable  Pain management: adequate  Airway patency: patent    PONV status at discharge: No PONV  Anesthetic complications: no      Cardiovascular status: stable  Respiratory status: unassisted  Hydration status: euvolemic  Follow-up not needed.  Comments: vss              Vitals Value Taken Time   BP  10/15/24 1227   Temp  10/15/24 1227   Pulse  10/15/24 1227   Resp  10/15/24 1227   SpO2  10/15/24 1227         No case tracking events are documented in the log.      Pain/Estephanie Score: Estephanie Score: 10 (10/15/2024  9:05 AM)

## 2024-10-15 NOTE — DISCHARGE INSTRUCTIONS
Post Pacemaker Discharge Instructions:  DO NOT remove the flexan dressing that is covering the pacemaker incision until instructed by  your physician in your follow up appointment (usually around 10 days).  Inspect the site daily for tenderness, discharge, or signs of infection.  Keep dressing dry and intact.  Do not apply powders or lotions to incision site until healed.  Avoid stress to the incision/suture site. Avoid any kind of trauma to the pacemaker site.  Check your pulse daily and notify your physician if the rate is less than the pacemaker set rate.   Always carry your pacemaker ID card with you. A permanent card will be mailed to you in 6-8 weeks.   Avoid areas of high voltages such as power plants, radio transmitters, or welding equipment. You may walk through anti-theft doorways, but do not stand near them for any length of time.   Inform all Physicians and Dentists that you have a pacemaker.   Your pacemaker will need to be checked for proper functioning at intervals determined by your physician. It is very important to keep these appointments or your pacemaker may not function properly.     Activity:  Limit your activity for the next 48 hours. Avoid excessive bending or squatting.   Do not lift anything grater than 5 pounds for one month with the arm on the same side as your pacemaker.  No driving until instructed by you physician at your follow up appointment.     CALL YOUR DOCTOR IMMEDIATELY IF YOU EXPERIENCE ANY OF THE FOLLOWING:  Chest pain, palpitations, shortness of breath, excessive dizziness, fainting, nausea or vomiting.   Signs of infection including: swelling, discharge, redness, warmth , pain, fever

## 2024-10-15 NOTE — H&P
[facility]  Cardiac Electrophysiology  History and Physical     Admission Date: 10/15/2024  Code Status: Prior   Attending Provider: ANGELIQUE Ashley MD   Principal Problem: Sick sinus syndrome with a Medtronic generator that has reached ROSETTA.    Subjective:     Chief Complaint: Sick sinus syndrome with a Medtronic generator that has reached ROSETTA.     HPI:  Ms. Mirta Geurin is a kristine 84-year-old woman who presents today to undergo a generator change of a Medtronic dual-chamber pacemaker, implanted in 2012 in the setting of sick sinus syndrome, that has reached the elective replacement interval. She has a past medical history significant for coronary artery disease s/p prior CABG, sick sinus syndrome s/p implantation of a Medtronic dual-chamber pacemaker on 4/5/2012, mitral regurgitation, hypertension, hyperlipidemia, type II diabetes mellitus, COPD, a history of right breast cancer, GERD, anxiety, osteoarthritis, chronic low back pain, osteopenia, aortic atherosclerosis, and NEGRITA - maintained on CPAP therapy.     No new subjective & objective note has been filed under this hospital service since the last note was generated.    Assessment and Plan:     No notes have been filed under this hospital service.  Service: Arrhythmia    The indications for the procedure were discussed with with patient. I provided a detailed description of the procedure itself, and reviewed potential risks, benefits, and alternative treatment options. Potential risks include, but are not limited to, bleeding, hematoma, infection, tenderness or pain, vascular damage, damage to structures surrounding the vasculature, myocardial damage including possible perforation or valvular damage, cardiac tamponade, CVA, MI, and death. The patient voices understanding and is in agreement with proceeding. Informed consent was obtained. All questions and concerns were addressed.    JARAD Ashley MD  Cardiac  Electrophysiology  [facility]

## 2024-10-15 NOTE — HPI
Ms. Mirta Guerin is a kristine 84-year-old woman who presents today to undergo a generator change of a Medtronic dual-chamber pacemaker, implanted in 2012 in the setting of sick sinus syndrome, that has reached the elective replacement interval. She has a past medical history significant for coronary artery disease s/p prior CABG, sick sinus syndrome s/p implantation of a Medtronic dual-chamber pacemaker on 4/5/2012, mitral regurgitation, hypertension, hyperlipidemia, type II diabetes mellitus, COPD, a history of right breast cancer, GERD, anxiety, osteoarthritis, chronic low back pain, osteopenia, aortic atherosclerosis, and NEGRITA - maintained on CPAP therapy.

## 2024-10-16 DIAGNOSIS — Z95.0 CARDIAC PACEMAKER IN SITU: Primary | ICD-10-CM

## 2024-10-16 NOTE — DISCHARGE SUMMARY
[facility]  Discharge Note  Short Stay    Procedure(s) (LRB):  REPLACEMENT, PACEMAKER GENERATOR SINGLE CHAMBER (Left)  REVISION, SKIN POCKET, FOR CARDIAC PACEMAKER      OUTCOME: Patient tolerated treatment/procedure well without complication and is now ready for discharge.    DISPOSITION: Home or Self Care    FINAL DIAGNOSIS:  Sick sinus syndrome with implantation of a Medtronic dual-chamber pacemaker that had a prior generator that had reached the elective replacement interval, now s/p successful generator change and pocket revision.     FOLLOWUP: In clinic in one week with Dr. Ashley for repeat incision inspection and repeat device interrogation.     DISCHARGE INSTRUCTIONS:  Please see the attached post-pacemaker discharge instructions.     TIME SPENT ON DISCHARGE: 31 minutes.    ANGELIQUE Ashley MD  EP Attending

## 2024-10-18 ENCOUNTER — OFFICE VISIT (OUTPATIENT)
Dept: FAMILY MEDICINE | Facility: CLINIC | Age: 84
End: 2024-10-18
Payer: MEDICARE

## 2024-10-18 VITALS
SYSTOLIC BLOOD PRESSURE: 135 MMHG | WEIGHT: 143.5 LBS | RESPIRATION RATE: 16 BRPM | HEART RATE: 61 BPM | HEIGHT: 64 IN | DIASTOLIC BLOOD PRESSURE: 79 MMHG | BODY MASS INDEX: 24.5 KG/M2 | OXYGEN SATURATION: 95 %

## 2024-10-18 DIAGNOSIS — N18.32 STAGE 3B CHRONIC KIDNEY DISEASE: ICD-10-CM

## 2024-10-18 DIAGNOSIS — R41.3 MEMORY DEFICIT: Primary | ICD-10-CM

## 2024-10-18 DIAGNOSIS — J44.9 CHRONIC OBSTRUCTIVE PULMONARY DISEASE, UNSPECIFIED COPD TYPE: ICD-10-CM

## 2024-10-18 DIAGNOSIS — F41.9 ANXIETY: ICD-10-CM

## 2024-10-18 DIAGNOSIS — I25.810 CORONARY ARTERY DISEASE INVOLVING AUTOLOGOUS VEIN CORONARY BYPASS GRAFT WITHOUT ANGINA PECTORIS: ICD-10-CM

## 2024-10-18 DIAGNOSIS — I10 PRIMARY HYPERTENSION: ICD-10-CM

## 2024-10-18 DIAGNOSIS — Z17.0 MALIGNANT NEOPLASM OF UPPER-OUTER QUADRANT OF RIGHT BREAST IN FEMALE, ESTROGEN RECEPTOR POSITIVE: ICD-10-CM

## 2024-10-18 DIAGNOSIS — C50.411 MALIGNANT NEOPLASM OF UPPER-OUTER QUADRANT OF RIGHT BREAST IN FEMALE, ESTROGEN RECEPTOR POSITIVE: ICD-10-CM

## 2024-10-18 DIAGNOSIS — E11.49 TYPE II DIABETES MELLITUS WITH NEUROLOGICAL MANIFESTATIONS: ICD-10-CM

## 2024-10-18 PROBLEM — E78.5 DYSLIPIDEMIA: Status: RESOLVED | Noted: 2021-05-24 | Resolved: 2024-10-18

## 2024-10-18 PROCEDURE — 99999 PR PBB SHADOW E&M-EST. PATIENT-LVL V: CPT | Mod: PBBFAC,,, | Performed by: STUDENT IN AN ORGANIZED HEALTH CARE EDUCATION/TRAINING PROGRAM

## 2024-10-18 PROCEDURE — 99215 OFFICE O/P EST HI 40 MIN: CPT | Mod: PBBFAC,PN | Performed by: STUDENT IN AN ORGANIZED HEALTH CARE EDUCATION/TRAINING PROGRAM

## 2024-10-18 NOTE — PROGRESS NOTES
Subjective:       Patient ID: Mirta Guerin is a 84 y.o. female.    Chief Complaint: Memory Loss    Ms Mirta is doing well  She has no acute complaints  She feels she is aging well but is tired of taking her meds and following with her doctors.  She is eating well, sleeping well  No issues with swallowing. No significant pain.  She has not had any falls  She is compliant with her meds/treatments.        .  Patient Active Problem List   Diagnosis    Personal history of tobacco use, presenting hazards to health    NEGRITA on CPAP    History of breast cancer    Coronary artery disease involving autologous vein coronary bypass graft without angina pectoris    Type II diabetes mellitus with neurological manifestations    Psoriasis    Disc degeneration, lumbar    Spondylolisthesis of lumbar region    Lumbar facet arthropathy    Allergic rhinitis    Primary hypertension    Hx of CABG    Cardiac pacemaker in situ    SSS (sick sinus syndrome)    Comprehensive diabetic foot examination, type 2 DM, encounter for    Former smoker    High risk medication use    Postmenopausal    Nephrolithiasis    Gastroesophageal reflux disease    Mitral valve insufficiency    Osteoarthritis    Osteopenia    Pulmonary hypertension    Ventricular premature beats    Vitamin D deficiency    Pulmonary nodules    Memory deficit    Aortic atherosclerosis    Chronic obstructive pulmonary disease, unspecified COPD type    Primary insomnia    Malignant neoplasm of upper-outer quadrant of right breast in female, estrogen receptor positive    Mixed hyperlipidemia    Anxiety    Stage 3b chronic kidney disease     Mirta has a current medication list which includes the following prescription(s): amlodipine, anastrozole, aspirin, atorvastatin, co-enzyme q-10, doxycycline, duloxetine, ketoconazole, metoprolol succinate, mupirocin, nystatin, olmesartan, trazodone, triamcinolone acetonide 0.1%, and triamcinolone acetonide 0.1%.    Review of Systems    Constitutional:  Negative for activity change and appetite change.   Respiratory:  Negative for shortness of breath.    Cardiovascular:  Negative for chest pain.   Gastrointestinal:  Negative for abdominal pain.   Genitourinary:  Negative for dysuria.   Integumentary:  Negative for rash.   Psychiatric/Behavioral:  Negative for depressed mood and sleep disturbance. The patient is not nervous/anxious.          Health Maintenance Due   Topic Date Due    RSV Vaccine (Age 60+ and Pregnant patients) (1 - 1-dose 75+ series) Never done    Shingles Vaccine (2 of 2) 10/19/2020    Eye Exam  06/24/2023    Hemoglobin A1c  04/19/2024    Influenza Vaccine (1) 09/01/2024    COVID-19 Vaccine (4 - 2024-25 season) 09/01/2024    Diabetes Urine Screening  10/19/2024    Lipid Panel  10/19/2024      Health Maintenance reviewed and discussed- encouraged pt to schedule eye exam, vaccines encouraged.     Objective:      Physical Exam  Constitutional:       General: She is not in acute distress.     Appearance: Normal appearance. She is not ill-appearing.   Eyes:      Conjunctiva/sclera: Conjunctivae normal.   Cardiovascular:      Rate and Rhythm: Normal rate and regular rhythm.      Heart sounds: Normal heart sounds. No murmur heard.  Pulmonary:      Effort: Pulmonary effort is normal. No respiratory distress.      Breath sounds: Normal breath sounds. No wheezing or rales.   Musculoskeletal:      Right lower leg: No edema.      Left lower leg: No edema.   Lymphadenopathy:      Cervical: No cervical adenopathy.   Skin:     General: Skin is warm and dry.   Neurological:      Mental Status: She is alert. Mental status is at baseline.      Gait: Gait abnormal.   Psychiatric:         Mood and Affect: Mood normal.         Behavior: Behavior normal.      Comments: Some evidence of cognitive decline. Slow to answer questions         Assessment:       1. Memory deficit    2. Anxiety    3. Chronic obstructive pulmonary disease, unspecified COPD type     4. Coronary artery disease involving autologous vein coronary bypass graft without angina pectoris    5. Primary hypertension    6. Malignant neoplasm of upper-outer quadrant of right breast in female, estrogen receptor positive    7. Type II diabetes mellitus with neurological manifestations    8. Stage 3b chronic kidney disease        Plan:       1. Memory deficit  Assessment & Plan:  Stable. Continue current medications and regular followup.        2. Anxiety  Assessment & Plan:  Stable. Continue current medications and regular followup.        3. Chronic obstructive pulmonary disease, unspecified COPD type  Assessment & Plan:  Stable. Continue current medications and regular followup.        4. Coronary artery disease involving autologous vein coronary bypass graft without angina pectoris  Assessment & Plan:  Stable. Continue current medications and regular followup.  Continue risk factor management        5. Primary hypertension  Assessment & Plan:  Stable. Continue current medications and regular followup.  Hypertension Medications               amLODIPine (NORVASC) 5 MG tablet TAKE 1 TABLET BY MOUTH TWICE A DAY    metoprolol succinate (TOPROL-XL) 50 MG 24 hr tablet Take 1 tablet (50 mg total) by mouth every evening.    olmesartan (BENICAR) 40 MG tablet Take 1 tablet (40 mg total) by mouth every evening.                6. Malignant neoplasm of upper-outer quadrant of right breast in female, estrogen receptor positive  Assessment & Plan:  Stable on arimidex      7. Type II diabetes mellitus with neurological manifestations  Assessment & Plan:  Lab Results   Component Value Date    HGBA1C 5.7 (H) 10/19/2023     Stable. Continue current diet and regular followup.          8. Stage 3b chronic kidney disease  Assessment & Plan:  BMP  Lab Results   Component Value Date     10/11/2024    K 4.8 10/11/2024     10/11/2024    CO2 28 10/11/2024    BUN 33 (H) 10/11/2024    CREATININE 1.5 (H) 10/11/2024     CALCIUM 9.8 10/11/2024    ANIONGAP 6 (L) 10/11/2024    EGFRNORACEVR 33.7 (A) 10/11/2024     KFRE 5-Year: 14.3% at 10/11/2024  2:39 PM  Calculated from:  Serum Creatinine: 1.5 mg/dL at 10/11/2024  2:39 PM  Urine Albumin Creatinine Ratio: 1,305.6 ug/mg at 10/19/2023  8:11 AM  Age: 84 years  Sex: Female at 10/11/2024  2:39 PM  Has CKD-3 to CKD-5: No    Needs nephrology    Orders:  -     Ambulatory referral/consult to Nephrology; Future; Expected date: 10/25/2024

## 2024-10-18 NOTE — ASSESSMENT & PLAN NOTE
BMP  Lab Results   Component Value Date     10/11/2024    K 4.8 10/11/2024     10/11/2024    CO2 28 10/11/2024    BUN 33 (H) 10/11/2024    CREATININE 1.5 (H) 10/11/2024    CALCIUM 9.8 10/11/2024    ANIONGAP 6 (L) 10/11/2024    EGFRNORACEVR 33.7 (A) 10/11/2024     KFRE 5-Year: 14.3% at 10/11/2024  2:39 PM  Calculated from:  Serum Creatinine: 1.5 mg/dL at 10/11/2024  2:39 PM  Urine Albumin Creatinine Ratio: 1,305.6 ug/mg at 10/19/2023  8:11 AM  Age: 84 years  Sex: Female at 10/11/2024  2:39 PM  Has CKD-3 to CKD-5: No    Needs nephrology

## 2024-10-18 NOTE — ASSESSMENT & PLAN NOTE
Stable. Continue current medications and regular followup.  Hypertension Medications               amLODIPine (NORVASC) 5 MG tablet TAKE 1 TABLET BY MOUTH TWICE A DAY    metoprolol succinate (TOPROL-XL) 50 MG 24 hr tablet Take 1 tablet (50 mg total) by mouth every evening.    olmesartan (BENICAR) 40 MG tablet Take 1 tablet (40 mg total) by mouth every evening.

## 2024-10-18 NOTE — ASSESSMENT & PLAN NOTE
Lab Results   Component Value Date    HGBA1C 5.7 (H) 10/19/2023     Stable. Continue current diet and regular followup.

## 2024-10-25 ENCOUNTER — HOSPITAL ENCOUNTER (OUTPATIENT)
Dept: CARDIOLOGY | Facility: CLINIC | Age: 84
Discharge: HOME | End: 2024-10-25
Attending: STUDENT IN AN ORGANIZED HEALTH CARE EDUCATION/TRAINING PROGRAM
Payer: MEDICARE

## 2024-10-25 ENCOUNTER — OFFICE VISIT (OUTPATIENT)
Dept: CARDIOLOGY | Facility: CLINIC | Age: 84
End: 2024-10-25
Payer: MEDICARE

## 2024-10-25 VITALS
HEART RATE: 74 BPM | DIASTOLIC BLOOD PRESSURE: 78 MMHG | RESPIRATION RATE: 16 BRPM | SYSTOLIC BLOOD PRESSURE: 132 MMHG | WEIGHT: 143.5 LBS | BODY MASS INDEX: 24.5 KG/M2 | HEIGHT: 64 IN | OXYGEN SATURATION: 98 %

## 2024-10-25 DIAGNOSIS — I49.5 SSS (SICK SINUS SYNDROME): Primary | ICD-10-CM

## 2024-10-25 DIAGNOSIS — N18.32 STAGE 3B CHRONIC KIDNEY DISEASE: ICD-10-CM

## 2024-10-25 DIAGNOSIS — K21.9 GASTROESOPHAGEAL REFLUX DISEASE WITHOUT ESOPHAGITIS: ICD-10-CM

## 2024-10-25 DIAGNOSIS — G89.29 CHRONIC MIDLINE LOW BACK PAIN, UNSPECIFIED WHETHER SCIATICA PRESENT: ICD-10-CM

## 2024-10-25 DIAGNOSIS — G47.33 OSA ON CPAP: ICD-10-CM

## 2024-10-25 DIAGNOSIS — Z95.1 HX OF CABG: ICD-10-CM

## 2024-10-25 DIAGNOSIS — Z85.3 HISTORY OF BREAST CANCER: ICD-10-CM

## 2024-10-25 DIAGNOSIS — M85.80 OSTEOPENIA, UNSPECIFIED LOCATION: ICD-10-CM

## 2024-10-25 DIAGNOSIS — Z95.0 CARDIAC PACEMAKER IN SITU: ICD-10-CM

## 2024-10-25 DIAGNOSIS — I70.0 AORTIC ATHEROSCLEROSIS: ICD-10-CM

## 2024-10-25 DIAGNOSIS — E11.49 TYPE II DIABETES MELLITUS WITH NEUROLOGICAL MANIFESTATIONS: ICD-10-CM

## 2024-10-25 DIAGNOSIS — E78.2 MIXED HYPERLIPIDEMIA: ICD-10-CM

## 2024-10-25 DIAGNOSIS — I10 PRIMARY HYPERTENSION: ICD-10-CM

## 2024-10-25 DIAGNOSIS — I25.810 CORONARY ARTERY DISEASE INVOLVING CORONARY BYPASS GRAFT OF NATIVE HEART WITHOUT ANGINA PECTORIS: ICD-10-CM

## 2024-10-25 DIAGNOSIS — F41.9 ANXIETY: ICD-10-CM

## 2024-10-25 DIAGNOSIS — I34.0 NONRHEUMATIC MITRAL VALVE REGURGITATION: ICD-10-CM

## 2024-10-25 DIAGNOSIS — M15.9 OSTEOARTHRITIS OF MULTIPLE JOINTS, UNSPECIFIED OSTEOARTHRITIS TYPE: ICD-10-CM

## 2024-10-25 DIAGNOSIS — M54.50 CHRONIC MIDLINE LOW BACK PAIN, UNSPECIFIED WHETHER SCIATICA PRESENT: ICD-10-CM

## 2024-10-25 DIAGNOSIS — J44.9 CHRONIC OBSTRUCTIVE PULMONARY DISEASE, UNSPECIFIED COPD TYPE: ICD-10-CM

## 2024-10-25 LAB
BATTERY VOLTAGE (V): 3.23 V
IMPEDANCE RA LEAD (NATIVE): 342 OHMS
IMPEDANCE RA LEAD: 437 OHMS
P/R-WAVE RA LEAD (NATIVE): 16.9 MV
P/R-WAVE RA LEAD: 1.1 MV
THRESHOLD MS RA LEAD (NATIVE): 0.4 MS
THRESHOLD MS RA LEAD: 0.4 MS
THRESHOLD V RA LEAD (NATIVE): 0.88 V
THRESHOLD V RA LEAD: 1 V

## 2024-10-25 PROCEDURE — 99214 OFFICE O/P EST MOD 30 MIN: CPT | Mod: PBBFAC,PN | Performed by: STUDENT IN AN ORGANIZED HEALTH CARE EDUCATION/TRAINING PROGRAM

## 2024-10-25 PROCEDURE — 99999 PR PBB SHADOW E&M-EST. PATIENT-LVL IV: CPT | Mod: PBBFAC,,, | Performed by: STUDENT IN AN ORGANIZED HEALTH CARE EDUCATION/TRAINING PROGRAM

## 2024-10-25 PROCEDURE — 93288 INTERROG EVL PM/LDLS PM IP: CPT | Mod: PBBFAC,PN

## 2024-10-25 PROCEDURE — 93288 INTERROG EVL PM/LDLS PM IP: CPT | Mod: 26,S$PBB,, | Performed by: STUDENT IN AN ORGANIZED HEALTH CARE EDUCATION/TRAINING PROGRAM

## 2024-10-25 NOTE — PROGRESS NOTES
Electrophysiology Clinic Note    Reason for follow-up patient visit: Ongoing evaluation and routine device surveillance of a Medtronic dual-chamber pacemaker, implanted in 2012 in the setting of sick sinus syndrome, s/p a recent generator change on 10/15/2024.     PRESENTING HISTORY:     History of Present Illness:  Ms. Mirta Guerin is a kristine 84-year-old woman who returns to clinic today for ongoing evaluation and routine device surveillance of a Medtronic dual-chamber pacemaker, implanted in 2012 in the setting of sick sinus syndrome, s/p a recent generator change on 10/15/2024. She has a past medical history significant for coronary artery disease s/p prior CABG, sick sinus syndrome s/p implantation of a Medtronic dual-chamber pacemaker on 4/5/2012, mitral regurgitation, hypertension, hyperlipidemia, type II diabetes mellitus, CKD stage III, COPD, a history of right breast cancer, GERD, anxiety, osteoarthritis, chronic low back pain, osteopenia, aortic atherosclerosis, and NEGRTIA - maintained on CPAP therapy. Her previous generator had reached the elective replacement interval, and she underwent a generator change on 10/15/2024.    She is followed in general cardiology with Dr. Juarez and JONNIE Quigley and was seen in clinic on 10/1/2024 where her device was interrogated. At interrogation, her prior generator was noted to have reached ROSETTA in 8/2024. She is not device dependent and remained in normal sinus rhythm with a narrow QRSd of 90ms. Her most recent pharmacologic nuclear cardiac stress test reveals preserved systolic function with LVEF 67%. She denies any alerts or alarms from her device. She underwent a successful generator change on 10/15/2024 and returns to clinic today for her postoperative interrogation and incision inspection.      In discussion with Ms. Guerin today, she tells me that she is feeling overall quite well. She presents to clinic today with her adult son, who is very involved in her  care. She has healed well following her recent generator change, and has completed her course of oral antibiotics. She denies any episodes of dizziness, lightheadedness, syncope/presyncope, chest pain or chest discomfort, palpitations, nausea or vomiting, orthopnea, or PND. She reports baseline mild shortness of breath and dyspnea with exertion that she feels has remained stable. She can climb one flight of stairs prior to needing to take a break, but is limited by knee, hip, and back pain. She denies limitation with getting her groceries or doing her household chores. She continues to attempt to increase her level of physical activity.     Review of Systems:  Review of Systems   Constitutional:  Negative for activity change.   HENT:  Negative for nasal congestion, nosebleeds, postnasal drip, rhinorrhea, sinus pressure/congestion, sneezing and sore throat.    Respiratory:  Positive for shortness of breath. Negative for apnea, cough, chest tightness and wheezing.    Cardiovascular:  Negative for chest pain, palpitations and leg swelling.   Gastrointestinal:  Negative for abdominal distention, abdominal pain, blood in stool, change in bowel habit, constipation, diarrhea, nausea and vomiting.   Genitourinary:  Negative for dysuria and hematuria.   Musculoskeletal:  Positive for arthralgias and back pain. Negative for gait problem.   Neurological:  Negative for dizziness, seizures, syncope, weakness, light-headedness, headaches and coordination difficulties.        PAST HISTORY:     Past Medical History:   Diagnosis Date    Anxiety     AV block     Breast cancer     Cardiac pacemaker in situ     Coronary artery disease     Diabetes mellitus     GERD (gastroesophageal reflux disease)     HLD (hyperlipidemia)     Hypertension     Lung disease     Mass of appendix 05/01/2023    Melanoma     Mitral valve disorder     NEGRITA (obstructive sleep apnea)     no CPAP    Prediabetes     Psoriasis     Spinal stenosis     Squamous cell  carcinoma of skin     right anterior scalp    Vitamin D deficiency        Past Surgical History:   Procedure Laterality Date    BREAST CYST ASPIRATION      BREAST LUMPECTOMY      CARDIAC PACEMAKER PLACEMENT  04/05/2012    COLONOSCOPY N/A 04/21/2023    Procedure: COLONOSCOPY;  Surgeon: Corey Blake III, MD;  Location: [facility];  Service: Endoscopy;  Laterality: N/A;    CORONARY ARTERY BYPASS GRAFT  07/2017    HYSTERECTOMY      LAPAROSCOPIC SURGICAL REMOVAL OF CECUM N/A 05/01/2023    Procedure: EXCISION, CECUM, LAPAROSCOPIC;  Surgeon: Eduin Neff MD;  Location: [facility];  Service: General;  Laterality: N/A;  ROBOTIC PARTIAL CECECTOMY    SKIN CANCER EXCISION      UPPER GASTROINTESTINAL ENDOSCOPY         Family History:  Family History   Problem Relation Name Age of Onset    Cancer Mother          ovarian    Coronary artery disease Father      Stroke Father      Stroke Maternal Grandfather      Breast cancer Paternal Grandmother      Cancer Paternal Grandmother          breast    Stroke Paternal Grandfather      Stroke Brother      Ovarian cancer Neg Hx         Social History:  She  reports that she has been smoking cigarettes. She started smoking about 70 years ago. She has a 17.7 pack-year smoking history. She has been exposed to tobacco smoke. She has never used smokeless tobacco. She reports current alcohol use. She reports that she does not use drugs.      MEDICATIONS & ALLERGIES:     Review of patient's allergies indicates:   Allergen Reactions    Bacitracin zinc-polymyxin b Rash    Adhesive Rash    Benazepril Other (See Comments)     sleepy    Codeine Nausea And Vomiting    Polysporin [bacitracin-polymyxin b] Rash       Current Outpatient Medications on File Prior to Visit   Medication Sig Dispense Refill    amLODIPine (NORVASC) 5 MG tablet TAKE 1 TABLET BY MOUTH TWICE A  tablet 3    anastrozole (ARIMIDEX) 1 mg Tab Take 1 tablet (1 mg total) by mouth once daily. 90 tablet 3    aspirin (ECOTRIN)  "81 MG EC tablet Take 81 mg by mouth once daily.      atorvastatin (LIPITOR) 20 MG tablet Take 1 tablet (20 mg total) by mouth every evening. 90 tablet 3    co-enzyme Q-10 50 mg capsule Take 100 mg by mouth once daily.      DULoxetine (CYMBALTA) 20 MG capsule TAKE 1 CAPSULE BY MOUTH  ONCE DAILY (Patient taking differently: Take 20 mg by mouth once daily.) 90 capsule 3    ketoconazole (NIZORAL) 2 % cream Apply topically once daily. 60 g 3    metoprolol succinate (TOPROL-XL) 50 MG 24 hr tablet Take 1 tablet (50 mg total) by mouth every evening. 90 tablet 3    mupirocin (BACTROBAN) 2 % ointment APPLY TO CHIN ONCE DAILY (Patient taking differently: Apply 1 g topically once daily. Apply to chin once daily) 22 g 1    nystatin (MYCOSTATIN) powder Apply topically 3 (three) times daily. 60 g 11    olmesartan (BENICAR) 40 MG tablet Take 1 tablet (40 mg total) by mouth every evening. 90 tablet 3    traZODone (DESYREL) 50 MG tablet Take 1 tablet (50 mg total) by mouth every evening. 90 tablet 2    triamcinolone acetonide 0.1% (KENALOG) 0.1 % cream Apply topically 2 (two) times daily. 28.4 g 0    triamcinolone acetonide 0.1% (KENALOG) 0.1 % ointment use sparingly, only to affected areas BID, avoid normal skin, do not continue beyond three weeks 30 g 0     No current facility-administered medications on file prior to visit.        OBJECTIVE:     Vital Signs:  /78 (BP Location: Left arm)   Pulse 74   Resp 16   Ht 5' 4" (1.626 m)   Wt 65.1 kg (143 lb 8.3 oz)   SpO2 98%   BMI 24.64 kg/m²     Physical Exam:  Physical Exam  Constitutional:       General: She is not in acute distress.     Appearance: Normal appearance. She is not ill-appearing or diaphoretic.      Comments: Well-appearing elderly woman in NAD, presenting with a rolling walker.    HENT:      Head: Normocephalic and atraumatic.      Nose: Nose normal.      Mouth/Throat:      Mouth: Mucous membranes are moist.      Pharynx: Oropharynx is clear.   Eyes:      " Pupils: Pupils are equal, round, and reactive to light.   Cardiovascular:      Rate and Rhythm: Normal rate and regular rhythm.      Pulses: Normal pulses.      Heart sounds: Murmur heard.      No friction rub. No gallop.      Comments: Left anterior chest wall incision site is in excellent repair with the device freely mobile within the pocket, with no evidence of device adhesion or erosion. Her recent incision was inspected and is healing well. II/VI CELESTE best appreciated at the RUSB.  Pulmonary:      Effort: Pulmonary effort is normal. No respiratory distress.      Breath sounds: Normal breath sounds. No wheezing, rhonchi or rales.   Chest:      Chest wall: No tenderness.   Abdominal:      General: There is no distension.      Palpations: Abdomen is soft.      Tenderness: There is no abdominal tenderness.   Musculoskeletal:         General: No swelling or tenderness.      Cervical back: Normal range of motion.      Right lower leg: No edema.      Left lower leg: No edema.   Skin:     General: Skin is warm and dry.      Findings: No erythema, lesion or rash.   Neurological:      General: No focal deficit present.      Mental Status: She is alert and oriented to person, place, and time. Mental status is at baseline.      Motor: No weakness.      Gait: Gait normal.   Psychiatric:         Mood and Affect: Mood normal.         Behavior: Behavior normal.          Laboratory Data:  Lab Results   Component Value Date    WBC 8.32 10/11/2024    HGB 15.9 10/11/2024    HCT 49.3 (H) 10/11/2024    MCV 95 10/11/2024     10/11/2024     Lab Results   Component Value Date    GLU 98 10/11/2024     10/11/2024    K 4.8 10/11/2024     10/11/2024    CO2 28 10/11/2024    BUN 33 (H) 10/11/2024    CREATININE 1.5 (H) 10/11/2024    CALCIUM 9.8 10/11/2024    MG 2.3 10/11/2024     Lab Results   Component Value Date    INR 0.9 10/11/2024       Pertinent Cardiac Data:  Personal interpretation of her ECG: Normal sinus rhythm  with rate of 73 bpm,  ms,  ms, QT/QTc 416/458 ms, LAD, LVH.     Pharmacologic Nuclear Cardiac Stress Test - SPECT - 11/17/2021:    Normal myocardial perfusion scan. There is no evidence of myocardial ischemia or infarction.    The gated perfusion images showed an ejection fraction of 67% post stress. Normal ejection fraction is greater than 53%.    There is normal wall motion post stress.    LV cavity size is  and normal at stress.    The EKG portion of this study is negative for ischemia.    The patient reported chest pain during the stress test.    There were no arrhythmias during stress.    Resting 2D Transthoracic Echocardiogram - 10/7/2024:    Left Ventricle: The left ventricle is normal in size. Severely increased wall thickness. There is severe asymmetric hypertrophy. There is normal systolic function with a visually estimated ejection fraction of 60 - 65%. Ejection fraction is approximately 65%.    Right Ventricle: Normal right ventricular cavity size. Systolic function is normal. TAPSE is 2.10 cm. Catheter present in the ventricle.    Aortic Valve: The aortic valve is structurally normal. Moderately calcified cusps. Mildly restricted motion.    Mitral Valve: Mildly calcified leaflets. There is mild regurgitation.    IVC/SVC: Normal venous pressure at 3 mmHg.    Pericardium: There is a small effusion. No indication of cardiac tamponade.    Personal interpretation of today's Device Interrogation: Personal review of her device interrogation report (Medtronic Miranda XT DR JOSHI, initially implanted 4/5/2012 with Dr. Calderon Burger with a generator change with me on 10/15/2024) reveals adequate battery longevity with an estimated remaining 14.8 years. Her leads were interrogated with acceptable and stable lead parameters. She is currently AS-VS with underlying normal sinus rhythm at a rate of 61 bpm. She is programmed AAIR <--> DDDR at 60 bpm, and is RA paced 31.3% and RV paced <0.1%. There are no  concerning AHR or VHR episodes noted. She has not had any recorded episodes of AF/AT, with an overall burden of 0%.        ASSESSMENT & PLAN:   Ms. Mirta Guerin is a kristine 84-year-old woman who returns to clinic today for ongoing evaluation and routine device surveillance of a Medtronic dual-chamber pacemaker, implanted in 2012 in the setting of sick sinus syndrome, s/p a recent generator change on 10/15/2024. She has a past medical history significant for coronary artery disease s/p prior CABG, sick sinus syndrome s/p implantation of a Medtronic dual-chamber pacemaker on 4/5/2012, mitral regurgitation, hypertension, hyperlipidemia, type II diabetes mellitus, CKD stage III, COPD, a history of right breast cancer, GERD, anxiety, osteoarthritis, chronic low back pain, osteopenia, aortic atherosclerosis, and NEGRITA - maintained on CPAP therapy. Her previous generator had reached the elective replacement interval, and she underwent a generator change on 10/15/2024.    - She has a normally functioning dual-chamber pacemaker on device interrogation today. She is presently AS-VS with underlying normal sinus rhythm at a rate of 61 bpm. She is programmed AAIR <--> DDDR at 60 bpm, and is RA paced 31.3% and RV paced <0.1%. There are no concerning AHR or VHR episodes noted. She has not had any recorded episodes of AF/AT, with an overall burden of 0%. She is not device dependent. Her intrinsic QRSd remains narrow at 90ms. Her systolic function remains preserved with her most recent LVEF 60-65%. She has no current indication for device upgrade. She has not captured any sustained or concerning atrial or ventricular arrhythmias since her prior counter clearance.   - Her AqualcelAg bandage was removed today with incision site inspection. Her closure is in excellent repair, with dermal sealant as placed. She has completed a 5-day course of prophylactic antibiotics.   - Her previous pacing had reverted to VVI with a lower rate  limit of 65 bpm following reaching ROSETTA. As she has completed a successful generator change, her pacing was reprogrammed to AAIR <--> DDDR with a lower rate limit of 60 bpm.   - We will provide her with a new remote monitor following her successful generator change, to be mailed to her home, for ongoing surveillance.   - She will continue to follow with her general cardiologist and her PCP for ongoing management of her comorbid conditions.     This patient will return to clinic with me in three months for ongoing device surveillance, with continued remote transmissions in the interim. All questions and concerns were addressed at this encounter. Total time spent in this patient encounter: 32 minutes.     Signing Physician:       JARAD Ashley MD  Electrophysiology Attending

## 2024-11-04 ENCOUNTER — OFFICE VISIT (OUTPATIENT)
Dept: URGENT CARE | Facility: CLINIC | Age: 84
End: 2024-11-04
Payer: MEDICARE

## 2024-11-04 VITALS
DIASTOLIC BLOOD PRESSURE: 84 MMHG | SYSTOLIC BLOOD PRESSURE: 130 MMHG | TEMPERATURE: 98 F | RESPIRATION RATE: 20 BRPM | OXYGEN SATURATION: 96 % | HEIGHT: 63 IN | HEART RATE: 70 BPM | BODY MASS INDEX: 25.69 KG/M2 | WEIGHT: 145 LBS
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
## 2024-11-04 PROCEDURE — 99214 OFFICE O/P EST MOD 30 MIN: CPT | Mod: 25,S$GLB,, | Performed by: NURSE PRACTITIONER

## 2024-11-04 PROCEDURE — 90471 IMMUNIZATION ADMIN: CPT | Mod: S$GLB,,, | Performed by: FAMILY MEDICINE

## 2024-11-04 PROCEDURE — 90714 TD VACC NO PRESV 7 YRS+ IM: CPT | Mod: S$GLB,,, | Performed by: FAMILY MEDICINE

## 2024-11-04 RX ORDER — DOXYCYCLINE 100 MG/1
100 CAPSULE ORAL EVERY 12 HOURS
Qty: 20 CAPSULE | Refills: 0 | Status: SHIPPED | OUTPATIENT
Start: 2024-11-04 | End: 2024-11-14

## 2024-11-04 NOTE — PROGRESS NOTES
"Subjective:      Patient ID: Mirta Guerin is a 84 y.o. female.    Vitals:  height is 5' 3" (1.6 m) and weight is 65.8 kg (145 lb). Her oral temperature is 97.7 °F (36.5 °C). Her blood pressure is 130/84 and her pulse is 70. Her respiration is 20 and oxygen saturation is 96%.     Chief Complaint: Animal Bite    84 y.o. female who presents today with a chief complaint of a cat bite on her left hand that occurred last night. Last tetanus shot unknown. She explains that her cat bit her while she was attempting to give her cat medication.    Animal Bite   The incident occurred yesterday. The incident occurred at home. There is an injury to the Left hand. The pain is mild. It is unknown if a foreign body is present. There have been no prior injuries to these areas. She is Right-handed. Her tetanus status is unknown. She has been Behaving normally. There were no sick contacts. She has received no recent medical care.       Skin:  Positive for wound and erythema.      Objective:     Physical Exam   Constitutional: She is oriented to person, place, and time.  Non-toxic appearance. She does not appear ill. No distress. normal  HENT:   Head: Normocephalic and atraumatic.   Nose: Nose normal.   Mouth/Throat: Mucous membranes are moist. Oropharynx is clear.   Eyes: Conjunctivae are normal. Extraocular movement intact   Neck: Neck supple. No neck rigidity present.   Cardiovascular: Normal rate, regular rhythm, normal heart sounds and normal pulses.   Pulmonary/Chest: Effort normal and breath sounds normal.   Abdominal: Normal appearance.   Musculoskeletal: Normal range of motion.         General: Normal range of motion.      Cervical back: She exhibits no tenderness.   Lymphadenopathy:     She has no cervical adenopathy.   Neurological: no focal deficit. She is alert and oriented to person, place, and time.   Skin: Skin is warm, dry and not diaphoretic. erythema         Comments: There is a small wound to the lateral " aspect of the palm of the left hand. There is some erythema localized to this area.   Psychiatric: Her behavior is normal. Mood normal.   Vitals reviewed.     XR HAND COMPLETE 3 VIEW LEFT    Result Date: 11/4/2024  EXAMINATION: XR HAND COMPLETE 3 VIEW LEFT CLINICAL HISTORY: . Bitten by cat, initial encounter TECHNIQUE: PA, lateral, and oblique views of the left hand were performed. COMPARISON: None FINDINGS: There is mild soft tissue swelling.  No radiopaque foreign body. Mild degenerative osteoarthrosis of the 1st MCP joint and the IP joints of the digits.  Carpal bones intact with mild to severe degenerative osteoarthrosis most predominant within the triscaphe joint and 1st CMC joint.  Distal radius and ulna intact. Mild bone demineralization.     1. Mild soft tissue swelling without radiopaque foreign body. 2. Degenerative osteoarthrosis most predominant within the 1st CMC joint. Electronically signed by: Hans Bucio Date:    11/04/2024 Time:    10:15      Assessment:     1. Cat bite, initial encounter        Plan:       Cat bite, initial encounter  -     XR HAND COMPLETE 3 VIEW LEFT; Future; Expected date: 11/04/2024  -     Td (Tenivac) IM vaccine (>/= 8 yo)    INSTRUCTIONS  Medication as prescribed. Wound care as advised. Follow up as advised.

## 2024-12-15 ENCOUNTER — HOSPITAL ENCOUNTER (OUTPATIENT)
Dept: CARDIOLOGY | Facility: CLINIC | Age: 84
Discharge: HOME | End: 2024-12-15
Attending: INTERNAL MEDICINE
Payer: MEDICARE

## 2024-12-15 ENCOUNTER — CLINICAL SUPPORT (OUTPATIENT)
Dept: CARDIOLOGY | Facility: CLINIC | Age: 84
End: 2024-12-15

## 2024-12-15 DIAGNOSIS — R00.1 BRADYCARDIA, UNSPECIFIED: ICD-10-CM

## 2024-12-15 DIAGNOSIS — Z95.0 PRESENCE OF CARDIAC PACEMAKER: ICD-10-CM

## 2024-12-15 PROCEDURE — 93294 REM INTERROG EVL PM/LDLS PM: CPT | Mod: ,,, | Performed by: INTERNAL MEDICINE

## 2025-01-02 ENCOUNTER — LAB VISIT (OUTPATIENT)
Dept: LAB | Facility: HOSPITAL | Age: 85
End: 2025-01-02
Attending: STUDENT IN AN ORGANIZED HEALTH CARE EDUCATION/TRAINING PROGRAM
Payer: MEDICARE

## 2025-01-02 DIAGNOSIS — E78.5 DYSLIPIDEMIA: ICD-10-CM

## 2025-01-02 DIAGNOSIS — E11.49 TYPE II DIABETES MELLITUS WITH NEUROLOGICAL MANIFESTATIONS: ICD-10-CM

## 2025-01-02 DIAGNOSIS — I10 ESSENTIAL HYPERTENSION: ICD-10-CM

## 2025-01-02 DIAGNOSIS — E55.9 VITAMIN D DEFICIENCY: ICD-10-CM

## 2025-01-02 LAB
25(OH)D3+25(OH)D2 SERPL-MCNC: 18 NG/ML (ref 30–96)
ALBUMIN SERPL BCP-MCNC: 3.7 G/DL (ref 3.5–5.2)
ALBUMIN/CREAT UR: 2519.8 UG/MG (ref 0–30)
ALP SERPL-CCNC: 85 U/L (ref 40–150)
ALT SERPL W/O P-5'-P-CCNC: 17 U/L (ref 10–44)
ANION GAP SERPL CALC-SCNC: 10 MMOL/L (ref 8–16)
AST SERPL-CCNC: 21 U/L (ref 10–40)
BASOPHILS # BLD AUTO: 0.08 K/UL (ref 0–0.2)
BASOPHILS NFR BLD: 0.8 % (ref 0–1.9)
BILIRUB SERPL-MCNC: 0.5 MG/DL (ref 0.1–1)
BUN SERPL-MCNC: 27 MG/DL (ref 8–23)
CALCIUM SERPL-MCNC: 9.8 MG/DL (ref 8.7–10.5)
CHLORIDE SERPL-SCNC: 107 MMOL/L (ref 95–110)
CHOLEST SERPL-MCNC: 185 MG/DL (ref 120–199)
CHOLEST/HDLC SERPL: 2.9 {RATIO} (ref 2–5)
CO2 SERPL-SCNC: 26 MMOL/L (ref 23–29)
CREAT SERPL-MCNC: 1.2 MG/DL (ref 0.5–1.4)
CREAT UR-MCNC: 91 MG/DL (ref 15–325)
DIFFERENTIAL METHOD BLD: ABNORMAL
EOSINOPHIL # BLD AUTO: 0.3 K/UL (ref 0–0.5)
EOSINOPHIL NFR BLD: 2.5 % (ref 0–8)
ERYTHROCYTE [DISTWIDTH] IN BLOOD BY AUTOMATED COUNT: 13.3 % (ref 11.5–14.5)
EST. GFR  (NO RACE VARIABLE): 44.1 ML/MIN/1.73 M^2
ESTIMATED AVG GLUCOSE: 120 MG/DL (ref 68–131)
GLUCOSE SERPL-MCNC: 110 MG/DL (ref 70–110)
HBA1C MFR BLD: 5.8 % (ref 4–5.6)
HCT VFR BLD AUTO: 49.3 % (ref 37–48.5)
HDLC SERPL-MCNC: 63 MG/DL (ref 40–75)
HDLC SERPL: 34.1 % (ref 20–50)
HGB BLD-MCNC: 15.6 G/DL (ref 12–16)
IMM GRANULOCYTES # BLD AUTO: 0.05 K/UL (ref 0–0.04)
IMM GRANULOCYTES NFR BLD AUTO: 0.5 % (ref 0–0.5)
LDLC SERPL CALC-MCNC: 102.4 MG/DL (ref 63–159)
LYMPHOCYTES # BLD AUTO: 3.5 K/UL (ref 1–4.8)
LYMPHOCYTES NFR BLD: 34.2 % (ref 18–48)
MCH RBC QN AUTO: 30 PG (ref 27–31)
MCHC RBC AUTO-ENTMCNC: 31.6 G/DL (ref 32–36)
MCV RBC AUTO: 95 FL (ref 82–98)
MICROALBUMIN UR DL<=1MG/L-MCNC: 2293 UG/ML
MONOCYTES # BLD AUTO: 0.8 K/UL (ref 0.3–1)
MONOCYTES NFR BLD: 7.6 % (ref 4–15)
NEUTROPHILS # BLD AUTO: 5.6 K/UL (ref 1.8–7.7)
NEUTROPHILS NFR BLD: 54.4 % (ref 38–73)
NONHDLC SERPL-MCNC: 122 MG/DL
NRBC BLD-RTO: 0 /100 WBC
PLATELET # BLD AUTO: 278 K/UL (ref 150–450)
PMV BLD AUTO: 9.7 FL (ref 9.2–12.9)
POTASSIUM SERPL-SCNC: 4.2 MMOL/L (ref 3.5–5.1)
PROT SERPL-MCNC: 7.1 G/DL (ref 6–8.4)
RBC # BLD AUTO: 5.2 M/UL (ref 4–5.4)
SODIUM SERPL-SCNC: 143 MMOL/L (ref 136–145)
TRIGL SERPL-MCNC: 98 MG/DL (ref 30–150)
TSH SERPL DL<=0.005 MIU/L-ACNC: 2.34 UIU/ML (ref 0.4–4)
WBC # BLD AUTO: 10.18 K/UL (ref 3.9–12.7)

## 2025-01-02 PROCEDURE — 82043 UR ALBUMIN QUANTITATIVE: CPT | Performed by: STUDENT IN AN ORGANIZED HEALTH CARE EDUCATION/TRAINING PROGRAM

## 2025-01-02 PROCEDURE — 85025 COMPLETE CBC W/AUTO DIFF WBC: CPT | Performed by: STUDENT IN AN ORGANIZED HEALTH CARE EDUCATION/TRAINING PROGRAM

## 2025-01-02 PROCEDURE — 82306 VITAMIN D 25 HYDROXY: CPT | Performed by: STUDENT IN AN ORGANIZED HEALTH CARE EDUCATION/TRAINING PROGRAM

## 2025-01-02 PROCEDURE — 84443 ASSAY THYROID STIM HORMONE: CPT | Performed by: STUDENT IN AN ORGANIZED HEALTH CARE EDUCATION/TRAINING PROGRAM

## 2025-01-02 PROCEDURE — 80053 COMPREHEN METABOLIC PANEL: CPT | Performed by: STUDENT IN AN ORGANIZED HEALTH CARE EDUCATION/TRAINING PROGRAM

## 2025-01-02 PROCEDURE — 83036 HEMOGLOBIN GLYCOSYLATED A1C: CPT | Performed by: STUDENT IN AN ORGANIZED HEALTH CARE EDUCATION/TRAINING PROGRAM

## 2025-01-02 PROCEDURE — 80061 LIPID PANEL: CPT | Performed by: STUDENT IN AN ORGANIZED HEALTH CARE EDUCATION/TRAINING PROGRAM

## 2025-01-10 ENCOUNTER — OFFICE VISIT (OUTPATIENT)
Dept: FAMILY MEDICINE | Facility: CLINIC | Age: 85
End: 2025-01-10
Payer: MEDICARE

## 2025-01-10 VITALS
DIASTOLIC BLOOD PRESSURE: 74 MMHG | HEART RATE: 75 BPM | HEIGHT: 63 IN | OXYGEN SATURATION: 93 % | BODY MASS INDEX: 26.25 KG/M2 | RESPIRATION RATE: 16 BRPM | SYSTOLIC BLOOD PRESSURE: 112 MMHG | WEIGHT: 148.13 LBS

## 2025-01-10 DIAGNOSIS — F41.9 ANXIETY: ICD-10-CM

## 2025-01-10 DIAGNOSIS — I10 PRIMARY HYPERTENSION: ICD-10-CM

## 2025-01-10 DIAGNOSIS — I25.810 CORONARY ARTERY DISEASE INVOLVING AUTOLOGOUS VEIN CORONARY BYPASS GRAFT WITHOUT ANGINA PECTORIS: ICD-10-CM

## 2025-01-10 DIAGNOSIS — J44.9 CHRONIC OBSTRUCTIVE PULMONARY DISEASE, UNSPECIFIED COPD TYPE: ICD-10-CM

## 2025-01-10 DIAGNOSIS — R41.3 MEMORY DEFICIT: ICD-10-CM

## 2025-01-10 DIAGNOSIS — C50.411 MALIGNANT NEOPLASM OF UPPER-OUTER QUADRANT OF RIGHT BREAST IN FEMALE, ESTROGEN RECEPTOR POSITIVE: ICD-10-CM

## 2025-01-10 DIAGNOSIS — N18.32 STAGE 3B CHRONIC KIDNEY DISEASE: ICD-10-CM

## 2025-01-10 DIAGNOSIS — M51.360 DEGENERATION OF INTERVERTEBRAL DISC OF LUMBAR REGION WITH DISCOGENIC BACK PAIN: Primary | ICD-10-CM

## 2025-01-10 DIAGNOSIS — Z17.0 MALIGNANT NEOPLASM OF UPPER-OUTER QUADRANT OF RIGHT BREAST IN FEMALE, ESTROGEN RECEPTOR POSITIVE: ICD-10-CM

## 2025-01-10 DIAGNOSIS — E78.2 MIXED HYPERLIPIDEMIA: ICD-10-CM

## 2025-01-10 DIAGNOSIS — F51.01 PRIMARY INSOMNIA: ICD-10-CM

## 2025-01-10 DIAGNOSIS — E55.9 VITAMIN D DEFICIENCY: ICD-10-CM

## 2025-01-10 PROCEDURE — 99213 OFFICE O/P EST LOW 20 MIN: CPT | Mod: PBBFAC,PN | Performed by: STUDENT IN AN ORGANIZED HEALTH CARE EDUCATION/TRAINING PROGRAM

## 2025-01-10 PROCEDURE — 99999 PR PBB SHADOW E&M-EST. PATIENT-LVL III: CPT | Mod: PBBFAC,,, | Performed by: STUDENT IN AN ORGANIZED HEALTH CARE EDUCATION/TRAINING PROGRAM

## 2025-01-10 RX ORDER — ATORVASTATIN CALCIUM 20 MG/1
20 TABLET, FILM COATED ORAL DAILY
Qty: 90 TABLET | Refills: 3 | Status: SHIPPED | OUTPATIENT
Start: 2025-01-10

## 2025-01-10 RX ORDER — DULOXETIN HYDROCHLORIDE 20 MG/1
20 CAPSULE, DELAYED RELEASE ORAL DAILY
Qty: 90 CAPSULE | Refills: 3 | Status: SHIPPED | OUTPATIENT
Start: 2025-01-10

## 2025-01-10 RX ORDER — ERGOCALCIFEROL 1.25 MG/1
50000 CAPSULE ORAL
Qty: 12 CAPSULE | Refills: 3 | Status: SHIPPED | OUTPATIENT
Start: 2025-01-10

## 2025-01-10 RX ORDER — OLMESARTAN MEDOXOMIL 40 MG/1
40 TABLET ORAL DAILY
Qty: 90 TABLET | Refills: 3 | Status: SHIPPED | OUTPATIENT
Start: 2025-01-10 | End: 2026-01-10

## 2025-01-10 RX ORDER — TRAZODONE HYDROCHLORIDE 50 MG/1
50 TABLET ORAL NIGHTLY
Qty: 90 TABLET | Refills: 3 | Status: SHIPPED | OUTPATIENT
Start: 2025-01-10 | End: 2026-01-10

## 2025-01-10 RX ORDER — METOPROLOL SUCCINATE 50 MG/1
50 TABLET, EXTENDED RELEASE ORAL DAILY
Qty: 90 TABLET | Refills: 3 | Status: SHIPPED | OUTPATIENT
Start: 2025-01-10 | End: 2026-01-10

## 2025-01-10 RX ORDER — AMLODIPINE BESYLATE 5 MG/1
5 TABLET ORAL 2 TIMES DAILY
Qty: 180 TABLET | Refills: 3 | Status: SHIPPED | OUTPATIENT
Start: 2025-01-10

## 2025-01-10 NOTE — ASSESSMENT & PLAN NOTE
BMP  Lab Results   Component Value Date     01/02/2025    K 4.2 01/02/2025     01/02/2025    CO2 26 01/02/2025    BUN 27 (H) 01/02/2025    CREATININE 1.2 01/02/2025    CALCIUM 9.8 01/02/2025    ANIONGAP 10 01/02/2025    EGFRNORACEVR 44.1 (A) 01/02/2025     Stable. Continue current medications and regular followup.

## 2025-01-10 NOTE — PROGRESS NOTES
Subjective:       Patient ID: Mirta Guerin is a 84 y.o. female.    Chief Complaint: Follow-up    History of Present Illness    CHIEF COMPLAINT:  Ms. Guerin presents today for follow-up regarding memory issues and depression.    MEMORY CONCERNS:  She reports struggling with memory at times but can usually recall information when sitting quietly to think about it. She has difficulty with minor details such as remembering when she went grocery shopping. She denies ever getting lost. Her son assists with managing bills, though this appears to be voluntary rather than due to necessity.    DEPRESSION AND ANXIETY:  She reports feeling mildly low but not seriously depressed. She continues Cymbalta (duloxetine) for anxiety and depression management.    CURRENT MEDICATIONS:  She continues Amlodipine, Anastrozole, Duloxetine, Metoprolol 50 mg, and Atorvastatin (Lipitor) 40 mg.       Review of Systems   Constitutional:  Negative for activity change and appetite change.   Respiratory:  Negative for shortness of breath.    Cardiovascular:  Negative for chest pain.   Gastrointestinal:  Negative for abdominal pain.   Genitourinary:  Negative for dysuria.   Integumentary:  Negative for rash.   Psychiatric/Behavioral:  Negative for sleep disturbance.       Patient Active Problem List   Diagnosis    Personal history of tobacco use, presenting hazards to health    NEGRITA on CPAP    History of breast cancer    Coronary artery disease involving autologous vein coronary bypass graft without angina pectoris    Type II diabetes mellitus with neurological manifestations    Psoriasis    Disc degeneration, lumbar    Spondylolisthesis of lumbar region    Lumbar facet arthropathy    Allergic rhinitis    Primary hypertension    Hx of CABG    Cardiac pacemaker in situ    SSS (sick sinus syndrome)    Comprehensive diabetic foot examination, type 2 DM, encounter for    Former smoker    High risk medication use    Postmenopausal     Nephrolithiasis    Gastroesophageal reflux disease    Mitral valve insufficiency    Osteoarthritis    Osteopenia    Pulmonary hypertension    Ventricular premature beats    Vitamin D deficiency    Pulmonary nodules    Memory deficit    Aortic atherosclerosis    Chronic obstructive pulmonary disease, unspecified COPD type    Primary insomnia    Malignant neoplasm of upper-outer quadrant of right breast in female, estrogen receptor positive    Mixed hyperlipidemia    Anxiety    Stage 3b chronic kidney disease     Mirta has a current medication list which includes the following prescription(s): anastrozole, aspirin, amlodipine, atorvastatin, duloxetine, empagliflozin, ergocalciferol, metoprolol succinate, olmesartan, and trazodone.        Health Maintenance Due   Topic Date Due    RSV Vaccine (Age 60+ and Pregnant patients) (1 - 1-dose 75+ series) Never done    Shingles Vaccine (2 of 2) 10/19/2020    Eye Exam  06/24/2023    Influenza Vaccine (1) 09/01/2024    COVID-19 Vaccine (4 - 2024-25 season) 09/01/2024      Health Maintenance reviewed and discussed- encouraged vaccines, eye exam.     Objective:      Physical Exam  Constitutional:       General: She is not in acute distress.     Appearance: Normal appearance. She is not ill-appearing.   Eyes:      Conjunctiva/sclera: Conjunctivae normal.   Cardiovascular:      Rate and Rhythm: Normal rate and regular rhythm.      Heart sounds: Normal heart sounds. No murmur heard.  Pulmonary:      Effort: Pulmonary effort is normal. No respiratory distress.      Breath sounds: Normal breath sounds. No wheezing or rales.   Musculoskeletal:      Right lower leg: No edema.      Left lower leg: No edema.   Skin:     General: Skin is warm and dry.   Neurological:      Mental Status: She is alert. Mental status is at baseline.      Gait: Gait normal.   Psychiatric:         Mood and Affect: Mood normal.         Behavior: Behavior normal.         Thought Content: Thought content normal.          Judgment: Judgment normal.             Assessment:       Assessment & Plan    1. Assessed memory and cognitive function, noting mild memory issues but no significant concerns  2. Evaluated depression status, currently managed with Cymbalta (duloxetine)  3. Reviewed lab results:  4. - Noted proteinuria indicating age-related kidney changes  5. - A1c in pre-diabetes range, indicating good control  6. - Cholesterol levels optimal  7. - Mild chronic kidney disease, improved from previous results  8. Considered adding Jardiance for additional kidney protection           Plan:       1. Degeneration of intervertebral disc of lumbar region with discogenic back pain  Assessment & Plan:  Stable. Continue current medications and regular followup.        2. Malignant neoplasm of upper-outer quadrant of right breast in female, estrogen receptor positive  -     DULoxetine (CYMBALTA) 20 MG capsule; Take 1 capsule (20 mg total) by mouth once daily.  Dispense: 90 capsule; Refill: 3    3. Memory deficit  Assessment & Plan:  Stable. Continue current medications and regular followup.        4. Anxiety  Assessment & Plan:  Overall doing fair.  Possibly with mild depressive symptoms    Orders:  -     DULoxetine (CYMBALTA) 20 MG capsule; Take 1 capsule (20 mg total) by mouth once daily.  Dispense: 90 capsule; Refill: 3    5. Chronic obstructive pulmonary disease, unspecified COPD type  Assessment & Plan:  Stable. Continue current medications and regular followup.        6. Primary hypertension  Assessment & Plan:  Stable. Continue current medications and regular followup.  Hypertension Medications               amLODIPine (NORVASC) 5 MG tablet TAKE 1 TABLET BY MOUTH TWICE A DAY    metoprolol succinate (TOPROL-XL) 50 MG 24 hr tablet Take 1 tablet (50 mg total) by mouth every evening.    olmesartan (BENICAR) 40 MG tablet Take 1 tablet (40 mg total) by mouth every evening.              Orders:  -     olmesartan (BENICAR) 40 MG tablet;  Take 1 tablet (40 mg total) by mouth once daily.  Dispense: 90 tablet; Refill: 3  -     amLODIPine (NORVASC) 5 MG tablet; Take 1 tablet (5 mg total) by mouth 2 (two) times daily.  Dispense: 180 tablet; Refill: 3  -     metoprolol succinate (TOPROL-XL) 50 MG 24 hr tablet; Take 1 tablet (50 mg total) by mouth once daily.  Dispense: 90 tablet; Refill: 3    7. Stage 3b chronic kidney disease  Assessment & Plan:  BMP  Lab Results   Component Value Date     01/02/2025    K 4.2 01/02/2025     01/02/2025    CO2 26 01/02/2025    BUN 27 (H) 01/02/2025    CREATININE 1.2 01/02/2025    CALCIUM 9.8 01/02/2025    ANIONGAP 10 01/02/2025    EGFRNORACEVR 44.1 (A) 01/02/2025     Stable. Continue current medications and regular followup.      Orders:  -     empagliflozin (JARDIANCE) 10 mg tablet; Take 1 tablet (10 mg total) by mouth once daily.  Dispense: 90 tablet; Refill: 3    8. Primary insomnia  -     traZODone (DESYREL) 50 MG tablet; Take 1 tablet (50 mg total) by mouth every evening.  Dispense: 90 tablet; Refill: 3    9. Mixed hyperlipidemia  -     atorvastatin (LIPITOR) 20 MG tablet; Take 1 tablet (20 mg total) by mouth once daily.  Dispense: 90 tablet; Refill: 3    10. Coronary artery disease involving autologous vein coronary bypass graft without angina pectoris  Assessment & Plan:  Stable. Continue current medications and regular followup.  Continue risk factor management        11. Vitamin D deficiency  -     ergocalciferol (ERGOCALCIFEROL) 50,000 unit Cap; Take 1 capsule (50,000 Units total) by mouth every 7 days.  Dispense: 12 capsule; Refill: 3         This note was generated with the assistance of ambient listening technology. Verbal consent was obtained by the patient and accompanying visitor(s) for the recording of patient appointment to facilitate this note. I attest to having reviewed and edited the generated note for accuracy, though some syntax or spelling errors may persist. Please contact the author  of this note for any clarification.

## 2025-01-13 ENCOUNTER — PATIENT MESSAGE (OUTPATIENT)
Dept: FAMILY MEDICINE | Facility: CLINIC | Age: 85
End: 2025-01-13
Payer: MEDICARE

## 2025-01-13 DIAGNOSIS — N18.32 STAGE 3B CHRONIC KIDNEY DISEASE: ICD-10-CM

## 2025-01-17 ENCOUNTER — TELEPHONE (OUTPATIENT)
Dept: CARDIOLOGY | Facility: CLINIC | Age: 85
End: 2025-01-17
Payer: MEDICARE

## 2025-01-17 NOTE — TELEPHONE ENCOUNTER
----- Message from Jaun sent at 1/17/2025  9:13 AM CST -----  Regarding: return call  Contact: patient  Type:  Patient Returning Call    Who Called:patient  Who Left Message for Patient:  Does the patient know what this is regarding?:returning office call  Would the patient rather a call back or a response via [facility]?   Best Call Back Number:[phone]  Additional Information:

## 2025-01-17 NOTE — TELEPHONE ENCOUNTER
----- Message from Med Assistant Banuelos sent at 1/17/2025 12:14 PM CST -----  Regarding: FW: advice    ----- Message -----  From: Cecelia Méndez  Sent: 1/17/2025  11:31 AM CST  To: [facility] Staff  Subject: advice                                           Type:  Needs Medical Advice    Who Called: whitney Carbone Call Back Number: [phone]    Additional Information: malorie wants a callback to get pt reschedule from 01/21/25 to a later date due to weather.  please call to discuss.

## 2025-02-11 LAB
OHS CV AF BURDEN PERCENT: < 1
OHS CV DC REMOTE DEVICE TYPE: NORMAL
OHS CV RV PACING PERCENT: 0.06 %

## 2025-03-16 ENCOUNTER — HOSPITAL ENCOUNTER (OUTPATIENT)
Dept: CARDIOLOGY | Facility: CLINIC | Age: 85
Discharge: HOME | End: 2025-03-16
Attending: INTERNAL MEDICINE
Payer: MEDICARE

## 2025-03-16 ENCOUNTER — CLINICAL SUPPORT (OUTPATIENT)
Dept: CARDIOLOGY | Facility: CLINIC | Age: 85
End: 2025-03-16

## 2025-03-16 DIAGNOSIS — R00.1 BRADYCARDIA, UNSPECIFIED: ICD-10-CM

## 2025-03-16 DIAGNOSIS — Z95.0 PRESENCE OF CARDIAC PACEMAKER: ICD-10-CM

## 2025-03-16 PROCEDURE — 93294 REM INTERROG EVL PM/LDLS PM: CPT | Mod: ,,, | Performed by: INTERNAL MEDICINE

## 2025-04-14 NOTE — PROGRESS NOTES
Electrophysiology Clinic Note    Reason for follow-up patient visit: Ongoing evaluation and routine device surveillance of a Medtronic dual-chamber pacemaker, implanted in 2012 in the setting of sick sinus syndrome, s/p a generator change on 10/15/2024.     PRESENTING HISTORY:     History of Present Illness:  Ms. Mirta Guerin is a kristine 84-year-old woman who returns to clinic today for ongoing evaluation and routine device surveillance of a Medtronic dual-chamber pacemaker, implanted in 2012 in the setting of sick sinus syndrome, s/p a generator change on 10/15/2024. She has a past medical history significant for coronary artery disease s/p prior CABG, sick sinus syndrome s/p implantation of a Medtronic dual-chamber pacemaker on 4/5/2012, mitral regurgitation, hypertension, hyperlipidemia, type II diabetes mellitus, CKD stage III, COPD, a history of right breast cancer, GERD, anxiety, osteoarthritis, chronic low back pain, osteopenia, aortic atherosclerosis, and NEGRITA - maintained on CPAP therapy. Her previous generator had reached the elective replacement interval, and she underwent a generator change on 10/15/2024.    She is followed in general cardiology with Dr. Juarez and JONNIE Quigley and was seen in clinic on 10/1/2024 where her device was interrogated. At interrogation, her prior generator was noted to have reached ROSETTA in 8/2024. She is not device dependent and remained in normal sinus rhythm with a narrow QRSd of 90ms. Her most recent pharmacologic nuclear cardiac stress test reveals preserved systolic function with LVEF 67%. She denies any alerts or alarms from her device. She underwent a successful generator change on 10/15/2024 and returns to clinic today for ongoing evaluation. She has recently been started on jardiance 10mg po daily.     In discussion with Ms. Guerin and her adult son today, she tells me that she is feeling overall quite well. She presents to clinic today with her adult son, who is  very involved in her care. She has healed well following her generator change, and has resumed her baseline level of physical activity with no reported limitations. She denies any episodes of dizziness, lightheadedness, syncope/presyncope, chest pain or chest discomfort, palpitations, nausea or vomiting, orthopnea, or PND. She reports baseline mild shortness of breath and dyspnea with exertion that she feels has remained stable. She can climb one flight of stairs prior to needing to take a break, but is limited by knee, hip, and back pain. She denies limitation with getting her groceries or doing her household chores. She continues to attempt to increase her level of physical activity.     Review of Systems:  Review of Systems   Constitutional:  Negative for activity change.   HENT:  Negative for nasal congestion, nosebleeds, postnasal drip, rhinorrhea, sinus pressure/congestion, sneezing and sore throat.    Respiratory:  Positive for shortness of breath. Negative for apnea, cough, chest tightness and wheezing.    Cardiovascular:  Negative for chest pain, palpitations and leg swelling.   Gastrointestinal:  Negative for abdominal distention, abdominal pain, blood in stool, change in bowel habit, constipation, diarrhea, nausea and vomiting.   Genitourinary:  Negative for dysuria and hematuria.   Musculoskeletal:  Positive for arthralgias and back pain. Negative for gait problem.   Neurological:  Negative for dizziness, seizures, syncope, weakness, light-headedness, headaches and coordination difficulties.        PAST HISTORY:     Past Medical History:   Diagnosis Date    Anxiety     AV block     Breast cancer     Cardiac pacemaker in situ     Coronary artery disease     Diabetes mellitus     GERD (gastroesophageal reflux disease)     HLD (hyperlipidemia)     Hypertension     Lung disease     Mass of appendix 05/01/2023    Melanoma     Mitral valve disorder     NEGRITA (obstructive sleep apnea)     no CPAP    Prediabetes      Psoriasis     Spinal stenosis     Squamous cell carcinoma of skin     right anterior scalp    Vitamin D deficiency        Past Surgical History:   Procedure Laterality Date    BREAST CYST ASPIRATION      BREAST LUMPECTOMY      CARDIAC PACEMAKER PLACEMENT  04/05/2012    COLONOSCOPY N/A 04/21/2023    Procedure: COLONOSCOPY;  Surgeon: Corey Blake III, MD;  Location: [facility] ENDO;  Service: Endoscopy;  Laterality: N/A;    CORONARY ARTERY BYPASS GRAFT  07/2017    HYSTERECTOMY      LAPAROSCOPIC SURGICAL REMOVAL OF CECUM N/A 05/01/2023    Procedure: EXCISION, CECUM, LAPAROSCOPIC;  Surgeon: Eduin Neff MD;  Location: [facility] OR;  Service: General;  Laterality: N/A;  ROBOTIC PARTIAL CECECTOMY    REPLACEMENT OF PACEMAKER GENERATOR Left 10/15/2024    Procedure: REPLACEMENT, PACEMAKER GENERATOR SINGLE CHAMBER;  Surgeon: ANGELIQUE Ashley MD;  Location: [facility] CATH/EP LAB;  Service: Cardiology;  Laterality: Left;  MEDTRONICS    REVISION OF SKIN POCKET FOR PACEMAKER  10/15/2024    Procedure: REVISION, SKIN POCKET, FOR CARDIAC PACEMAKER;  Surgeon: ANGELIQUE Ashley MD;  Location: [facility] CATH/EP LAB;  Service: Cardiology;;    SKIN CANCER EXCISION      UPPER GASTROINTESTINAL ENDOSCOPY         Family History:  Family History   Problem Relation Name Age of Onset    Cancer Mother          ovarian    Coronary artery disease Father      Stroke Father      Stroke Maternal Grandfather      Breast cancer Paternal Grandmother      Cancer Paternal Grandmother          breast    Stroke Paternal Grandfather      Stroke Brother      Ovarian cancer Neg Hx         Social History:  She  reports that she has been smoking cigarettes. She started smoking about 71 years ago. She has a 17.8 pack-year smoking history. She has been exposed to tobacco smoke. She has never used smokeless tobacco. She reports current alcohol use. She reports that she does not use drugs.      MEDICATIONS & ALLERGIES:     Review of patient's allergies indicates:  "  Allergen Reactions    Bacitracin zinc-polymyxin b Rash    Adhesive Rash    Benazepril Other (See Comments)     sleepy    Codeine Nausea And Vomiting    Polysporin [bacitracin-polymyxin b] Rash       Current Outpatient Medications on File Prior to Visit   Medication Sig Dispense Refill    amLODIPine (NORVASC) 5 MG tablet Take 1 tablet (5 mg total) by mouth 2 (two) times daily. 180 tablet 3    anastrozole (ARIMIDEX) 1 mg Tab Take 1 tablet (1 mg total) by mouth once daily. 90 tablet 3    aspirin (ECOTRIN) 81 MG EC tablet Take 81 mg by mouth once daily.      atorvastatin (LIPITOR) 20 MG tablet Take 1 tablet (20 mg total) by mouth once daily. 90 tablet 3    DULoxetine (CYMBALTA) 20 MG capsule Take 1 capsule (20 mg total) by mouth once daily. 90 capsule 3    empagliflozin (JARDIANCE) 10 mg tablet Take 1 tablet (10 mg total) by mouth once daily. 90 tablet 3    ergocalciferol (ERGOCALCIFEROL) 50,000 unit Cap Take 1 capsule (50,000 Units total) by mouth every 7 days. 12 capsule 3    metoprolol succinate (TOPROL-XL) 50 MG 24 hr tablet Take 1 tablet (50 mg total) by mouth once daily. 90 tablet 3    olmesartan (BENICAR) 40 MG tablet Take 1 tablet (40 mg total) by mouth once daily. 90 tablet 3    traZODone (DESYREL) 50 MG tablet Take 1 tablet (50 mg total) by mouth every evening. 90 tablet 3     No current facility-administered medications on file prior to visit.        OBJECTIVE:     Vital Signs:  /62 (BP Location: Left arm)   Pulse 65   Resp 16   Ht 5' 3" (1.6 m)   Wt 67.2 kg (148 lb 2.4 oz)   SpO2 (!) 90%   BMI 26.24 kg/m²     Physical Exam:  Physical Exam  Constitutional:       General: She is not in acute distress.     Appearance: Normal appearance. She is not ill-appearing or diaphoretic.      Comments: Well-appearing elderly woman in NAD, presenting with a rolling walker.    HENT:      Head: Normocephalic and atraumatic.      Nose: Nose normal.      Mouth/Throat:      Mouth: Mucous membranes are moist.      " Pharynx: Oropharynx is clear.   Eyes:      Pupils: Pupils are equal, round, and reactive to light.   Cardiovascular:      Rate and Rhythm: Normal rate and regular rhythm.      Pulses: Normal pulses.      Heart sounds: Murmur heard.      No friction rub. No gallop.      Comments: Left anterior chest wall incision site is in excellent repair with the device freely mobile within the pocket, with no evidence of device adhesion or erosion. II/VI CELESTE best appreciated at the RUSB.  Pulmonary:      Effort: Pulmonary effort is normal. No respiratory distress.      Breath sounds: Normal breath sounds. No wheezing, rhonchi or rales.   Chest:      Chest wall: No tenderness.   Abdominal:      General: There is no distension.      Palpations: Abdomen is soft.      Tenderness: There is no abdominal tenderness.   Musculoskeletal:         General: No swelling or tenderness.      Cervical back: Normal range of motion.      Right lower leg: No edema.      Left lower leg: No edema.   Skin:     General: Skin is warm and dry.      Findings: No erythema, lesion or rash.   Neurological:      General: No focal deficit present.      Mental Status: She is alert and oriented to person, place, and time. Mental status is at baseline.      Motor: No weakness.      Gait: Gait normal.   Psychiatric:         Mood and Affect: Mood normal.         Behavior: Behavior normal.          Laboratory Data:  Lab Results   Component Value Date    WBC 10.18 01/02/2025    HGB 15.6 01/02/2025    HCT 49.3 (H) 01/02/2025    MCV 95 01/02/2025     01/02/2025     Lab Results   Component Value Date     01/02/2025     01/02/2025    K 4.2 01/02/2025     01/02/2025    CO2 26 01/02/2025    BUN 27 (H) 01/02/2025    CREATININE 1.2 01/02/2025    CALCIUM 9.8 01/02/2025    MG 2.3 10/11/2024     Lab Results   Component Value Date    INR 0.9 10/11/2024       Pertinent Cardiac Data:  Personal interpretation of her most recent ECG: Normal sinus rhythm with  rate of 73 bpm,  ms,  ms, QT/QTc 416/458 ms, LAD, LVH.     Pharmacologic Nuclear Cardiac Stress Test - SPECT - 11/17/2021:    Normal myocardial perfusion scan. There is no evidence of myocardial ischemia or infarction.    The gated perfusion images showed an ejection fraction of 67% post stress. Normal ejection fraction is greater than 53%.    There is normal wall motion post stress.    LV cavity size is  and normal at stress.    The EKG portion of this study is negative for ischemia.    The patient reported chest pain during the stress test.    There were no arrhythmias during stress.    Resting 2D Transthoracic Echocardiogram - 10/7/2024:    Left Ventricle: The left ventricle is normal in size. Severely increased wall thickness. There is severe asymmetric hypertrophy. There is normal systolic function with a visually estimated ejection fraction of 60 - 65%. Ejection fraction is approximately 65%.    Right Ventricle: Normal right ventricular cavity size. Systolic function is normal. TAPSE is 2.10 cm. Catheter present in the ventricle.    Aortic Valve: The aortic valve is structurally normal. Moderately calcified cusps. Mildly restricted motion.    Mitral Valve: Mildly calcified leaflets. There is mild regurgitation.    IVC/SVC: Normal venous pressure at 3 mmHg.    Pericardium: There is a small effusion. No indication of cardiac tamponade.    Personal interpretation of today's Device Interrogation: Personal review of her device interrogation report (Medtronic Sabinal XT DR JOSHI, initially implanted 4/5/2012 with Dr. Calderon Burger with a generator change with me on 10/15/2024) reveals adequate battery longevity with an estimated remaining 14.5 years. Her leads were interrogated with acceptable and stable lead parameters. She is currently AS-VS with underlying normal sinus rhythm at a rate of 63 bpm. She is programmed AAIR <--> DDDR at 60 bpm, and is RA paced 17.8% and RV paced <0.1%. There are no concerning  AHR or VHR episodes noted. She has not had any recorded episodes of AF/AT, with an overall burden of 0%.        ASSESSMENT & PLAN:   Ms. Mirta Guerin is a kristine 84-year-old woman who returns to clinic today for ongoing evaluation and routine device surveillance of a Medtronic dual-chamber pacemaker, implanted in 2012 in the setting of sick sinus syndrome, s/p a generator change on 10/15/2024. She has a past medical history significant for coronary artery disease s/p prior CABG, sick sinus syndrome s/p implantation of a Medtronic dual-chamber pacemaker on 4/5/2012, mitral regurgitation, hypertension, hyperlipidemia, type II diabetes mellitus, CKD stage III, COPD, a history of right breast cancer, GERD, anxiety, osteoarthritis, chronic low back pain, osteopenia, aortic atherosclerosis, and NEGRITA - maintained on CPAP therapy. Her previous generator had reached the elective replacement interval, and she underwent a generator change on 10/15/2024.     - She has a normally functioning Medtronic dual-chamber pacemaker on device interrogation today. She has adequate battery longevity with an estimated remaining 14.5 years. Her leads were interrogated with acceptable and stable lead parameters. She is currently AS-VS with underlying normal sinus rhythm at a rate of 63 bpm. She is programmed AAIR <--> DDDR at 60 bpm, and is RA paced 17.8% and RV paced <0.1%. There are no concerning AHR or VHR episodes noted. She has not had any recorded episodes of AF/AT, with an overall burden of 0%. She is not device dependent. Her intrinsic QRSd remains narrow at 90ms. Her systolic function remains preserved with her most recent LVEF 60-65%. She has no current indication for device upgrade. She has not captured any sustained or concerning atrial or ventricular arrhythmias since her prior counter clearance.   - Her previous pacing had reverted to VVI with a lower rate limit of 65 bpm following reaching ROSETTA. As she has completed a  successful generator change, her pacing has been reprogrammed to AAIR <--> DDDR with a lower rate limit of 60 bpm.   - She will continue to send remote transmissions for ongoing surveillance, with her next in-office interrogation in six months.   - She will continue to follow with her general cardiologist and her PCP for ongoing management of her comorbid conditions.     This patient will return to clinic with me in one year for ongoing device surveillance, with continued remote transmissions in the interim. All questions and concerns were addressed at this encounter. Total time spent in this patient encounter: 32 minutes.     Signing Physician:       JARAD Ashley MD  Electrophysiology Attending

## 2025-04-15 ENCOUNTER — HOSPITAL ENCOUNTER (OUTPATIENT)
Dept: CARDIOLOGY | Facility: CLINIC | Age: 85
Discharge: HOME | End: 2025-04-15
Attending: STUDENT IN AN ORGANIZED HEALTH CARE EDUCATION/TRAINING PROGRAM
Payer: MEDICARE

## 2025-04-15 ENCOUNTER — OFFICE VISIT (OUTPATIENT)
Dept: CARDIOLOGY | Facility: CLINIC | Age: 85
End: 2025-04-15
Payer: MEDICARE

## 2025-04-15 VITALS
WEIGHT: 148.13 LBS | BODY MASS INDEX: 26.25 KG/M2 | HEART RATE: 65 BPM | OXYGEN SATURATION: 90 % | DIASTOLIC BLOOD PRESSURE: 62 MMHG | HEIGHT: 63 IN | SYSTOLIC BLOOD PRESSURE: 106 MMHG | RESPIRATION RATE: 16 BRPM

## 2025-04-15 DIAGNOSIS — I10 PRIMARY HYPERTENSION: ICD-10-CM

## 2025-04-15 DIAGNOSIS — I25.810 CORONARY ARTERY DISEASE INVOLVING CORONARY BYPASS GRAFT OF NATIVE HEART WITHOUT ANGINA PECTORIS: ICD-10-CM

## 2025-04-15 DIAGNOSIS — I70.0 AORTIC ATHEROSCLEROSIS: ICD-10-CM

## 2025-04-15 DIAGNOSIS — Z95.0 CARDIAC PACEMAKER IN SITU: ICD-10-CM

## 2025-04-15 DIAGNOSIS — E78.2 MIXED HYPERLIPIDEMIA: ICD-10-CM

## 2025-04-15 DIAGNOSIS — F41.9 ANXIETY: ICD-10-CM

## 2025-04-15 DIAGNOSIS — G89.29 CHRONIC MIDLINE LOW BACK PAIN, UNSPECIFIED WHETHER SCIATICA PRESENT: ICD-10-CM

## 2025-04-15 DIAGNOSIS — N18.32 STAGE 3B CHRONIC KIDNEY DISEASE: ICD-10-CM

## 2025-04-15 DIAGNOSIS — J44.9 CHRONIC OBSTRUCTIVE PULMONARY DISEASE, UNSPECIFIED COPD TYPE: ICD-10-CM

## 2025-04-15 DIAGNOSIS — M15.9 OSTEOARTHRITIS OF MULTIPLE JOINTS, UNSPECIFIED OSTEOARTHRITIS TYPE: ICD-10-CM

## 2025-04-15 DIAGNOSIS — I27.20 PULMONARY HYPERTENSION: ICD-10-CM

## 2025-04-15 DIAGNOSIS — K21.9 GASTROESOPHAGEAL REFLUX DISEASE WITHOUT ESOPHAGITIS: ICD-10-CM

## 2025-04-15 DIAGNOSIS — M54.50 CHRONIC MIDLINE LOW BACK PAIN, UNSPECIFIED WHETHER SCIATICA PRESENT: ICD-10-CM

## 2025-04-15 DIAGNOSIS — M85.80 OSTEOPENIA, UNSPECIFIED LOCATION: ICD-10-CM

## 2025-04-15 DIAGNOSIS — I34.0 NONRHEUMATIC MITRAL VALVE REGURGITATION: ICD-10-CM

## 2025-04-15 DIAGNOSIS — Z85.3 HISTORY OF BREAST CANCER: ICD-10-CM

## 2025-04-15 DIAGNOSIS — G47.33 OSA ON CPAP: ICD-10-CM

## 2025-04-15 DIAGNOSIS — F51.01 PRIMARY INSOMNIA: ICD-10-CM

## 2025-04-15 DIAGNOSIS — E11.49 TYPE II DIABETES MELLITUS WITH NEUROLOGICAL MANIFESTATIONS: ICD-10-CM

## 2025-04-15 DIAGNOSIS — I49.5 SSS (SICK SINUS SYNDROME): Primary | ICD-10-CM

## 2025-04-15 DIAGNOSIS — Z95.1 HX OF CABG: ICD-10-CM

## 2025-04-15 LAB
BATTERY VOLTAGE (V): 3.2 V
IMPEDANCE RA LEAD (NATIVE): 361 OHMS
IMPEDANCE RA LEAD: 475 OHMS
P/R-WAVE RA LEAD (NATIVE): 15.4 MV
P/R-WAVE RA LEAD: 1.4 MV
THRESHOLD MS RA LEAD (NATIVE): 0.4 MS
THRESHOLD MS RA LEAD: 0.4 MS
THRESHOLD V RA LEAD (NATIVE): 0.62 V
THRESHOLD V RA LEAD: 1.12 V

## 2025-04-15 PROCEDURE — 99999 PR PBB SHADOW E&M-EST. PATIENT-LVL III: CPT | Mod: PBBFAC,,, | Performed by: STUDENT IN AN ORGANIZED HEALTH CARE EDUCATION/TRAINING PROGRAM

## 2025-04-15 PROCEDURE — 99214 OFFICE O/P EST MOD 30 MIN: CPT | Mod: S$PBB,,, | Performed by: STUDENT IN AN ORGANIZED HEALTH CARE EDUCATION/TRAINING PROGRAM

## 2025-04-15 PROCEDURE — 93288 INTERROG EVL PM/LDLS PM IP: CPT | Mod: PBBFAC,PN

## 2025-04-15 PROCEDURE — 93288 INTERROG EVL PM/LDLS PM IP: CPT | Mod: 26,S$PBB,, | Performed by: STUDENT IN AN ORGANIZED HEALTH CARE EDUCATION/TRAINING PROGRAM

## 2025-04-15 PROCEDURE — 99213 OFFICE O/P EST LOW 20 MIN: CPT | Mod: PBBFAC,25,PN | Performed by: STUDENT IN AN ORGANIZED HEALTH CARE EDUCATION/TRAINING PROGRAM

## 2025-04-15 NOTE — TELEPHONE ENCOUNTER
No care due was identified.  [facility] Embedded Care Due Messages. Reference number: [id number].   4/15/2025 3:26:30 PM CDT

## 2025-04-16 RX ORDER — ATORVASTATIN CALCIUM 20 MG/1
20 TABLET, FILM COATED ORAL DAILY
Qty: 90 TABLET | Refills: 2 | Status: SHIPPED | OUTPATIENT
Start: 2025-04-16

## 2025-04-16 RX ORDER — TRAZODONE HYDROCHLORIDE 50 MG/1
50 TABLET ORAL NIGHTLY
Qty: 90 TABLET | Refills: 2 | Status: SHIPPED | OUTPATIENT
Start: 2025-04-16

## 2025-04-16 RX ORDER — OLMESARTAN MEDOXOMIL 40 MG/1
40 TABLET ORAL DAILY
Qty: 90 TABLET | Refills: 2 | Status: SHIPPED | OUTPATIENT
Start: 2025-04-16

## 2025-04-16 NOTE — TELEPHONE ENCOUNTER
Refill Routing Note   Medication(s) are not appropriate for processing by [facility] for the following reason(s):        New or recently adjusted medication    [facility] action(s):  Defer  Approve        Medication Therapy Plan: INITIATED BY PROVIDER ON 01/10/2025      Appointments  past 12m or future 3m with PCP    Date Provider   Last Visit   1/10/2025 Lizzy Larios MD   Next Visit   4/22/2025 Lizzy Larios MD   ED visits in past 90 days: 0        Note composed:8:52 AM 04/16/2025

## 2025-04-22 ENCOUNTER — OFFICE VISIT (OUTPATIENT)
Dept: FAMILY MEDICINE | Facility: CLINIC | Age: 85
End: 2025-04-22
Payer: MEDICARE

## 2025-04-22 VITALS
HEART RATE: 67 BPM | BODY MASS INDEX: 27.36 KG/M2 | HEIGHT: 63 IN | OXYGEN SATURATION: 95 % | RESPIRATION RATE: 16 BRPM | SYSTOLIC BLOOD PRESSURE: 122 MMHG | DIASTOLIC BLOOD PRESSURE: 72 MMHG | WEIGHT: 154.38 LBS

## 2025-04-22 DIAGNOSIS — F41.9 ANXIETY: ICD-10-CM

## 2025-04-22 DIAGNOSIS — Z17.0 MALIGNANT NEOPLASM OF UPPER-OUTER QUADRANT OF RIGHT BREAST IN FEMALE, ESTROGEN RECEPTOR POSITIVE: ICD-10-CM

## 2025-04-22 DIAGNOSIS — I10 PRIMARY HYPERTENSION: ICD-10-CM

## 2025-04-22 DIAGNOSIS — R41.3 MEMORY DEFICIT: Primary | ICD-10-CM

## 2025-04-22 DIAGNOSIS — N18.32 STAGE 3B CHRONIC KIDNEY DISEASE: ICD-10-CM

## 2025-04-22 DIAGNOSIS — E78.2 MIXED HYPERLIPIDEMIA: ICD-10-CM

## 2025-04-22 DIAGNOSIS — C50.411 MALIGNANT NEOPLASM OF UPPER-OUTER QUADRANT OF RIGHT BREAST IN FEMALE, ESTROGEN RECEPTOR POSITIVE: ICD-10-CM

## 2025-04-22 DIAGNOSIS — E11.49 TYPE II DIABETES MELLITUS WITH NEUROLOGICAL MANIFESTATIONS: ICD-10-CM

## 2025-04-22 DIAGNOSIS — M85.80 OSTEOPENIA, UNSPECIFIED LOCATION: ICD-10-CM

## 2025-04-22 DIAGNOSIS — J44.9 CHRONIC OBSTRUCTIVE PULMONARY DISEASE, UNSPECIFIED COPD TYPE: ICD-10-CM

## 2025-04-22 DIAGNOSIS — E55.9 VITAMIN D DEFICIENCY: ICD-10-CM

## 2025-04-22 PROCEDURE — 99999 PR PBB SHADOW E&M-EST. PATIENT-LVL IV: CPT | Mod: PBBFAC,,, | Performed by: STUDENT IN AN ORGANIZED HEALTH CARE EDUCATION/TRAINING PROGRAM

## 2025-04-22 PROCEDURE — 99214 OFFICE O/P EST MOD 30 MIN: CPT | Mod: PBBFAC,PN | Performed by: STUDENT IN AN ORGANIZED HEALTH CARE EDUCATION/TRAINING PROGRAM

## 2025-04-22 PROCEDURE — 99214 OFFICE O/P EST MOD 30 MIN: CPT | Mod: S$PBB,,, | Performed by: STUDENT IN AN ORGANIZED HEALTH CARE EDUCATION/TRAINING PROGRAM

## 2025-04-22 NOTE — ASSESSMENT & PLAN NOTE
Stable. Continue current medications and regular followup.  Hyperlipidemia Medications              atorvastatin (LIPITOR) 20 MG tablet Take 1 tablet (20 mg total) by mouth once daily.

## 2025-04-22 NOTE — ASSESSMENT & PLAN NOTE
Lab Results   Component Value Date    HGBA1C 5.8 (H) 01/02/2025     Recent addition of Jardiance for microalbuminuria

## 2025-04-22 NOTE — PROGRESS NOTES
Subjective:       Patient ID: Mirta Guerin is a 84 y.o. female.    Chief Complaint: Follow-up    History of Present Illness    CHIEF COMPLAINT:  Ms. Guerin presents today for follow up    MEDICATIONS:  She takes Amlodipine daily for blood pressure management, Ergocalciferol (Vitamin D), and Aspirin daily. Jardiance was recently started for kidney protection due to diabetes-associated proteinuria. She uses pill organizer boxes for medication management.    SLEEP:  She reports chronic difficulty with sleep initiation and takes OTC sleep aids. She denies problems maintaining sleep once asleep.       Review of Systems   Constitutional:  Negative for activity change and appetite change.   Respiratory:  Negative for shortness of breath.    Cardiovascular:  Negative for chest pain.   Gastrointestinal:  Negative for abdominal pain.   Genitourinary:  Negative for dysuria.   Musculoskeletal:  Negative for gait problem.   Integumentary:  Negative for rash.   Psychiatric/Behavioral:  Negative for depressed mood and sleep disturbance. The patient is not nervous/anxious.       Problem List[1]  Mirta has a current medication list which includes the following prescription(s): amlodipine, anastrozole, aspirin, atorvastatin, duloxetine, empagliflozin, ergocalciferol, metoprolol succinate, olmesartan, and trazodone.        Health Maintenance Due   Topic Date Due    Diabetic Eye Exam  06/24/2023      Health Maintenance reviewed and discussed- encourage eye exam.     Objective:      Physical Exam  Constitutional:       General: She is not in acute distress.     Appearance: Normal appearance. She is not ill-appearing.   Eyes:      Conjunctiva/sclera: Conjunctivae normal.   Cardiovascular:      Rate and Rhythm: Normal rate and regular rhythm.      Heart sounds: Normal heart sounds. No murmur heard.  Pulmonary:      Effort: Pulmonary effort is normal. No respiratory distress.      Breath sounds: Normal breath sounds. No wheezing or  rales.   Musculoskeletal:      Right lower leg: No edema.      Left lower leg: No edema.   Lymphadenopathy:      Cervical: No cervical adenopathy.   Skin:     General: Skin is warm and dry.   Neurological:      Mental Status: She is alert. Mental status is at baseline.      Gait: Gait normal.   Psychiatric:         Mood and Affect: Mood normal.         Behavior: Behavior normal.         Thought Content: Thought content normal.         Judgment: Judgment normal.             Assessment:       Assessment & Plan    1. Osteopenia with 5% risk of hip fracture, qualifying for stronger bone density medications.  2. Prescribed Jardiance for kidney protection due to protein in urine associated with diabetes.  3. Small heart murmur, likely congenital and not concerning.  4. Continued Amlodipine daily for BP management.           Plan:       1. Memory deficit  Assessment & Plan:  Stable. Continue current medications and regular followup.        2. Anxiety  Assessment & Plan:  Overall doing fair.  Possibly with mild depressive symptoms      3. Chronic obstructive pulmonary disease, unspecified COPD type  Assessment & Plan:  Stable. Continue current medications and regular followup.        4. Primary hypertension  Assessment & Plan:  Stable. Continue current medications and regular followup.  Hypertension Medications               amLODIPine (NORVASC) 5 MG tablet TAKE 1 TABLET BY MOUTH TWICE A DAY    metoprolol succinate (TOPROL-XL) 50 MG 24 hr tablet Take 1 tablet (50 mg total) by mouth every evening.    olmesartan (BENICAR) 40 MG tablet Take 1 tablet (40 mg total) by mouth every evening.                5. Vitamin D deficiency  Assessment & Plan:  Taking vitamin D      6. Osteopenia, unspecified location  Assessment & Plan:  Dexa  The L2-L3 vertebral bone mineral density is equal to 1.511 g/cm squared with a T score of 2.6.     The left femoral neck bone mineral density is equal to 0.836 g/cm squared with a T score of -1.5.      The total hip bone mineral density is equal to 0.885 g/cm squared with a T score of -1.0.     There is a 13.7% risk of a major osteoporotic fracture and a 5.5% risk of hip fracture in the next 10 years (FRAX).     Impression:Osteopenia.    She is not taking medication.  Seems she is not sure if she would like to proceed at this time          7. Mixed hyperlipidemia  Assessment & Plan:  Stable. Continue current medications and regular followup.  Hyperlipidemia Medications              atorvastatin (LIPITOR) 20 MG tablet Take 1 tablet (20 mg total) by mouth once daily.                8. Stage 3b chronic kidney disease  Assessment & Plan:  BMP  Lab Results   Component Value Date     01/02/2025    K 4.2 01/02/2025     01/02/2025    CO2 26 01/02/2025    BUN 27 (H) 01/02/2025    CREATININE 1.2 01/02/2025    CALCIUM 9.8 01/02/2025    ANIONGAP 10 01/02/2025    EGFRNORACEVR 44.1 (A) 01/02/2025     KFRE 5-Year: 6.6% at 1/2/2025  9:32 AM  Calculated from:  Serum Creatinine: 1.2 mg/dL at 1/2/2025  9:30 AM  Urine Albumin Creatinine Ratio: 2,519.8 ug/mg at 1/2/2025  9:32 AM  Age: 84 years  Sex: Female at 1/2/2025  9:32 AM  Has CKD-3 to CKD-5: Yes    On Jardiance      9. Malignant neoplasm of upper-outer quadrant of right breast in female, estrogen receptor positive  Assessment & Plan:  Stable. Continue current medications and regular followup.        10. Type II diabetes mellitus with neurological manifestations  Assessment & Plan:  Lab Results   Component Value Date    HGBA1C 5.8 (H) 01/02/2025     Recent addition of Jardiance for microalbuminuria           This note was generated with the assistance of ambient listening technology. Verbal consent was obtained by the patient and accompanying visitor(s) for the recording of patient appointment to facilitate this note. I attest to having reviewed and edited the generated note for accuracy, though some syntax or spelling errors may persist. Please contact the author  of this note for any clarification.                    [1]   Patient Active Problem List  Diagnosis    Personal history of tobacco use, presenting hazards to health    NEGRITA on CPAP    History of breast cancer    Coronary artery disease involving autologous vein coronary bypass graft without angina pectoris    Type II diabetes mellitus with neurological manifestations    Psoriasis    Disc degeneration, lumbar    Spondylolisthesis of lumbar region    Lumbar facet arthropathy    Allergic rhinitis    Primary hypertension    Hx of CABG    Cardiac pacemaker in situ    SSS (sick sinus syndrome)    Comprehensive diabetic foot examination, type 2 DM, encounter for    Former smoker    High risk medication use    Postmenopausal    Nephrolithiasis    Gastroesophageal reflux disease    Mitral valve insufficiency    Osteoarthritis    Osteopenia    Pulmonary hypertension    Ventricular premature beats    Vitamin D deficiency    Pulmonary nodules    Memory deficit    Aortic atherosclerosis    Chronic obstructive pulmonary disease, unspecified COPD type    Primary insomnia    Malignant neoplasm of upper-outer quadrant of right breast in female, estrogen receptor positive    Mixed hyperlipidemia    Anxiety    Stage 3b chronic kidney disease

## 2025-04-22 NOTE — ASSESSMENT & PLAN NOTE
Dexa  The L2-L3 vertebral bone mineral density is equal to 1.511 g/cm squared with a T score of 2.6.     The left femoral neck bone mineral density is equal to 0.836 g/cm squared with a T score of -1.5.     The total hip bone mineral density is equal to 0.885 g/cm squared with a T score of -1.0.     There is a 13.7% risk of a major osteoporotic fracture and a 5.5% risk of hip fracture in the next 10 years (FRAX).     Impression:Osteopenia.    She is not taking medication.  Seems she is not sure if she would like to proceed at this time

## 2025-04-22 NOTE — ASSESSMENT & PLAN NOTE
BMP  Lab Results   Component Value Date     01/02/2025    K 4.2 01/02/2025     01/02/2025    CO2 26 01/02/2025    BUN 27 (H) 01/02/2025    CREATININE 1.2 01/02/2025    CALCIUM 9.8 01/02/2025    ANIONGAP 10 01/02/2025    EGFRNORACEVR 44.1 (A) 01/02/2025     KFRE 5-Year: 6.6% at 1/2/2025  9:32 AM  Calculated from:  Serum Creatinine: 1.2 mg/dL at 1/2/2025  9:30 AM  Urine Albumin Creatinine Ratio: 2,519.8 ug/mg at 1/2/2025  9:32 AM  Age: 84 years  Sex: Female at 1/2/2025  9:32 AM  Has CKD-3 to CKD-5: Yes    On Jardiance

## 2025-06-16 ENCOUNTER — CLINICAL SUPPORT (OUTPATIENT)
Dept: CARDIOLOGY | Facility: CLINIC | Age: 85
End: 2025-06-16

## 2025-06-16 ENCOUNTER — HOSPITAL ENCOUNTER (OUTPATIENT)
Dept: CARDIOLOGY | Facility: CLINIC | Age: 85
Discharge: HOME | End: 2025-06-16
Attending: INTERNAL MEDICINE
Payer: MEDICARE

## 2025-06-16 DIAGNOSIS — Z95.0 PRESENCE OF CARDIAC PACEMAKER: ICD-10-CM

## 2025-06-16 DIAGNOSIS — R00.1 BRADYCARDIA, UNSPECIFIED: ICD-10-CM

## 2025-06-16 PROCEDURE — 93294 REM INTERROG EVL PM/LDLS PM: CPT | Mod: ,,, | Performed by: INTERNAL MEDICINE

## 2025-06-23 DIAGNOSIS — Z00.00 ENCOUNTER FOR MEDICARE ANNUAL WELLNESS EXAM: ICD-10-CM

## 2025-07-01 DIAGNOSIS — N18.32 STAGE 3B CHRONIC KIDNEY DISEASE: ICD-10-CM

## 2025-07-01 NOTE — TELEPHONE ENCOUNTER
No care due was identified.  [facility] Embedded Care Due Messages. Reference number: [id number].   7/01/2025 3:12:56 AM CDT

## 2025-07-01 NOTE — TELEPHONE ENCOUNTER
Refill Decision Note   Mirta Guerin  is requesting a refill authorization.  Brief Assessment and Rationale for Refill:  Approve     Medication Therapy Plan:         Comments:     Note composed:10:32 AM 07/01/2025

## 2025-07-21 ENCOUNTER — PATIENT MESSAGE (OUTPATIENT)
Dept: ADMINISTRATIVE | Facility: HOSPITAL | Age: 85
End: 2025-07-21
Payer: MEDICARE

## 2025-08-01 LAB
OHS CV AF BURDEN PERCENT: < 1
OHS CV AF BURDEN PERCENT: < 1
OHS CV DC REMOTE DEVICE TYPE: NORMAL
OHS CV DC REMOTE DEVICE TYPE: NORMAL
OHS CV ICD SHOCK: NO
OHS CV RV PACING PERCENT: 0.04 %
OHS CV RV PACING PERCENT: 0.07 %

## 2025-08-31 DIAGNOSIS — N18.32 STAGE 3B CHRONIC KIDNEY DISEASE: ICD-10-CM

## 2025-08-31 DIAGNOSIS — I10 PRIMARY HYPERTENSION: ICD-10-CM

## 2025-08-31 DIAGNOSIS — E78.2 MIXED HYPERLIPIDEMIA: ICD-10-CM

## 2025-08-31 RX ORDER — ATORVASTATIN CALCIUM 20 MG/1
20 TABLET, FILM COATED ORAL DAILY
Qty: 90 TABLET | Refills: 1 | Status: SHIPPED | OUTPATIENT
Start: 2025-08-31

## 2025-09-01 RX ORDER — METOPROLOL SUCCINATE 50 MG/1
50 TABLET, EXTENDED RELEASE ORAL DAILY
Qty: 90 TABLET | Refills: 1 | Status: SHIPPED | OUTPATIENT
Start: 2025-09-01 | End: 2026-02-28

## (undated) DEVICE — IRRIGATOR SUCTION W/TIP

## (undated) DEVICE — CLOSURE SKIN STERI STRIP 1/2X4

## (undated) DEVICE — DRAPE ABDOMINAL TIBURON 14X11

## (undated) DEVICE — TROCAR ENDOPATH XCEL 12X100MM

## (undated) DEVICE — SUT 3-0 VICRYL / SH (J416)

## (undated) DEVICE — SYR LUER LOCK STERILE 10ML

## (undated) DEVICE — UNDERGLOVES BIOGEL PI SZ 7 LF

## (undated) DEVICE — SEALER VESSEL DAVINCI XI

## (undated) DEVICE — GOWN POLY REINF BRTH SLV XL

## (undated) DEVICE — SUT 3-0 VICRYL SH CR/8 18

## (undated) DEVICE — CANNULA REDUCER 12-8MM

## (undated) DEVICE — SOL IRR NACL .9% 3000ML

## (undated) DEVICE — SET TUBE PNEUMOCLEAR SE HI FLO

## (undated) DEVICE — CANNULA SEAL 12MM

## (undated) DEVICE — SYR ONLY LUER LOCK 20CC

## (undated) DEVICE — NDL SAFETY 21G X 1 1/2 ECLPSE

## (undated) DEVICE — SUT VLOC 90 3-0 V-20 NDL 9

## (undated) DEVICE — SUT V-LOC 90 GS22 2-0 VIO 23CM

## (undated) DEVICE — SOL 9P NACL IRR PIC IL

## (undated) DEVICE — APPLICATOR CHLORAPREP ORN 26ML

## (undated) DEVICE — PACK CUSTOM UNIV BASIN SLI

## (undated) DEVICE — ELECTRODE REM PLYHSV RETURN 9

## (undated) DEVICE — PAD PINK TRENDELENBURG POS XL

## (undated) DEVICE — GLOVE SURG ULTRA TOUCH 7.5

## (undated) DEVICE — SLEEVE SCD EXPRESS KNEE MEDIUM

## (undated) DEVICE — PACK SIRUS BASIC V SURG STRL

## (undated) DEVICE — SUT MONOCRYL 4-0 PS-2

## (undated) DEVICE — LINER SUCTION 3000CC

## (undated) DEVICE — SUT VICRYL 0 CT-2 27 DYE

## (undated) DEVICE — SPONGE BULKEE II ABSRB 6X6.75

## (undated) DEVICE — SUT SILK 0 STRANDS 30IN BLK

## (undated) DEVICE — BLADE SURG CARBON STEEL SZ11

## (undated) DEVICE — SEAL UNIVERSAL 5MM-8MM XI

## (undated) DEVICE — OBTURATOR BLADELESS 8MM XI CLR

## (undated) DEVICE — NDL INSUF ULTRA VERESS 120MM

## (undated) DEVICE — BAG TISS RETRV MONARCH 10MM

## (undated) DEVICE — DRAPE ARM DAVINCI XI

## (undated) DEVICE — SOL ELECTROLUBE ANTI-STIC

## (undated) DEVICE — TRAY CATH FOL SIL URIMTR 16FR

## (undated) DEVICE — SUT 0 VICRYL / CT-1

## (undated) DEVICE — TROCAR ENDOPATH XCEL 5X100MM

## (undated) DEVICE — STRAP OR TABLE 5IN X 72IN

## (undated) DEVICE — GLOVE SURG ULTRA TOUCH 7

## (undated) DEVICE — SOL CLEARIFY VISUALIZATION LAP

## (undated) DEVICE — STAPLER SUREFORM 60 SPU

## (undated) DEVICE — SOL WATER STRL IRR 1000ML

## (undated) DEVICE — DRAPE COLUMN DAVINCI XI

## (undated) DEVICE — COVER TIP CURVED SCISSORS XI

## (undated) DEVICE — STRIP MEDI WND CLSR 1/2X4IN

## (undated) DEVICE — CARTRIDGE BABCOCK GRASPER 5X45

## (undated) DEVICE — STOPCOCK DISCOFIX 3 WAY

## (undated) DEVICE — TOWEL OR DISP STRL BLUE 4/PK

## (undated) DEVICE — SUT SA85H SILK 2-0

## (undated) DEVICE — RELOAD SUREFORM 60 3.5 BLU 6R